# Patient Record
Sex: FEMALE | Race: BLACK OR AFRICAN AMERICAN | NOT HISPANIC OR LATINO | Employment: UNEMPLOYED | URBAN - NONMETROPOLITAN AREA
[De-identification: names, ages, dates, MRNs, and addresses within clinical notes are randomized per-mention and may not be internally consistent; named-entity substitution may affect disease eponyms.]

---

## 2023-09-14 PROBLEM — Z86.79 PERSONAL HISTORY OF OTHER DISEASES OF THE CIRCULATORY SYSTEM: Status: ACTIVE | Noted: 2023-09-14

## 2023-09-14 PROBLEM — Z87.39 PERSONAL HISTORY OF OTHER DISEASES OF THE MUSCULOSKELETAL SYSTEM AND CONNECTIVE TISSUE: Status: ACTIVE | Noted: 2023-09-14

## 2023-09-14 PROBLEM — I99.8 OTHER DISORDER OF CIRCULATORY SYSTEM: Status: ACTIVE | Noted: 2023-09-14

## 2023-09-14 PROBLEM — I25.2 OLD MYOCARDIAL INFARCTION: Status: ACTIVE | Noted: 2023-09-14

## 2023-09-14 PROBLEM — Z87.19 PERSONAL HISTORY OF OTHER DISEASES OF THE DIGESTIVE SYSTEM: Status: ACTIVE | Noted: 2023-09-14

## 2023-10-01 PROBLEM — N94.9 ADNEXAL FULLNESS: Status: ACTIVE | Noted: 2023-10-01

## 2023-10-01 PROBLEM — I25.10 CAD (CORONARY ARTERY DISEASE): Status: ACTIVE | Noted: 2023-10-01

## 2023-10-01 PROBLEM — I49.5 SICK SINUS SYNDROME DUE TO SINOATRIAL NODE DYSFUNCTION (MULTI): Status: ACTIVE | Noted: 2023-10-01

## 2023-10-01 PROBLEM — I44.7 LBBB (LEFT BUNDLE BRANCH BLOCK): Status: ACTIVE | Noted: 2023-10-01

## 2023-10-01 PROBLEM — I50.9 CHF (CONGESTIVE HEART FAILURE) (MULTI): Status: ACTIVE | Noted: 2023-10-01

## 2023-10-01 PROBLEM — N18.9 CKD (CHRONIC KIDNEY DISEASE): Status: ACTIVE | Noted: 2023-10-01

## 2023-10-01 PROBLEM — I10 HTN (HYPERTENSION): Status: ACTIVE | Noted: 2023-10-01

## 2023-10-01 RX ORDER — MULTIVITAMIN
TABLET ORAL
COMMUNITY

## 2023-10-01 RX ORDER — VALSARTAN 320 MG/1
TABLET ORAL
COMMUNITY

## 2023-10-01 RX ORDER — METOPROLOL TARTRATE 50 MG/1
50 TABLET ORAL 2 TIMES DAILY
COMMUNITY

## 2023-10-01 RX ORDER — FUROSEMIDE 20 MG/1
20 TABLET ORAL 2 TIMES DAILY
COMMUNITY

## 2023-10-01 RX ORDER — PREDNISONE 2.5 MG/1
2.5 TABLET ORAL DAILY
COMMUNITY

## 2023-10-01 RX ORDER — TORSEMIDE 20 MG/1
TABLET ORAL
COMMUNITY

## 2023-10-01 RX ORDER — HYDROXYCHLOROQUINE SULFATE 200 MG/1
TABLET, FILM COATED ORAL
COMMUNITY

## 2023-10-01 RX ORDER — OMEPRAZOLE 20 MG/1
CAPSULE, DELAYED RELEASE ORAL
COMMUNITY

## 2023-10-01 RX ORDER — HYDROCORTISONE 25 MG/G
OINTMENT TOPICAL
COMMUNITY

## 2023-10-01 RX ORDER — ATORVASTATIN CALCIUM 10 MG/1
TABLET, FILM COATED ORAL
COMMUNITY

## 2023-10-01 RX ORDER — METOLAZONE 2.5 MG/1
TABLET ORAL
COMMUNITY

## 2023-10-01 RX ORDER — TRAMADOL HYDROCHLORIDE 50 MG/1
50 TABLET ORAL EVERY 8 HOURS PRN
COMMUNITY

## 2023-10-01 RX ORDER — NITROGLYCERIN 0.4 MG/1
0.4 TABLET SUBLINGUAL EVERY 5 MIN PRN
COMMUNITY

## 2023-10-01 RX ORDER — AMLODIPINE AND BENAZEPRIL HYDROCHLORIDE 10; 20 MG/1; MG/1
CAPSULE ORAL
COMMUNITY

## 2023-10-01 RX ORDER — ERGOCALCIFEROL 1.25 MG/1
1.25 CAPSULE ORAL
COMMUNITY

## 2023-10-01 RX ORDER — AMLODIPINE BESYLATE 10 MG/1
10 TABLET ORAL DAILY
COMMUNITY

## 2023-10-01 RX ORDER — ASPIRIN 81 MG/1
81 TABLET ORAL DAILY
COMMUNITY

## 2023-10-02 ENCOUNTER — APPOINTMENT (OUTPATIENT)
Dept: CARDIOLOGY | Facility: HOSPITAL | Age: 88
End: 2023-10-02
Payer: COMMERCIAL

## 2023-10-03 ENCOUNTER — HOSPITAL ENCOUNTER (OUTPATIENT)
Dept: CARDIOLOGY | Facility: HOSPITAL | Age: 88
Discharge: HOME | End: 2023-10-03
Payer: COMMERCIAL

## 2023-10-03 DIAGNOSIS — I50.42 CHRONIC COMBINED SYSTOLIC (CONGESTIVE) AND DIASTOLIC (CONGESTIVE) HEART FAILURE (MULTI): ICD-10-CM

## 2023-10-03 DIAGNOSIS — I44.7 LBBB (LEFT BUNDLE BRANCH BLOCK): ICD-10-CM

## 2023-10-03 DIAGNOSIS — I49.5 SICK SINUS SYNDROME (MULTI): ICD-10-CM

## 2023-10-03 DIAGNOSIS — I50.9 CHF (CONGESTIVE HEART FAILURE) (MULTI): ICD-10-CM

## 2023-10-03 PROCEDURE — 93306 TTE W/DOPPLER COMPLETE: CPT

## 2023-10-03 PROCEDURE — 93306 TTE W/DOPPLER COMPLETE: CPT | Performed by: INTERNAL MEDICINE

## 2023-10-04 LAB — EJECTION FRACTION APICAL 4 CHAMBER: 51

## 2024-08-23 ENCOUNTER — CLINICAL SUPPORT (OUTPATIENT)
Dept: EMERGENCY MEDICINE | Facility: HOSPITAL | Age: 89
End: 2024-08-23
Payer: COMMERCIAL

## 2024-08-23 LAB
ALBUMIN SERPL BCP-MCNC: 3.5 G/DL (ref 3.4–5)
ALP SERPL-CCNC: 101 U/L (ref 33–136)
ALT SERPL W P-5'-P-CCNC: 12 U/L (ref 7–45)
ANION GAP BLDV CALCULATED.4IONS-SCNC: 13 MMOL/L (ref 10–25)
ANION GAP SERPL CALC-SCNC: 17 MMOL/L (ref 10–20)
AST SERPL W P-5'-P-CCNC: 18 U/L (ref 9–39)
ATRIAL RATE: 61 BPM
BASE EXCESS BLDV CALC-SCNC: -1.1 MMOL/L (ref -2–3)
BASOPHILS # BLD AUTO: 0.03 X10*3/UL (ref 0–0.1)
BASOPHILS NFR BLD AUTO: 0.7 %
BILIRUB SERPL-MCNC: 0.7 MG/DL (ref 0–1.2)
BNP SERPL-MCNC: 2626 PG/ML (ref 0–99)
BODY TEMPERATURE: 37 DEGREES CELSIUS
BUN SERPL-MCNC: 29 MG/DL (ref 6–23)
CA-I BLDV-SCNC: 1.13 MMOL/L (ref 1.1–1.33)
CALCIUM SERPL-MCNC: 9.5 MG/DL (ref 8.6–10.6)
CARDIAC TROPONIN I PNL SERPL HS: 235 NG/L (ref 0–34)
CHLORIDE BLDV-SCNC: 107 MMOL/L (ref 98–107)
CHLORIDE SERPL-SCNC: 108 MMOL/L (ref 98–107)
CO2 SERPL-SCNC: 22 MMOL/L (ref 21–32)
CREAT SERPL-MCNC: 1.61 MG/DL (ref 0.5–1.05)
EGFRCR SERPLBLD CKD-EPI 2021: 31 ML/MIN/1.73M*2
EOSINOPHIL # BLD AUTO: 0.07 X10*3/UL (ref 0–0.4)
EOSINOPHIL NFR BLD AUTO: 1.6 %
ERYTHROCYTE [DISTWIDTH] IN BLOOD BY AUTOMATED COUNT: 17.9 % (ref 11.5–14.5)
GLUCOSE BLDV-MCNC: 110 MG/DL (ref 74–99)
GLUCOSE SERPL-MCNC: 96 MG/DL (ref 74–99)
HCO3 BLDV-SCNC: 24.3 MMOL/L (ref 22–26)
HCT VFR BLD AUTO: 41.8 % (ref 36–46)
HCT VFR BLD EST: 44 % (ref 36–46)
HGB BLD-MCNC: 14.2 G/DL (ref 12–16)
HGB BLDV-MCNC: 14.8 G/DL (ref 12–16)
IMM GRANULOCYTES # BLD AUTO: 0.02 X10*3/UL (ref 0–0.5)
IMM GRANULOCYTES NFR BLD AUTO: 0.5 % (ref 0–0.9)
LACTATE BLDV-SCNC: 2.5 MMOL/L (ref 0.4–2)
LYMPHOCYTES # BLD AUTO: 0.76 X10*3/UL (ref 0.8–3)
LYMPHOCYTES NFR BLD AUTO: 17.6 %
MCH RBC QN AUTO: 27.1 PG (ref 26–34)
MCHC RBC AUTO-ENTMCNC: 34 G/DL (ref 32–36)
MCV RBC AUTO: 80 FL (ref 80–100)
MONOCYTES # BLD AUTO: 0.51 X10*3/UL (ref 0.05–0.8)
MONOCYTES NFR BLD AUTO: 11.8 %
NEUTROPHILS # BLD AUTO: 2.94 X10*3/UL (ref 1.6–5.5)
NEUTROPHILS NFR BLD AUTO: 67.8 %
NRBC BLD-RTO: 0 /100 WBCS (ref 0–0)
OXYHGB MFR BLDV: 21.5 % (ref 45–75)
PCO2 BLDV: 42 MM HG (ref 41–51)
PH BLDV: 7.37 PH (ref 7.33–7.43)
PLATELET # BLD AUTO: 214 X10*3/UL (ref 150–450)
PO2 BLDV: 23 MM HG (ref 35–45)
POTASSIUM BLDV-SCNC: 4.1 MMOL/L (ref 3.5–5.3)
POTASSIUM SERPL-SCNC: 4 MMOL/L (ref 3.5–5.3)
PROT SERPL-MCNC: 7.1 G/DL (ref 6.4–8.2)
Q ONSET: 196 MS
QRS COUNT: 10 BEATS
QRS DURATION: 164 MS
QT INTERVAL: 492 MS
QTC CALCULATION(BAZETT): 495 MS
QTC FREDERICIA: 494 MS
R AXIS: 147 DEGREES
RBC # BLD AUTO: 5.24 X10*6/UL (ref 4–5.2)
SAO2 % BLDV: 22 % (ref 45–75)
SODIUM BLDV-SCNC: 140 MMOL/L (ref 136–145)
SODIUM SERPL-SCNC: 143 MMOL/L (ref 136–145)
T AXIS: -33 DEGREES
T OFFSET: 442 MS
VENTRICULAR RATE: 61 BPM
WBC # BLD AUTO: 4.3 X10*3/UL (ref 4.4–11.3)

## 2024-08-23 PROCEDURE — 85025 COMPLETE CBC W/AUTO DIFF WBC: CPT | Performed by: STUDENT IN AN ORGANIZED HEALTH CARE EDUCATION/TRAINING PROGRAM

## 2024-08-23 PROCEDURE — 93005 ELECTROCARDIOGRAM TRACING: CPT

## 2024-08-23 PROCEDURE — 82330 ASSAY OF CALCIUM: CPT

## 2024-08-23 PROCEDURE — 84484 ASSAY OF TROPONIN QUANT: CPT | Performed by: STUDENT IN AN ORGANIZED HEALTH CARE EDUCATION/TRAINING PROGRAM

## 2024-08-23 PROCEDURE — 99285 EMERGENCY DEPT VISIT HI MDM: CPT

## 2024-08-23 PROCEDURE — 36415 COLL VENOUS BLD VENIPUNCTURE: CPT | Performed by: EMERGENCY MEDICINE

## 2024-08-23 PROCEDURE — 84132 ASSAY OF SERUM POTASSIUM: CPT

## 2024-08-23 PROCEDURE — 85025 COMPLETE CBC W/AUTO DIFF WBC: CPT | Performed by: EMERGENCY MEDICINE

## 2024-08-23 PROCEDURE — 82435 ASSAY OF BLOOD CHLORIDE: CPT

## 2024-08-23 PROCEDURE — 83880 ASSAY OF NATRIURETIC PEPTIDE: CPT | Performed by: STUDENT IN AN ORGANIZED HEALTH CARE EDUCATION/TRAINING PROGRAM

## 2024-08-23 PROCEDURE — 84484 ASSAY OF TROPONIN QUANT: CPT | Performed by: EMERGENCY MEDICINE

## 2024-08-23 PROCEDURE — 99285 EMERGENCY DEPT VISIT HI MDM: CPT | Performed by: STUDENT IN AN ORGANIZED HEALTH CARE EDUCATION/TRAINING PROGRAM

## 2024-08-23 PROCEDURE — 84132 ASSAY OF SERUM POTASSIUM: CPT | Performed by: EMERGENCY MEDICINE

## 2024-08-23 PROCEDURE — 93010 ELECTROCARDIOGRAM REPORT: CPT | Performed by: STUDENT IN AN ORGANIZED HEALTH CARE EDUCATION/TRAINING PROGRAM

## 2024-08-23 PROCEDURE — 83880 ASSAY OF NATRIURETIC PEPTIDE: CPT | Performed by: EMERGENCY MEDICINE

## 2024-08-23 PROCEDURE — 84132 ASSAY OF SERUM POTASSIUM: CPT | Performed by: STUDENT IN AN ORGANIZED HEALTH CARE EDUCATION/TRAINING PROGRAM

## 2024-08-23 PROCEDURE — 36415 COLL VENOUS BLD VENIPUNCTURE: CPT

## 2024-08-23 ASSESSMENT — COLUMBIA-SUICIDE SEVERITY RATING SCALE - C-SSRS
6. HAVE YOU EVER DONE ANYTHING, STARTED TO DO ANYTHING, OR PREPARED TO DO ANYTHING TO END YOUR LIFE?: NO
1. IN THE PAST MONTH, HAVE YOU WISHED YOU WERE DEAD OR WISHED YOU COULD GO TO SLEEP AND NOT WAKE UP?: NO
2. HAVE YOU ACTUALLY HAD ANY THOUGHTS OF KILLING YOURSELF?: NO

## 2024-08-23 ASSESSMENT — PAIN DESCRIPTION - ORIENTATION: ORIENTATION: LEFT;RIGHT

## 2024-08-23 ASSESSMENT — LIFESTYLE VARIABLES
TOTAL SCORE: 0
HAVE YOU EVER FELT YOU SHOULD CUT DOWN ON YOUR DRINKING: NO
HAVE PEOPLE ANNOYED YOU BY CRITICIZING YOUR DRINKING: NO
EVER HAD A DRINK FIRST THING IN THE MORNING TO STEADY YOUR NERVES TO GET RID OF A HANGOVER: NO
EVER FELT BAD OR GUILTY ABOUT YOUR DRINKING: NO

## 2024-08-23 ASSESSMENT — PAIN DESCRIPTION - DESCRIPTORS: DESCRIPTORS: ACHING

## 2024-08-23 ASSESSMENT — PAIN DESCRIPTION - LOCATION: LOCATION: LEG

## 2024-08-23 ASSESSMENT — PAIN SCALES - GENERAL: PAINLEVEL_OUTOF10: 7

## 2024-08-23 ASSESSMENT — PAIN DESCRIPTION - FREQUENCY: FREQUENCY: CONSTANT/CONTINUOUS

## 2024-08-23 ASSESSMENT — PAIN DESCRIPTION - PAIN TYPE: TYPE: ACUTE PAIN

## 2024-08-23 ASSESSMENT — PAIN - FUNCTIONAL ASSESSMENT: PAIN_FUNCTIONAL_ASSESSMENT: 0-10

## 2024-08-24 ENCOUNTER — CLINICAL SUPPORT (OUTPATIENT)
Dept: EMERGENCY MEDICINE | Facility: HOSPITAL | Age: 89
End: 2024-08-24
Payer: COMMERCIAL

## 2024-08-24 ENCOUNTER — APPOINTMENT (OUTPATIENT)
Dept: RADIOLOGY | Facility: HOSPITAL | Age: 89
End: 2024-08-24
Payer: COMMERCIAL

## 2024-08-24 ENCOUNTER — HOSPITAL ENCOUNTER (INPATIENT)
Facility: HOSPITAL | Age: 89
End: 2024-08-24
Attending: STUDENT IN AN ORGANIZED HEALTH CARE EDUCATION/TRAINING PROGRAM | Admitting: INTERNAL MEDICINE
Payer: COMMERCIAL

## 2024-08-24 ENCOUNTER — APPOINTMENT (OUTPATIENT)
Dept: CARDIOLOGY | Facility: HOSPITAL | Age: 89
End: 2024-08-24
Payer: COMMERCIAL

## 2024-08-24 DIAGNOSIS — R60.0 EDEMA OF BOTH UPPER EXTREMITIES: ICD-10-CM

## 2024-08-24 DIAGNOSIS — I42.9 CARDIOMYOPATHY, UNSPECIFIED TYPE (MULTI): Chronic | ICD-10-CM

## 2024-08-24 DIAGNOSIS — K59.00 CONSTIPATION, UNSPECIFIED CONSTIPATION TYPE: ICD-10-CM

## 2024-08-24 DIAGNOSIS — I21.4 NSTEMI (NON-ST ELEVATED MYOCARDIAL INFARCTION) (MULTI): Primary | ICD-10-CM

## 2024-08-24 DIAGNOSIS — Z95.0 PACEMAKER: ICD-10-CM

## 2024-08-24 DIAGNOSIS — E55.9 VITAMIN D DEFICIENCY: ICD-10-CM

## 2024-08-24 DIAGNOSIS — I50.43 ACUTE ON CHRONIC COMBINED SYSTOLIC AND DIASTOLIC CONGESTIVE HEART FAILURE (MULTI): ICD-10-CM

## 2024-08-24 DIAGNOSIS — Z00.00 EXAMINATION: ICD-10-CM

## 2024-08-24 DIAGNOSIS — M79.89 OTHER SPECIFIED SOFT TISSUE DISORDERS: ICD-10-CM

## 2024-08-24 DIAGNOSIS — I42.8 OTHER CARDIOMYOPATHY (MULTI): Chronic | ICD-10-CM

## 2024-08-24 DIAGNOSIS — K21.9 GASTROESOPHAGEAL REFLUX DISEASE, UNSPECIFIED WHETHER ESOPHAGITIS PRESENT: ICD-10-CM

## 2024-08-24 DIAGNOSIS — M19.90 ARTHRITIS: ICD-10-CM

## 2024-08-24 DIAGNOSIS — I50.21 ACUTE SYSTOLIC HEART FAILURE (MULTI): Chronic | ICD-10-CM

## 2024-08-24 DIAGNOSIS — E78.5 HYPERLIPIDEMIA, UNSPECIFIED HYPERLIPIDEMIA TYPE: ICD-10-CM

## 2024-08-24 DIAGNOSIS — I49.5 SICK SINUS SYNDROME DUE TO SINOATRIAL NODE DYSFUNCTION (MULTI): ICD-10-CM

## 2024-08-24 DIAGNOSIS — I50.22 CHRONIC SYSTOLIC (CONGESTIVE) HEART FAILURE (MULTI): ICD-10-CM

## 2024-08-24 PROBLEM — N17.9 AKI (ACUTE KIDNEY INJURY) (CMS-HCC): Status: ACTIVE | Noted: 2024-08-24

## 2024-08-24 PROBLEM — N17.9 AKI (ACUTE KIDNEY INJURY) (CMS-HCC): Chronic | Status: ACTIVE | Noted: 2024-08-24

## 2024-08-24 LAB
ALBUMIN SERPL BCP-MCNC: 3.1 G/DL (ref 3.4–5)
ANION GAP SERPL CALC-SCNC: 13 MMOL/L (ref 10–20)
AORTIC VALVE MEAN GRADIENT: 1.8 MMHG
AORTIC VALVE PEAK VELOCITY: 0.87 M/S
APTT PPP: 32 SECONDS (ref 27–38)
AV PEAK GRADIENT: 3 MMHG
AVA (PEAK VEL): 2.3 CM2
AVA (VTI): 2.17 CM2
BUN SERPL-MCNC: 29 MG/DL (ref 6–23)
CALCIUM SERPL-MCNC: 9 MG/DL (ref 8.6–10.6)
CARDIAC TROPONIN I PNL SERPL HS: 189 NG/L (ref 0–34)
CHLORIDE SERPL-SCNC: 107 MMOL/L (ref 98–107)
CO2 SERPL-SCNC: 25 MMOL/L (ref 21–32)
CREAT SERPL-MCNC: 1.56 MG/DL (ref 0.5–1.05)
EGFRCR SERPLBLD CKD-EPI 2021: 32 ML/MIN/1.73M*2
EJECTION FRACTION APICAL 4 CHAMBER: 31.7
EJECTION FRACTION: 33 %
ERYTHROCYTE [DISTWIDTH] IN BLOOD BY AUTOMATED COUNT: 18.2 % (ref 11.5–14.5)
GLUCOSE SERPL-MCNC: 88 MG/DL (ref 74–99)
HCT VFR BLD AUTO: 40.8 % (ref 36–46)
HGB BLD-MCNC: 13 G/DL (ref 12–16)
INR PPP: 1.1 (ref 0.9–1.1)
LEFT ATRIUM VOLUME AREA LENGTH INDEX BSA: 38.1 ML/M2
LEFT VENTRICLE INTERNAL DIMENSION DIASTOLE: 3.56 CM (ref 3.5–6)
LEFT VENTRICULAR OUTFLOW TRACT DIAMETER: 1.98 CM
MAGNESIUM SERPL-MCNC: 2.24 MG/DL (ref 1.6–2.4)
MCH RBC QN AUTO: 26.9 PG (ref 26–34)
MCHC RBC AUTO-ENTMCNC: 31.9 G/DL (ref 32–36)
MCV RBC AUTO: 84 FL (ref 80–100)
MITRAL VALVE E/A RATIO: 0.68
NRBC BLD-RTO: 0 /100 WBCS (ref 0–0)
PHOSPHATE SERPL-MCNC: 3.3 MG/DL (ref 2.5–4.9)
PLATELET # BLD AUTO: 196 X10*3/UL (ref 150–450)
POTASSIUM SERPL-SCNC: 4.4 MMOL/L (ref 3.5–5.3)
PROT SERPL-MCNC: 6.2 G/DL (ref 6.4–8.2)
PROT UR-ACNC: 9 MG/DL (ref 5–25)
PROTHROMBIN TIME: 12.6 SECONDS (ref 9.8–12.8)
RBC # BLD AUTO: 4.84 X10*6/UL (ref 4–5.2)
RIGHT VENTRICLE FREE WALL PEAK S': 12 CM/S
RIGHT VENTRICLE PEAK SYSTOLIC PRESSURE: 31.2 MMHG
SODIUM SERPL-SCNC: 141 MMOL/L (ref 136–145)
TRICUSPID ANNULAR PLANE SYSTOLIC EXCURSION: 2 CM
UFH PPP CHRO-ACNC: 0.6 IU/ML
UFH PPP CHRO-ACNC: 0.6 IU/ML
WBC # BLD AUTO: 4.4 X10*3/UL (ref 4.4–11.3)

## 2024-08-24 PROCEDURE — 2500000002 HC RX 250 W HCPCS SELF ADMINISTERED DRUGS (ALT 637 FOR MEDICARE OP, ALT 636 FOR OP/ED): Performed by: STUDENT IN AN ORGANIZED HEALTH CARE EDUCATION/TRAINING PROGRAM

## 2024-08-24 PROCEDURE — 2500000001 HC RX 250 WO HCPCS SELF ADMINISTERED DRUGS (ALT 637 FOR MEDICARE OP): Performed by: STUDENT IN AN ORGANIZED HEALTH CARE EDUCATION/TRAINING PROGRAM

## 2024-08-24 PROCEDURE — 84165 PROTEIN E-PHORESIS SERUM: CPT

## 2024-08-24 PROCEDURE — 84165 PROTEIN E-PHORESIS SERUM: CPT | Performed by: PATHOLOGY

## 2024-08-24 PROCEDURE — 2500000004 HC RX 250 GENERAL PHARMACY W/ HCPCS (ALT 636 FOR OP/ED)

## 2024-08-24 PROCEDURE — 1200000002 HC GENERAL ROOM WITH TELEMETRY DAILY

## 2024-08-24 PROCEDURE — 36415 COLL VENOUS BLD VENIPUNCTURE: CPT

## 2024-08-24 PROCEDURE — 86325 OTHER IMMUNOELECTROPHORESIS: CPT | Performed by: PATHOLOGY

## 2024-08-24 PROCEDURE — 93306 TTE W/DOPPLER COMPLETE: CPT | Performed by: INTERNAL MEDICINE

## 2024-08-24 PROCEDURE — 36415 COLL VENOUS BLD VENIPUNCTURE: CPT | Performed by: STUDENT IN AN ORGANIZED HEALTH CARE EDUCATION/TRAINING PROGRAM

## 2024-08-24 PROCEDURE — 83521 IG LIGHT CHAINS FREE EACH: CPT

## 2024-08-24 PROCEDURE — 85520 HEPARIN ASSAY: CPT

## 2024-08-24 PROCEDURE — 96375 TX/PRO/DX INJ NEW DRUG ADDON: CPT | Mod: 59

## 2024-08-24 PROCEDURE — 83735 ASSAY OF MAGNESIUM: CPT

## 2024-08-24 PROCEDURE — 93306 TTE W/DOPPLER COMPLETE: CPT

## 2024-08-24 PROCEDURE — 84155 ASSAY OF PROTEIN SERUM: CPT

## 2024-08-24 PROCEDURE — 71045 X-RAY EXAM CHEST 1 VIEW: CPT | Mod: FOREIGN READ | Performed by: RADIOLOGY

## 2024-08-24 PROCEDURE — 93308 TTE F-UP OR LMTD: CPT | Performed by: STUDENT IN AN ORGANIZED HEALTH CARE EDUCATION/TRAINING PROGRAM

## 2024-08-24 PROCEDURE — 96374 THER/PROPH/DIAG INJ IV PUSH: CPT | Mod: 59

## 2024-08-24 PROCEDURE — 84166 PROTEIN E-PHORESIS/URINE/CSF: CPT | Performed by: PATHOLOGY

## 2024-08-24 PROCEDURE — 2500000004 HC RX 250 GENERAL PHARMACY W/ HCPCS (ALT 636 FOR OP/ED): Performed by: STUDENT IN AN ORGANIZED HEALTH CARE EDUCATION/TRAINING PROGRAM

## 2024-08-24 PROCEDURE — 93005 ELECTROCARDIOGRAM TRACING: CPT

## 2024-08-24 PROCEDURE — 86320 SERUM IMMUNOELECTROPHORESIS: CPT | Performed by: PATHOLOGY

## 2024-08-24 PROCEDURE — 71045 X-RAY EXAM CHEST 1 VIEW: CPT

## 2024-08-24 PROCEDURE — 84156 ASSAY OF PROTEIN URINE: CPT

## 2024-08-24 PROCEDURE — 86335 IMMUNFIX E-PHORSIS/URINE/CSF: CPT

## 2024-08-24 PROCEDURE — 85610 PROTHROMBIN TIME: CPT | Performed by: STUDENT IN AN ORGANIZED HEALTH CARE EDUCATION/TRAINING PROGRAM

## 2024-08-24 PROCEDURE — 85027 COMPLETE CBC AUTOMATED: CPT

## 2024-08-24 PROCEDURE — 80069 RENAL FUNCTION PANEL: CPT

## 2024-08-24 PROCEDURE — 99223 1ST HOSP IP/OBS HIGH 75: CPT | Performed by: STUDENT IN AN ORGANIZED HEALTH CARE EDUCATION/TRAINING PROGRAM

## 2024-08-24 RX ORDER — FUROSEMIDE 10 MG/ML
40 INJECTION INTRAMUSCULAR; INTRAVENOUS ONCE
Status: COMPLETED | OUTPATIENT
Start: 2024-08-24 | End: 2024-08-24

## 2024-08-24 RX ORDER — ACETAMINOPHEN 325 MG/1
650 TABLET ORAL EVERY 6 HOURS PRN
Status: DISCONTINUED | OUTPATIENT
Start: 2024-08-24 | End: 2024-09-04 | Stop reason: HOSPADM

## 2024-08-24 RX ORDER — ACETAMINOPHEN 650 MG/1
650 SUPPOSITORY RECTAL EVERY 6 HOURS PRN
Status: DISCONTINUED | OUTPATIENT
Start: 2024-08-24 | End: 2024-08-30

## 2024-08-24 RX ORDER — ACETAMINOPHEN 160 MG/5ML
650 SOLUTION ORAL EVERY 6 HOURS PRN
Status: DISCONTINUED | OUTPATIENT
Start: 2024-08-24 | End: 2024-08-30

## 2024-08-24 RX ORDER — NAPROXEN SODIUM 220 MG/1
324 TABLET, FILM COATED ORAL ONCE
Status: COMPLETED | OUTPATIENT
Start: 2024-08-24 | End: 2024-08-24

## 2024-08-24 RX ORDER — ROSUVASTATIN CALCIUM 20 MG/1
20 TABLET, COATED ORAL NIGHTLY
Status: DISCONTINUED | OUTPATIENT
Start: 2024-08-24 | End: 2024-09-04 | Stop reason: HOSPADM

## 2024-08-24 RX ORDER — HEPARIN SODIUM 10000 [USP'U]/100ML
0-4000 INJECTION, SOLUTION INTRAVENOUS CONTINUOUS
Status: DISCONTINUED | OUTPATIENT
Start: 2024-08-24 | End: 2024-08-26

## 2024-08-24 RX ORDER — PANTOPRAZOLE SODIUM 40 MG/1
40 TABLET, DELAYED RELEASE ORAL
Status: DISCONTINUED | OUTPATIENT
Start: 2024-08-24 | End: 2024-09-04 | Stop reason: HOSPADM

## 2024-08-24 RX ORDER — PREDNISONE 2.5 MG/1
2.5 TABLET ORAL DAILY
Status: DISCONTINUED | OUTPATIENT
Start: 2024-08-24 | End: 2024-09-04 | Stop reason: HOSPADM

## 2024-08-24 RX ORDER — ERGOCALCIFEROL 1.25 MG/1
1250 CAPSULE ORAL
Status: DISCONTINUED | OUTPATIENT
Start: 2024-08-25 | End: 2024-09-04 | Stop reason: HOSPADM

## 2024-08-24 RX ORDER — AMLODIPINE BESYLATE 10 MG/1
10 TABLET ORAL DAILY
Status: DISCONTINUED | OUTPATIENT
Start: 2024-08-24 | End: 2024-08-25

## 2024-08-24 RX ORDER — FERROUS SULFATE, DRIED 160(50) MG
1 TABLET, EXTENDED RELEASE ORAL DAILY
Status: DISCONTINUED | OUTPATIENT
Start: 2024-08-24 | End: 2024-09-04 | Stop reason: HOSPADM

## 2024-08-24 RX ORDER — ASPIRIN 81 MG/1
81 TABLET ORAL DAILY
Status: DISCONTINUED | OUTPATIENT
Start: 2024-08-24 | End: 2024-09-04 | Stop reason: HOSPADM

## 2024-08-24 RX ORDER — POLYETHYLENE GLYCOL 3350 17 G/17G
17 POWDER, FOR SOLUTION ORAL DAILY
Status: DISCONTINUED | OUTPATIENT
Start: 2024-08-24 | End: 2024-08-28

## 2024-08-24 RX ADMIN — PANTOPRAZOLE SODIUM 40 MG: 40 TABLET, DELAYED RELEASE ORAL at 06:34

## 2024-08-24 RX ADMIN — FUROSEMIDE 40 MG: 10 INJECTION, SOLUTION INTRAMUSCULAR; INTRAVENOUS at 12:13

## 2024-08-24 RX ADMIN — FUROSEMIDE 40 MG: 10 INJECTION, SOLUTION INTRAMUSCULAR; INTRAVENOUS at 00:34

## 2024-08-24 RX ADMIN — ACETAMINOPHEN 650 MG: 325 TABLET ORAL at 09:34

## 2024-08-24 RX ADMIN — FUROSEMIDE 40 MG: 10 INJECTION, SOLUTION INTRAMUSCULAR; INTRAVENOUS at 18:07

## 2024-08-24 RX ADMIN — ASPIRIN 81 MG: 81 TABLET, COATED ORAL at 09:34

## 2024-08-24 RX ADMIN — AMLODIPINE BESYLATE 10 MG: 10 TABLET ORAL at 09:35

## 2024-08-24 RX ADMIN — Medication 1 TABLET: at 09:34

## 2024-08-24 RX ADMIN — HEPARIN SODIUM 800 UNITS/HR: 10000 INJECTION, SOLUTION INTRAVENOUS at 01:34

## 2024-08-24 RX ADMIN — ASPIRIN 81 MG 324 MG: 81 TABLET ORAL at 00:34

## 2024-08-24 RX ADMIN — PREDNISONE 2.5 MG: 2.5 TABLET ORAL at 09:34

## 2024-08-24 RX ADMIN — ROSUVASTATIN CALCIUM 20 MG: 20 TABLET, FILM COATED ORAL at 20:14

## 2024-08-24 SDOH — ECONOMIC STABILITY: HOUSING INSECURITY: IN THE PAST 12 MONTHS, HOW MANY TIMES HAVE YOU MOVED WHERE YOU WERE LIVING?: 1

## 2024-08-24 SDOH — ECONOMIC STABILITY: TRANSPORTATION INSECURITY
IN THE PAST 12 MONTHS, HAS THE LACK OF TRANSPORTATION KEPT YOU FROM MEDICAL APPOINTMENTS OR FROM GETTING MEDICATIONS?: NO

## 2024-08-24 SDOH — SOCIAL STABILITY: SOCIAL INSECURITY: HAS ANYONE EVER THREATENED TO HURT YOUR FAMILY OR YOUR PETS?: NO

## 2024-08-24 SDOH — SOCIAL STABILITY: SOCIAL INSECURITY: ABUSE: ADULT

## 2024-08-24 SDOH — SOCIAL STABILITY: SOCIAL INSECURITY: WERE YOU ABLE TO COMPLETE ALL THE BEHAVIORAL HEALTH SCREENINGS?: YES

## 2024-08-24 SDOH — SOCIAL STABILITY: SOCIAL INSECURITY: ARE THERE ANY APPARENT SIGNS OF INJURIES/BEHAVIORS THAT COULD BE RELATED TO ABUSE/NEGLECT?: NO

## 2024-08-24 SDOH — SOCIAL STABILITY: SOCIAL INSECURITY: DOES ANYONE TRY TO KEEP YOU FROM HAVING/CONTACTING OTHER FRIENDS OR DOING THINGS OUTSIDE YOUR HOME?: NO

## 2024-08-24 SDOH — ECONOMIC STABILITY: INCOME INSECURITY: IN THE LAST 12 MONTHS, WAS THERE A TIME WHEN YOU WERE NOT ABLE TO PAY THE MORTGAGE OR RENT ON TIME?: NO

## 2024-08-24 SDOH — SOCIAL STABILITY: SOCIAL INSECURITY: HAVE YOU HAD ANY THOUGHTS OF HARMING ANYONE ELSE?: NO

## 2024-08-24 SDOH — ECONOMIC STABILITY: TRANSPORTATION INSECURITY
IN THE PAST 12 MONTHS, HAS LACK OF TRANSPORTATION KEPT YOU FROM MEETINGS, WORK, OR FROM GETTING THINGS NEEDED FOR DAILY LIVING?: NO

## 2024-08-24 SDOH — ECONOMIC STABILITY: HOUSING INSECURITY: AT ANY TIME IN THE PAST 12 MONTHS, WERE YOU HOMELESS OR LIVING IN A SHELTER (INCLUDING NOW)?: NO

## 2024-08-24 SDOH — SOCIAL STABILITY: SOCIAL INSECURITY: ARE YOU OR HAVE YOU BEEN THREATENED OR ABUSED PHYSICALLY, EMOTIONALLY, OR SEXUALLY BY ANYONE?: NO

## 2024-08-24 SDOH — HEALTH STABILITY: PHYSICAL HEALTH: ON AVERAGE, HOW MANY DAYS PER WEEK DO YOU ENGAGE IN MODERATE TO STRENUOUS EXERCISE (LIKE A BRISK WALK)?: 0 DAYS

## 2024-08-24 SDOH — HEALTH STABILITY: PHYSICAL HEALTH: ON AVERAGE, HOW MANY MINUTES DO YOU ENGAGE IN EXERCISE AT THIS LEVEL?: 0 MIN

## 2024-08-24 SDOH — SOCIAL STABILITY: SOCIAL INSECURITY: DO YOU FEEL UNSAFE GOING BACK TO THE PLACE WHERE YOU ARE LIVING?: NO

## 2024-08-24 SDOH — ECONOMIC STABILITY: INCOME INSECURITY: HOW HARD IS IT FOR YOU TO PAY FOR THE VERY BASICS LIKE FOOD, HOUSING, MEDICAL CARE, AND HEATING?: NOT HARD AT ALL

## 2024-08-24 SDOH — SOCIAL STABILITY: SOCIAL INSECURITY: HAVE YOU HAD THOUGHTS OF HARMING ANYONE ELSE?: NO

## 2024-08-24 SDOH — SOCIAL STABILITY: SOCIAL INSECURITY: DO YOU FEEL ANYONE HAS EXPLOITED OR TAKEN ADVANTAGE OF YOU FINANCIALLY OR OF YOUR PERSONAL PROPERTY?: NO

## 2024-08-24 ASSESSMENT — COGNITIVE AND FUNCTIONAL STATUS - GENERAL
HELP NEEDED FOR BATHING: A LITTLE
WALKING IN HOSPITAL ROOM: A LOT
DRESSING REGULAR UPPER BODY CLOTHING: A LITTLE
DRESSING REGULAR LOWER BODY CLOTHING: A LITTLE
EATING MEALS: A LITTLE
EATING MEALS: A LITTLE
PERSONAL GROOMING: A LITTLE
CLIMB 3 TO 5 STEPS WITH RAILING: A LOT
MOVING TO AND FROM BED TO CHAIR: A LOT
DRESSING REGULAR LOWER BODY CLOTHING: A LITTLE
TOILETING: A LITTLE
TOILETING: A LITTLE
PERSONAL GROOMING: A LITTLE
STANDING UP FROM CHAIR USING ARMS: A LOT
DAILY ACTIVITIY SCORE: 18
MOVING FROM LYING ON BACK TO SITTING ON SIDE OF FLAT BED WITH BEDRAILS: A LOT
DAILY ACTIVITIY SCORE: 18
CLIMB 3 TO 5 STEPS WITH RAILING: A LOT
TURNING FROM BACK TO SIDE WHILE IN FLAT BAD: A LOT
MOBILITY SCORE: 12
TURNING FROM BACK TO SIDE WHILE IN FLAT BAD: A LOT
HELP NEEDED FOR BATHING: A LITTLE
WALKING IN HOSPITAL ROOM: A LOT
MOVING TO AND FROM BED TO CHAIR: A LOT
DAILY ACTIVITIY SCORE: 19
DRESSING REGULAR LOWER BODY CLOTHING: A LITTLE
MOVING TO AND FROM BED TO CHAIR: A LOT
DRESSING REGULAR UPPER BODY CLOTHING: A LITTLE
MOBILITY SCORE: 12
DRESSING REGULAR UPPER BODY CLOTHING: A LITTLE
PERSONAL GROOMING: A LITTLE
STANDING UP FROM CHAIR USING ARMS: A LOT
STANDING UP FROM CHAIR USING ARMS: A LOT
MOBILITY SCORE: 12
WALKING IN HOSPITAL ROOM: A LOT
TURNING FROM BACK TO SIDE WHILE IN FLAT BAD: A LOT
MOVING FROM LYING ON BACK TO SITTING ON SIDE OF FLAT BED WITH BEDRAILS: A LOT
CLIMB 3 TO 5 STEPS WITH RAILING: A LOT
MOVING FROM LYING ON BACK TO SITTING ON SIDE OF FLAT BED WITH BEDRAILS: A LOT
HELP NEEDED FOR BATHING: A LITTLE
TOILETING: A LITTLE
PATIENT BASELINE BEDBOUND: NO

## 2024-08-24 ASSESSMENT — ACTIVITIES OF DAILY LIVING (ADL)
HEARING - LEFT EAR: FUNCTIONAL
DRESSING YOURSELF: NEEDS ASSISTANCE
FEEDING YOURSELF: NEEDS ASSISTANCE
GROOMING: NEEDS ASSISTANCE
ADEQUATE_TO_COMPLETE_ADL: YES
TOILETING: NEEDS ASSISTANCE
ASSISTIVE_DEVICE: WALKER
JUDGMENT_ADEQUATE_SAFELY_COMPLETE_DAILY_ACTIVITIES: YES
WALKS IN HOME: NEEDS ASSISTANCE
BATHING: NEEDS ASSISTANCE
HEARING - RIGHT EAR: FUNCTIONAL
PATIENT'S MEMORY ADEQUATE TO SAFELY COMPLETE DAILY ACTIVITIES?: YES

## 2024-08-24 ASSESSMENT — PAIN SCALES - WONG BAKER: WONGBAKER_NUMERICALRESPONSE: NO HURT

## 2024-08-24 ASSESSMENT — LIFESTYLE VARIABLES
HOW OFTEN DO YOU HAVE 6 OR MORE DRINKS ON ONE OCCASION: NEVER
HOW OFTEN DO YOU HAVE A DRINK CONTAINING ALCOHOL: NEVER
SKIP TO QUESTIONS 9-10: 1
AUDIT-C TOTAL SCORE: 0
AUDIT-C TOTAL SCORE: 0
HOW MANY STANDARD DRINKS CONTAINING ALCOHOL DO YOU HAVE ON A TYPICAL DAY: PATIENT DOES NOT DRINK

## 2024-08-24 ASSESSMENT — PAIN SCALES - GENERAL
PAINLEVEL_OUTOF10: 0 - NO PAIN
PAINLEVEL_OUTOF10: 7

## 2024-08-24 ASSESSMENT — PATIENT HEALTH QUESTIONNAIRE - PHQ9
2. FEELING DOWN, DEPRESSED OR HOPELESS: NOT AT ALL
SUM OF ALL RESPONSES TO PHQ9 QUESTIONS 1 & 2: 0
1. LITTLE INTEREST OR PLEASURE IN DOING THINGS: NOT AT ALL

## 2024-08-24 NOTE — CARE PLAN
The patient's goals for the shift include  being free from pain     The clinical goals for the shift include pt will remain free from falls

## 2024-08-24 NOTE — CARE PLAN
The patient's goals for the shift include  free from pain    The clinical goals for the shift include pt will remain free from falls

## 2024-08-24 NOTE — ED TRIAGE NOTES
Pt reports bilateral upper and lower extremity edema that she has had for the last couple of weeks she also reports  SOB she has a hx/of CHF with a pacemaker

## 2024-08-24 NOTE — PROGRESS NOTES
Pharmacy Medication History Review    Erin Lopez is a 88 y.o. female admitted for NSTEMI (non-ST elevated myocardial infarction) ([facility]). Pharmacy reviewed the patient's amala-xj-gwdxrrfzj medications and allergies for accuracy.    Medications ADDED:  None  Medications CHANGED:  Calcium, vitamin D, Lopressor, torsemide  Medications REMOVED:   Hydrocortisone ointment, prednisone, omeprazole    The list below reflects the updated PTA list.   Prior to Admission Medications   Prescriptions Last Dose Informant   amLODIPine (Norvasc) 10 mg tablet Past Week Self   Sig: Take 1 tablet (10 mg) by mouth once daily.   aspirin 81 mg EC tablet Past Week Self   Sig: Take 1 tablet (81 mg) by mouth once daily.   atorvastatin (Lipitor) 10 mg tablet Past Week Self   Sig: Take 1 tablet (10 mg) by mouth once daily.  Patient reports taking, but no fill history    calcium carbonate-vitamin D3 (Calcium 600 + D,3,) 600 mg-10 mcg (400 unit) tablet Past Week Self   Sig: Take 1 tablet by mouth once daily.   ergocalciferol (Vitamin D-2) 50 MCG (2000 UT) capsule capsule Past Month Self   Sig: Take 1 capsule (50 mcg) by mouth 1 (one) time per week.   furosemide (Lasix) 20 mg tablet Past Week Self   Sig: Take 1 tablet (20 mg) by mouth 2 times a day.   metoprolol tartrate (Lopressor) 50 mg tablet Past Week Self   Sig: Take 1 tablet by mouth once daily.   nitroglycerin (Nitrostat) 0.4 mg SL tablet More than a month Self   Sig: Place 1 tablet (0.4 mg) under the tongue every 5 minutes if needed for chest pain (for up to 3 doses.Call 911 is pain persists).   torsemide (Demadex) 20 mg tablet Past Week Self   Sig: Take 1 tablet (20 mg) by mouth once daily.  Patient reports taking, but no fill history    traMADol (Ultram) 50 mg tablet Past Week Self   Sig: Take 1 tablet (50 mg) by mouth every 8 hours if needed.      Facility-Administered Medications: None      The list below reflects the updated allergy list. Please review each documented allergy for  "additional clarification and justification.  Allergies  Reviewed by Simone Nava on 8/24/2024   No Known Allergies  Patient states that a cream may have caused leg swelling, but does not have any more information     Patient declines M2B at discharge.     Sources:   Last fill history through pharmacy  Patient reported    Additional Comments:  Patient is a poor historian; not able to recall names or frequencies without prompting  States she takes some medications that have not been filled   Patient reports being on torsemide an furosemide together  Patietn reports only taking Lopressor once daily  Fill history states vitamin D dose of 2000 units, which is normally once daily, and patient reports taking once weekly    SIMONE NAVA  PGY-1 Pharmacy Resident  08/24/24     Secure Chat preferred   If no response call [phone] or Re-[employer]era \"Med Rec\"    "

## 2024-08-24 NOTE — CARE PLAN
The patient's goals for the shift include      The clinical goals for the shift include    PT WILL REMAIN FREE FROM INJURY THIS SHIFT    Problem: Fall/Injury  Goal: Not fall by end of shift  Outcome: Progressing  Goal: Be free from injury by end of the shift  Outcome: Progressing  Goal: Verbalize understanding of personal risk factors for fall in the hospital  Outcome: Progressing  Goal: Verbalize understanding of risk factor reduction measures to prevent injury from fall in the home  Outcome: Progressing  Goal: Use assistive devices by end of the shift  Outcome: Progressing  Goal: Pace activities to prevent fatigue by end of the shift  Outcome: Progressing     Problem: Pain - Adult  Goal: Verbalizes/displays adequate comfort level or baseline comfort level  Outcome: Progressing     Problem: Safety - Adult  Goal: Free from fall injury  Outcome: Progressing     Problem: Discharge Planning  Goal: Discharge to home or other facility with appropriate resources  Outcome: Progressing     Problem: Chronic Conditions and Co-morbidities  Goal: Patient's chronic conditions and co-morbidity symptoms are monitored and maintained or improved  Outcome: Progressing

## 2024-08-24 NOTE — H&P
History Of Present Illness:    Pt is an 87yo F w PMH of NSTEMI (nonobstructive CAD 2017), HFpEF, LBBB and SSS s/p PPM 2017 s/p device changeout 11/2022, HTN, CKD presenting with CHF exacerbation and new HFrEF.    Pt presented with progressively worsening bilateral upper and lower extremity edema and SOB for past 3 weeks. She takes diuretic only intermittently as she doesn't want to take it when she leaves the house. No CP. Occasionally gets palpitations but denies lightheadedness.    In the ED /95 SPO2 99% on RA. Labs notable for trop 235->189, BNP 2600s, Cr 1.61 WBC 4.3 Hgb 14.2. CXR showing likely bilateral pleural effusions and pulmonary edema. She was loaded w ASA and started on a heparin gtt given her troponin elevation and IV Lasix.    POCUS images performed by ED staff reviewed and appear to show a new EF reduction to 25-30% with regional wma (septal akinesis and otherwise global hypokinesis).     EKG showing 100% AV pacing    Home Meds (reconciled w pt):  amlo 10 od  Atorva 10 od  Asa 81 d  Lasix 20 od  Metop tart 50 bid  SL nitro  Omep 20 od  Prednisone 2.5 od ->takes for bad arthritis per pt    Cardiac Testing:  POCUS images appear to show EF 25-30% with septal akinesis and otherwise global hypokinesis.    TTE 9/2023:  1. Left ventricular systolic function is mildly decreased with a 50-55% estimated ejection fraction.   2. Moderately increased left ventricular septal thickness.   3. The left ventricular posterior wall thickness is moderately increased.   4. Moderately enlarged right ventricle.   5. Mild to moderate aortic valve regurgitation.   6. Mild to moderate mitral valve regurgitation.   7. Moderate tricuspid regurgitation visualized.   8. The tricuspid valve annulus appears dilated.   9. The left atrium is moderately dilated.  10. Moderately elevated right ventricular systolic pressure.  11. Compared with study from 2/17/2017, there is a significant increase in LV wall thickness. There is  "evidence of mild thickening of the RV free wall, associated with mild thickening of the AV, MV and tricuspid valve plus moderately elevated RVSP. This findings could be associated to an underlying infiltrative disease like amyloidosis.     Last Recorded Vitals:  Vitals:    08/23/24 2152 08/24/24 0015 08/24/24 0100 08/24/24 0200   BP: 124/70 125/74 (!) 122/95 136/69   BP Location: Left arm      Patient Position: Sitting      Pulse: 60 60 60 60   Resp: 18 18 13 14   Temp: 35.7 °C (96.3 °F)      TempSrc: Temporal      SpO2: (!) 93% 98% 99% 96%   Weight: 64.9 kg (143 lb)      Height: 1.6 m (5' 3\")          Last Labs:  CBC - 8/23/2024: 10:35 PM  4.3 14.2 214    41.8      CMP - 8/23/2024: 10:35 PM  9.5 7.1 18 --- 0.7   _ 3.5 12 101      PTT - 8/24/2024: 12:01 AM  1.1   12.6 32     Troponin I, High Sensitivity ([facility])   Date/Time Value Ref Range Status   08/23/2024 11:41  (HH) 0 - 34 ng/L Final     Comment:     Previous result verified on 8/23/2024 2336 on specimen/case [id number] called with component [facility] for procedure Troponin I, High Sensitivity, Initial with value 235 ng/L.   08/23/2024 10:35  (HH) 0 - 34 ng/L Final     BNP   Date/Time Value Ref Range Status   08/23/2024 10:35 PM 2,626 (H) 0 - 99 pg/mL Final      Last I/O:  No intake/output data recorded.    Past Cardiology Tests (Last 3 Years):  EKG:  ECG 12 lead 08/23/2024    Echo:  Transthoracic Echo (TTE) Complete 10/03/2023    Ejection Fractions:  No results found for: \"EF\"  Cath:  No results found for this or any previous visit from the past 1095 days.    Stress Test:  No results found for this or any previous visit from the past 1095 days.    Cardiac Imaging:  No results found for this or any previous visit from the past 1095 days.      Past Medical History:  She has a past medical history of Old myocardial infarction (11/24/2014), Other disorder of circulatory system (11/24/2014), Personal history of other diseases of the circulatory system " (11/24/2014), Personal history of other diseases of the digestive system (11/24/2014), Personal history of other diseases of the musculoskeletal system and connective tissue (11/24/2014), and Personal history of other endocrine, nutritional and metabolic disease (11/24/2014).    Past Surgical History:  She has a past surgical history that includes Hysterectomy (11/24/2014); Other surgical history (11/24/2014); and Colonoscopy (11/24/2014).      Social History:  She has no history on file for tobacco use, alcohol use, and drug use.    Family History:  Family History   Problem Relation Name Age of Onset    Prostate cancer Brother          Allergies:  Patient has no known allergies.    Inpatient Medications:  Scheduled medications   Medication Dose Route Frequency     PRN medications   Medication    heparin     Continuous Medications   Medication Dose Last Rate    heparin  0-4,000 Units/hr 800 Units/hr (08/24/24 0134)     Outpatient Medications:  Current Outpatient Medications   Medication Instructions    amLODIPine (NORVASC) 10 mg, oral, Daily    aspirin 81 mg, oral, Daily    atorvastatin (Lipitor) 10 mg tablet oral    calcium carbonate-vitamin D3 (Calcium 600 + D,3,) 600 mg-10 mcg (400 unit) tablet No dose, route, or frequency recorded.    ergocalciferol (VITAMIN D-2) 1.25 mg, oral, Once Weekly    furosemide (LASIX) 20 mg, oral, 2 times daily    hydrocortisone 2.5 % ointment Hydrocortisone 2.5 % External Ointment   Refills: 0       Active    metoprolol tartrate (LOPRESSOR) 50 mg, oral, 2 times daily    nitroglycerin (NITROSTAT) 0.4 mg, sublingual, Every 5 min PRN    omeprazole (PriLOSEC) 20 mg DR capsule oral    predniSONE (DELTASONE) 2.5 mg, oral, Daily    torsemide (Demadex) 20 mg tablet oral    traMADol (ULTRAM) 50 mg, oral, Every 8 hours PRN       Physical Exam:  GEN: NAD, pleasant, diaphoretic  NEURO: nonfocal, AAOx3  CV: RRR, S1/S2, 2/6 TAMEKA best heard at apex, JVD to jawline @ 45 deg, somewhat thready pulse but  difficult to assess d/t severe UE edema  PULM: bibasilar crackles, no increased wob on 2L NC  GI: soft nt/nd, +BS  EXT: 3-4+ upper and lower extremity edema       Assessment/Plan   Pt is an 89yo F w PMH of NSTEMI (nonobstructive CAD 2017), HFpEF, LBBB and SSS s/p PPM 2017 s/p device changeout 11/2022, HTN, CKD presenting with CHF exacerbation and new HFrEF.    Pt is NAD and HDS presenting with new HFrEF with decompensation. Historically  in 9/2023 her EF was 50-55%, some concern in past for underlying infiltrative disease, no cMRI as yet obtained EF appears significantly decreased (25-30%) on uploaded ED POCUS images with at least moderate, possibly severe MR by color Doppler. There is global hypokinesis however the septum appears largely akinetic which may suggest an ischemic component. Etiology may be ischemia vs infiltration vs pacemaker mediated (100% AV pacing on EKG today). Pt is diaphoretic on interview but does not appear to be in acute distress. Will diurese and plan on formal TTE in AM.     #Decompensated CHF   #New HFrEF  -Historically 9/2023, EF 50-55%, some concern in past for underlying infiltrative disease, no cMRI as yet obtained  -EF appears significantly decreased (~30%) on uploaded ED POCUS images with at least moderate, possibly severe MR by color Doppler. There is global hypokinesis however the septum appears largely akinetic  -Etiology may be ischemia vs infiltration vs pacemaker mediated (100% AV pacing on EKG today)  -Formal complete TTE in AM  -S/p 40 IV Lasix in ED  -Cont IV Lasix spot doses to goal net neg 2L/day  -May warrant cMRI given prior c/f amyloid  -Anticipate ischemic eval of some kind as part of HFrEF workup (perhaps stress cMRI)  -Hold metop iso new HFrEF and decompensation  -Device interrogation in AM to determine RV pacing burden    #Troponinemia  #CAD  -Trops already downtrending peak in 200s  -Suspect low and flat trops in absence of CP reflect demand/supply mismatch iso  decompensated CHF though septal wma may suggest there is an ischemic component  -Cont heparin gtt for now given apparently new EF reduction but relatively low c/f acute NSTEMI  -Cont ASA  -Intensify atorva 10 to rosuva 20 given hx of CAD    #Blistering LE  -Nonwarm, nonerythematous, likely chronic d/t LE edema, no apparent sign of infection at this time  -Wound care c/s in AM     #Arthritis  -Pt previously getting steroid injections q3 month then put on systemic steroids  -Cont prednisone 2.5 daily   -Was previously listed as on plaquenil for unclear reason but pt states this was stopped.  -cont home PPI while on steroids    #HTN  -Cont home amlo 10      F: none  E: replete prn  N: cardiac, NPO @ mn  A: PIV    DVT ppx heparin gtt    CODE STATUS: FULL CODE (confirmed on admission)  Cassidy Thapa (Child)  [phone] (Home Phone)    Peripheral IV 08/24/24 20 G Right;Anterior Forearm (Active)   Site Assessment Clean;Dry;Intact 08/24/24 0148   Dressing Type Transparent 08/24/24 0148   Line Status Blood return noted 08/24/24 0148   Number of days: 0       Code Status:  No Order    I spent 45 minutes in the professional and overall care of this patient.        Reese Cochran MD

## 2024-08-24 NOTE — PROGRESS NOTES
Subjective Data:  Patient reports feeling slightly better since admission following IV lasix.   She reports she has been slowly accumulating fluid over the last few months, noting it was her RLE weeping with fluid and weakness that drove her to seek medical attention.     Family at bedside, updated with plan. All questions were answered.     - device interrogation pending  - complete TTE: EF 30-35%, DD, elevated LVEDP, LV cavity decreased, abnormal septal motion consistent with RV pacemaker, LV posterior wall thickness mod increased, A mildly dilated, RA severely dilated, mild to mod MR, wide open severe TR (previously moderate), mild to mod AR, IVC dilated greater than 50% collapse  - severe anasarca to upper abdomen/UE and JVD up to mandible with RLE wound weeping  - will rebolus with IV Lasix 40mg this morning and assess UO throughout the day, may rebolus this evening if needed  - with previously noted concern for amyloidosis on TTE 10/2023, will obtain serum free light chains, SPEP and UPEP  - NM PYP cardiac amyloidosis with SPECT CT ordered for Monday - request approved by Dr. Patterson    Overnight Events:    Admitted to [facility] from ED overnight     Objective Data:  Last Recorded Vitals:  Vitals:    08/24/24 0315 08/24/24 0330 08/24/24 0806 08/24/24 1128   BP: 146/77  128/70 134/70   Pulse: 59  63 64   Resp: 18      Temp: 36.6 °C (97.9 °F)   36.4 °C (97.5 °F)   TempSrc:       SpO2: 98%  98% 95%   Weight:  73.3 kg (161 lb 9.6 oz)     Height:           Last Labs:  CBC - 8/23/2024: 10:35 PM  4.3 14.2 214    41.8      CMP - 8/23/2024: 10:35 PM  9.5 7.1 18 --- 0.7   _ 3.5 12 101      PTT - 8/24/2024: 12:01 AM  1.1   12.6 32     TROPHS   Date/Time Value Ref Range Status   08/23/2024 11:41  0 - 34 ng/L Final     Comment:     Previous result verified on 8/23/2024 2336 on specimen/case [id number] called with component [facility] for procedure Troponin I, High Sensitivity, Initial with value 235 ng/L.   08/23/2024  10:35  0 - 34 ng/L Final     BNP   Date/Time Value Ref Range Status   08/23/2024 10:35 PM 2,626 0 - 99 pg/mL Final      Last I/O:  I/O last 3 completed shifts:  In: 240 (3.3 mL/kg) [P.O.:240]  Out: - (0 mL/kg)   Weight: 73.3 kg     Past Cardiology Tests (Last 3 Years):  EKG:  ECG 12 lead 08/23/2024    Echo:  Transthoracic Echo (TTE) Complete 8/24/2024   1. Left ventricular ejection fraction is moderately decreased, by visual estimate at 30-35%.   2. There is global hypokinesis of the left ventricle with minor regional variations.   3. Spectral Doppler shows an impaired relaxation pattern of left ventricular diastolic filling.   4. There is an elevated left ventricular end diastolic pressure.   5. Left ventricular cavity size is decreased.   6. Abnormal septal motion consistent with RV pacemaker.   7. Moderately increased left ventricular septal thickness.   8. The left ventricular posterior wall thickness is moderately increased.   9. There is severe concentric left ventricular hypertrophy.  10. There is normal right ventricular global systolic function.  11. Moderately enlarged right ventricle.  12. The left atrium is mildly dilated.  13. The right atrium is severely dilated.  14. Mild to moderate mitral valve regurgitation.  15. Severe tricuspid regurgitation visualized.  16. Slightly elevated RVSP.  17. The RV systolic pressure is likely to be underestimated.  18. Mild to moderate aortic valve regurgitation.    Transthoracic Echo (TTE) Complete 10/03/2023   1. Left ventricular systolic function is mildly decreased with a 50-55% estimated ejection fraction.   2. Moderately increased left ventricular septal thickness.   3. The left ventricular posterior wall thickness is moderately increased.   4. Moderately enlarged right ventricle.   5. Mild to moderate aortic valve regurgitation.   6. Mild to moderate mitral valve regurgitation.   7. Moderate tricuspid regurgitation visualized.   8. The tricuspid valve  annulus appears dilated.   9. The left atrium is moderately dilated.  10. Moderately elevated right ventricular systolic pressure.  11. Compared with study from 2/17/2017, there is a significant increase in LV wall thickness. There is evidence of mild thickening of the RV free wall, associated with mild thickening of the AV, MV and tricuspid valve plus moderately elevated RVSP. This findings could be associated to an underlying infiltrative disease like amyloidosis.    Ejection Fractions:  EF   Date/Time Value Ref Range Status   08/24/2024 11:20 AM 33 %      Cath:  No results found for this or any previous visit from the past 1095 days.    Stress Test:  No results found for this or any previous visit from the past 1095 days.    Cardiac Imaging:  No results found for this or any previous visit from the past 1095 days.      Inpatient Medications:  Scheduled medications   Medication Dose Route Frequency    amLODIPine  10 mg oral Daily    aspirin  81 mg oral Daily    calcium carbonate-vitamin D3  1 tablet oral Daily    [START ON 8/25/2024] ergocalciferol  1,250 mcg oral Every Sunday    pantoprazole  40 mg oral Daily before breakfast    polyethylene glycol  17 g oral Daily    predniSONE  2.5 mg oral Daily    rosuvastatin  20 mg oral Nightly     PRN medications   Medication    acetaminophen    Or    acetaminophen    Or    acetaminophen    heparin     Continuous Medications   Medication Dose Last Rate    heparin  0-4,000 Units/hr 800 Units/hr (08/24/24 0134)       Physical Exam:  Physical Exam  Constitutional:       General: She is not in acute distress.     Appearance: She is obese. She is ill-appearing.   Cardiovascular:      Rate and Rhythm: Normal rate and regular rhythm.      Heart sounds: Murmur heard.      Comments: Engorged EJ  Pulmonary:      Effort: Pulmonary effort is normal.      Breath sounds: Examination of the right-lower field reveals decreased breath sounds. Examination of the left-lower field reveals  decreased breath sounds. Decreased breath sounds present.   Abdominal:      General: There is distension.      Comments: Edematous to upper abdomen and bilateral flanks   Musculoskeletal:      Right forearm: Edema present.      Left forearm: Edema present.      Right lower leg: 3+ Pitting Edema present.      Left lower leg: 3+ Pitting Edema present.   Skin:     General: Skin is warm and dry.   Neurological:      General: No focal deficit present.      Mental Status: She is alert and oriented to person, place, and time.   Psychiatric:         Mood and Affect: Mood normal.         Behavior: Behavior normal.          Assessment/Plan   Erin Lopez is a 87yo F w PMH of NSTEMI (nonobstructive CAD 2017), HFpEF, LBBB and SSS s/p PPM 2017 s/p device changeout 11/2022, HTN, CKD presenting with CHF exacerbation and new HFrEF.    Acute systolic and diastolic heart failure (HFrEF 30-35%, previous EF 50-55%)  Severe TR  -Etiology may be ischemia vs infiltration vs pacemaker mediated (100% AV pacing on EKG today)  - Historically 9/2023, EF 50-55%, some concern in past for underlying infiltrative disease, no cMRI as yet obtained  - EF appears significantly decreased (~30%) on uploaded ED POCUS images with at least moderate, possibly severe MR by color Doppler. There is global hypokinesis however the septum appears largely akinetic  - complete TTE 8/24: EF 30-35%, DD, elevated LVEDP, LV cavity decreased, abnormal septal motion consistent with RV pacemaker, LV posterior wall thickness mod increased, A mildly dilated, RA severely dilated, mild to mod MR, wide open severe TR (previously moderate), mild to mod AR, IVC dilated greater than 50% collapse  - BNP 2,626  - admit wt: 73.3 kg  - severe anasarca to upper abdomen/UE and JVD up to mandible with RLE wound weeping  - S/p 40 IV Lasix in ED  - will rebolus with IV Lasix 40mg this morning and assess UO throughout the day, may rebolus this evening if needed  - with previously noted  concern for amyloidosis on TTE 10/2023, will obtain serum free light chains, SPEP and UPEP  - NM PYP cardiac amyloidosis with SPECT CT ordered for Monday - request approved by Dr. Patterson  - Anticipate ischemic eval of some kind as part of HFrEF workup   - Hold metop iso new HFrEF and decompensation  - Daily standing weights, strict I&O's, 2g sodium diet, 2L fluid restriction      Troponinemia  CAD  - HS trop: 235 -> 189  - Suspect low and flat trops in absence of CP reflect demand/supply mismatch iso decompensated CHF though septal wma may suggest there is an ischemic component  - Cont heparin gtt for now given apparently new EF reduction but relatively low c/f acute NSTEMI  - Cont ASA  - Intensify atorva 10 to rosuva 20 given hx of CAD    HTN  - admit /70  - Cont home amlo 10 for now, will likely discontinue when transitioning to GDMT for HFrEF    Hx of SSS s/p PPM 2017   - s/p device changeout 11/2022  - ECG showing 100% AV pacing  - tele reveals AV pacing 60bpm  - concern for possible pacemaker mediated cardiomyopathy  - device interrogation pending     Blistering/weeping LE  - in setting of massively hypervolemic with +3 pitting LE edema  - Nonwarm, nonerythematous, no apparent sign of infection at this time  - consulted wound care     Arthritis  - Pt previously getting steroid injections q3 month then put on systemic steroids  - Cont prednisone 2.5 daily   - Was previously listed as on plaquenil for unclear reason but pt states this was stopped  - cont home PPI while on steroids     HTN  - admit /70  - Cont home amlo 10 for now  - will likely discontinue when transitioning to GDMT for HFrEF      DVT ppx: heparin gtt  Dispo: pending HFrEF work up, GDMT optimization, aggressive diuresis  Code Status:  Full Code    Seen and discussed with Dr. Adán Woo, LUIS F

## 2024-08-24 NOTE — ED PROCEDURE NOTE
Procedure    Performed by: Julio César Hernandez MD  Authorized by: Julio César Hernandez MD  Cardiac Indications: fluid overload                Procedure: Cardiac Ultrasound    Findings:   Views: parasternal long, parasternal short, apical four and subxiphoid  The pericardial space was visualized and was NEGATIVE for a significant pericardial effusion. (Pleural effusions present bilaterally, fluid posterior to descending aorta, so most consistent with pleural effusions rather than pericardial effusion)  Activity: Ventricular contractions were visualized.  LV: LV systolic function was DECREASED. and Just below normal, likely 45-50%  RV: RV size was NORMAL.    Impression:  Cardiac: The focused cardiac ultrasound exam had ABNORMAL findings as specified.    Comments: IVC plethoric, mildly low EF generally near baseline from 2023 Echo, but significant pleural effusions bilaterally, most consistent with significant fluid overload               Julio César Hernandez MD  08/24/24 0032

## 2024-08-24 NOTE — ED PROVIDER NOTES
History of Present Illness     History provided by: Patient  Limitations to History: None  External Records Reviewed: Care summary note in Care Everywhere    HPI:  Erin Lopez is a 88 y.o. female reported history of CVA, hypertension, congestive heart failure, pacemaker, on verapamil presents for shortness of breath and leg swelling.  Patient states that she is minimally compliant with her diuretics.  Had no preceding chest pain.  Her written out med list indicates she is supposed to be on lasix 20 mg BID. No infectious symptoms.  States she has been having worsening swelling over the past 3 weeks, 3-pillow orthopnea.  She states she saw her primary care physician and her primary care physician said take your water pill.  She is unsure whether she has been taking it but on deeper questioning she states she thinks she is probably not taking it.  She states decreased ability to perform her ADLs and IADLs due to shortness of breath.  She denies a personal history of blood clotting.  She denies pleuritic chest pain.  She denies abdominal pain/nausea/vomiting or diarrhea    Physical Exam   Triage vitals:  T 35.7 °C (96.3 °F)  HR 60  /70  RR 18  O2 (!) 93 % None (Room air)    Awake alert and oriented x 4, no acute distress, resting comfortably in stretcher.  Patient is ill-appearing but likely chronically so.   Bibasilar crackles, rales noted, with decreased breath sounds in bases bilaterally. no respiratory distress, on room air (was initially on 2 LPM O2, but weaned off without hypoxia)  Warm and well-perfused with 2+ pulses in all 4 extremities.  Diffuse edema/anasarca, without asymmetry in upper and lower extremities, abdominal wall, chest wall. Positive JVD, positive hepatojugular reflux  Abdomen is soft nontender nondistended    Medical Decision Making & ED Course   Medical Decision Makin y.o. female presents hemodynamically stable for the evaluation of shortness of breath.  Concern for CHF  exacerbation in this patient with poor compliance with diuretics, concern for ACS, NSTEMI without ischemic changes on EKG.  Patient's EKG does not show concern for ST elevation myocardial infarction at this time.  Troponin down trended.  Renal function slightly elevated but not concerningly so, okay for IV Lasix.  Patient given push of IV Lasix and initiated on heparin drip for NSTEMI.  Low concern for clotting pathology in this patient with a better explanation who is not tachycardic and not hypoxic, and specifically bradycardic (without sympathetic response, pushes strongly away from PE). Patient was admitted to the cardiology service for management of NSTEMI and CHF exacerbation.  ----         Social Determinants of Health which Significantly Impact Care: None identified       Chronic conditions affecting the patient's care: See HPI    The patient was discussed with the following consultants/services: Please see ED course for consult transcript        ED Course:  ED Course as of 08/24/24 0130   Sat Aug 24, 2024   0007 EKG shows a dual atrial and ventricularly paced rhythm, significant artifact obscures interpretation, within the limitations of artifact, right axis deviation, prolonged QTc, negative by Sgarbossa [GA]   0033 Heart Rate: 60  Not concerned for PE in this patient with signs of fluid overload, normal right heart on echo, and bradycardia [JH]   0034 Starting on heparin gtt for ACS []      ED Course User Index  [GA] Parish Centeno MD  [JH] Julio César Hernandez MD         Diagnoses as of 08/24/24 0130   NSTEMI (non-ST elevated myocardial infarction) (Multi)   Acute on chronic combined systolic and diastolic congestive heart failure (Multi)     Disposition   As a result of their workup, the patient will require admission to the hospital.  The patient was informed of her diagnosis.  The patient was given the opportunity to ask questions and I answered them. The patient agreed to be admitted to the  [facility].    Procedures   Procedures    Patient was seen and discussed with the attending of record.    Parish Centeno MD  Emergency Medicine     Parish Centeno MD  Resident  08/24/24 0142       Julio César Hernandez MD  08/25/24 [id number]

## 2024-08-25 VITALS
SYSTOLIC BLOOD PRESSURE: 135 MMHG | OXYGEN SATURATION: 98 % | RESPIRATION RATE: 18 BRPM | HEIGHT: 63 IN | TEMPERATURE: 97.3 F | BODY MASS INDEX: 28.63 KG/M2 | HEART RATE: 84 BPM | DIASTOLIC BLOOD PRESSURE: 82 MMHG | WEIGHT: 161.6 LBS

## 2024-08-25 LAB
ALBUMIN SERPL BCP-MCNC: 3 G/DL (ref 3.4–5)
ANION GAP BLDV CALCULATED.4IONS-SCNC: 12 MMOL/L (ref 10–25)
ANION GAP SERPL CALC-SCNC: 14 MMOL/L (ref 10–20)
BASE EXCESS BLDV CALC-SCNC: -0.2 MMOL/L (ref -2–3)
BODY TEMPERATURE: 37 DEGREES CELSIUS
BUN SERPL-MCNC: 29 MG/DL (ref 6–23)
CA-I BLDV-SCNC: 1.19 MMOL/L (ref 1.1–1.33)
CALCIUM SERPL-MCNC: 8.9 MG/DL (ref 8.6–10.6)
CHLORIDE BLDV-SCNC: 107 MMOL/L (ref 98–107)
CHLORIDE SERPL-SCNC: 105 MMOL/L (ref 98–107)
CO2 SERPL-SCNC: 27 MMOL/L (ref 21–32)
CREAT SERPL-MCNC: 1.55 MG/DL (ref 0.5–1.05)
EGFRCR SERPLBLD CKD-EPI 2021: 32 ML/MIN/1.73M*2
ERYTHROCYTE [DISTWIDTH] IN BLOOD BY AUTOMATED COUNT: 18.5 % (ref 11.5–14.5)
GLUCOSE BLDV-MCNC: 98 MG/DL (ref 74–99)
GLUCOSE SERPL-MCNC: 100 MG/DL (ref 74–99)
HCO3 BLDV-SCNC: 25.4 MMOL/L (ref 22–26)
HCT VFR BLD AUTO: 42.8 % (ref 36–46)
HCT VFR BLD EST: 41 % (ref 36–46)
HGB BLD-MCNC: 13.3 G/DL (ref 12–16)
HGB BLDV-MCNC: 13.8 G/DL (ref 12–16)
KAPPA LC SERPL-MCNC: 5.13 MG/DL (ref 0.33–1.94)
KAPPA LC/LAMBDA SER: 1.36 {RATIO} (ref 0.26–1.65)
LACTATE BLDV-SCNC: 2.1 MMOL/L (ref 0.4–2)
LAMBDA LC SERPL-MCNC: 3.78 MG/DL (ref 0.57–2.63)
MAGNESIUM SERPL-MCNC: 2.03 MG/DL (ref 1.6–2.4)
MCH RBC QN AUTO: 26.2 PG (ref 26–34)
MCHC RBC AUTO-ENTMCNC: 31.1 G/DL (ref 32–36)
MCV RBC AUTO: 84 FL (ref 80–100)
NRBC BLD-RTO: 0 /100 WBCS (ref 0–0)
OXYHGB MFR BLDV: 15.9 % (ref 45–75)
PCO2 BLDV: 44 MM HG (ref 41–51)
PH BLDV: 7.37 PH (ref 7.33–7.43)
PHOSPHATE SERPL-MCNC: 3.1 MG/DL (ref 2.5–4.9)
PLATELET # BLD AUTO: 216 X10*3/UL (ref 150–450)
PO2 BLDV: 20 MM HG (ref 35–45)
POTASSIUM BLDV-SCNC: 4.6 MMOL/L (ref 3.5–5.3)
POTASSIUM SERPL-SCNC: 4.1 MMOL/L (ref 3.5–5.3)
RBC # BLD AUTO: 5.07 X10*6/UL (ref 4–5.2)
SAO2 % BLDV: 16 % (ref 45–75)
SODIUM BLDV-SCNC: 140 MMOL/L (ref 136–145)
SODIUM SERPL-SCNC: 142 MMOL/L (ref 136–145)
UFH PPP CHRO-ACNC: 0.5 IU/ML
WBC # BLD AUTO: 5.4 X10*3/UL (ref 4.4–11.3)

## 2024-08-25 PROCEDURE — 2500000002 HC RX 250 W HCPCS SELF ADMINISTERED DRUGS (ALT 637 FOR MEDICARE OP, ALT 636 FOR OP/ED): Performed by: STUDENT IN AN ORGANIZED HEALTH CARE EDUCATION/TRAINING PROGRAM

## 2024-08-25 PROCEDURE — 85520 HEPARIN ASSAY: CPT

## 2024-08-25 PROCEDURE — 94640 AIRWAY INHALATION TREATMENT: CPT

## 2024-08-25 PROCEDURE — 2500000001 HC RX 250 WO HCPCS SELF ADMINISTERED DRUGS (ALT 637 FOR MEDICARE OP)

## 2024-08-25 PROCEDURE — 2500000004 HC RX 250 GENERAL PHARMACY W/ HCPCS (ALT 636 FOR OP/ED)

## 2024-08-25 PROCEDURE — 83735 ASSAY OF MAGNESIUM: CPT

## 2024-08-25 PROCEDURE — 99233 SBSQ HOSP IP/OBS HIGH 50: CPT | Performed by: INTERNAL MEDICINE

## 2024-08-25 PROCEDURE — 80069 RENAL FUNCTION PANEL: CPT

## 2024-08-25 PROCEDURE — 2500000002 HC RX 250 W HCPCS SELF ADMINISTERED DRUGS (ALT 637 FOR MEDICARE OP, ALT 636 FOR OP/ED)

## 2024-08-25 PROCEDURE — 1200000002 HC GENERAL ROOM WITH TELEMETRY DAILY

## 2024-08-25 PROCEDURE — 2500000001 HC RX 250 WO HCPCS SELF ADMINISTERED DRUGS (ALT 637 FOR MEDICARE OP): Performed by: STUDENT IN AN ORGANIZED HEALTH CARE EDUCATION/TRAINING PROGRAM

## 2024-08-25 PROCEDURE — 36415 COLL VENOUS BLD VENIPUNCTURE: CPT

## 2024-08-25 PROCEDURE — 2500000004 HC RX 250 GENERAL PHARMACY W/ HCPCS (ALT 636 FOR OP/ED): Performed by: STUDENT IN AN ORGANIZED HEALTH CARE EDUCATION/TRAINING PROGRAM

## 2024-08-25 PROCEDURE — 85027 COMPLETE CBC AUTOMATED: CPT

## 2024-08-25 RX ORDER — SPIRONOLACTONE 25 MG/1
25 TABLET ORAL DAILY
Status: DISCONTINUED | OUTPATIENT
Start: 2024-08-25 | End: 2024-08-29

## 2024-08-25 RX ORDER — IPRATROPIUM BROMIDE AND ALBUTEROL SULFATE 2.5; .5 MG/3ML; MG/3ML
3 SOLUTION RESPIRATORY (INHALATION) EVERY 6 HOURS PRN
Status: DISCONTINUED | OUTPATIENT
Start: 2024-08-25 | End: 2024-09-04 | Stop reason: HOSPADM

## 2024-08-25 RX ORDER — FUROSEMIDE 10 MG/ML
40 INJECTION INTRAMUSCULAR; INTRAVENOUS 2 TIMES DAILY
Status: DISCONTINUED | OUTPATIENT
Start: 2024-08-25 | End: 2024-08-26

## 2024-08-25 RX ORDER — BENZONATATE 100 MG/1
100 CAPSULE ORAL 3 TIMES DAILY PRN
Status: DISCONTINUED | OUTPATIENT
Start: 2024-08-25 | End: 2024-09-04 | Stop reason: HOSPADM

## 2024-08-25 RX ORDER — ACETAZOLAMIDE 500 MG/5ML
500 INJECTION, POWDER, LYOPHILIZED, FOR SOLUTION INTRAVENOUS ONCE
Status: COMPLETED | OUTPATIENT
Start: 2024-08-25 | End: 2024-08-25

## 2024-08-25 RX ADMIN — HEPARIN SODIUM 800 UNITS/HR: 10000 INJECTION, SOLUTION INTRAVENOUS at 03:31

## 2024-08-25 RX ADMIN — BENZONATATE 100 MG: 100 CAPSULE ORAL at 13:38

## 2024-08-25 RX ADMIN — FUROSEMIDE 40 MG: 10 INJECTION, SOLUTION INTRAMUSCULAR; INTRAVENOUS at 20:33

## 2024-08-25 RX ADMIN — SPIRONOLACTONE 25 MG: 25 TABLET, FILM COATED ORAL at 13:38

## 2024-08-25 RX ADMIN — Medication 1 TABLET: at 09:00

## 2024-08-25 RX ADMIN — POLYETHYLENE GLYCOL 3350 17 G: 17 POWDER, FOR SOLUTION ORAL at 09:00

## 2024-08-25 RX ADMIN — IPRATROPIUM BROMIDE AND ALBUTEROL SULFATE 3 ML: .5; 3 SOLUTION RESPIRATORY (INHALATION) at 10:59

## 2024-08-25 RX ADMIN — PANTOPRAZOLE SODIUM 40 MG: 40 TABLET, DELAYED RELEASE ORAL at 06:29

## 2024-08-25 RX ADMIN — FUROSEMIDE 40 MG: 10 INJECTION, SOLUTION INTRAMUSCULAR; INTRAVENOUS at 14:15

## 2024-08-25 RX ADMIN — ROSUVASTATIN CALCIUM 20 MG: 20 TABLET, FILM COATED ORAL at 20:33

## 2024-08-25 RX ADMIN — PREDNISONE 2.5 MG: 2.5 TABLET ORAL at 09:00

## 2024-08-25 RX ADMIN — ASPIRIN 81 MG: 81 TABLET, COATED ORAL at 09:00

## 2024-08-25 RX ADMIN — ACETAZOLAMIDE 500 MG: 500 INJECTION, POWDER, LYOPHILIZED, FOR SOLUTION INTRAVENOUS at 13:38

## 2024-08-25 RX ADMIN — ERGOCALCIFEROL 1250 MCG: 1.25 CAPSULE ORAL at 09:00

## 2024-08-25 ASSESSMENT — COGNITIVE AND FUNCTIONAL STATUS - GENERAL
TOILETING: A LOT
MOVING FROM LYING ON BACK TO SITTING ON SIDE OF FLAT BED WITH BEDRAILS: A LOT
DRESSING REGULAR LOWER BODY CLOTHING: A LOT
HELP NEEDED FOR BATHING: A LITTLE
EATING MEALS: A LITTLE
DAILY ACTIVITIY SCORE: 16
CLIMB 3 TO 5 STEPS WITH RAILING: A LOT
MOBILITY SCORE: 13
MOVING TO AND FROM BED TO CHAIR: A LOT
TOILETING: A LOT
DRESSING REGULAR UPPER BODY CLOTHING: A LITTLE
MOVING FROM LYING ON BACK TO SITTING ON SIDE OF FLAT BED WITH BEDRAILS: A LITTLE
WALKING IN HOSPITAL ROOM: A LOT
PERSONAL GROOMING: A LITTLE
STANDING UP FROM CHAIR USING ARMS: A LOT
DAILY ACTIVITIY SCORE: 16
MOBILITY SCORE: 12
TURNING FROM BACK TO SIDE WHILE IN FLAT BAD: A LOT
HELP NEEDED FOR BATHING: A LITTLE
DRESSING REGULAR LOWER BODY CLOTHING: A LOT
MOVING TO AND FROM BED TO CHAIR: A LOT
STANDING UP FROM CHAIR USING ARMS: A LOT
WALKING IN HOSPITAL ROOM: A LOT
CLIMB 3 TO 5 STEPS WITH RAILING: A LOT
DRESSING REGULAR UPPER BODY CLOTHING: A LITTLE
EATING MEALS: A LITTLE
PERSONAL GROOMING: A LITTLE
TURNING FROM BACK TO SIDE WHILE IN FLAT BAD: A LOT

## 2024-08-25 ASSESSMENT — ACTIVITIES OF DAILY LIVING (ADL): LACK_OF_TRANSPORTATION: NO

## 2024-08-25 ASSESSMENT — PAIN SCALES - GENERAL
PAINLEVEL_OUTOF10: 0 - NO PAIN
PAINLEVEL_OUTOF10: 0 - NO PAIN

## 2024-08-25 NOTE — HOSPITAL COURSE
Pt is an 89yo F w PMH of NSTEMI (nonobstructive CAD 2017), HFpEF, LBBB and SSS s/p PPM 2017 s/p device changeout 11/2022, HTN, CKD presenting with CHF exacerbation and new HFrEF.     Pt presented with progressively worsening bilateral upper and lower extremity edema and SOB for past 3 weeks. She takes diuretic only intermittently as she doesn't want to take it when she leaves the house. No CP. Occasionally gets palpitations but denies lightheadedness.     In the ED /95 SPO2 99% on RA. Labs notable for trop 235->189, BNP 2600s, Cr 1.61 WBC 4.3 Hgb 14.2. CXR showing likely bilateral pleural effusions and pulmonary edema. She was loaded w ASA and started on a heparin gtt given her troponin elevation and IV Lasix.     POCUS images performed by ED staff reviewed and appear to show a new EF reduction to 25-30% with regional wma (septal akinesis and otherwise global hypokinesis). EKG showing 100% AV pacing.    Floor course:  Complete TTE 8/24: EF 30-35%, DD, elevated LVEDP, LV cavity decreased, abnormal septal motion consistent with RV pacemaker, LV posterior wall thickness mod increased, A mildly dilated, RA severely dilated, mild to mod MR, wide open severe TR (previously moderate), mild to mod AR, IVC dilated greater than 50% collapse.   Patient was diuresed with IV Lasix until euvolemic, then transitioned to PO Lasix 40mg daily.   With previously noted concern for amyloidosis on TTE 10/2023 and significantly thickened LV posterior wall on TTE 8/24, serum free light chains, SPEP and UPEP negative. NM PYP cardiac amyloidosis with SPECT CT suggestive of TTR.  CTA coronary revealed minor CAD, calcium score 17.     Advanced heart failure recommended GDMT initiation inpatient as well as tafamadis as outpatient.  EP consulted and although she has a high pacing burden, an upgrade to CRT device would be too high risk.    GDMT was limited due to hypotension. GDMT at discharge: Entresto 12/13 BID, metop succ 12.5 mg daily,  dapagliflozin 10 mg daily,  and spironolactone 12.5 mg daily + Lasix 20mg daily for maintenance diuretic    PT recommended SNF; however patient declined SNF and wished to be discharged home with [facility]. DME was out for delivery to the home prior to discharge (hospital bed, shower chair, bedside commode and wheeled walker). Medications brought to bedside via meds to beds.      Discharge weight: 51.5 kg    After all labs and VS were reviewed the decision was made that the patient was medically stable for discharge.  The patient was discharged in satisfactory condition.    More than 60 minutes were spent in coordinating patient discharge.

## 2024-08-25 NOTE — CARE PLAN
The patient's goals for the shift include      The clinical goals for the shift include Patient will remain HDS overnight and no falls.

## 2024-08-25 NOTE — PROGRESS NOTES
Subjective Data:  Patient reports feeling better day by day with continued diuresis. She notes improvement in her SOB, however remains on O2 (not on home O2). UO -2.3L following IV lasix 40mg BID yesterday.  New complaint of dry cough, non productive. Tessalon pearles and PRN DuoNebs ordered.    - device interrogation pending, likely to be completed tomorrow  - pending wound care evaluation for weeping RLE wound  - will continue diuresis today with IV Lasix 40mg BID + IV Diamox 500mg to be given 30 min prior to first lasix bolus  - initiate spironolactone 25mg today  - serum free light chains, SPEP and UPEP collected and pending  - NM PYP cardiac amyloidosis with SPECT CT ordered for tomorrow - request approved by Dr. Patterson    Overnight Events:    No acute events overnight     Objective Data:  Last Recorded Vitals:  Vitals:    08/24/24 2358 08/25/24 0508 08/25/24 0801 08/25/24 1042   BP: 127/76 131/81 135/70 134/76   Pulse: 62 73 74 81   Resp: 16  16 20   Temp: 36.2 °C (97.2 °F) 36.2 °C (97.2 °F) 36.2 °C (97.2 °F) 36.3 °C (97.3 °F)   TempSrc: Temporal Temporal     SpO2: 98% 99% 100% 97%   Weight:       Height:           Last Labs:  CBC - 8/24/2024: 10:38 PM  4.4 13.0 196    40.8      CMP - 8/24/2024: 10:38 PM  9.0 6.2 18 --- 0.7   3.3 3.1 12 101      PTT - 8/24/2024: 12:01 AM  1.1   12.6 32     TROPHS   Date/Time Value Ref Range Status   08/23/2024 11:41  0 - 34 ng/L Final     Comment:     Previous result verified on 8/23/2024 2336 on specimen/case [id number] called with component [facility] for procedure Troponin I, High Sensitivity, Initial with value 235 ng/L.   08/23/2024 10:35  0 - 34 ng/L Final     BNP   Date/Time Value Ref Range Status   08/23/2024 10:35 PM 2,626 0 - 99 pg/mL Final      Last I/O:  I/O last 3 completed shifts:  In: 480 (6.5 mL/kg) [P.O.:480]  Out: 2300 (31.4 mL/kg) [Urine:2300 (0.9 mL/kg/hr)]  Weight: 73.3 kg     Past Cardiology Tests (Last 3 Years):  EKG:  ECG 12 lead  08/23/2024    Echo:  Transthoracic Echo (TTE) Complete 8/24/2024   1. Left ventricular ejection fraction is moderately decreased, by visual estimate at 30-35%.   2. There is global hypokinesis of the left ventricle with minor regional variations.   3. Spectral Doppler shows an impaired relaxation pattern of left ventricular diastolic filling.   4. There is an elevated left ventricular end diastolic pressure.   5. Left ventricular cavity size is decreased.   6. Abnormal septal motion consistent with RV pacemaker.   7. Moderately increased left ventricular septal thickness.   8. The left ventricular posterior wall thickness is moderately increased.   9. There is severe concentric left ventricular hypertrophy.  10. There is normal right ventricular global systolic function.  11. Moderately enlarged right ventricle.  12. The left atrium is mildly dilated.  13. The right atrium is severely dilated.  14. Mild to moderate mitral valve regurgitation.  15. Severe tricuspid regurgitation visualized.  16. Slightly elevated RVSP.  17. The RV systolic pressure is likely to be underestimated.  18. Mild to moderate aortic valve regurgitation.    Transthoracic Echo (TTE) Complete 10/03/2023   1. Left ventricular systolic function is mildly decreased with a 50-55% estimated ejection fraction.   2. Moderately increased left ventricular septal thickness.   3. The left ventricular posterior wall thickness is moderately increased.   4. Moderately enlarged right ventricle.   5. Mild to moderate aortic valve regurgitation.   6. Mild to moderate mitral valve regurgitation.   7. Moderate tricuspid regurgitation visualized.   8. The tricuspid valve annulus appears dilated.   9. The left atrium is moderately dilated.  10. Moderately elevated right ventricular systolic pressure.  11. Compared with study from 2/17/2017, there is a significant increase in LV wall thickness. There is evidence of mild thickening of the RV free wall, associated  with mild thickening of the AV, MV and tricuspid valve plus moderately elevated RVSP. This findings could be associated to an underlying infiltrative disease like amyloidosis.    Ejection Fractions:  EF   Date/Time Value Ref Range Status   08/24/2024 11:20 AM 33 %      Cath:  No results found for this or any previous visit from the past 1095 days.    Stress Test:  No results found for this or any previous visit from the past 1095 days.    Cardiac Imaging:  No results found for this or any previous visit from the past 1095 days.      Inpatient Medications:  Scheduled medications   Medication Dose Route Frequency    acetazolamide  500 mg intravenous Once    aspirin  81 mg oral Daily    calcium carbonate-vitamin D3  1 tablet oral Daily    ergocalciferol  1,250 mcg oral Every Sunday    furosemide  40 mg intravenous BID    pantoprazole  40 mg oral Daily before breakfast    polyethylene glycol  17 g oral Daily    predniSONE  2.5 mg oral Daily    rosuvastatin  20 mg oral Nightly    spironolactone  25 mg oral Daily     PRN medications   Medication    acetaminophen    Or    acetaminophen    Or    acetaminophen    benzonatate    heparin    ipratropium-albuteroL     Continuous Medications   Medication Dose Last Rate    heparin  0-4,000 Units/hr 800 Units/hr (08/25/24 0331)       Physical Exam:  Physical Exam  Constitutional:       General: She is not in acute distress.     Appearance: She is obese. She is ill-appearing.   Neck:      Vascular: JVD (in setting of severe TR) present.   Cardiovascular:      Rate and Rhythm: Normal rate and regular rhythm.      Heart sounds: Murmur heard.      Comments: Engorged EJ  Pulmonary:      Effort: Pulmonary effort is normal.      Breath sounds: Examination of the right-lower field reveals decreased breath sounds. Examination of the left-lower field reveals decreased breath sounds. Decreased breath sounds present.   Abdominal:      General: There is distension.      Comments: Edematous to  upper abdomen and bilateral flanks   Musculoskeletal:      Right forearm: Edema present.      Left forearm: Edema present.      Right lower le+ Pitting Edema present.      Left lower le+ Pitting Edema present.   Skin:     General: Skin is warm and dry.   Neurological:      General: No focal deficit present.      Mental Status: She is alert and oriented to person, place, and time.   Psychiatric:         Mood and Affect: Mood normal.         Behavior: Behavior normal.            Assessment/Plan   Erin Lopez is a 89yo F w PMH of NSTEMI (nonobstructive CAD ), HFpEF, LBBB and SSS s/p PPM 2017 s/p device changeout 2022, HTN, CKD presenting with CHF exacerbation and new HFrEF.    Acute systolic and diastolic heart failure (HFrEF 30-35%, previous EF 50-55%)  Severe TR  -Etiology may be ischemia vs infiltration vs pacemaker mediated (100% AV pacing on EKG today)  - Historically 2023, EF 50-55%, some concern in past for underlying infiltrative disease, no cMRI as yet obtained  - EF appears significantly decreased (~30%) on uploaded ED POCUS images with at least moderate, possibly severe MR by color Doppler. There is global hypokinesis however the septum appears largely akinetic  - complete TTE : EF 30-35%, DD, elevated LVEDP, LV cavity decreased, abnormal septal motion consistent with RV pacemaker, LV posterior wall thickness mod increased, A mildly dilated, RA severely dilated, mild to mod MR, wide open severe TR (previously moderate), mild to mod AR, IVC dilated greater than 50% collapse  - BNP 2,626  - admit wt: 73.3 kg  - today's wt: pending  - severe anasarca to upper abdomen/UE and JVD up to mandible with RLE wound weeping  - will continue diuresis today with IV Lasix 40mg BID + IV Diamox 500mg to be given 30 min prior to first lasix bolus  - initiate spironolactone 25mg today  - with previously noted concern for amyloidosis on TTE 10/2023 and significantly thickened LV posterior wall on TTE ,  serum free light chains, SPEP and UPEP collected and pending  - NM PYP cardiac amyloidosis with SPECT CT ordered for tomorrow - request approved by Dr. Patterson  - Anticipate ischemic eval of some kind as part of HFrEF workup   - Hold metop iso new HFrEF and decompensation  - Daily standing weights, strict I&O's, 2g sodium diet, 2L fluid restriction      Troponinemia  CAD  - HS trop: 235 -> 189  - Suspect low and flat trops in absence of CP reflect demand/supply mismatch iso decompensated CHF though septal wma may suggest there is an ischemic component  - Cont heparin gtt for now given apparently new EF reduction but relatively low c/f acute NSTEMI  - Cont ASA  - Intensify atorva 10 to rosuva 20 given hx of CAD    HTN  - admit /70  - -130s  - discontinued home amlodipine 10mg in anticipation for GDMT optimization    Hx of SSS s/p PPM 2017   - s/p device changeout 11/2022  - ECG showing 100% AV pacing  - tele reveals AV pacing 60bpm  - concern for possible pacemaker mediated cardiomyopathy  - device interrogation pending, likely to be completed tomorrow     Blistering/weeping LE  - in setting of massively hypervolemic with +3 pitting LE edema  - Nonwarm, nonerythematous, no apparent sign of infection at this time  - consulted wound care, pending evaluation and recommendations     Arthritis  - Pt previously getting steroid injections q3 month then put on systemic steroids  - Cont prednisone 2.5 daily   - Was previously listed as on plaquenil for unclear reason but pt states this was stopped  - cont home PPI while on steroids      DVT ppx: heparin gtt  Dispo: pending HFrEF work up, GDMT optimization, aggressive diuresis  Code Status:  Full Code    Seen and discussed with Dr. Adán Woo PA-C

## 2024-08-25 NOTE — CARE PLAN
The patient's goals for the shift include      The clinical goals for the shift include Patient will remain HDS overnight and no falls.      Problem: Fall/Injury  Goal: Not fall by end of shift  8/24/2024 2038 by Alexsander Chase RN  Outcome: Progressing  8/24/2024 2038 by Alexsander Chase RN  Outcome: Progressing  Goal: Be free from injury by end of the shift  8/24/2024 2038 by Alexsander Chase RN  Outcome: Progressing  8/24/2024 2038 by Alexsander Chase RN  Outcome: Progressing  Goal: Verbalize understanding of personal risk factors for fall in the hospital  8/24/2024 2038 by Alexsander Chase RN  Outcome: Progressing  8/24/2024 2038 by Alexsander Chase RN  Outcome: Progressing  Goal: Verbalize understanding of risk factor reduction measures to prevent injury from fall in the home  8/24/2024 2038 by Alexsander Chase RN  Outcome: Progressing  8/24/2024 2038 by Alexsander Chase RN  Outcome: Progressing  Goal: Use assistive devices by end of the shift  8/24/2024 2038 by Alexsander Chase RN  Outcome: Progressing  8/24/2024 2038 by Alexsander Chase RN  Outcome: Progressing  Goal: Pace activities to prevent fatigue by end of the shift  8/24/2024 2038 by Alexsander Chase RN  Outcome: Progressing  8/24/2024 2038 by Alexsander Chase RN  Outcome: Progressing     Problem: Pain - Adult  Goal: Verbalizes/displays adequate comfort level or baseline comfort level  8/24/2024 2038 by Alexsander Chase RN  Outcome: Progressing  8/24/2024 2038 by Alexsander Chase RN  Outcome: Progressing     Problem: Safety - Adult  Goal: Free from fall injury  8/24/2024 2038 by Alexsander Chase RN  Outcome: Progressing  8/24/2024 2038 by Alexsander Chase RN  Outcome: Progressing     Problem: Discharge Planning  Goal: Discharge to home or other facility with appropriate resources  8/24/2024 2038 by Alexsander Chase RN  Outcome: Progressing  8/24/2024 2038 by Alexsander Puruczky, RN  Outcome: Progressing     Problem: Chronic Conditions and Co-morbidities  Goal: Patient's  chronic conditions and co-morbidity symptoms are monitored and maintained or improved  8/24/2024 2038 by Alexsander Chase RN  Outcome: Progressing  8/24/2024 2038 by Alexsander Chase RN  Outcome: Progressing     Problem: Skin  Goal: Participates in plan/prevention/treatment measures  8/24/2024 2038 by Alexsander Chase RN  Outcome: Progressing  8/24/2024 2038 by Alexsander Chase RN  Outcome: Progressing  Goal: Prevent/manage excess moisture  8/24/2024 2038 by Alexsander Chase RN  Outcome: Progressing  8/24/2024 2038 by Alexsander Chase RN  Outcome: Progressing  Goal: Prevent/minimize sheer/friction injuries  8/24/2024 2038 by Alexsander Chase RN  Outcome: Progressing  8/24/2024 2038 by Alexasnder Chase RN  Outcome: Progressing  Goal: Promote/optimize nutrition  8/24/2024 2038 by Alexsander Chase RN  Outcome: Progressing  8/24/2024 2038 by Alexsander Chase RN  Outcome: Progressing

## 2024-08-25 NOTE — PROGRESS NOTES
Transitional Care Coordinator Progress Note:         08/25/24 1412   Discharge Planning   Living Arrangements Children  (Daughter Cassidy)   Support Systems Children;Family members   Assistance Needed shower chair/ bath bench   Type of Residence Private residence   Number of Stairs to Enter Residence 0  (Ramp)   Number of Stairs Within Residence 0   Do you have animals or pets at home? No   Who is requesting discharge planning? Patient   Expected Discharge Disposition Home   Does the patient need discharge transport arranged? No   Financial Resource Strain   How hard is it for you to pay for the very basics like food, housing, medical care, and heating? Not hard   Housing Stability   In the last 12 months, was there a time when you were not able to pay the mortgage or rent on time? N   In the past 12 months, how many times have you moved where you were living? 1   At any time in the past 12 months, were you homeless or living in a shelter (including now)? N   Transportation Needs   In the past 12 months, has lack of transportation kept you from medical appointments or from getting medications? no   In the past 12 months, has lack of transportation kept you from meetings, work, or from getting things needed for daily living? No     Assessment Note:  Met with pt and introduced myself as care coordinator and member of the Care Transitions team for discharge planning.   Pt feels safe at home.  Daughter Cassidy provides transport to drs appts.  Pt's address, phone number and contact information was verified.  Pt does not have any questions/concerns at this time.     Previous Home Care: None   DME:patient has cane, walker, ramp however needs shower chair/ bath bench   Falls: None   PCP: Shannen Shahid RN, BSN, T[facility]

## 2024-08-26 ENCOUNTER — APPOINTMENT (OUTPATIENT)
Dept: CARDIOLOGY | Facility: HOSPITAL | Age: 89
End: 2024-08-26
Payer: COMMERCIAL

## 2024-08-26 ENCOUNTER — APPOINTMENT (OUTPATIENT)
Dept: VASCULAR MEDICINE | Facility: HOSPITAL | Age: 89
End: 2024-08-26
Payer: COMMERCIAL

## 2024-08-26 ENCOUNTER — APPOINTMENT (OUTPATIENT)
Dept: RADIOLOGY | Facility: HOSPITAL | Age: 89
End: 2024-08-26
Payer: COMMERCIAL

## 2024-08-26 LAB
ALBUMIN SERPL BCP-MCNC: 3.2 G/DL (ref 3.4–5)
ANION GAP SERPL CALC-SCNC: 15 MMOL/L (ref 10–20)
BUN SERPL-MCNC: 28 MG/DL (ref 6–23)
CALCIUM SERPL-MCNC: 9.4 MG/DL (ref 8.6–10.6)
CHLORIDE SERPL-SCNC: 103 MMOL/L (ref 98–107)
CO2 SERPL-SCNC: 27 MMOL/L (ref 21–32)
CREAT SERPL-MCNC: 1.51 MG/DL (ref 0.5–1.05)
EGFRCR SERPLBLD CKD-EPI 2021: 33 ML/MIN/1.73M*2
ERYTHROCYTE [DISTWIDTH] IN BLOOD BY AUTOMATED COUNT: 18.1 % (ref 11.5–14.5)
ERYTHROCYTE [DISTWIDTH] IN BLOOD BY AUTOMATED COUNT: 18.8 % (ref 11.5–14.5)
GLUCOSE SERPL-MCNC: 81 MG/DL (ref 74–99)
HCT VFR BLD AUTO: 40 % (ref 36–46)
HCT VFR BLD AUTO: 43.5 % (ref 36–46)
HGB BLD-MCNC: 13 G/DL (ref 12–16)
HGB BLD-MCNC: 14.3 G/DL (ref 12–16)
MAGNESIUM SERPL-MCNC: 2.1 MG/DL (ref 1.6–2.4)
MCH RBC QN AUTO: 26.7 PG (ref 26–34)
MCH RBC QN AUTO: 26.9 PG (ref 26–34)
MCHC RBC AUTO-ENTMCNC: 32.5 G/DL (ref 32–36)
MCHC RBC AUTO-ENTMCNC: 32.9 G/DL (ref 32–36)
MCV RBC AUTO: 82 FL (ref 80–100)
MCV RBC AUTO: 82 FL (ref 80–100)
NRBC BLD-RTO: 0 /100 WBCS (ref 0–0)
NRBC BLD-RTO: 0 /100 WBCS (ref 0–0)
PHOSPHATE SERPL-MCNC: 2.7 MG/DL (ref 2.5–4.9)
PLATELET # BLD AUTO: 213 X10*3/UL (ref 150–450)
PLATELET # BLD AUTO: 214 X10*3/UL (ref 150–450)
POTASSIUM SERPL-SCNC: 3.6 MMOL/L (ref 3.5–5.3)
RBC # BLD AUTO: 4.86 X10*6/UL (ref 4–5.2)
RBC # BLD AUTO: 5.31 X10*6/UL (ref 4–5.2)
SODIUM SERPL-SCNC: 141 MMOL/L (ref 136–145)
UFH PPP CHRO-ACNC: 0.5 IU/ML
WBC # BLD AUTO: 5.4 X10*3/UL (ref 4.4–11.3)
WBC # BLD AUTO: 6.2 X10*3/UL (ref 4.4–11.3)

## 2024-08-26 PROCEDURE — 36415 COLL VENOUS BLD VENIPUNCTURE: CPT

## 2024-08-26 PROCEDURE — 78830 RP LOCLZJ TUM SPECT W/CT 1: CPT | Performed by: STUDENT IN AN ORGANIZED HEALTH CARE EDUCATION/TRAINING PROGRAM

## 2024-08-26 PROCEDURE — 80069 RENAL FUNCTION PANEL: CPT

## 2024-08-26 PROCEDURE — 2500000002 HC RX 250 W HCPCS SELF ADMINISTERED DRUGS (ALT 637 FOR MEDICARE OP, ALT 636 FOR OP/ED): Performed by: STUDENT IN AN ORGANIZED HEALTH CARE EDUCATION/TRAINING PROGRAM

## 2024-08-26 PROCEDURE — 99233 SBSQ HOSP IP/OBS HIGH 50: CPT | Performed by: INTERNAL MEDICINE

## 2024-08-26 PROCEDURE — 93970 EXTREMITY STUDY: CPT

## 2024-08-26 PROCEDURE — 4B02XSZ MEASUREMENT OF CARDIAC PACEMAKER, EXTERNAL APPROACH: ICD-10-PCS | Performed by: INTERNAL MEDICINE

## 2024-08-26 PROCEDURE — A9538 TC99M PYROPHOSPHATE: HCPCS | Performed by: INTERNAL MEDICINE

## 2024-08-26 PROCEDURE — 2500000004 HC RX 250 GENERAL PHARMACY W/ HCPCS (ALT 636 FOR OP/ED): Performed by: NURSE PRACTITIONER

## 2024-08-26 PROCEDURE — 93970 EXTREMITY STUDY: CPT | Performed by: SURGERY

## 2024-08-26 PROCEDURE — 2500000001 HC RX 250 WO HCPCS SELF ADMINISTERED DRUGS (ALT 637 FOR MEDICARE OP): Performed by: STUDENT IN AN ORGANIZED HEALTH CARE EDUCATION/TRAINING PROGRAM

## 2024-08-26 PROCEDURE — 1200000002 HC GENERAL ROOM WITH TELEMETRY DAILY

## 2024-08-26 PROCEDURE — 2500000004 HC RX 250 GENERAL PHARMACY W/ HCPCS (ALT 636 FOR OP/ED): Performed by: STUDENT IN AN ORGANIZED HEALTH CARE EDUCATION/TRAINING PROGRAM

## 2024-08-26 PROCEDURE — 83735 ASSAY OF MAGNESIUM: CPT

## 2024-08-26 PROCEDURE — 78830 RP LOCLZJ TUM SPECT W/CT 1: CPT

## 2024-08-26 PROCEDURE — 85027 COMPLETE CBC AUTOMATED: CPT

## 2024-08-26 PROCEDURE — 2500000002 HC RX 250 W HCPCS SELF ADMINISTERED DRUGS (ALT 637 FOR MEDICARE OP, ALT 636 FOR OP/ED)

## 2024-08-26 PROCEDURE — 3430000001 HC RX 343 DIAGNOSTIC RADIOPHARMACEUTICALS: Performed by: INTERNAL MEDICINE

## 2024-08-26 PROCEDURE — 85520 HEPARIN ASSAY: CPT

## 2024-08-26 RX ORDER — FUROSEMIDE 10 MG/ML
40 INJECTION INTRAMUSCULAR; INTRAVENOUS 2 TIMES DAILY
Status: DISCONTINUED | OUTPATIENT
Start: 2024-08-26 | End: 2024-08-29

## 2024-08-26 RX ORDER — HEPARIN SODIUM 10000 [USP'U]/100ML
0-4000 INJECTION, SOLUTION INTRAVENOUS CONTINUOUS
Status: DISCONTINUED | OUTPATIENT
Start: 2024-08-26 | End: 2024-08-26

## 2024-08-26 RX ADMIN — FUROSEMIDE 40 MG: 10 INJECTION, SOLUTION INTRAMUSCULAR; INTRAVENOUS at 20:29

## 2024-08-26 RX ADMIN — SPIRONOLACTONE 25 MG: 25 TABLET, FILM COATED ORAL at 09:23

## 2024-08-26 RX ADMIN — TECHNETIUM TC 99M PYROPHOSPHATE 21.6 MILLICURIE: 11.9; 3.2 INJECTION, POWDER, LYOPHILIZED, FOR SOLUTION INTRAVENOUS at 12:15

## 2024-08-26 RX ADMIN — ASPIRIN 81 MG: 81 TABLET, COATED ORAL at 09:23

## 2024-08-26 RX ADMIN — PREDNISONE 2.5 MG: 2.5 TABLET ORAL at 09:23

## 2024-08-26 RX ADMIN — PANTOPRAZOLE SODIUM 40 MG: 40 TABLET, DELAYED RELEASE ORAL at 06:35

## 2024-08-26 RX ADMIN — ROSUVASTATIN CALCIUM 20 MG: 20 TABLET, FILM COATED ORAL at 20:29

## 2024-08-26 RX ADMIN — FUROSEMIDE 40 MG: 10 INJECTION, SOLUTION INTRAMUSCULAR; INTRAVENOUS at 11:27

## 2024-08-26 RX ADMIN — HEPARIN SODIUM 800 UNITS/HR: 10000 INJECTION, SOLUTION INTRAVENOUS at 11:28

## 2024-08-26 RX ADMIN — POLYETHYLENE GLYCOL 3350 17 G: 17 POWDER, FOR SOLUTION ORAL at 09:46

## 2024-08-26 RX ADMIN — Medication 1 TABLET: at 09:23

## 2024-08-26 ASSESSMENT — COGNITIVE AND FUNCTIONAL STATUS - GENERAL
DAILY ACTIVITIY SCORE: 16
STANDING UP FROM CHAIR USING ARMS: A LOT
TURNING FROM BACK TO SIDE WHILE IN FLAT BAD: A LOT
CLIMB 3 TO 5 STEPS WITH RAILING: A LOT
EATING MEALS: A LITTLE
EATING MEALS: A LITTLE
MOVING TO AND FROM BED TO CHAIR: A LOT
WALKING IN HOSPITAL ROOM: A LOT
HELP NEEDED FOR BATHING: A LITTLE
MOVING FROM LYING ON BACK TO SITTING ON SIDE OF FLAT BED WITH BEDRAILS: A LITTLE
DRESSING REGULAR UPPER BODY CLOTHING: A LITTLE
MOBILITY SCORE: 13
HELP NEEDED FOR BATHING: A LITTLE
STANDING UP FROM CHAIR USING ARMS: A LOT
WALKING IN HOSPITAL ROOM: A LOT
DAILY ACTIVITIY SCORE: 16
MOVING FROM LYING ON BACK TO SITTING ON SIDE OF FLAT BED WITH BEDRAILS: A LITTLE
MOVING TO AND FROM BED TO CHAIR: A LOT
TURNING FROM BACK TO SIDE WHILE IN FLAT BAD: A LOT
TOILETING: A LOT
TOILETING: A LOT
MOBILITY SCORE: 13
CLIMB 3 TO 5 STEPS WITH RAILING: A LOT
PERSONAL GROOMING: A LITTLE
DRESSING REGULAR UPPER BODY CLOTHING: A LITTLE
PERSONAL GROOMING: A LITTLE
DRESSING REGULAR LOWER BODY CLOTHING: A LOT
DRESSING REGULAR LOWER BODY CLOTHING: A LOT

## 2024-08-26 ASSESSMENT — PAIN SCALES - GENERAL
PAINLEVEL_OUTOF10: 0 - NO PAIN

## 2024-08-26 ASSESSMENT — PAIN - FUNCTIONAL ASSESSMENT
PAIN_FUNCTIONAL_ASSESSMENT: 0-10
PAIN_FUNCTIONAL_ASSESSMENT: 0-10

## 2024-08-26 NOTE — CONSULTS
"Nutrition Initial Assessment:   Nutrition Assessment    Reason for Assessment: Admission nursing screening    Patient is a 88 y.o. female with PMH of NSTEMI (nonobstructive CAD 2017), HFpEF, LBBB and SSS s/p PPM 2017 s/p device changeout 11/2022, HTN, CKD presenting with CHF exacerbation and new HFrEF.       Nutrition History:  Energy Intake: Fair 50-75 %  Food and Nutrient History: Pt reports some days she eats 1 meal and some days won't eat anything. States appetite is fair. Per I/Os is consuming 50% of meals. Reports she used to drink Ensure supplements in the past. Agreeable to having oral nutrition supplements during admission.  Food Allergies/Intolerances:  None  GI Symptoms:  hard BM 8/25  Oral Problems: None       Anthropometrics:  Height: 160 cm (5' 3\")   Weight: 67.7 kg (149 lb 4 oz)   BMI (Calculated): 26.45  IBW/kg (Dietitian Calculated): 52.3 kg  Percent of IBW: 129 %       Weight History:   Wt Readings from Last 10 Encounters:   08/26/24 67.7 kg (149 lb 4 oz)     Per wt hx:  08/18/23 56.8 kg (125lb)    Weight Change %:  Weight History / % Weight Change: Reports usual body wt of 132lb. Reports wt is up d/t fluids.  Significant Weight Loss: No    Nutrition Focused Physical Exam Findings:    Subcutaneous Fat Loss:   Orbital Fat Pads: Mild-Moderate (slight dark circles and slight hollowing)  Buccal Fat Pads: Mild-Moderate (flat cheeks, minimal bounce)  Triceps: Well nourished (ample fat tissue)  Muscle Wasting:  Temporalis: Severe (hollowed scooping depression)  Pectoralis (Clavicular Region): Severe (protruding prominent clavicle)  Deltoid/Trapezius: Severe (squared shoulders, acromion process prominent)  Quadriceps: Mild-Moderate (mild depression on inner and outer thigh)  Gastrocnemius: Mild-Moderate (not well developed muscle)  Edema:  Edema: +4 severe  Edema Location: Generalized, RUE, LUE, RLE, LLE  Physical Findings:  Hair: Negative  Eyes: Negative  Mouth: Negative  Nails: Negative  Skin: Positive " (Pretibial R wound)    Nutrition Significant Labs:  CBC Trend:   Results from last 7 days   Lab Units 08/26/24  0940 08/25/24 1932 08/24/24 2238 08/23/24 2235   WBC AUTO x10*3/uL 5.4 5.4 4.4 4.3*   RBC AUTO x10*6/uL 4.86 5.07 4.84 5.24*   HEMOGLOBIN g/dL 13.0 13.3 13.0 14.2   HEMATOCRIT % 40.0 42.8 40.8 41.8   MCV fL 82 84 84 80   PLATELETS AUTO x10*3/uL 213 216 196 214    , BMP Trend:   Results from last 7 days   Lab Units 08/25/24 1932 08/24/24 2238 08/23/24 2235   GLUCOSE mg/dL 100* 88 96   CALCIUM mg/dL 8.9 9.0 9.5   SODIUM mmol/L 142 141 143   POTASSIUM mmol/L 4.1 4.4 4.0   CO2 mmol/L 27 25 22   CHLORIDE mmol/L 105 107 108*   BUN mg/dL 29* 29* 29*   CREATININE mg/dL 1.55* 1.56* 1.61*   Renal Lab Trend:   Results from last 7 days   Lab Units 08/25/24 1932 08/24/24 2238 08/23/24 2235 08/23/24 2235   POTASSIUM mmol/L 4.1 4.4  --  4.0   PHOSPHORUS mg/dL 3.1 3.3   < >  --    SODIUM mmol/L 142 141  --  143   MAGNESIUM mg/dL 2.03 2.24   < >  --    EGFR mL/min/1.73m*2 32* 32*  --  31*   BUN mg/dL 29* 29*  --  29*   CREATININE mg/dL 1.55* 1.56*  --  1.61*    < > = values in this interval not displayed.        Nutrition Specific Medications:  Scheduled medications  aspirin, 81 mg, oral, Daily  calcium carbonate-vitamin D3, 1 tablet, oral, Daily  ergocalciferol, 1,250 mcg, oral, Every Sunday  furosemide, 40 mg, intravenous, BID  heparin, 4,000 Units, intravenous, Once  pantoprazole, 40 mg, oral, Daily before breakfast  polyethylene glycol, 17 g, oral, Daily  predniSONE, 2.5 mg, oral, Daily  rosuvastatin, 20 mg, oral, Nightly  spironolactone, 25 mg, oral, Daily      Continuous medications  heparin, 0-4,000 Units/hr, Last Rate: 800 Units/hr (08/26/24 1128)        I/O:   Last BM Date: 08/25/24; Stool Appearance: Hard, Formed (08/25/24 1100)    Dietary Orders (From admission, onward)       Start     Ordered    08/24/24 0327  Adult diet Cardiac; 70 gm fat; 2 - 3 grams Sodium  Diet effective now        Question Answer  Comment   Diet type Cardiac    Fat restriction: 70 gm fat    Sodium restriction: 2 - 3 grams Sodium        08/24/24 0326                     Estimated Needs:   Total Energy Estimated Needs (kCal):  (0601-4405)  Method for Estimating Needs: 30kcal/kg IBW 52.3 kg  Total Protein Estimated Needs (g): 70 g  Method for Estimating Needs: 1.3g/kg IBW 52.3 kg  Total Fluid Estimated Needs (mL):  (1mL/kcal or per team)           Nutrition Diagnosis   Malnutrition Diagnosis  Patient has Malnutrition Diagnosis: Yes  Diagnosis Status: New  Malnutrition Diagnosis: Severe malnutrition related to chronic disease or condition  As Evidenced by: suspect patient meeting <75% of estimated energy needs for >1 month; moderate-severe muscle and subcutaneous fat loss; genealized +4 edema.            Nutrition Interventions/Recommendations         Nutrition Prescription:  Boost VHC BID (530kcal, 22g protein each).  Recommend liberalizing diet to regular to promote PO intake.  Diet texture and consistency per SLP.        Nutrition Interventions:   Interventions: Meals and snacks, Medical food supplement  Goal: consume >50% of meals  Medical Food Supplement: Commercial beverage  Goal: Boost [facility] BID       Nutrition Education:   Encouraged PO intake of oral nutrition supplements.       Nutrition Monitoring and Evaluation   Food/Nutrient Related History Monitoring  Monitoring and Evaluation Plan: Energy intake, Amount of food  Criteria: meet >75% of estimated energy needs  Amount of Food: Medical food intake  Criteria: consume 100% ONS    Body Composition/Growth/Weight History  Monitoring and Evaluation Plan: Weight  Criteria: stable weight    Biochemical Data, Medical Tests and Procedures  Monitoring and Evaluation Plan: Electrolyte/renal panel, Glucose/endocrine profile  Criteria: WNL              Time Spent (min): 60 minutes

## 2024-08-26 NOTE — CARE PLAN
The patient's goals for the shift include  work with PT/OT    The clinical goals for the shift include pt will remain HMDS throughout shift

## 2024-08-26 NOTE — PROGRESS NOTES
"[facility] Attending Shared Visit Note    This is a shared visit. Please see Advanced Practice Provider's encounter note for additional details.        Overnight events/Subjective: K/L ratio normal.    Exam:   Physical exam: Well appearing, no distress. Normal rate, regular rhythm, non labored breathing, clear to auscultation, abdomen non distended, + LE edema, \"woody\" appearing legs, bilateral upper extremity swelling. no focal neuro deficits.     A/P:   88 F with SSS s/p PPM, HTN, CKD and HFpEF who presented with decompensated HF and was found to have a newly reduced EF (30-35%).   #HFrEF: volume overloaded, rule out amyloid with NM SPECT PYP (K/L ratio normal, unlikely AL amyloid). 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 Continue diuresis  -may need RHC and coronary angiogram when more euvolemic  -GDMT pending the above  #Bilateral upper extremity swelling: ultrasound of both arms    Dispo: pending the above, likely late this week.     Oscar Marsh MD    ________________________________________________________________________________  Problems:   Principal Problem:    Acute systolic heart failure (Multi)  Active Problems:    Sick sinus syndrome (Multi)    NSTEMI (non-ST elevated myocardial infarction) (Multi)    Cardiomyopathy (Multi)    Pacemaker    TERI (acute kidney injury) (CMS-[facility])      Objective   Admit Date: 8/24/2024  Hospital Length of Stay: 2   Home: [facility] [id number]    Vitals:      8/26/2024    11:48 AM 8/26/2024     7:18 AM 8/26/2024     5:00 AM 8/26/2024     4:58 AM 8/26/2024    12:15 AM 8/25/2024     8:18 PM 8/25/2024     4:26 PM   Vitals   Systolic 125 122  120 128 135 139   Diastolic 64 68  66 77 82 84   Heart Rate 63 76  78 79 84 81   Temp 36.3 °C (97.3 °F) 36.1 °C (97 °F)  36.4 °C (97.5 °F) 36.2 °C (97.2 °F) 36.3 °C (97.3 °F) 36.2 °C (97.2 °F)   Resp 18 16  16 16 18 17   Weight (lb)   149.25       BMI   26.44 kg/m2       BSA (m2)   1.73 m2         Wt Readings " from Last 5 Encounters:   08/26/24 67.7 kg (149 lb 4 oz)       Intake/Output Summary (Last 24 hours) at 8/26/2024 1429  Last data filed at 8/26/2024 0927  Gross per 24 hour   Intake --   Output 5275 ml   Net -5275 ml         MEDICATIONS  Infusions:  heparin, Last Rate: 800 Units/hr (08/26/24 1128)      Scheduled:  aspirin, 81 mg, Daily  calcium carbonate-vitamin D3, 1 tablet, Daily  ergocalciferol, 1,250 mcg, Every Sunday  furosemide, 40 mg, BID  heparin, 4,000 Units, Once  pantoprazole, 40 mg, Daily before breakfast  polyethylene glycol, 17 g, Daily  predniSONE, 2.5 mg, Daily  rosuvastatin, 20 mg, Nightly  spironolactone, 25 mg, Daily      PRN:  acetaminophen, 650 mg, q6h PRN   Or  acetaminophen, 650 mg, q6h PRN   Or  acetaminophen, 650 mg, q6h PRN  benzonatate, 100 mg, TID PRN  heparin, 1,500-3,000 Units, q4h PRN  ipratropium-albuteroL, 3 mL, q6h PRN      Prior to Admission Meds:  Medications Prior to Admission   Medication Sig Dispense Refill Last Dose    amLODIPine (Norvasc) 10 mg tablet Take 1 tablet (10 mg) by mouth once daily.   Past Week    aspirin 81 mg EC tablet Take 1 tablet (81 mg) by mouth once daily.   Past Week    atorvastatin (Lipitor) 10 mg tablet Take 1 tablet (10 mg) by mouth once daily.   Past Week    calcium carbonate-vitamin D3 (Calcium 600 + D,3,) 600 mg-10 mcg (400 unit) tablet Take 1 tablet by mouth once daily.   Past Week    ergocalciferol (Vitamin D-2) 50 MCG (2000 UT) capsule capsule Take 1 capsule (50 mcg) by mouth 1 (one) time per week.   Past Month    furosemide (Lasix) 20 mg tablet Take 1 tablet (20 mg) by mouth 2 times a day.   Past Week    metoprolol tartrate (Lopressor) 50 mg tablet Take 1 tablet by mouth once daily.   Past Week    torsemide (Demadex) 20 mg tablet Take 1 tablet (20 mg) by mouth once daily.   Past Week    traMADol (Ultram) 50 mg tablet Take 1 tablet (50 mg) by mouth every 8 hours if needed.   Past Week    nitroglycerin (Nitrostat) 0.4 mg SL tablet Place 1 tablet  "(0.4 mg) under the tongue every 5 minutes if needed for chest pain (for up to 3 doses.Call 911 is pain persists).   More than a month       DATA:  CMP:  Recent Labs     08/25/24 1932 08/24/24 2238 08/23/24 2235    141 143   K 4.1 4.4 4.0    107 108*   CO2 27 25 22   ANIONGAP 14 13 17   BUN 29* 29* 29*   CREATININE 1.55* 1.56* 1.61*   EGFR 32* 32* 31*   MG 2.03 2.24  --      Recent Labs     08/25/24 1932 08/24/24 2238 08/23/24 2235   ALBUMIN 3.0* 3.1* 3.5   ALT  --   --  12   AST  --   --  18   BILITOT  --   --  0.7     CBC:  Recent Labs     08/26/24  0940 08/25/24 1932 08/24/24 2238 08/23/24 2235   WBC 5.4 5.4 4.4 4.3*   HGB 13.0 13.3 13.0 14.2   HCT 40.0 42.8 40.8 41.8    216 196 214   MCV 82 84 84 80     COAG:   Recent Labs     08/26/24  0940 08/25/24 1932 08/24/24  1305 08/24/24  0645 08/24/24  0001   INR  --   --   --   --  1.1   HAUF 0.5 0.5 0.6 0.6  --      ABO: No results for input(s): \"ABO\" in the last 49699 hours.  HEME/ENDO: No results for input(s): \"FERRITIN\", \"IRONSAT\", \"TSH\", \"FREET4\", \"HGBA1C\" in the last 08770 hours.  CARDIAC:   Recent Labs     08/23/24 2341 08/23/24 2235   TROPHS 189* 235*   BNP  --  2,626*     No results for input(s): \"CHOL\", \"LDLF\", \"LDLCALC\", \"HDL\", \"TRIG\" in the last 18649 hours.  TOX:No results for input(s): \"AMPHETAMINE\", \"BENZO\", \"CANNABINOID\", \"COCAI\", \"FENTANYL\", \"OPIATE\", \"OXYCODONE\", \"PCP\" in the last 01466 hours.    No lab exists for component: \"BARBSCRUR\"  MICRO: No results for input(s): \"ESR\", \"CRP\", \"PROCAL\" in the last 34273 hours.  No results found for the last 90 days.      FOLLOWUP:   Future Appointments   Date Time Provider Department Center   9/9/2024  8:00 AM Juan Akins DO [id number]           "

## 2024-08-26 NOTE — CARE PLAN
The patient's goals for the shift include      The clinical goals for the shift include Patient will remain HDS overnight and no falls.      Problem: Fall/Injury  Goal: Not fall by end of shift  Outcome: Progressing  Goal: Be free from injury by end of the shift  Outcome: Progressing  Goal: Verbalize understanding of personal risk factors for fall in the hospital  Outcome: Progressing  Goal: Verbalize understanding of risk factor reduction measures to prevent injury from fall in the home  Outcome: Progressing  Goal: Use assistive devices by end of the shift  Outcome: Progressing  Goal: Pace activities to prevent fatigue by end of the shift  Outcome: Progressing     Problem: Pain - Adult  Goal: Verbalizes/displays adequate comfort level or baseline comfort level  Outcome: Progressing     Problem: Safety - Adult  Goal: Free from fall injury  Outcome: Progressing     Problem: Discharge Planning  Goal: Discharge to home or other facility with appropriate resources  Outcome: Progressing     Problem: Chronic Conditions and Co-morbidities  Goal: Patient's chronic conditions and co-morbidity symptoms are monitored and maintained or improved  Outcome: Progressing     Problem: Skin  Goal: Participates in plan/prevention/treatment measures  Outcome: Progressing  Goal: Prevent/manage excess moisture  Outcome: Progressing  Goal: Prevent/minimize sheer/friction injuries  Outcome: Progressing  Goal: Promote/optimize nutrition  Outcome: Progressing

## 2024-08-26 NOTE — ED PROCEDURE NOTE
Procedure  Critical Care    Performed by: Julio César Hernandez MD  Authorized by: Julio César Hernandez MD    Critical care provider statement:     Critical care time (minutes):  14    Critical care time was exclusive of:  Separately billable procedures and treating other patients and teaching time    Critical care was necessary to treat or prevent imminent or life-threatening deterioration of the following conditions:  Cardiac failure (heparin gtt management in setting of NSTEMI)    Critical care was time spent personally by me on the following activities:  Development of treatment plan with patient or surrogate, ordering and performing treatments and interventions, ordering and review of laboratory studies, discussions with consultants, ordering and review of radiographic studies and evaluation of patient's response to treatment    Care discussed with: admitting provider                 Julio César Hernandez MD  08/25/24 8500

## 2024-08-26 NOTE — PROGRESS NOTES
Subjective Data:  - device interrogation pending today  - will continue diuresis today with IV Lasix 40mg BI  - NM PYP cardiac amyloidosis with SPECT CT today  - B/L UE edema, c/w heparin drip for now and obtain B/L UE duplex    Overnight Events:    No acute events overnight     Objective Data:  Last Recorded Vitals:  Vitals:    08/26/24 0458 08/26/24 0500 08/26/24 0718 08/26/24 1148   BP: 120/66  122/68 125/64   Pulse: 78  76 63   Resp: 16  16 18   Temp: 36.4 °C (97.5 °F)  36.1 °C (97 °F) 36.3 °C (97.3 °F)   TempSrc:       SpO2: 98%  97% 97%   Weight:  67.7 kg (149 lb 4 oz)     Height:           Last Labs:  CBC - 8/26/2024:  9:40 AM  5.4 13.0 213    40.0      CMP - 8/25/2024:  7:32 PM  8.9 6.2 18 --- 0.7   3.1 3.0 12 101      PTT - 8/24/2024: 12:01 AM  1.1   12.6 32     TROPHS   Date/Time Value Ref Range Status   08/23/2024 11:41  0 - 34 ng/L Final     Comment:     Previous result verified on 8/23/2024 2336 on specimen/case [id number] called with component [facility] for procedure Troponin I, High Sensitivity, Initial with value 235 ng/L.   08/23/2024 10:35  0 - 34 ng/L Final     BNP   Date/Time Value Ref Range Status   08/23/2024 10:35 PM 2,626 0 - 99 pg/mL Final      Last I/O:  I/O last 3 completed shifts:  In: - (0 mL/kg)   Out: 5925 (87.5 mL/kg) [Urine:5925 (2.4 mL/kg/hr)]  Weight: 67.7 kg     Past Cardiology Tests (Last 3 Years):  EKG:  ECG 12 lead 08/23/2024    Echo:  Transthoracic Echo (TTE) Complete 8/24/2024   1. Left ventricular ejection fraction is moderately decreased, by visual estimate at 30-35%.   2. There is global hypokinesis of the left ventricle with minor regional variations.   3. Spectral Doppler shows an impaired relaxation pattern of left ventricular diastolic filling.   4. There is an elevated left ventricular end diastolic pressure.   5. Left ventricular cavity size is decreased.   6. Abnormal septal motion consistent with RV pacemaker.   7. Moderately increased left ventricular  septal thickness.   8. The left ventricular posterior wall thickness is moderately increased.   9. There is severe concentric left ventricular hypertrophy.  10. There is normal right ventricular global systolic function.  11. Moderately enlarged right ventricle.  12. The left atrium is mildly dilated.  13. The right atrium is severely dilated.  14. Mild to moderate mitral valve regurgitation.  15. Severe tricuspid regurgitation visualized.  16. Slightly elevated RVSP.  17. The RV systolic pressure is likely to be underestimated.  18. Mild to moderate aortic valve regurgitation.    Transthoracic Echo (TTE) Complete 10/03/2023   1. Left ventricular systolic function is mildly decreased with a 50-55% estimated ejection fraction.   2. Moderately increased left ventricular septal thickness.   3. The left ventricular posterior wall thickness is moderately increased.   4. Moderately enlarged right ventricle.   5. Mild to moderate aortic valve regurgitation.   6. Mild to moderate mitral valve regurgitation.   7. Moderate tricuspid regurgitation visualized.   8. The tricuspid valve annulus appears dilated.   9. The left atrium is moderately dilated.  10. Moderately elevated right ventricular systolic pressure.  11. Compared with study from 2/17/2017, there is a significant increase in LV wall thickness. There is evidence of mild thickening of the RV free wall, associated with mild thickening of the AV, MV and tricuspid valve plus moderately elevated RVSP. This findings could be associated to an underlying infiltrative disease like amyloidosis.    Ejection Fractions:  EF   Date/Time Value Ref Range Status   08/24/2024 11:20 AM 33 %      Cath:  No results found for this or any previous visit from the past 1095 days.    Stress Test:  No results found for this or any previous visit from the past 1095 days.    Cardiac Imaging:  No results found for this or any previous visit from the past 1095 days.      Inpatient  Medications:  Scheduled medications   Medication Dose Route Frequency    aspirin  81 mg oral Daily    calcium carbonate-vitamin D3  1 tablet oral Daily    ergocalciferol  1,250 mcg oral Every     furosemide  40 mg intravenous BID    heparin  4,000 Units intravenous Once    pantoprazole  40 mg oral Daily before breakfast    polyethylene glycol  17 g oral Daily    predniSONE  2.5 mg oral Daily    rosuvastatin  20 mg oral Nightly    spironolactone  25 mg oral Daily     PRN medications   Medication    acetaminophen    Or    acetaminophen    Or    acetaminophen    benzonatate    heparin    ipratropium-albuteroL     Continuous Medications   Medication Dose Last Rate    heparin  0-4,000 Units/hr 800 Units/hr (24 1128)       Physical Exam:  Physical Exam  Constitutional:       General: She is not in acute distress.     Appearance: She is obese. She is ill-appearing.   Neck:      Vascular: JVD (in setting of severe TR) present.   Cardiovascular:      Rate and Rhythm: Normal rate and regular rhythm.      Heart sounds: Murmur heard.      Comments: Engorged EJ  Pulmonary:      Effort: Pulmonary effort is normal.      Breath sounds: Examination of the right-lower field reveals decreased breath sounds. Examination of the left-lower field reveals decreased breath sounds. Decreased breath sounds present.   Abdominal:      General: There is distension.      Comments: Edematous to upper abdomen and bilateral flanks   Musculoskeletal:      Right forearm: Edema present.      Left forearm: Edema present.      Right lower le+ Pitting Edema present.      Left lower le+ Pitting Edema present.   Skin:     General: Skin is warm and dry.   Neurological:      General: No focal deficit present.      Mental Status: She is alert and oriented to person, place, and time.   Psychiatric:         Mood and Affect: Mood normal.         Behavior: Behavior normal.            Assessment/Plan   Erin Lopez is a 89yo F w PMH of NSTEMI  (nonobstructive CAD 2017), HFpEF, LBBB and SSS s/p PPM 2017 s/p device changeout 11/2022, HTN, CKD presenting with CHF exacerbation and new HFrEF.    Acute systolic and diastolic heart failure (HFrEF 30-35%, previous EF 50-55%)  Severe TR  -Etiology may be ischemia vs infiltration vs pacemaker mediated (100% AV pacing on EKG today)  - Historically 9/2023, EF 50-55%, some concern in past for underlying infiltrative disease, no cMRI as yet obtained  - EF appears significantly decreased (~30%) on uploaded ED POCUS images with at least moderate, possibly severe MR by color Doppler. There is global hypokinesis however the septum appears largely akinetic  - complete TTE 8/24: EF 30-35%, DD, elevated LVEDP, LV cavity decreased, abnormal septal motion consistent with RV pacemaker, LV posterior wall thickness mod increased, A mildly dilated, RA severely dilated, mild to mod MR, wide open severe TR (previously moderate), mild to mod AR, IVC dilated greater than 50% collapse  - BNP 2,626  - admit wt: 73.3 kg  - today's wt: pending  - severe anasarca to upper abdomen/UE and JVD up to mandible with RLE wound weeping  -8/25 IV Lasix 40mg BID + IV Diamox 500mg to be given 30 min prior to first lasix bolus  - C/w lasix 40mg IV BID   - c/w spironolactone 25mg today  - with previously noted concern for amyloidosis on TTE 10/2023 and significantly thickened LV posterior wall on TTE 8/24, serum free light chains, SPEP and UPEP collected and pending  - K/L ration WNL   - SPEP, UPEP in process   - NM PYP cardiac amyloidosis with SPECT CT today  - Anticipate ischemic eval of some kind as part of HFrEF workup   - Hold metop iso new HFrEF and decompensation  - B/L UE edema, c/w heparin drip for now and obtain B/L UE duplex  - Daily standing weights, strict I&O's, 2g sodium diet, 2L fluid restriction      Troponinemia  CAD  - HS trop: 235 -> 189  - Suspect low and flat trops in absence of CP reflect demand/supply mismatch iso decompensated  CHF though septal wma may suggest there is an ischemic component  - Cont heparin gtt for now given apparently new EF reduction but relatively low c/f acute NSTEMI, had planned to stop today but c/f DVT in B/L UE will continue for now   - Cont ASA  - Intensified atorva 10 to rosuva 20 given hx of CAD    HTN  - admit /70  - -130s  - discontinued home amlodipine 10mg in anticipation for GDMT optimization    Hx of SSS s/p PPM 2017   - s/p device changeout 11/2022  - ECG showing 100% AV pacing  - tele reveals AV pacing 60bpm  - concern for possible pacemaker mediated cardiomyopathy  - device interrogation pending    Blistering/weeping LE  - in setting of massively hypervolemic with +3 pitting LE edema  - Nonwarm, nonerythematous, no apparent sign of infection at this time  - consulted wound care, pending evaluation and recommendations     Arthritis  - Pt previously getting steroid injections q3 month then put on systemic steroids  - Cont prednisone 2.5 daily   - Was previously listed as on plaquenil for unclear reason but pt states this was stopped  - cont home PPI while on steroids      DVT ppx: heparin gtt  Dispo: pending HFrEF work up, GDMT optimization, aggressive diuresis  Code Status:  Full Code    Seen and discussed with Dr. Maximo Anthony, APRN-CNP, DNP

## 2024-08-26 NOTE — CARE PLAN
The patient's goals for the shift include      The clinical goals for the shift include Patient will remain HDS overnight and no falls.      Problem: Fall/Injury  Goal: Not fall by end of shift  Outcome: Progressing  Goal: Be free from injury by end of the shift  Outcome: Progressing  Goal: Verbalize understanding of personal risk factors for fall in the hospital  Outcome: Progressing  Goal: Verbalize understanding of risk factor reduction measures to prevent injury from fall in the home  Outcome: Progressing  Goal: Use assistive devices by end of the shift  Outcome: Progressing  Goal: Pace activities to prevent fatigue by end of the shift  Outcome: Progressing     Problem: Pain - Adult  Goal: Verbalizes/displays adequate comfort level or baseline comfort level  Outcome: Progressing     Problem: Safety - Adult  Goal: Free from fall injury  Outcome: Progressing     Problem: Discharge Planning  Goal: Discharge to home or other facility with appropriate resources  Outcome: Progressing     Problem: Chronic Conditions and Co-morbidities  Goal: Patient's chronic conditions and co-morbidity symptoms are monitored and maintained or improved  Outcome: Progressing     Problem: Skin  Goal: Participates in plan/prevention/treatment measures  Outcome: Progressing  Goal: Prevent/manage excess moisture  Outcome: Progressing  Goal: Prevent/minimize sheer/friction injuries  8/25/2024 2302 by Alexsander Chase RN  Flowsheets (Taken 8/25/2024 2302)  Prevent/minimize sheer/friction injuries: Use pull sheet  8/25/2024 2302 by Alexsander Chase RN  Outcome: Progressing  Flowsheets (Taken 8/25/2024 2302)  Prevent/minimize sheer/friction injuries: Use pull sheet  Goal: Promote/optimize nutrition  Outcome: Progressing

## 2024-08-26 NOTE — CARE PLAN
The patient's goals for the shift include  free from injury    The clinical goals for the shift include pt will remain HMDS throughout shift

## 2024-08-26 NOTE — CARE PLAN
The patient's goals for the shift include  being free from falls    The clinical goals for the shift include pt will remain HMDS throughout shift

## 2024-08-26 NOTE — CONSULTS
Wound Care Consult     Visit Date: 8/26/2024      Patient Name: Erin Lopez         MRN: [id number]           YOB: 1935     Reason for Consult: right lower extremity wound        Wound Assessment:  Wound 08/25/24 Other (comment) Pretibial Right (Active)   Wound Image   08/26/24 1353   Site Assessment Bleeding;Edema;Fragile;Painful;Red 08/26/24 1353   Nelda-Wound Assessment Boggy;Dry;Edema;Hyperpigmented 08/26/24 1353   Non-staged Wound Description Partial thickness 08/26/24 1353   Wound Length (cm) 5 cm 08/26/24 1353   Wound Width (cm) 5 cm 08/26/24 1353   Wound Surface Area (cm^2) 25 cm^2 08/26/24 1353   State of Healing Early/partial granulation 08/25/24 1244   Margins Undefined edges 08/26/24 0900   Drainage Description Serosanguineous 08/26/24 1353   Drainage Amount Scant 08/26/24 1353   Dressing ABD;Xeroform;Kerlix/rolled gauze 08/26/24 1353   Dressing Changed New 08/26/24 1353   Dressing Status Clean;Dry;Occlusive 08/26/24 0900       Wound Team Summary Assessment:   Bilateral lower extremities cleansed with bath wipes, and allowed to dry. Criticaid clear barrier ointment then applied dry skin on both legs.  For open area to RLE:  Cleanse wound with NS, dry with gauze.  Apply xeroform gauze only to open shallow wounds.  Cover with 4x4 or ABD pad.  Wrap with kerlix.  Elevate heels on pillow or using EHOB boots.  Dressing should be change daily by bedside RN.        Elizabeth Torres RN  8/26/2024  1:55 PM

## 2024-08-27 ENCOUNTER — APPOINTMENT (OUTPATIENT)
Dept: CARDIOLOGY | Facility: HOSPITAL | Age: 89
End: 2024-08-27
Payer: COMMERCIAL

## 2024-08-27 LAB
ALBUMIN SERPL BCP-MCNC: 2.8 G/DL (ref 3.4–5)
ANION GAP SERPL CALC-SCNC: 21 MMOL/L (ref 10–20)
BUN SERPL-MCNC: 29 MG/DL (ref 6–23)
CALCIUM SERPL-MCNC: 9.1 MG/DL (ref 8.6–10.6)
CHLORIDE SERPL-SCNC: 101 MMOL/L (ref 98–107)
CO2 SERPL-SCNC: 21 MMOL/L (ref 21–32)
CREAT SERPL-MCNC: 1.4 MG/DL (ref 0.5–1.05)
EGFRCR SERPLBLD CKD-EPI 2021: 36 ML/MIN/1.73M*2
ERYTHROCYTE [DISTWIDTH] IN BLOOD BY AUTOMATED COUNT: 18.6 % (ref 11.5–14.5)
GLUCOSE SERPL-MCNC: 68 MG/DL (ref 74–99)
HCT VFR BLD AUTO: 44.8 % (ref 36–46)
HGB BLD-MCNC: 14.1 G/DL (ref 12–16)
MAGNESIUM SERPL-MCNC: 2.26 MG/DL (ref 1.6–2.4)
MCH RBC QN AUTO: 26.4 PG (ref 26–34)
MCHC RBC AUTO-ENTMCNC: 31.5 G/DL (ref 32–36)
MCV RBC AUTO: 84 FL (ref 80–100)
NRBC BLD-RTO: 0 /100 WBCS (ref 0–0)
PHOSPHATE SERPL-MCNC: 3 MG/DL (ref 2.5–4.9)
PLATELET # BLD AUTO: 213 X10*3/UL (ref 150–450)
POTASSIUM SERPL-SCNC: 5 MMOL/L (ref 3.5–5.3)
RBC # BLD AUTO: 5.35 X10*6/UL (ref 4–5.2)
SODIUM SERPL-SCNC: 138 MMOL/L (ref 136–145)
WBC # BLD AUTO: 5.7 X10*3/UL (ref 4.4–11.3)

## 2024-08-27 PROCEDURE — 2500000002 HC RX 250 W HCPCS SELF ADMINISTERED DRUGS (ALT 637 FOR MEDICARE OP, ALT 636 FOR OP/ED): Performed by: STUDENT IN AN ORGANIZED HEALTH CARE EDUCATION/TRAINING PROGRAM

## 2024-08-27 PROCEDURE — 2500000001 HC RX 250 WO HCPCS SELF ADMINISTERED DRUGS (ALT 637 FOR MEDICARE OP): Performed by: STUDENT IN AN ORGANIZED HEALTH CARE EDUCATION/TRAINING PROGRAM

## 2024-08-27 PROCEDURE — 97530 THERAPEUTIC ACTIVITIES: CPT | Mod: GP | Performed by: PHYSICAL THERAPIST

## 2024-08-27 PROCEDURE — 36415 COLL VENOUS BLD VENIPUNCTURE: CPT

## 2024-08-27 PROCEDURE — 97162 PT EVAL MOD COMPLEX 30 MIN: CPT | Mod: GP | Performed by: PHYSICAL THERAPIST

## 2024-08-27 PROCEDURE — 97166 OT EVAL MOD COMPLEX 45 MIN: CPT | Mod: GO

## 2024-08-27 PROCEDURE — 85027 COMPLETE CBC AUTOMATED: CPT

## 2024-08-27 PROCEDURE — 99221 1ST HOSP IP/OBS SF/LOW 40: CPT | Performed by: INTERNAL MEDICINE

## 2024-08-27 PROCEDURE — 2500000002 HC RX 250 W HCPCS SELF ADMINISTERED DRUGS (ALT 637 FOR MEDICARE OP, ALT 636 FOR OP/ED)

## 2024-08-27 PROCEDURE — 99223 1ST HOSP IP/OBS HIGH 75: CPT | Performed by: STUDENT IN AN ORGANIZED HEALTH CARE EDUCATION/TRAINING PROGRAM

## 2024-08-27 PROCEDURE — 2500000004 HC RX 250 GENERAL PHARMACY W/ HCPCS (ALT 636 FOR OP/ED): Performed by: NURSE PRACTITIONER

## 2024-08-27 PROCEDURE — 97530 THERAPEUTIC ACTIVITIES: CPT | Mod: GO

## 2024-08-27 PROCEDURE — 2500000004 HC RX 250 GENERAL PHARMACY W/ HCPCS (ALT 636 FOR OP/ED)

## 2024-08-27 PROCEDURE — 2500000002 HC RX 250 W HCPCS SELF ADMINISTERED DRUGS (ALT 637 FOR MEDICARE OP, ALT 636 FOR OP/ED): Performed by: NURSE PRACTITIONER

## 2024-08-27 PROCEDURE — 93010 ELECTROCARDIOGRAM REPORT: CPT | Performed by: INTERNAL MEDICINE

## 2024-08-27 PROCEDURE — 99233 SBSQ HOSP IP/OBS HIGH 50: CPT | Performed by: INTERNAL MEDICINE

## 2024-08-27 PROCEDURE — 1200000002 HC GENERAL ROOM WITH TELEMETRY DAILY

## 2024-08-27 PROCEDURE — 83735 ASSAY OF MAGNESIUM: CPT

## 2024-08-27 PROCEDURE — 80069 RENAL FUNCTION PANEL: CPT

## 2024-08-27 PROCEDURE — 93005 ELECTROCARDIOGRAM TRACING: CPT

## 2024-08-27 PROCEDURE — 2500000004 HC RX 250 GENERAL PHARMACY W/ HCPCS (ALT 636 FOR OP/ED): Performed by: STUDENT IN AN ORGANIZED HEALTH CARE EDUCATION/TRAINING PROGRAM

## 2024-08-27 RX ORDER — ONDANSETRON HYDROCHLORIDE 2 MG/ML
4 INJECTION, SOLUTION INTRAVENOUS ONCE
Status: COMPLETED | OUTPATIENT
Start: 2024-08-27 | End: 2024-08-27

## 2024-08-27 RX ORDER — ENOXAPARIN SODIUM 100 MG/ML
30 INJECTION SUBCUTANEOUS EVERY 24 HOURS
Status: DISCONTINUED | OUTPATIENT
Start: 2024-08-27 | End: 2024-09-04 | Stop reason: HOSPADM

## 2024-08-27 RX ORDER — POTASSIUM CHLORIDE 750 MG/1
40 TABLET, FILM COATED, EXTENDED RELEASE ORAL ONCE
Status: COMPLETED | OUTPATIENT
Start: 2024-08-27 | End: 2024-08-27

## 2024-08-27 RX ADMIN — PANTOPRAZOLE SODIUM 40 MG: 40 TABLET, DELAYED RELEASE ORAL at 06:22

## 2024-08-27 RX ADMIN — ROSUVASTATIN CALCIUM 20 MG: 20 TABLET, FILM COATED ORAL at 21:24

## 2024-08-27 RX ADMIN — Medication 1 TABLET: at 08:53

## 2024-08-27 RX ADMIN — ASPIRIN 81 MG: 81 TABLET, COATED ORAL at 08:53

## 2024-08-27 RX ADMIN — FUROSEMIDE 40 MG: 10 INJECTION, SOLUTION INTRAMUSCULAR; INTRAVENOUS at 21:24

## 2024-08-27 RX ADMIN — PREDNISONE 2.5 MG: 2.5 TABLET ORAL at 08:53

## 2024-08-27 RX ADMIN — ONDANSETRON 4 MG: 2 INJECTION INTRAMUSCULAR; INTRAVENOUS at 19:03

## 2024-08-27 RX ADMIN — FUROSEMIDE 40 MG: 10 INJECTION, SOLUTION INTRAMUSCULAR; INTRAVENOUS at 08:53

## 2024-08-27 RX ADMIN — POTASSIUM CHLORIDE 40 MEQ: 750 TABLET, FILM COATED, EXTENDED RELEASE ORAL at 08:53

## 2024-08-27 RX ADMIN — SPIRONOLACTONE 25 MG: 25 TABLET, FILM COATED ORAL at 08:53

## 2024-08-27 RX ADMIN — ENOXAPARIN SODIUM 30 MG: 100 INJECTION SUBCUTANEOUS at 08:53

## 2024-08-27 RX ADMIN — ONDANSETRON 4 MG: 2 INJECTION INTRAMUSCULAR; INTRAVENOUS at 12:43

## 2024-08-27 ASSESSMENT — COGNITIVE AND FUNCTIONAL STATUS - GENERAL
TOILETING: A LOT
WALKING IN HOSPITAL ROOM: A LOT
MOVING TO AND FROM BED TO CHAIR: A LOT
PERSONAL GROOMING: A LITTLE
MOVING FROM LYING ON BACK TO SITTING ON SIDE OF FLAT BED WITH BEDRAILS: A LITTLE
CLIMB 3 TO 5 STEPS WITH RAILING: A LOT
DRESSING REGULAR UPPER BODY CLOTHING: A LITTLE
EATING MEALS: A LITTLE
DRESSING REGULAR UPPER BODY CLOTHING: A LITTLE
DRESSING REGULAR UPPER BODY CLOTHING: A LITTLE
DRESSING REGULAR LOWER BODY CLOTHING: A LOT
CLIMB 3 TO 5 STEPS WITH RAILING: A LOT
STANDING UP FROM CHAIR USING ARMS: A LOT
HELP NEEDED FOR BATHING: A LITTLE
MOBILITY SCORE: 15
HELP NEEDED FOR BATHING: A LOT
DRESSING REGULAR LOWER BODY CLOTHING: A LOT
STANDING UP FROM CHAIR USING ARMS: A LOT
MOBILITY SCORE: 13
MOBILITY SCORE: 13
MOVING FROM LYING ON BACK TO SITTING ON SIDE OF FLAT BED WITH BEDRAILS: A LITTLE
STANDING UP FROM CHAIR USING ARMS: A LOT
DRESSING REGULAR LOWER BODY CLOTHING: A LOT
TURNING FROM BACK TO SIDE WHILE IN FLAT BAD: A LITTLE
MOVING TO AND FROM BED TO CHAIR: A LOT
DAILY ACTIVITIY SCORE: 16
CLIMB 3 TO 5 STEPS WITH RAILING: TOTAL
EATING MEALS: A LITTLE
WALKING IN HOSPITAL ROOM: A LOT
HELP NEEDED FOR BATHING: A LITTLE
PERSONAL GROOMING: A LITTLE
TURNING FROM BACK TO SIDE WHILE IN FLAT BAD: A LITTLE
EATING MEALS: A LITTLE
MOVING TO AND FROM BED TO CHAIR: A LOT
TURNING FROM BACK TO SIDE WHILE IN FLAT BAD: A LOT
TOILETING: A LOT
TOILETING: A LOT
DAILY ACTIVITIY SCORE: 16
MOVING FROM LYING ON BACK TO SITTING ON SIDE OF FLAT BED WITH BEDRAILS: A LITTLE
WALKING IN HOSPITAL ROOM: A LITTLE
DAILY ACTIVITIY SCORE: 15
PERSONAL GROOMING: A LITTLE

## 2024-08-27 ASSESSMENT — PAIN SCALES - GENERAL
PAINLEVEL_OUTOF10: 0 - NO PAIN

## 2024-08-27 ASSESSMENT — PAIN - FUNCTIONAL ASSESSMENT
PAIN_FUNCTIONAL_ASSESSMENT: 0-10

## 2024-08-27 ASSESSMENT — ACTIVITIES OF DAILY LIVING (ADL): BATHING_ASSISTANCE: MAXIMAL

## 2024-08-27 NOTE — NURSING NOTE
I contact Dr. Juan iDego Beal regarding patient having new onset JVD that appeared prominent at approximately 2335. Updated vitals obtained. Patient asymptomatic at this time. No new orders at this time.

## 2024-08-27 NOTE — CONSULTS
Heart Failure Nurse Navigator    The role of the HF nurse navigator is to (1) characterize risk profiles of patients with heart failure transitioning from [id number]-[id number] after hospitalization, (2) recommend interventions to improve care and reduce risks of worse post-hospitalization outcomes. Potential recommendations may touch base on patient/family education, additional consults that may bring value, and appointment scheduling.    Assessment    I met with Erin Jessica at the bedside, and my impressions include:   89yo female with PMHx of SSS s/p PPM, HTN, CKD and HFpEF. She is alert and oriented and engaged in assessment. Newly diagnosed HFrEF + TTR amyloid. Advanced heart failure consulted who suggested initiation of Tafamidis as an outpatient.    Patients Cardiologist(s): New appointment scheduled with heart failure cardiologist Dr. Lit Goss 9/17/24 3pm [facility]    1. Medical Domain  What is the patient's most recent LVEF? 30-35%  HFrEF (LVEF <= 40%) Quadruple therapy recommended  HFmrEF (LVEF 41-49%) Quadruple therapy recommended  HFpEF (LVEF >= 50%) Minimum recommendations: SGLT2i and MRA  Is the patient on GDMT for their condition?   ARNI/ACEI/ARB: Yes  BB: Yes  MRA: Yes  SGLT2i: Yes  Could this patient have advanced heart failure (Stage D heart failure)?: No   If yes, the potential markers of advanced heart failure include: NA    REFERENCE: Potential markers of advanced HF   Inotrope (dobutamine or milrinone) used during this admission?   LVEF<=25%?   >=2 hospitalizations for ADHF in the last year?   Severe symptoms of HF (fatigue, dyspnea, confusion, edema) despite medical therapy?   Downtitration of GDMT as compared to home medications?   Discontinuation of GDMT because of hypotension or renal intolerance?   Recurrent arrhythmias (AF, VT with ICD shocks)?   Cardiac cachexia (i.e., unintentional weight loss due to HF)?   High-risk biomarker profile (e.g., hyponatremia [Na<135],  "very elevated BNP, worsening kidney function)   Escalating doses of diuretics or persistent edema despite escalation     2. Mind and Emotion  Does this patient have possible cognitive impairment?: No (The Mini-Cog score 3/5)  Ask the patient to memorize these 3 words: banana, sunrise, chair  Ask the patient to draw a clock with hands pointing at \"20 minutes after 8\"  Ask the patient to recall the 3 words  Score: Add number of words recalled + clock drawing (0 points for any errors, 2 points if correct)  Interpretation: A score of 0-2 suggests cognitive impairment is present, a score of 3-5 suggests cognitive impairment is absent  Does this patient have major depression?: No (PH-Q2 score 1/6)  Over the last 2 weeks: Little interest or pleasure in doing things? (Not at all +0, Several days +1, More than half the days +2, Nearly every day +3)  Over the last 2 weeks: Feeling down, depressed or hopeless? (Not at all +0, Several days +1, More than half the days +2, Nearly every day +3)  Score: Add points  Interpretation: A score of 3 or more suggests that major depression is likely.     3. Physical Function  Could this patient be frail?: Yes   Defined by presence of all of these: slowness, weakness, shrinking, inactivity, exhaustion  Is this patient at risk for falling?: No   Defined by having experienced a fall in the last 12 months.    4. Social Determinants of Health  Transportation deficits?: No   Lack of insurance?: No   Poor financial resources?: No   Living conditions (homelessness, unstable home)?: No   Poor family/social support?: No   Poor personal care?: No   Food insecurity?: No   Lack of HCPOA?: N/A    Summary of Assessment  The following linked problems were found:   Patient is currently on quadruple GDMT being managed by HHVI service.   Tafamidis to be prescribed outpatient. YURIDIA Brand, RN [place] cardiac [place] [facility] [place]  Patient has been scheduled with Dr. Goss for " "initiation of Tafamadis as recommended by cardiology team.  Patient assessed with Mini-Cog and PHQ-2. There are no concerns for cognitive dysfunction or depression.  Patient appears to be frail. She reports weakness. Nutrition consulted for malnutrition who recommended Boost VHC BID (530kcal, 22g protein each), recommend liberalizing diet to regular to promote PO intake. Patient still reports decreased appetite due to \"stomach not feeling well\". Encouraged patient to drink Boost if she is unable to meals.   PT following and recommended SNF. Patient will discuss with family regarding options.  Patient denies any falls. Fall precautions reviewed with patient which includes requesting assistance ambulating when needed, use of assistive devices and changing of positions slowly to reduced dizziness and lightheadedness.    Current Heart Failure Medications:  Beta blocker: Metoprolol succinate 12.5 mg daily  ACE inhibitor/ARB/ARNI: Sacubitril-valsartan 24-26 mg 0.5 tablet BID  MRA: spironolactone 12.5 mg daily  SGLT2i:   dapagliflozin 5mg  Diuretic: Furosemide 40 mg daily    Heart Failure Education   - Living with Heart Failure book provided.  - CHF signs and symptoms, heart failure zones and when to call cardiologist. HF Zones magnet provided.  - Controlling heart failure at home: medication adherence, following up with cardiologist at least once yearly, staying healthy and active, limiting sodium and fluid intake as directed by cardiologist.  - Daily weight education    Recommendations  Recommend patient continues to be followed by registered dietician on outpatient bases.    Mika LAMAN, RN  Heart Failure Clinical Nurse Navigator  [phone]   "

## 2024-08-27 NOTE — PROGRESS NOTES
Ozempic 1mg weekly  Qty 9/ 3 was escribed to [facility] as requested. Patient advised to let us know if medication coverage is an issue.        Subjective Data:  - device interrogation done high pacing burden, consult EP for recs  - will continue diuresis today with IV Lasix 40mg BI  - consult HF for new HFREF, TTR amyloid recs      Overnight Events:    No acute events overnight     Objective Data:  Last Recorded Vitals:  Vitals:    08/27/24 0421 08/27/24 0425 08/27/24 0726 08/27/24 1117   BP: 129/71  131/70 132/73   BP Location:       Patient Position:       Pulse: 73  76 80   Resp: 18  16 18   Temp: 36.1 °C (97 °F)  36.1 °C (97 °F) 36.4 °C (97.5 °F)   TempSrc:       SpO2: 99%  98% 97%   Weight:  66.9 kg (147 lb 8 oz)     Height:           Last Labs:  CBC - 8/26/2024:  6:27 PM  6.2 14.3 214    43.5      CMP - 8/26/2024:  6:27 PM  9.4 6.2 18 --- 0.7   2.7 3.2 12 101      PTT - 8/24/2024: 12:01 AM  1.1   12.6 32     TROPHS   Date/Time Value Ref Range Status   08/23/2024 11:41  0 - 34 ng/L Final     Comment:     Previous result verified on 8/23/2024 2336 on specimen/case [id number] called with component [facility] for procedure Troponin I, High Sensitivity, Initial with value 235 ng/L.   08/23/2024 10:35  0 - 34 ng/L Final     BNP   Date/Time Value Ref Range Status   08/23/2024 10:35 PM 2,626 0 - 99 pg/mL Final      Last I/O:  I/O last 3 completed shifts:  In: - (0 mL/kg)   Out: 6825 (102 mL/kg) [Urine:6825 (2.8 mL/kg/hr)]  Weight: 66.9 kg     Past Cardiology Tests (Last 3 Years):  EKG:  ECG 12 lead 08/23/2024    Echo:  Transthoracic Echo (TTE) Complete 8/24/2024   1. Left ventricular ejection fraction is moderately decreased, by visual estimate at 30-35%.   2. There is global hypokinesis of the left ventricle with minor regional variations.   3. Spectral Doppler shows an impaired relaxation pattern of left ventricular diastolic filling.   4. There is an elevated left ventricular end diastolic pressure.   5. Left ventricular cavity size is decreased.   6. Abnormal septal motion consistent with RV pacemaker.   7. Moderately increased left  ventricular septal thickness.   8. The left ventricular posterior wall thickness is moderately increased.   9. There is severe concentric left ventricular hypertrophy.  10. There is normal right ventricular global systolic function.  11. Moderately enlarged right ventricle.  12. The left atrium is mildly dilated.  13. The right atrium is severely dilated.  14. Mild to moderate mitral valve regurgitation.  15. Severe tricuspid regurgitation visualized.  16. Slightly elevated RVSP.  17. The RV systolic pressure is likely to be underestimated.  18. Mild to moderate aortic valve regurgitation.    Transthoracic Echo (TTE) Complete 10/03/2023   1. Left ventricular systolic function is mildly decreased with a 50-55% estimated ejection fraction.   2. Moderately increased left ventricular septal thickness.   3. The left ventricular posterior wall thickness is moderately increased.   4. Moderately enlarged right ventricle.   5. Mild to moderate aortic valve regurgitation.   6. Mild to moderate mitral valve regurgitation.   7. Moderate tricuspid regurgitation visualized.   8. The tricuspid valve annulus appears dilated.   9. The left atrium is moderately dilated.  10. Moderately elevated right ventricular systolic pressure.  11. Compared with study from 2/17/2017, there is a significant increase in LV wall thickness. There is evidence of mild thickening of the RV free wall, associated with mild thickening of the AV, MV and tricuspid valve plus moderately elevated RVSP. This findings could be associated to an underlying infiltrative disease like amyloidosis.    Ejection Fractions:  EF   Date/Time Value Ref Range Status   08/24/2024 11:20 AM 33 %      Cath:  No results found for this or any previous visit from the past 1095 days.    Stress Test:  No results found for this or any previous visit from the past 1095 days.    Cardiac Imaging:  No results found for this or any previous visit from the past 1095 days.      Inpatient  Medications:  Scheduled medications   Medication Dose Route Frequency    aspirin  81 mg oral Daily    calcium carbonate-vitamin D3  1 tablet oral Daily    enoxaparin  30 mg subcutaneous q24h    ergocalciferol  1,250 mcg oral Every     furosemide  40 mg intravenous BID    ondansetron  4 mg intravenous Once    pantoprazole  40 mg oral Daily before breakfast    polyethylene glycol  17 g oral Daily    predniSONE  2.5 mg oral Daily    rosuvastatin  20 mg oral Nightly    spironolactone  25 mg oral Daily     PRN medications   Medication    acetaminophen    Or    acetaminophen    Or    acetaminophen    benzonatate    ipratropium-albuteroL     Continuous Medications   Medication Dose Last Rate       Physical Exam:  Physical Exam  Constitutional:       General: She is not in acute distress.     Appearance: She is obese. She is ill-appearing.   Neck:      Vascular: JVD (in setting of severe TR) present.   Cardiovascular:      Rate and Rhythm: Normal rate and regular rhythm.      Heart sounds: Murmur heard.      Comments: Engorged EJ  Pulmonary:      Effort: Pulmonary effort is normal.      Breath sounds: Examination of the right-lower field reveals decreased breath sounds. Examination of the left-lower field reveals decreased breath sounds. Decreased breath sounds present.   Abdominal:      General: There is distension.      Comments: Edematous to upper abdomen and bilateral flanks   Musculoskeletal:      Right forearm: Edema present.      Left forearm: Edema present.      Right lower le+ Pitting Edema present.      Left lower le+ Pitting Edema present.   Skin:     General: Skin is warm and dry.   Neurological:      General: No focal deficit present.      Mental Status: She is alert and oriented to person, place, and time.   Psychiatric:         Mood and Affect: Mood normal.         Behavior: Behavior normal.            Assessment/Plan   Erin Lopez is a 89yo F w PMH of NSTEMI (nonobstructive CAD ), HFpEF,  LBBB and SSS s/p PPM 2017 s/p device changeout 11/2022, HTN, CKD presenting with CHF exacerbation and new HFrEF.    Acute systolic and diastolic heart failure (HFrEF 30-35%, previous EF 50-55%)  Severe TR  -Etiology may be ischemia vs infiltration vs pacemaker mediated (100% AV pacing on EKG today)  - Historically 9/2023, EF 50-55%, some concern in past for underlying infiltrative disease, no cMRI as yet obtained  - EF appears significantly decreased (~30%) on uploaded ED POCUS images with at least moderate, possibly severe MR by color Doppler. There is global hypokinesis however the septum appears largely akinetic  - complete TTE 8/24: EF 30-35%, DD, elevated LVEDP, LV cavity decreased, abnormal septal motion consistent with RV pacemaker, LV posterior wall thickness mod increased, A mildly dilated, RA severely dilated, mild to mod MR, wide open severe TR (previously moderate), mild to mod AR, IVC dilated greater than 50% collapse  - BNP 2,626  - admit wt: 73.3 kg  - today's wt: pending  - severe anasarca to upper abdomen/UE and JVD up to mandible with RLE wound weeping  -8/25 IV Lasix 40mg BID + IV Diamox 500mg to be given 30 min prior to first lasix bolus  - neg 4.3L (no inputs) remains volume up , JVD to earlobe  - C/w lasix 40mg IV BID   - c/w spironolactone 25mg   - with previously noted concern for amyloidosis on TTE 10/2023 and significantly thickened LV posterior wall on TTE 8/24, serum free light chains, SPEP and UPEP collected and pending  - K/L ration WNL   - SPEP, UPEP in process   - 8/26 NM PYP cardiac amyloidosis with SPECT CT Amyloid SPECT heart study is suggestive of TTR amyloidosis.   - consult HF team for recs  - Anticipate ischemic eval of some kind as part of HFrEF workup   - Hold metop iso new HFrEF and decompensation  - B/L UE edema, c/w heparin drip for now and obtain B/L UE duplex  - Daily standing weights, strict I&O's, 2g sodium diet, 2L fluid restriction      Troponinemia  CAD  - HS trop:  235 -> 189  - Suspect low and flat trops in absence of CP reflect demand/supply mismatch iso decompensated CHF though septal wma may suggest there is an ischemic component  - Cont heparin gtt for now given apparently new EF reduction but relatively low c/f acute NSTEMI, had planned to stop today but c/f DVT in B/L UE will continue for now   - Cont ASA  - Intensified atorva 10 to rosuva 20 given hx of CAD    HTN  - admit /70  - -130s  - discontinued home amlodipine 10mg in anticipation for GDMT optimization    Hx of SSS s/p PPM 2017   - s/p device changeout 11/2022  - ECG showing 100% AV pacing  - tele reveals AV pacing 60bpm  - concern for possible pacemaker mediated cardiomyopathy  - device interrogation done, high pacer burden consult EP for recs, I/e CRT upgrade or adjustment of settings     Blistering/weeping LE  - in setting of massively hypervolemic with +3 pitting LE edema  - Nonwarm, nonerythematous, no apparent sign of infection at this time  - consulted wound care, appreciate recommendations     Arthritis  - Pt previously getting steroid injections q3 month then put on systemic steroids  - Cont prednisone 2.5 daily   - Was previously listed as on plaquenil for unclear reason but pt states this was stopped  - cont home PPI while on steroids      DVT ppx: heparin gtt  Dispo: pending HFrEF work up, GDMT optimization, aggressive diuresis  Code Status:  Full Code    Seen and discussed with Dr. Maximo Anthony, APRN-CNP, DNP

## 2024-08-27 NOTE — CONSULTS
Inpatient consult to Electrophysiology  Consult performed by: Eliud Shine MD  Consult ordered by: Reese Anthony, APRN-CNP, DNP      History Of Present Illness:    Erin Lopez is a 88 y.o. female with PMH of NSTEMI (nonobstructive CAD 2017), HFpEF, LBBB and SSS s/p PPM 2017 s/p device changeout 11/2022, HTN, CKD presenting with CHF exacerbation and new HFrEF.     Over the past 3 weeks, she had progressive upper and lower extremity swelling with associated dyspnea on exertion. She also endorses orthopnea and PND, and occasional palpitations. She did describe 2 brief episodes of lightheadedness, one which occurred while she was standing in the kitchen, but she was able to sit down and felt better. No syncope. Denies chest pain.     In the ED /95 SPO2 99% on RA. Labs notable for trop 235->189, BNP 2600s, Cr 1.61 WBC 4.3 Hgb 14.2. CXR showing likely bilateral pleural effusions and pulmonary edema. She was loaded w ASA and started on a heparin gtt given her troponin elevation and IV Lasix.     During this admission, she has been diuresed with improvement in her symptoms. A repeat surface echocardiogram has shown newly reduced EF 30-35%, severe concentric LVH and thickened LV posterior wall, mild-mod MR, severe TR, mild-mod AI. She is currently undergoing a work up for cardiac amyloidosis. So far, k/l ratio normal, SPEP/UPEP pending, but NM PYP scan is suggestive of TTR amyloidosis. Device interrogation revealed AP 66%,  >99%, and underlying rhythm was complete heart block.     ROS: 10/14 systems reviewed and negative except per HPI.     Last Recorded Vitals:  Vitals:    08/27/24 0421 08/27/24 0425 08/27/24 0726 08/27/24 1117   BP: 129/71  131/70 132/73   BP Location:       Patient Position:       Pulse: 73  76 80   Resp: 18  16 18   Temp: 36.1 °C (97 °F)  36.1 °C (97 °F) 36.4 °C (97.5 °F)   TempSrc:       SpO2: 99%  98% 97%   Weight:  66.9 kg (147 lb 8 oz)     Height:         Physical Exam:  GEN: Elderly  woman lying in bed, comfortable appearing  HEENT: MMM, anicteric sclera  CV: RRR, nl S1/2, systolic murmur best heard at LLSB. JVD to midneck   RESP: mild crackles at the bases, no increased WOB  ABD: Soft, nontender, nondistended  EXT: 2+ pitting edema of the upper extremities, 1+ edema of b/l LE to the shins  NEURO: alert and oriented to conversation, moves all extremities  PSYCH: appropriate affect    Last Labs:  CBC - 8/26/2024:  6:27 PM  6.2 14.3 214    43.5      CMP - 8/26/2024:  6:27 PM  9.4 6.2 18 --- 0.7   2.7 3.2 12 101      PTT - 8/24/2024: 12:01 AM  1.1   12.6 32     Troponin I, High Sensitivity ([facility])   Date/Time Value Ref Range Status   08/23/2024 11:41  (HH) 0 - 34 ng/L Final     Comment:     Previous result verified on 8/23/2024 2336 on specimen/case [id number] called with component [facility] for procedure Troponin I, High Sensitivity, Initial with value 235 ng/L.   08/23/2024 10:35  (HH) 0 - 34 ng/L Final     BNP   Date/Time Value Ref Range Status   08/23/2024 10:35 PM 2,626 (H) 0 - 99 pg/mL Final      Last I/O:  I/O last 3 completed shifts:  In: - (0 mL/kg)   Out: 6825 (102 mL/kg) [Urine:6825 (2.8 mL/kg/hr)]  Weight: 66.9 kg     Past Cardiology Tests (Last 3 Years):  EKG:  ECG 12 lead 08/23/2024: AV paced rhythm      Echo:  Transthoracic Echo (TTE) Complete 08/24/2024  CONCLUSIONS:   1. Left ventricular ejection fraction is moderately decreased, by visual estimate at 30-35%.   2. There is global hypokinesis of the left ventricle with minor regional variations.   3. Spectral Doppler shows an impaired relaxation pattern of left ventricular diastolic filling.   4. There is an elevated left ventricular end diastolic pressure.   5. Left ventricular cavity size is decreased.   6. Abnormal septal motion consistent with RV pacemaker.   7. Moderately increased left ventricular septal thickness.   8. The left ventricular posterior wall thickness is moderately increased.   9. There is severe  concentric left ventricular hypertrophy.  10. There is normal right ventricular global systolic function.  11. Moderately enlarged right ventricle.  12. The left atrium is mildly dilated.  13. The right atrium is severely dilated.  14. Mild to moderate mitral valve regurgitation.  15. Severe tricuspid regurgitation visualized.  16. Slightly elevated RVSP.  17. The RV systolic pressure is likely to be underestimated.  18. Mild to moderate aortic valve regurgitation.  19. Sent a Haiku to the NP service.    Cardiac Imaging:  NM PYP 8/26:  IMPRESSION:  1. Amyloid SPECT heart study is suggestive of TTR amyloidosis.  2. Representative images were saved into the PACS system.    Device interrogation 8/26:      Past Medical History:  She has a past medical history of Old myocardial infarction (11/24/2014), Other disorder of circulatory system (11/24/2014), Personal history of other diseases of the circulatory system (11/24/2014), Personal history of other diseases of the digestive system (11/24/2014), Personal history of other diseases of the musculoskeletal system and connective tissue (11/24/2014), and Personal history of other endocrine, nutritional and metabolic disease (11/24/2014).    Past Surgical History:  She has a past surgical history that includes Hysterectomy (11/24/2014); Other surgical history (11/24/2014); and Colonoscopy (11/24/2014).      Social History:  She reports that she has never smoked. She has never used smokeless tobacco. No history on file for alcohol use and drug use.    Family History:  Family History   Problem Relation Name Age of Onset    Prostate cancer Brother          Allergies:  Patient has no known allergies.    Inpatient Medications:  Scheduled medications   Medication Dose Route Frequency    aspirin  81 mg oral Daily    calcium carbonate-vitamin D3  1 tablet oral Daily    enoxaparin  30 mg subcutaneous q24h    ergocalciferol  1,250 mcg oral Every Sunday    furosemide  40 mg intravenous  BID    pantoprazole  40 mg oral Daily before breakfast    polyethylene glycol  17 g oral Daily    predniSONE  2.5 mg oral Daily    rosuvastatin  20 mg oral Nightly    spironolactone  25 mg oral Daily     PRN medications   Medication    acetaminophen    Or    acetaminophen    Or    acetaminophen    benzonatate    ipratropium-albuteroL     Continuous Medications   Medication Dose Last Rate     Outpatient Medications:  Current Outpatient Medications   Medication Instructions    amLODIPine (NORVASC) 10 mg, oral, Daily    aspirin 81 mg, oral, Daily    atorvastatin (LIPITOR) 10 mg, oral, Daily    calcium carbonate-vitamin D3 (Calcium 600 + D,3,) 600 mg-10 mcg (400 unit) tablet 1 tablet, oral, Daily    ergocalciferol (Vitamin D-2) 50 MCG (2000 UT) capsule capsule 1 capsule, oral, Once Weekly    furosemide (LASIX) 20 mg, oral, 2 times daily    metoprolol tartrate (LOPRESSOR) 50 mg, oral, Daily    nitroglycerin (NITROSTAT) 0.4 mg, sublingual, Every 5 min PRN    torsemide (DEMADEX) 20 mg, oral, Daily    traMADol (ULTRAM) 50 mg, oral, Every 8 hours PRN        Assessment/Plan   Erin Lopez is a 88 y.o. female with PMH of NSTEMI (nonobstructive CAD 2017), HFpEF, LBBB and SSS s/p PPM 2017 s/p device changeout 11/2022, HTN, CKD presenting with CHF exacerbation and new HFrEF/newly diagnosed TTR cardiac amyloidosis. EP is consulted regarding high burden of RV pacing.    # SSS s/p PPM  # Complete heart block  # High RV pacing burden  Overall, the patient has reduced EF in the setting of cardiac amyloid and has been shown to have >99% RV pacing burden. We interrogated her device again today and extended the AV delay to 350ms, however there was no intrinsic conduction across the AV node, thus Ventricular Intrinsic Preference mode is not an option for her to minimize RV pacing. In this setting we would recommend optimizing GDMT/heart failure treatment prior to consideration of CRT upgrade (would be high-risk procedure in this  patient).    Recommendations:  - optimize guideline directed medical therapy for HFrEF  - please refer to EP for outpatient follow up    Thank you for involving us in the care of this patient. Above recommendations discussed with Dr. Bell. If further questions arise, please page the EP consult pager at [phone] on weekdays 7AM - 6PM and weekends 7AM - 2PM, or at [phone] at all other times. The EP device nurse can be reached at pager [phone] during regular business hours GRACIA Shine MD  Cardiology Fellow, PGY-5

## 2024-08-27 NOTE — CARE PLAN
The patient's goals for the shift include  to have improved work of breathing    The clinical goals for the shift include patient will have adequate urine output throughout the shift      Problem: Fall/Injury  Goal: Not fall by end of shift  Outcome: Progressing  Goal: Be free from injury by end of the shift  Outcome: Progressing  Goal: Verbalize understanding of personal risk factors for fall in the hospital  Outcome: Progressing  Goal: Pace activities to prevent fatigue by end of the shift  Outcome: Progressing     Problem: Safety - Adult  Goal: Free from fall injury  Outcome: Progressing     Problem: Skin  Goal: Participates in plan/prevention/treatment measures  Outcome: Progressing  Goal: Prevent/manage excess moisture  Outcome: Progressing  Goal: Prevent/minimize sheer/friction injuries  Outcome: Progressing  Goal: Promote/optimize nutrition  Outcome: Progressing

## 2024-08-27 NOTE — PROGRESS NOTES
"[facility] Attending Shared Visit Note    This is a shared visit. Please see Advanced Practice Provider's encounter note for additional details.        Overnight events/Subjective: PYP scan positive, 99% RV pacing burden    Exam:   Physical exam: Well appearing, no distress. Normal rate, regular rhythm, non labored breathing, clear to auscultation, abdomen non distended, + LE edema, \"woody\" appearing legs, bilateral upper extremity swelling. no focal neuro deficits.     A/P:   88 F with SSS s/p PPM, HTN, CKD and HFpEF who presented with decompensated HF and was found to have a newly reduced EF (30-35%) and PYP suggestion of TTR amyloid.  #New HFrEF: HF review for treatment of TTR amyloid with new HFrEF. Ask EP if any changes need to be made to device settings or if they would add a CRT lead given high pacing burden. Continue diuresis she is overloaded and warm on exam.   -may need RHC and coronary angiogram when more euvolemic pending goals of care discussion  #Bilateral upper extremity swelling: no DVT    Dispo: pending the above, likely late this week.     Oscar Marsh MD    ________________________________________________________________________________  Problems:   Principal Problem:    Acute systolic heart failure (Multi)  Active Problems:    Sick sinus syndrome (Multi)    NSTEMI (non-ST elevated myocardial infarction) (Multi)    Cardiomyopathy (Multi)    Pacemaker    TERI (acute kidney injury) (CMS-[facility])      Objective   Admit Date: 8/24/2024  Hospital Length of Stay: 3   Home: [facility] [id number]    Vitals:      8/27/2024    11:51 AM 8/27/2024    11:50 AM 8/27/2024    11:17 AM 8/27/2024     7:26 AM 8/27/2024     4:25 AM 8/27/2024     4:21 AM 8/26/2024    11:43 PM   Vitals   Systolic 117 117 132 131  129 128   Diastolic 80 80 73 70  71 71   Heart Rate -- -- 80 76  73 78   Temp   36.4 °C (97.5 °F) 36.1 °C (97 °F)  36.1 °C (97 °F) 36.8 °C (98.2 °F)   Resp   18 16  18 18   Weight (lb)     147.5     BMI     26.13 kg/m2   "   BSA (m2)     1.72 m2       Wt Readings from Last 5 Encounters:   08/27/24 66.9 kg (147 lb 8 oz)       Intake/Output Summary (Last 24 hours) at 8/27/2024 1455  Last data filed at 8/27/2024 1426  Gross per 24 hour   Intake --   Output 4300 ml   Net -4300 ml         MEDICATIONS  Infusions:       Scheduled:  aspirin, 81 mg, Daily  calcium carbonate-vitamin D3, 1 tablet, Daily  enoxaparin, 30 mg, q24h  ergocalciferol, 1,250 mcg, Every Sunday  furosemide, 40 mg, BID  pantoprazole, 40 mg, Daily before breakfast  polyethylene glycol, 17 g, Daily  predniSONE, 2.5 mg, Daily  rosuvastatin, 20 mg, Nightly  spironolactone, 25 mg, Daily      PRN:  acetaminophen, 650 mg, q6h PRN   Or  acetaminophen, 650 mg, q6h PRN   Or  acetaminophen, 650 mg, q6h PRN  benzonatate, 100 mg, TID PRN  ipratropium-albuteroL, 3 mL, q6h PRN      Prior to Admission Meds:  Medications Prior to Admission   Medication Sig Dispense Refill Last Dose    amLODIPine (Norvasc) 10 mg tablet Take 1 tablet (10 mg) by mouth once daily.   Past Week    aspirin 81 mg EC tablet Take 1 tablet (81 mg) by mouth once daily.   Past Week    atorvastatin (Lipitor) 10 mg tablet Take 1 tablet (10 mg) by mouth once daily.   Past Week    calcium carbonate-vitamin D3 (Calcium 600 + D,3,) 600 mg-10 mcg (400 unit) tablet Take 1 tablet by mouth once daily.   Past Week    ergocalciferol (Vitamin D-2) 50 MCG (2000 UT) capsule capsule Take 1 capsule (50 mcg) by mouth 1 (one) time per week.   Past Month    furosemide (Lasix) 20 mg tablet Take 1 tablet (20 mg) by mouth 2 times a day.   Past Week    metoprolol tartrate (Lopressor) 50 mg tablet Take 1 tablet by mouth once daily.   Past Week    torsemide (Demadex) 20 mg tablet Take 1 tablet (20 mg) by mouth once daily.   Past Week    traMADol (Ultram) 50 mg tablet Take 1 tablet (50 mg) by mouth every 8 hours if needed.   Past Week    nitroglycerin (Nitrostat) 0.4 mg SL tablet Place 1 tablet (0.4 mg) under the tongue every 5 minutes if needed  "for chest pain (for up to 3 doses.Call 911 is pain persists).   More than a month       DATA:  CMP:  Recent Labs     08/26/24 1827 08/25/24 1932 08/24/24 2238 08/23/24 2235    142 141 143   K 3.6 4.1 4.4 4.0    105 107 108*   CO2 27 27 25 22   ANIONGAP 15 14 13 17   BUN 28* 29* 29* 29*   CREATININE 1.51* 1.55* 1.56* 1.61*   EGFR 33* 32* 32* 31*   MG 2.10 2.03 2.24  --      Recent Labs     08/26/24 1827 08/25/24 1932 08/24/24 2238 08/23/24 2235   ALBUMIN 3.2* 3.0* 3.1* 3.5   ALT  --   --   --  12   AST  --   --   --  18   BILITOT  --   --   --  0.7     CBC:  Recent Labs     08/26/24 1827 08/26/24 0940 08/25/24 1932 08/24/24 2238 08/23/24 2235   WBC 6.2 5.4 5.4 4.4 4.3*   HGB 14.3 13.0 13.3 13.0 14.2   HCT 43.5 40.0 42.8 40.8 41.8    213 216 196 214   MCV 82 82 84 84 80     COAG:   Recent Labs     08/26/24 0940 08/25/24 1932 08/24/24  1305 08/24/24  0645 08/24/24  0001   INR  --   --   --   --  1.1   HAUF 0.5 0.5 0.6 0.6  --      ABO: No results for input(s): \"ABO\" in the last 66912 hours.  HEME/ENDO: No results for input(s): \"FERRITIN\", \"IRONSAT\", \"TSH\", \"FREET4\", \"HGBA1C\" in the last 24020 hours.  CARDIAC:   Recent Labs     08/23/24 2341 08/23/24 2235   TROPHS 189* 235*   BNP  --  2,626*     No results for input(s): \"CHOL\", \"LDLF\", \"LDLCALC\", \"HDL\", \"TRIG\" in the last 68390 hours.  TOX:No results for input(s): \"AMPHETAMINE\", \"BENZO\", \"CANNABINOID\", \"COCAI\", \"FENTANYL\", \"OPIATE\", \"OXYCODONE\", \"PCP\" in the last 58403 hours.    No lab exists for component: \"BARBSCRUR\"  MICRO: No results for input(s): \"ESR\", \"CRP\", \"PROCAL\" in the last 58014 hours.  No results found for the last 90 days.      FOLLOWUP:   Future Appointments   Date Time Provider Department Center   9/9/2024  8:00 AM Juan Akins DO [id number]           "

## 2024-08-27 NOTE — CARE PLAN
The patient's goals for the shift include   Problem: Fall/Injury  Goal: Not fall by end of shift  8/27/2024 1431 by Penny Pinzon RN  Outcome: Progressing  8/27/2024 1217 by Penny Pinzon RN  Outcome: Progressing  Goal: Be free from injury by end of the shift  8/27/2024 1431 by Penny Pinzon RN  Outcome: Progressing  8/27/2024 1217 by Penny Pinzon RN  Outcome: Progressing  Goal: Verbalize understanding of personal risk factors for fall in the hospital  8/27/2024 1431 by Penny Pinzon RN  Outcome: Progressing  8/27/2024 1217 by Penny Pinzon RN  Outcome: Progressing  Goal: Verbalize understanding of risk factor reduction measures to prevent injury from fall in the home  8/27/2024 1431 by Penny Pinzon RN  Outcome: Progressing  8/27/2024 1217 by Penny Pinzon RN  Outcome: Progressing  Goal: Use assistive devices by end of the shift  8/27/2024 1431 by Penny Pinzon RN  Outcome: Progressing  8/27/2024 1217 by Penny Pinzon RN  Outcome: Progressing  Goal: Pace activities to prevent fatigue by end of the shift  8/27/2024 1431 by Penny Pinzon RN  Outcome: Progressing  8/27/2024 1217 by Penny Pinzon RN  Outcome: Progressing     Problem: Pain - Adult  Goal: Verbalizes/displays adequate comfort level or baseline comfort level  8/27/2024 1431 by Penny Pinzon RN  Outcome: Progressing  8/27/2024 1217 by Penny Pinzon RN  Outcome: Progressing     Problem: Safety - Adult  Goal: Free from fall injury  8/27/2024 1431 by Penny Pinzon RN  Outcome: Progressing  8/27/2024 1217 by Penny Pinzon RN  Outcome: Progressing     Problem: Discharge Planning  Goal: Discharge to home or other facility with appropriate resources  8/27/2024 1431 by Penny Pinzon RN  Outcome: Progressing  8/27/2024 1217 by Penny Pinzon RN  Outcome: Progressing     Problem: Chronic Conditions and Co-morbidities  Goal: Patient's chronic conditions and co-morbidity symptoms are monitored and maintained or improved  8/27/2024 1431 by Penny  MELONY Pinzon  Outcome: Progressing  8/27/2024 1217 by Penny Pinzon RN  Outcome: Progressing     Problem: Skin  Goal: Participates in plan/prevention/treatment measures  8/27/2024 1431 by Penny Pinzon RN  Outcome: Progressing  8/27/2024 1217 by Penny Pinzon RN  Outcome: Progressing  Goal: Prevent/manage excess moisture  8/27/2024 1431 by Penny Pinzon RN  Outcome: Progressing  8/27/2024 1217 by Penny Pinzon RN  Outcome: Progressing  Goal: Prevent/minimize sheer/friction injuries  8/27/2024 1431 by Penny Pinzon RN  Outcome: Progressing  8/27/2024 1217 by Penny Pinzon RN  Outcome: Progressing  Goal: Promote/optimize nutrition  8/27/2024 1431 by Penny Pinzon RN  Outcome: Progressing  8/27/2024 1217 by Penny Pinzon RN  Outcome: Progressing        The clinical goals for the shift include pt will remain HMDS throughout shift

## 2024-08-27 NOTE — CARE PLAN
The patient's goals for the shift include  being free from injury    The clinical goals for the shift include pt will remain HMDS throughout shift

## 2024-08-27 NOTE — CONSULTS
Advanced Heart Failure Initial Consultation Note   Consulting Team:  Primary Cardiologist:  Reason for Consult:    Keaton Lopez is a 88 y.o. female with a PMHx of HFpEF, SSS s/p PPM, nonobstructive CAD/NSTEMI who was admitted on 8/24/2024 for ADHF and f/t/h new drop in EF to 30-35% with concentric LVH and global hypokineses, PYP scan showing concern for TTR amyloid . Heart failure was consulted regarding TTR amyloid management.    Patient is seen at bedside. She reports that she presented with severe b/l upper and lower extremities that was making if difficult for her to walk at home. She also had RANGEL prior to admission iso volume overload. After she has received multiple days of effective IV diuresis with fluid removal, she now feels its easier to breath. Also notices improvement in LE swelling. She denies CP, cough or SOB at rest. Her goal is to return to her children and be able to walk around home better. She is not aware of prior TTR amyloid diagnosis. Denies malignancy history.     The following portions of the chart were reviewed this encounter and updated as appropriate:    Review of Systems  Otherwise, limited cardiovascular review of systems is negative.    Past Medical History:   Diagnosis Date    Old myocardial infarction 11/24/2014    History of myocardial infarction    Other disorder of circulatory system 11/24/2014    Transient ischemia    Personal history of other diseases of the circulatory system 11/24/2014    History of hypertension    Personal history of other diseases of the digestive system 11/24/2014    History of gastroesophageal reflux (GERD)    Personal history of other diseases of the musculoskeletal system and connective tissue 11/24/2014    History of osteoarthritis    Personal history of other endocrine, nutritional and metabolic disease 11/24/2014    History of hyperlipidemia     Past Surgical History:   Procedure Laterality Date    COLONOSCOPY  11/24/2014    Complete  Colonoscopy    HYSTERECTOMY  11/24/2014    Hysterectomy    OTHER SURGICAL HISTORY  11/24/2014    Duodenoscopy With Biopsy     Social History     Socioeconomic History    Marital status:      Spouse name: Not on file    Number of children: Not on file    Years of education: Not on file    Highest education level: Not on file   Occupational History    Not on file   Tobacco Use    Smoking status: Never    Smokeless tobacco: Never   Substance and Sexual Activity    Alcohol use: Not on file    Drug use: Not on file    Sexual activity: Not on file   Other Topics Concern    Not on file   Social History Narrative    Not on file     Social Determinants of Health     Financial Resource Strain: Low Risk  (8/25/2024)    Overall Financial Resource Strain (CARDIA)     Difficulty of Paying Living Expenses: Not hard at all   Food Insecurity: Not on file   Transportation Needs: No Transportation Needs (8/25/2024)    PRAPARE - Transportation     Lack of Transportation (Medical): No     Lack of Transportation (Non-Medical): No   Physical Activity: Inactive (8/24/2024)    Exercise Vital Sign     Days of Exercise per Week: 0 days     Minutes of Exercise per Session: 0 min   Stress: Not on file   Social Connections: Not on file   Intimate Partner Violence: Not on file   Housing Stability: Low Risk  (8/25/2024)    Housing Stability Vital Sign     Unable to Pay for Housing in the Last Year: No     Number of Times Moved in the Last Year: 1     Homeless in the Last Year: No     Family History   Problem Relation Name Age of Onset    Prostate cancer Brother         Current Outpatient Medications   Medication Instructions    amLODIPine (NORVASC) 10 mg, oral, Daily    aspirin 81 mg, oral, Daily    atorvastatin (LIPITOR) 10 mg, oral, Daily    calcium carbonate-vitamin D3 (Calcium 600 + D,3,) 600 mg-10 mcg (400 unit) tablet 1 tablet, oral, Daily    ergocalciferol (Vitamin D-2) 50 MCG (2000 UT) capsule capsule 1 capsule, oral, Once Weekly     furosemide (LASIX) 20 mg, oral, 2 times daily    metoprolol tartrate (LOPRESSOR) 50 mg, oral, Daily    nitroglycerin (NITROSTAT) 0.4 mg, sublingual, Every 5 min PRN    torsemide (DEMADEX) 20 mg, oral, Daily    traMADol (ULTRAM) 50 mg, oral, Every 8 hours PRN         Objective   Physical Exam  Vitals:    08/27/24 1551   BP: 124/74   Pulse: 82   Resp: 18   Temp: 36.1 °C (97 °F)   SpO2: 96%       GEN: frail, elderly female, ill-appearing, NAD.  CV: RRR, L parasternal murmur c/w severe TR. JVP elevated   LUNGS: CTAB, no w/r/c.  ABD: Soft, NT/ND, NBS, no masses or organomegaly.  SKIN: Warm, well perfused. No skin rashes or abnormal lesions. LE edema 2+ non-pitting bilaterally   MSK: No deformities.  EXT: No clubbing, cyanosis.   NEURO: AO x 3. No focal deficits.    I have personally reviewed the following images and laboratory findings:    ECG:  AV dual paced rhythm.  .    Results for orders placed during the hospital encounter of 08/24/24    Transthoracic Echo (TTE) Complete    Narrative  [facility], [address] [place] [address]  Tel [phone] and Fax [phone]    TRANSTHORACIC ECHOCARDIOGRAM REPORT      Patient Name:      MICHELE JACOB          Pierre Physician:    [id number] Mehrdad Yee MD  Study Date:        8/24/2024            Ordering Provider:    [id number] ALEN SIMON  MRN/PID:           [id number]             Fellow:  Accession#:        [id number]         Nurse:                Abigail Moore RN  Date of Birth/Age: 1935 / 88 years Sonographer:          Concha Pena RDCS  Gender:            F                    Additional Staff:  Height:            160.02 cm            Admit Date:  Weight:            73.03 kg             Admission Status:     Inpatient -  Routine  BSA / BMI:         1.76 m2 / 28.52      Encounter#:           [id number]  kg/m2  Blood Pressure:    128/70 mmHg          Department Location:  [facility] Non  Invasive    Study Type:    TRANSTHORACIC ECHO (TTE)  COMPLETE  Diagnosis/ICD: Chronic systolic (congestive) heart failure (CHF)-I50.22  Indication:    new HFrEF with decompensation  CPT Code:      Echo Complete w Full Doppler-[id number]    Patient History:  Pertinent History: NSTEMI, HFpEF, SSS s/p PPM 2017, HTN, CKD, CAD.    Study Detail: The following Echo studies were performed: 2D, M-Mode, Doppler and  color flow. Agitated saline used as a contrast agent for  intraseptal flow evaluation.      Critical Event  Critical Event: Test was completed as per department protocol.  Critical Finding: Severe TR.  Time Test was Completed: 11:15:39 AM  Notified: Dr. Yee.  Attending notification time: 11:25:49 AM    PHYSICIAN INTERPRETATION:  Left Ventricle: Left ventricular ejection fraction is moderately decreased, by visual estimate at 30-35%. There is global hypokinesis of the left ventricle with minor regional variations. The left ventricular cavity size is decreased. The left ventricular septal wall thickness is moderately increased. There is moderately increased left ventricular posterior wall thickness. There is severe concentric left ventricular hypertrophy. Abnormal (paradoxical) septal motion, consistent with RV pacemaker. Spectral Doppler shows an impaired relaxation pattern of left ventricular diastolic filling. There is an elevated left ventricular end diastolic pressure.  Left Atrium: The left atrium is mildly dilated. A bubble study using agitated saline was performed. A small PFO (= 10 bubbles) was demonstrated.  Right Ventricle: The right ventricle is moderately enlarged. There is normal right ventricular global systolic function. A device is visualized in the right ventricle.  Right Atrium: The right atrium is severely dilated.  Aortic Valve: The aortic valve is trileaflet. There is minimal aortic valve cusp calcification. The aortic valve dimensionless index is 0.70. There is mild to moderate aortic valve regurgitation. The peak instantaneous gradient of the  aortic valve is 3.0 mmHg. The mean gradient of the aortic valve is 1.8 mmHg.  Mitral Valve: The mitral valve is mildly thickened. There is mild to moderate mitral valve regurgitation.  Tricuspid Valve: The tricuspid valve is structurally normal. There is severe tricuspid regurgitation. The Doppler estimated RVSP is slightly elevated at 31.2 mmHg. The RV systolic pressure is likely to be underestimated.  Pulmonic Valve: The pulmonic valve is structurally normal. There is physiologic pulmonic valve regurgitation.  Pericardium: There is a trivial pericardial effusion.  Pleural: There is bilateral pleural effusion.  Aorta: The aortic root is normal.  Systemic Veins: The inferior vena cava appears dilated. There is IVC inspiratory collapse greater than 50%. The hepatic vein shows a pattern of systolic flow reversal, suggestive of severe tricuspid regurgitation.  In comparison to the previous echocardiogram(s): Compared with study dated 10/3/2023, the LV EF is decreaaed (was 50-55%) and the TR is wide-open (was moderate).      CONCLUSIONS:  1. Left ventricular ejection fraction is moderately decreased, by visual estimate at 30-35%.  2. There is global hypokinesis of the left ventricle with minor regional variations.  3. Spectral Doppler shows an impaired relaxation pattern of left ventricular diastolic filling.  4. There is an elevated left ventricular end diastolic pressure.  5. Left ventricular cavity size is decreased.  6. Abnormal septal motion consistent with RV pacemaker.  7. Moderately increased left ventricular septal thickness.  8. The left ventricular posterior wall thickness is moderately increased.  9. There is severe concentric left ventricular hypertrophy.  10. There is normal right ventricular global systolic function.  11. Moderately enlarged right ventricle.  12. The left atrium is mildly dilated.  13. The right atrium is severely dilated.  14. Mild to moderate mitral valve regurgitation.  15. Severe  tricuspid regurgitation visualized.  16. Slightly elevated RVSP.  17. The RV systolic pressure is likely to be underestimated.  18. Mild to moderate aortic valve regurgitation.  19. Sent a Haiku to the NP service.    QUANTITATIVE DATA SUMMARY:  2D MEASUREMENTS:  Normal Ranges:  Ao Root d:     3.20 cm  (2.0-3.7cm)  LAs:           4.25 cm  (2.7-4.0cm)  IVSd:          1.78 cm  (0.6-1.1cm)  LVPWd:         1.76 cm  (0.6-1.1cm)  LVIDd:         3.56 cm  (3.9-5.9cm)  LVIDs:         3.26 cm  LV Mass Index: 148 g/m2  LVEDV Index:   56 ml/m2  LV % FS        8.3 %    LA VOLUME:  Normal Ranges:  LA Vol A4C:        65.4 ml    (22+/-6mL/m2)  LA Vol A2C:        62.6 ml  LA Vol BP:         67.2 ml  LA Vol Index A4C:  37.1ml/m2  LA Vol Index A2C:  35.5 ml/m2  LA Vol Index BP:   38.1 ml/m2  LA Area A4C:       20.2 cm2  LA Area A2C:       18.8 cm2  LA Major Axis A4C: 5.3 cm  LA Major Axis A2C: 4.8 cm    RA VOLUME BY A/L METHOD:  Normal Ranges:  RA Area A4C: 31.0 cm2    AORTA MEASUREMENTS:  Normal Ranges:  Asc Ao, d: 3.30 cm (2.1-3.4cm)    LV SYSTOLIC FUNCTION BY 2D PLANIMETRY (MOD):  Normal Ranges:  EF-A4C View:    32 % (>=55%)  EF-A2C View:    39 %  EF-Biplane:     35 %  EF-Visual:      33 %  LV EF Reported: 33 %    LV DIASTOLIC FUNCTION:  Normal Ranges:  MV Peak E: 0.44 m/s    (0.7-1.2 m/s)  MV Peak A: 0.64 m/s    (0.42-0.7 m/s)  E/A Ratio: 0.68        (1.0-2.2)  MV A Dur:  124.57 msec  MV DT:     126 msec    (150-240 msec)    MITRAL VALVE:  Normal Ranges:  MV DT: 126 msec (150-240msec)    MITRAL INSUFFICIENCY:  Normal Ranges:  MR VTI:  176.42 cm  MR Vmax: 459.02 cm/s    AORTIC VALVE:  Normal Ranges:  AoV Vmax:                0.87 m/s (<=1.7m/s)  AoV Peak PG:             3.0 mmHg (<20mmHg)  AoV Mean P.8 mmHg (1.7-11.5mmHg)  LVOT Max Alfonso:            0.65 m/s (<=1.1m/s)  AoV VTI:                 16.35 cm (18-25cm)  LVOT VTI:                11.51 cm  LVOT Diameter:           1.98 cm  (1.8-2.4cm)  AoV Area, VTI:            "2.17 cm2 (2.5-5.5cm2)  AoV Area,Vmax:           2.30 cm2 (2.5-4.5cm2)  AoV Dimensionless Index: 0.70    AORTIC INSUFFICIENCY:  AI Vmax:       3.51 m/s  AI Half-time:  433 msec  AI Decel Time: 1492 msec  AI Decel Rate: 235.88 cm/s2      RIGHT VENTRICLE:  RV Basal 4.80 cm  RV Mid   3.60 cm  RV Major 8.2 cm  TAPSE:   20.0 mm  RV s'    0.12 m/s    TRICUSPID VALVE/RVSP:  Normal Ranges:  Peak TR Velocity: 2.41 m/s  RV Syst Pressure: 31.2 mmHg (< 30mmHg)  IVC Diam:         2.89 cm    PULMONIC VALVE:  Normal Ranges:  PV Accel Time: 69 msec  (>120ms)  PV Max Alfonso:    0.7 m/s  (0.6-0.9m/s)  PV Max P.0 mmHg    AORTA:  Asc Ao Diam 3.26 cm      [id number] Mehrdad Yee MD  Electronically signed on 2024 at 12:07:44 PM        ** Final **       Imaging  Chest x-ray:  Cardiomegaly with small to moderate bilateral pleural effusions and      Lab Review   Lab Results   Component Value Date     2024     2024     2024    K 3.6 2024    K 4.1 2024    K 4.4 2024    CO2 27 2024    CO2 27 2024    CO2 25 2024    BUN 28 (H) 2024    BUN 29 (H) 2024    BUN 29 (H) 2024    CREATININE 1.51 (H) 2024    CREATININE 1.55 (H) 2024    CREATININE 1.56 (H) 2024    GLUCOSE 81 2024    GLUCOSE 100 (H) 2024    GLUCOSE 88 2024    CALCIUM 9.4 2024    CALCIUM 8.9 2024    CALCIUM 9.0 2024     No results found for: \"CKTOTAL\", \"CKMB\", \"CKMBINDEX\", \"TROPONINI\"  Lab Results   Component Value Date    WBC 6.2 2024    WBC 5.4 2024    WBC 5.4 2024    HGB 14.3 2024    HGB 13.0 2024    HGB 13.3 2024    HCT 43.5 2024    HCT 40.0 2024    HCT 42.8 2024    MCV 82 2024    MCV 82 2024    MCV 84 2024     2024     2024     2024       Troponin I, High Sensitivity (CMC)   Date/Time Value Ref Range Status   2024 11:41  " (HH) 0 - 34 ng/L Final     Comment:     Previous result verified on 8/23/2024 2336 on specimen/case [id number] called with component TRPHS for procedure Troponin I, High Sensitivity, Initial with value 235 ng/L.   08/23/2024 10:35  (HH) 0 - 34 ng/L Final     BNP   Date/Time Value Ref Range Status   08/23/2024 10:35 PM 2,626 (H) 0 - 99 pg/mL Final        Assessment and Plan     Erin Lopez is a 88 y.o. female with a PMHx of HFpEF, SSS s/p PPM, nonobstructive CAD/NSTEMI who was admitted on 8/24/2024 for ADHF and f/t/h new drop in EF to 30-35% with concentric LVH and global hypokineses, PYP scan showing concern for TTR amyloid . Heart failure was consulted regarding TTR amyloid management.    Acute HFrEF (NYHA Class IV/AHA Stage C) on presentation  hx of HFpEF  LVH  Severe TR   TTR amyloidosis:  Patient presented with ADHF. Admit BNP 2626, trop 235>189, EKG AV-dual paced rhythm. Last TTE on 08/24 demonstrating EF 30-35%. Etiology of heart failure is secondary to TTR amyloid as seen on PYP scan.  - Diuresis: defer to primary  - GDMT   - SGLT-2i: consider Fraxiga 10 mg    - Beta-blocker: pt takes metop tart at home, can switch to XL for GDMT when appropriate    - ACE-I/ARB/ARNI: consider entresto for GDMT if BP allows    - MRA: cont Aldactone 25 mg   - Vasodilators: none  - Recommend initiating Tafamidis, amyloid coordinator informed. Discussed w pt and family and in agreement.   - AICD/CRT-D/Lifevest: agree with EP eval  - Agree with ischemic work-up for eval of newly reduced EF.     For any questions or concerns, feel free to reach out via [employer] chat or page at [phone].     Recs discussed w MD Angeline Mcintosh MD, MS  Heart Failure Fellow,  [facility]

## 2024-08-27 NOTE — PROGRESS NOTES
Occupational Therapy    Evaluation and Treatment    Patient Name: Erin Lopez  MRN: [id number]  Today's Date: 8/27/2024  Room: 70[facility]  Time Calculation  Start Time: 1150  Stop Time: 1232  Time Calculation (min): 42 min    Assessment  IP OT Assessment  OT Assessment: Pt presents with weakness, mildly decreased coordination, slowed movements, and low endurance impacting ADL and functional mobility independence. Pt would benefit from MOD intensity OT.  Prognosis: Good  Evaluation/Treatment Tolerance: Patient limited by fatigue  End of Session Communication: Bedside nurse  End of Session Patient Position: Bed, 3 rail up, Alarm off, not on at start of session    Plan:  Inpatient Plan  Treatment Interventions: ADL retraining, Functional transfer training, UE strengthening/ROM, Endurance training, Patient/family training, Equipment evaluation/education, Fine motor coordination activities, Compensatory technique education  OT Frequency: 3 times per week  OT Discharge Recommendations: Moderate intensity level of continued care  Equipment Recommended upon Discharge:  (sock aid, reacher, BSC)  OT Recommended Transfer Status: Assist of 2  OT - OK to Discharge: Yes  OT Assessment  OT Assessment Results: Decreased ADL status, Decreased upper extremity range of motion, Decreased upper extremity strength, Decreased endurance, Decreased fine motor control, Decreased functional mobility, Decreased gross motor control, Decreased IADLs  Prognosis: Good  Evaluation/Treatment Tolerance: Patient limited by fatigue  Strengths: Attitude of self, Support of Caregivers    Subjective   Current Problem:  1. NSTEMI (non-ST elevated myocardial infarction) (Multi)  CANCELED: Cardiac Device Check - Inpatient    CANCELED: Cardiac Device Check - Inpatient      2. Acute on chronic combined systolic and diastolic congestive heart failure (Multi)  Transthoracic Echo (TTE) Complete    Transthoracic Echo (TTE) Complete    CANCELED: Cardiac Device Check  - Inpatient    CANCELED: Cardiac Device Check - Inpatient      3. Sick sinus syndrome due to sinoatrial node dysfunction (Multi)  CANCELED: Cardiac Device Check - Inpatient    CANCELED: Cardiac Device Check - Inpatient      4. Chronic systolic (congestive) heart failure (Multi)  Transthoracic Echo (TTE) Complete      5. Pacemaker  Cardiac Device Check - Inpatient    Cardiac Device Check - Inpatient      6. Edema of both upper extremities  Vascular US Upper Extremity Venous Duplex Bilateral    Vascular US Upper Extremity Venous Duplex Bilateral      7. Other specified soft tissue disorders  Vascular US Upper Extremity Venous Duplex Bilateral      8. Other specified soft tissue disorders  Vascular US Upper Extremity Venous Duplex Bilateral        General:  Reason for Referral: Per chart, CHF excaerbation and new HFrEF  Past Medical History Relevant to Rehab: Per chart: PMH of NSTEMI (nonobstructive CAD 2017), HFpEF, LBBB and SSS s/p PPM 2017 s/p device changeout 11/2022, HTN, CKD  Co-Treatment: PT  Co-Treatment Reason: To maximize pt safety and mobility  Prior to Session Communication: Bedside nurse  Patient Position Received: Bed, 3 rail up, Alarm off, not on at start of session  Family/Caregiver Present: Yes  Caregiver Feedback: granddaughter present and supportive  General Comment: Pt agreeable to therapy this date. Pt performedUB and LB dressing ADLs as well as functional mobility min household distances. Pt would benefit from MOD intensity OT.     Precautions:  Medical Precautions: Fall precautions    Vital Signs:  Heart Rate:  (80-85 after mobility)  SpO2: 97 %  BP: 117/80  BP Location: Left arm  BP Method: Automatic  Patient Position: Sitting    Pain:  Pain Assessment  Pain Assessment: 0-10  0-10 (Numeric) Pain Score: 0 - No pain  Lines/Tubes/Drains:  External Urinary Catheter (Active)   Number of days: 3     Objective   Cognition:  Orientation Level: Oriented X4  Memory:  (Pt did not recall she was not  independent at home - pt's granddaughter corrected her)  Safety/Judgement: Within Functional Limits  Insight: Within function limits  Impulsive: Within functional limits  Processing Speed:  (mildly delayed)    Home Living:  Type of Home: House  Lives With: Adult children (can help as needed)  Home Adaptive Equipment: Cane (+rollator, uses cane mostly)  Home Layout: One level  Home Access: Ramped entrance  Bathroom Shower/Tub: Tub/shower unit  Bathroom Equipment: Grab bars in shower     Prior Function:  Level of [place]:  (Pt endorsed independence but granddaughter states she needs assistance for many ADLs)  Receives Help From: Family  Hand Dominance: Right  Prior Function Comments: endorses 0 falls in past 6 months, gradnchildren will drive into comunity as needed, daughter will help cook/clean    ADL:  Eating Assistance:  (set up, anticipated)  Grooming Assistance:  (modA, anticipated)  Bathing Assistance: Maximal (anticipated)  UE Dressing Assistance: Minimal (Pt doffed and donned gown with maxA and Angy respectively - pt able to thread UEs through gown when held up but therapist unsnapped gown when doffing)  LE Dressing Assistance:  (Pt donned and doffed pants with maxA)  Toileting Assistance with Device: Maximal (anticipate)  Functional Assistance: Maximal (anticipate)    Activity Tolerance:  Endurance:  (pt with limited endurance, demonstrated shortness of breath after taking few steps at EOB and after taking few steps forward)    Balance:  Dynamic Sitting Balance  Dynamic Sitting-Level of Assistance:  (CGA-Angy during LB dressing and dynamic reaching)  Dynamic Standing Balance  Dynamic Standing-Level of Assistance: Minimum assistance (+2)  Static Sitting Balance  Static Sitting-Level of Assistance:  (close supervision-Angy, pt demonstrated posterior lean requiring frequent VCs for correction and intermittent assistance)  Static Standing Balance  Static Standing-Level of Assistance: Minimum assistance  (+2)    Bed Mobility/Transfers: Bed Mobility/Transfers: Bed Mobility  Bed Mobility: Yes  Bed Mobility 1  Bed Mobility 1: Supine to sitting  Level of Assistance 1: Minimum assistance, Minimal verbal cues  Bed Mobility 2  Bed Mobility  2: Sitting to supine  Level of Assistance 2: Moderate assistance, Minimal verbal cues  Functional Mobility  Functional Mobility Performed: Yes  Functional Mobility 1  Device 1: Rolling walker  Assistance 1: Minimum assistance, Minimal verbal cues (+2)  Comments 1: Pt took few side steps toward HOB  Functional Mobility 2  Device 2: Rolling walker  Assistance 2: Minimum assistance, Minimal verbal cues (+2)  Comments 2: Pt took few steps forward and backward   and Transfers  Transfer: Yes  Transfer 1  Transfer From 1: Bed to, Stand to  Transfer to 1: Stand, Bed  Transfer Device 1: Walker  Transfer Level of Assistance 1: Minimum assistance, +2, Minimal verbal cues, Minimal tactile cues  Trials/Comments 1: x2 trials    Sensation:  Sensation Comment: n/t endorsed in arms    Coordination:  Movements are Fluid and Coordinated: No  Upper Body Coordination: slowed movements throughout UEs     Hand Function:  Hand Function  Gross Grasp: Functional  Coordination:  (decreased)    Extremities:   RUE   RUE : Exceptions to WFL  RUE AROM (degrees)  RUE AROM Comment: WFL besides shoulder flexion  R Shoulder Flexion  0-170:  (~90 degrees)  RUE Strength  RUE Overall Strength: Greater than or equal to 3/5 as evidenced by functional mobility (+ADLs besides shoulder flexion (decreased AROM)), LUE   LUE: Exceptions to WFL  LUE AROM (degrees)  LUE AROM Comment: WFL besides shoulder flexion  L Shoulder Flexion  0-170:  (~90 degrees)  LUE Strength  LUE Overall Strength: Greater than or equal to 3/5 as evidenced by functional mobility (+ADLs besides shoulder flexion (decreased AROM)),  , and      Outcome Measures: [facility] Daily Activity  Putting on and taking off regular lower body clothing: A lot  Bathing  (including washing, rinsing, drying): A lot  Putting on and taking off regular upper body clothing: A little  Toileting, which includes using toilet, bedpan or urinal: A lot  Taking care of personal grooming such as brushing teeth: A little  Eating Meals: A little  Daily Activity - Total Score: 15  Brief Confusion Assessment Method (bCAM)  CAM Result: CAM -     Education Documentation  Body Mechanics, taught by Elizabeth Jewell OT at 8/27/2024  2:00 PM.  Learner: Patient  Readiness: Eager  Method: Explanation, Demonstration  Response: Verbalizes Understanding, Demonstrated Understanding    Precautions, taught by Elizabeth Jewell OT at 8/27/2024  2:00 PM.  Learner: Patient  Readiness: Eager  Method: Explanation, Demonstration  Response: Verbalizes Understanding, Demonstrated Understanding    ADL Training, taught by Elizabeth Jewell OT at 8/27/2024  2:00 PM.  Learner: Patient  Readiness: Eager  Method: Explanation, Demonstration  Response: Verbalizes Understanding, Demonstrated Understanding    Education Comments  No comments found.      Goals:   Encounter Problems       Encounter Problems (Active)       ADLs       Patient will perform UB and LB bathing with modified independent level of assistance and shower chair and long-handled sponge. (Progressing)       Start:  08/27/24    Expected End:  09/10/24            Patient will complete upper body dressing with stand by assist level of assistance donning and doffing all UE clothes with PRN adaptive equipment while edge of bed  (Progressing)       Start:  08/27/24    Expected End:  09/10/24            Patient will complete lower body dressing with minimal assist  level of assistance donning and doffing all LE clothes  with PRN adaptive equipment while edge of bed  (Progressing)       Start:  08/27/24    Expected End:  09/10/24            Patient will complete toileting including hygiene clothing management/hygiene with minimal assist  level of assistance and raised toilet seat  and grab bars. (Progressing)       Start:  08/27/24    Expected End:  09/10/24               BALANCE       Pt will maintain dynamic sitting balance during ADL task with stand by assist level of assistance in order to demonstrate decreased risk of falling and improved postural control. (Progressing)       Start:  08/27/24    Expected End:  09/10/24               MOBILITY       Patient will perform Functional mobility mod  Household distances/Community Distances with contact guard assist level of assistance and least restrictive device in order to improve safety and functional mobility. (Progressing)       Start:  08/27/24    Expected End:  09/10/24               TRANSFERS       Patient will perform bed mobility stand by assist level of assistance and bed rails in order to improve safety and independence with mobility (Progressing)       Start:  08/27/24    Expected End:  09/10/24            Patient will complete sit to stand transfer with contact guard assist level of assistance and least restrictive device in order to improve safety and prepare for out of bed mobility. (Progressing)       Start:  08/27/24    Expected End:  09/10/24                   Treatment Completed on Evaluation  Therapy/Activity:     Therapeutic Activity  Therapeutic Activity Performed: Yes  Therapeutic Activity 1: VCs for hand placement and walker management  Therapeutic Activity 2: VCs for dressing techniques       08/27/24 at 2:03 PM   AGNES Laurent/RAFAEL  Rehab Office: [phone]

## 2024-08-27 NOTE — PROGRESS NOTES
Physical Therapy    Physical Therapy Evaluation & Treatment    Patient Name: Erin Lopez  MRN: [id number]  Today's Date: 8/27/2024   Time Calculation  Start Time: 1150  Stop Time: 1232  Time Calculation (min): 42 min    Assessment/Plan   PT Assessment  PT Assessment Results: Decreased strength, Decreased endurance, Impaired balance, Decreased mobility  Rehab Prognosis: Good  End of Session Communication: Bedside nurse  End of Session Patient Position: Bed, 3 rail up, Alarm off, not on at start of session   IP OR SWING BED PT PLAN  Inpatient or Swing Bed: Inpatient  PT Plan  Treatment/Interventions: Bed mobility, Transfer training, Gait training, Balance training, Endurance training, Strengthening, Therapeutic exercise, Therapeutic activity  PT Plan: Ongoing PT  PT Frequency: 3 times per week  PT Discharge Recommendations: Moderate intensity level of continued care  Equipment Recommended upon Discharge: Wheeled walker  PT Recommended Transfer Status: Assist x2, Assistive device  PT - OK to Discharge: Yes      Subjective     General Visit Information:  General  Reason for Referral: presented with decompensated HF and was found to have a newly reduced EF (30-35%).  Past Medical History Relevant to Rehab: PMH of NSTEMI (nonobstructive CAD 2017), HFpEF, LBBB and SSS s/p PPM 2017 s/p device changeout 11/2022, HTN, CKD  Family/Caregiver Present: Yes  Caregiver Feedback: granddaughter present and supportive  Co-Treatment: OT  Co-Treatment Reason: To maximize pt safety with mobility  Prior to Session Communication: Bedside nurse  Patient Position Received: Bed, 3 rail up  Preferred Learning Style: verbal  General Comment: Pt supine in bed upon arrival and willing to participate in Therapy session. BUE and LE edema, dressing on RLE lower leg. Reports of nausea, dizziness with mobility which resolved after  Home Living:  Home Living  Type of Home: House  Lives With: Adult children (Dtr, can assist as needed, Per grand dtr  "24/7)  Home Adaptive Equipment: Cane  Home Layout: One level, Laundry main level  Home Access: Ramped entrance  Bathroom Shower/Tub: Tub/shower unit  Prior Level of Function:  Prior Function Per Pt/Caregiver Report  Level of [place]:  (Pt reports indepedent, Grand dtr reports needs assist with ADL\"s)  Receives Help From: Family  Ambulatory Assistance:  (Ranjit with cane)  Leisure: likes to watch TV  Hand Dominance: Right  Prior Function Comments: denies falls in past 6 months, familiy drives pt  Precautions:  Precautions  Medical Precautions: Fall precautions, Cardiac precautions  Vital Signs:  Vital Signs  Heart Rate:  (80 bpm to 85 bpm with mobility)  SpO2: 97 %  BP: 117/80  Patient Position: Sitting    Objective   Pain:  Pain Assessment  Pain Assessment: 0-10  0-10 (Numeric) Pain Score: 0 - No pain  Cognition:  Cognition  Orientation Level: Oriented X4    General Assessments:         Sensation  Light Touch:  (reports n/t arms)    Strength  Strength Comments: BLE's >/=3/5 based on mobility               Static Sitting Balance  Static Sitting-Level of Assistance:  (SBA-CGA)  Dynamic Sitting Balance  Dynamic Sitting-Level of Assistance:  (CGA-Chago due to retro lean)    Static Standing Balance  Static Standing-Level of Assistance:  (MinAx2 with wlaker)  Dynamic Standing Balance  Dynamic Standing-Level of Assistance:  (MinAx2 with walker)  Functional Assessments:  Bed Mobility  Bed Mobility: Yes  Bed Mobility 1  Bed Mobility 1: Supine to sitting  Level of Assistance 1: Minimum assistance, Minimal verbal cues  Bed Mobility Comments 1: HOB elevated, bedrails (increased time to complete)  Bed Mobility 2  Bed Mobility  2: Sitting to supine  Level of Assistance 2: Moderate assistance  Bed Mobility 3  Bed Mobility 3: Scooting (to HOB)  Level of Assistance 3: Maximum assistance, +2    Transfers  Transfer: Yes  Transfer 1  Transfer From 1: Sit to, Stand to  Transfer to 1: Sit, Stand  Technique 1: Sit to stand, Stand to " sit  Transfer Device 1: Walker  Transfer Level of Assistance 1: Maximum assistance, +2, Minimal verbal cues  Trials/Comments 1: x2 from EOB (cues for safe hand placement and sequencing)    Ambulation/Gait Training  Ambulation/Gait Training Performed: Yes  Ambulation/Gait Training 1  Surface 1: Level tile  Device 1: Rolling walker  Assistance 1: Minimal verbal cues (MinAx2)  Quality of Gait 1: Decreased step length, Narrow base of support  Comments/Distance (ft) 1: 3 sidesteps and 5 steps fwd and backwards (cues for sequencing and walker management)    Stairs  Stairs: No  Extremity/Trunk Assessments:        RLE   RLE : Within Functional Limits  LLE   LLE : Within Functional Limits  Treatments:    Ambulation/Gait Training  Ambulation/Gait Training Performed: Yes  Ambulation/Gait Training 1  Surface 1: Level tile  Device 1: Rolling walker  Assistance 1: Minimal verbal cues (MinAx2)  Quality of Gait 1: Decreased step length, Narrow base of support  Comments/Distance (ft) 1: 3 sidesteps and 5 steps fwd and backwards (cues for sequencing and walker management)  Transfers  Transfer: Yes  Transfer 1  Transfer From 1: Sit to, Stand to  Transfer to 1: Sit, Stand  Technique 1: Sit to stand, Stand to sit  Transfer Device 1: Walker  Transfer Level of Assistance 1: Maximum assistance, +2, Minimal verbal cues  Trials/Comments 1: x2 from EOB (cues for safe hand placement and sequencing)    Outcome Measures:  [facility] Basic Mobility  Turning from your back to your side while in a flat bed without using bedrails: A little  Moving from lying on your back to sitting on the side of a flat bed without using bedrails: A little  Moving to and from bed to chair (including a wheelchair): A lot  Standing up from a chair using your arms (e.g. wheelchair or bedside chair): A lot  To walk in hospital room: A lot  Climbing 3-5 steps with railing: Total  Basic Mobility - Total Score: 13    Encounter Problems       Encounter Problems (Active)        Balance       Pt will tolerate standing for >/=2 mins with SBA with walker (Progressing)       Start:  08/27/24    Expected End:  09/10/24                   Mobility       Pt will be SBA ambulation 80 ft with LRAD (Progressing)       Start:  08/27/24    Expected End:  09/10/24               PT Transfers       Pt will be SBA for sit to stand and bed to chair transfers with LRAD (Progressing)       Start:  08/27/24    Expected End:  09/10/24            Pt will be Ranjit with be dmobility (Progressing)       Start:  08/27/24    Expected End:  09/10/24                     Education Documentation  Precautions, taught by Ramya Mendes PT at 8/27/2024  2:22 PM.  Learner: Patient  Readiness: Acceptance  Method: Explanation, Demonstration  Response: Verbalizes Understanding, Needs Reinforcement    Mobility Training, taught by Ramya Mendes PT at 8/27/2024  2:22 PM.  Learner: Patient  Readiness: Acceptance  Method: Explanation, Demonstration  Response: Verbalizes Understanding, Needs Reinforcement    Education Comments  No comments found.

## 2024-08-28 ENCOUNTER — APPOINTMENT (OUTPATIENT)
Dept: RADIOLOGY | Facility: HOSPITAL | Age: 89
End: 2024-08-28
Payer: COMMERCIAL

## 2024-08-28 PROBLEM — E85.82 WILD-TYPE TRANSTHYRETIN-RELATED (ATTR) AMYLOIDOSIS (MULTI): Status: ACTIVE | Noted: 2024-08-28

## 2024-08-28 LAB
ALBUMIN MFR UR ELPH: 56.5 %
ALBUMIN SERPL BCP-MCNC: 2.9 G/DL (ref 3.4–5)
ALBUMIN: 3.2 G/DL (ref 3.4–5)
ALPHA 1 GLOBULIN: 0.3 G/DL (ref 0.2–0.6)
ALPHA 2 GLOBULIN: 0.6 G/DL (ref 0.4–1.1)
ALPHA1 GLOB MFR UR ELPH: 3.9 %
ALPHA2 GLOB MFR UR ELPH: 7.8 %
ANION GAP SERPL CALC-SCNC: 16 MMOL/L (ref 10–20)
ATRIAL RATE: 80 BPM
B-GLOBULIN MFR UR ELPH: 12.7 %
BETA GLOBULIN: 0.9 G/DL (ref 0.5–1.2)
BUN SERPL-MCNC: 31 MG/DL (ref 6–23)
CALCIUM SERPL-MCNC: 9.2 MG/DL (ref 8.6–10.6)
CHLORIDE SERPL-SCNC: 96 MMOL/L (ref 98–107)
CO2 SERPL-SCNC: 28 MMOL/L (ref 21–32)
CREAT SERPL-MCNC: 1.65 MG/DL (ref 0.5–1.05)
EGFRCR SERPLBLD CKD-EPI 2021: 30 ML/MIN/1.73M*2
ERYTHROCYTE [DISTWIDTH] IN BLOOD BY AUTOMATED COUNT: 18.1 % (ref 11.5–14.5)
GAMMA GLOB MFR UR ELPH: 19.1 %
GAMMA GLOBULIN: 1.3 G/DL (ref 0.5–1.4)
GLUCOSE SERPL-MCNC: 77 MG/DL (ref 74–99)
HCT VFR BLD AUTO: 45.2 % (ref 36–46)
HGB BLD-MCNC: 14.1 G/DL (ref 12–16)
IMMUNOFIXATION COMMENT: ABNORMAL
IMMUNOFIXATION COMMENT: NORMAL
MAGNESIUM SERPL-MCNC: 2.19 MG/DL (ref 1.6–2.4)
MCH RBC QN AUTO: 26.6 PG (ref 26–34)
MCHC RBC AUTO-ENTMCNC: 31.2 G/DL (ref 32–36)
MCV RBC AUTO: 85 FL (ref 80–100)
NRBC BLD-RTO: 0 /100 WBCS (ref 0–0)
P AXIS: 32 DEGREES
P OFFSET: 158 MS
P ONSET: 104 MS
PATH REVIEW - SERUM IMMUNOFIXATION: ABNORMAL
PATH REVIEW - URINE IMMUNOFIXATION: NORMAL
PATH REVIEW-SERUM PROTEIN ELECTROPHORESIS: ABNORMAL
PATH REVIEW-URINE PROTEIN ELECTROPHORESIS: NORMAL
PHOSPHATE SERPL-MCNC: 2.7 MG/DL (ref 2.5–4.9)
PLATELET # BLD AUTO: 211 X10*3/UL (ref 150–450)
POTASSIUM SERPL-SCNC: 4.3 MMOL/L (ref 3.5–5.3)
PR INTERVAL: 166 MS
PROTEIN ELECTROPHORESIS COMMENT: ABNORMAL
Q ONSET: 187 MS
QRS COUNT: 13 BEATS
QRS DURATION: 164 MS
QT INTERVAL: 450 MS
QTC CALCULATION(BAZETT): 519 MS
QTC FREDERICIA: 495 MS
R AXIS: 204 DEGREES
RBC # BLD AUTO: 5.31 X10*6/UL (ref 4–5.2)
SODIUM SERPL-SCNC: 136 MMOL/L (ref 136–145)
T AXIS: 56 DEGREES
T OFFSET: 412 MS
URINE ELECTROPHORESIS COMMENT: NORMAL
VENTRICULAR RATE: 80 BPM
WBC # BLD AUTO: 5.6 X10*3/UL (ref 4.4–11.3)

## 2024-08-28 PROCEDURE — 2500000004 HC RX 250 GENERAL PHARMACY W/ HCPCS (ALT 636 FOR OP/ED): Performed by: STUDENT IN AN ORGANIZED HEALTH CARE EDUCATION/TRAINING PROGRAM

## 2024-08-28 PROCEDURE — 36415 COLL VENOUS BLD VENIPUNCTURE: CPT

## 2024-08-28 PROCEDURE — 2500000002 HC RX 250 W HCPCS SELF ADMINISTERED DRUGS (ALT 637 FOR MEDICARE OP, ALT 636 FOR OP/ED)

## 2024-08-28 PROCEDURE — 83735 ASSAY OF MAGNESIUM: CPT

## 2024-08-28 PROCEDURE — 2500000002 HC RX 250 W HCPCS SELF ADMINISTERED DRUGS (ALT 637 FOR MEDICARE OP, ALT 636 FOR OP/ED): Performed by: STUDENT IN AN ORGANIZED HEALTH CARE EDUCATION/TRAINING PROGRAM

## 2024-08-28 PROCEDURE — 71045 X-RAY EXAM CHEST 1 VIEW: CPT | Performed by: RADIOLOGY

## 2024-08-28 PROCEDURE — 2500000004 HC RX 250 GENERAL PHARMACY W/ HCPCS (ALT 636 FOR OP/ED): Performed by: NURSE PRACTITIONER

## 2024-08-28 PROCEDURE — 2500000001 HC RX 250 WO HCPCS SELF ADMINISTERED DRUGS (ALT 637 FOR MEDICARE OP): Performed by: PHYSICIAN ASSISTANT

## 2024-08-28 PROCEDURE — 80069 RENAL FUNCTION PANEL: CPT

## 2024-08-28 PROCEDURE — 99232 SBSQ HOSP IP/OBS MODERATE 35: CPT | Performed by: STUDENT IN AN ORGANIZED HEALTH CARE EDUCATION/TRAINING PROGRAM

## 2024-08-28 PROCEDURE — 2500000004 HC RX 250 GENERAL PHARMACY W/ HCPCS (ALT 636 FOR OP/ED): Performed by: INTERNAL MEDICINE

## 2024-08-28 PROCEDURE — 74018 RADEX ABDOMEN 1 VIEW: CPT

## 2024-08-28 PROCEDURE — 1200000002 HC GENERAL ROOM WITH TELEMETRY DAILY

## 2024-08-28 PROCEDURE — 99233 SBSQ HOSP IP/OBS HIGH 50: CPT | Performed by: INTERNAL MEDICINE

## 2024-08-28 PROCEDURE — 74018 RADEX ABDOMEN 1 VIEW: CPT | Performed by: RADIOLOGY

## 2024-08-28 PROCEDURE — 85027 COMPLETE CBC AUTOMATED: CPT

## 2024-08-28 PROCEDURE — 2500000001 HC RX 250 WO HCPCS SELF ADMINISTERED DRUGS (ALT 637 FOR MEDICARE OP): Performed by: STUDENT IN AN ORGANIZED HEALTH CARE EDUCATION/TRAINING PROGRAM

## 2024-08-28 PROCEDURE — 71045 X-RAY EXAM CHEST 1 VIEW: CPT

## 2024-08-28 RX ORDER — DAPAGLIFLOZIN 10 MG/1
5 TABLET, FILM COATED ORAL EVERY 24 HOURS
Qty: 15 TABLET | Refills: 0 | Status: SHIPPED | OUTPATIENT
Start: 2024-08-28 | End: 2024-09-03 | Stop reason: HOSPADM

## 2024-08-28 RX ORDER — METOPROLOL TARTRATE 1 MG/ML
5 INJECTION, SOLUTION INTRAVENOUS ONCE AS NEEDED
Status: DISCONTINUED | OUTPATIENT
Start: 2024-08-28 | End: 2024-08-30

## 2024-08-28 RX ORDER — METOPROLOL TARTRATE 1 MG/ML
5 INJECTION, SOLUTION INTRAVENOUS ONCE
Status: DISCONTINUED | OUTPATIENT
Start: 2024-08-28 | End: 2024-08-30

## 2024-08-28 RX ORDER — METOPROLOL TARTRATE 25 MG/1
100 TABLET, FILM COATED ORAL ONCE AS NEEDED
Status: COMPLETED | OUTPATIENT
Start: 2024-08-28 | End: 2024-08-28

## 2024-08-28 RX ORDER — METOPROLOL TARTRATE 25 MG/1
100 TABLET, FILM COATED ORAL ONCE
Status: COMPLETED | OUTPATIENT
Start: 2024-08-28 | End: 2024-08-28

## 2024-08-28 RX ORDER — NITROGLYCERIN 0.4 MG/1
0.8 TABLET SUBLINGUAL ONCE
Status: COMPLETED | OUTPATIENT
Start: 2024-08-28 | End: 2024-08-29

## 2024-08-28 RX ORDER — ONDANSETRON 4 MG/1
4 TABLET, FILM COATED ORAL EVERY 8 HOURS PRN
Status: DISCONTINUED | OUTPATIENT
Start: 2024-08-28 | End: 2024-09-04 | Stop reason: HOSPADM

## 2024-08-28 RX ORDER — ONDANSETRON HYDROCHLORIDE 2 MG/ML
4 INJECTION, SOLUTION INTRAVENOUS EVERY 8 HOURS PRN
Status: DISCONTINUED | OUTPATIENT
Start: 2024-08-28 | End: 2024-09-04 | Stop reason: HOSPADM

## 2024-08-28 RX ORDER — METOPROLOL SUCCINATE 25 MG/1
25 TABLET, EXTENDED RELEASE ORAL DAILY
Status: DISCONTINUED | OUTPATIENT
Start: 2024-08-28 | End: 2024-08-29

## 2024-08-28 RX ORDER — DAPAGLIFLOZIN 5 MG/1
5 TABLET, FILM COATED ORAL EVERY 24 HOURS
Status: DISCONTINUED | OUTPATIENT
Start: 2024-08-29 | End: 2024-08-29

## 2024-08-28 RX ORDER — LORAZEPAM 2 MG/ML
0.5 INJECTION INTRAMUSCULAR EVERY 5 MIN PRN
Status: DISCONTINUED | OUTPATIENT
Start: 2024-08-28 | End: 2024-09-04 | Stop reason: HOSPADM

## 2024-08-28 RX ADMIN — ENOXAPARIN SODIUM 30 MG: 100 INJECTION SUBCUTANEOUS at 08:23

## 2024-08-28 RX ADMIN — METOPROLOL TARTRATE 100 MG: 25 TABLET, FILM COATED ORAL at 13:29

## 2024-08-28 RX ADMIN — Medication 1 TABLET: at 08:23

## 2024-08-28 RX ADMIN — POLYETHYLENE GLYCOL 3350 17 G: 17 POWDER, FOR SOLUTION ORAL at 08:23

## 2024-08-28 RX ADMIN — FUROSEMIDE 40 MG: 10 INJECTION, SOLUTION INTRAMUSCULAR; INTRAVENOUS at 08:24

## 2024-08-28 RX ADMIN — ONDANSETRON 4 MG: 2 INJECTION INTRAMUSCULAR; INTRAVENOUS at 21:49

## 2024-08-28 RX ADMIN — METOPROLOL SUCCINATE 25 MG: 25 TABLET, EXTENDED RELEASE ORAL at 13:11

## 2024-08-28 RX ADMIN — PREDNISONE 2.5 MG: 2.5 TABLET ORAL at 08:24

## 2024-08-28 RX ADMIN — ASPIRIN 81 MG: 81 TABLET, COATED ORAL at 08:23

## 2024-08-28 RX ADMIN — METOPROLOL TARTRATE 100 MG: 25 TABLET, FILM COATED ORAL at 14:44

## 2024-08-28 RX ADMIN — PANTOPRAZOLE SODIUM 40 MG: 40 TABLET, DELAYED RELEASE ORAL at 06:45

## 2024-08-28 RX ADMIN — ROSUVASTATIN CALCIUM 20 MG: 20 TABLET, FILM COATED ORAL at 20:33

## 2024-08-28 RX ADMIN — SPIRONOLACTONE 25 MG: 25 TABLET, FILM COATED ORAL at 08:23

## 2024-08-28 ASSESSMENT — COGNITIVE AND FUNCTIONAL STATUS - GENERAL
DRESSING REGULAR UPPER BODY CLOTHING: A LITTLE
STANDING UP FROM CHAIR USING ARMS: A LOT
CLIMB 3 TO 5 STEPS WITH RAILING: A LOT
WALKING IN HOSPITAL ROOM: A LITTLE
EATING MEALS: A LITTLE
MOBILITY SCORE: 15
PERSONAL GROOMING: A LITTLE
DRESSING REGULAR LOWER BODY CLOTHING: A LOT
TURNING FROM BACK TO SIDE WHILE IN FLAT BAD: A LITTLE
DAILY ACTIVITIY SCORE: 16
MOVING FROM LYING ON BACK TO SITTING ON SIDE OF FLAT BED WITH BEDRAILS: A LITTLE
MOVING TO AND FROM BED TO CHAIR: A LOT
TOILETING: A LOT
HELP NEEDED FOR BATHING: A LITTLE

## 2024-08-28 ASSESSMENT — PAIN - FUNCTIONAL ASSESSMENT
PAIN_FUNCTIONAL_ASSESSMENT: 0-10

## 2024-08-28 ASSESSMENT — PAIN SCALES - GENERAL
PAINLEVEL_OUTOF10: 10 - WORST POSSIBLE PAIN
PAINLEVEL_OUTOF10: 0 - NO PAIN
PAINLEVEL_OUTOF10: 0 - NO PAIN
PAINLEVEL_OUTOF10: 8

## 2024-08-28 ASSESSMENT — PAIN DESCRIPTION - DESCRIPTORS
DESCRIPTORS: ACHING;BURNING;DISCOMFORT;CRAMPING
DESCRIPTORS: ACHING;BURNING;CRAMPING;DISCOMFORT

## 2024-08-28 NOTE — PROGRESS NOTES
Advanced Heart Failure Progress Note   Consulting Team: [facility]  Primary Cardiologist: N/A  Reason for Consult: TTR amyloidosis     Subjective   rEin Lopez is a 88 y.o. female with a PMHx of HFpEF, SSS s/p PPM, nonobstructive CAD/NSTEMI who was admitted on 8/24/2024 for ADHF and f/t/h new drop in EF to 30-35% with concentric LVH and global hypokineses, PYP scan showing concern for TTR amyloid . Heart failure was consulted regarding TTR amyloid management.     She was seen at bedside this morning. She reports vomiting and diarrhea this morning that has now settled. Otherwise denies CP, SOB. Continues to diurese well and per charts appears to be >10L net negative since admission.      Objective   Physical Exam  Vitals:    08/28/24 1431   BP: 133/73   Pulse: 81   Resp:    Temp:    SpO2: 97%       GEN: Healthy appearing, well-developed, NAD.  CV: RRR, no m/r/g. JVP mildly elevated  LUNGS: CTAB, no w/r/c.  ABD: Soft, NT/ND, NBS, no masses or organomegaly.  SKIN: Warm, well perfused. No skin rashes or abnormal lesions. LE edema +1 bilaterally  MSK: Normal gait. No deformities.  EXT: No clubbing, cyanosis, +1 edema.  NEURO: Ambulating with no limitations. No focal deficits.      ECG:  AV dual paced rhythm.  .     Results for orders placed during the hospital encounter of 08/24/24    Transthoracic Echo (TTE) Complete    PHYSICIAN INTERPRETATION:  Left Ventricle: Left ventricular ejection fraction is moderately decreased, by visual estimate at 30-35%. There is global hypokinesis of the left ventricle with minor regional variations. The left ventricular cavity size is decreased. The left ventricular septal wall thickness is moderately increased. There is moderately increased left ventricular posterior wall thickness. There is severe concentric left ventricular hypertrophy. Abnormal (paradoxical) septal motion, consistent with RV pacemaker. Spectral Doppler shows an impaired relaxation pattern of left ventricular diastolic  filling. There is an elevated left ventricular end diastolic pressure.  Left Atrium: The left atrium is mildly dilated. A bubble study using agitated saline was performed. A small PFO (= 10 bubbles) was demonstrated.  Right Ventricle: The right ventricle is moderately enlarged. There is normal right ventricular global systolic function. A device is visualized in the right ventricle.  Right Atrium: The right atrium is severely dilated.  Aortic Valve: The aortic valve is trileaflet. There is minimal aortic valve cusp calcification. The aortic valve dimensionless index is 0.70. There is mild to moderate aortic valve regurgitation. The peak instantaneous gradient of the aortic valve is 3.0 mmHg. The mean gradient of the aortic valve is 1.8 mmHg.  Mitral Valve: The mitral valve is mildly thickened. There is mild to moderate mitral valve regurgitation.  Tricuspid Valve: The tricuspid valve is structurally normal. There is severe tricuspid regurgitation. The Doppler estimated RVSP is slightly elevated at 31.2 mmHg. The RV systolic pressure is likely to be underestimated.  Pulmonic Valve: The pulmonic valve is structurally normal. There is physiologic pulmonic valve regurgitation.  Pericardium: There is a trivial pericardial effusion.  Pleural: There is bilateral pleural effusion.  Aorta: The aortic root is normal.  Systemic Veins: The inferior vena cava appears dilated. There is IVC inspiratory collapse greater than 50%. The hepatic vein shows a pattern of systolic flow reversal, suggestive of severe tricuspid regurgitation.  In comparison to the previous echocardiogram(s): Compared with study dated 10/3/2023, the LV EF is decreaaed (was 50-55%) and the TR is wide-open (was moderate).      CONCLUSIONS:  1. Left ventricular ejection fraction is moderately decreased, by visual estimate at 30-35%.  2. There is global hypokinesis of the left ventricle with minor regional variations.  3. Spectral Doppler shows an impaired  relaxation pattern of left ventricular diastolic filling.  4. There is an elevated left ventricular end diastolic pressure.  5. Left ventricular cavity size is decreased.  6. Abnormal septal motion consistent with RV pacemaker.  7. Moderately increased left ventricular septal thickness.  8. The left ventricular posterior wall thickness is moderately increased.  9. There is severe concentric left ventricular hypertrophy.  10. There is normal right ventricular global systolic function.  11. Moderately enlarged right ventricle.  12. The left atrium is mildly dilated.  13. The right atrium is severely dilated.  14. Mild to moderate mitral valve regurgitation.  15. Severe tricuspid regurgitation visualized.  16. Slightly elevated RVSP.  17. The RV systolic pressure is likely to be underestimated.  18. Mild to moderate aortic valve regurgitation.  19. Sent a Haiku to the NP service.    QUANTITATIVE DATA SUMMARY:  2D MEASUREMENTS:  Normal Ranges:  Ao Root d:     3.20 cm  (2.0-3.7cm)  LAs:           4.25 cm  (2.7-4.0cm)  IVSd:          1.78 cm  (0.6-1.1cm)  LVPWd:         1.76 cm  (0.6-1.1cm)  LVIDd:         3.56 cm  (3.9-5.9cm)  LVIDs:         3.26 cm  LV Mass Index: 148 g/m2  LVEDV Index:   56 ml/m2  LV % FS        8.3 %    LA VOLUME:  Normal Ranges:  LA Vol A4C:        65.4 ml    (22+/-6mL/m2)  LA Vol A2C:        62.6 ml  LA Vol BP:         67.2 ml  LA Vol Index A4C:  37.1ml/m2  LA Vol Index A2C:  35.5 ml/m2  LA Vol Index BP:   38.1 ml/m2  LA Area A4C:       20.2 cm2  LA Area A2C:       18.8 cm2  LA Major Axis A4C: 5.3 cm  LA Major Axis A2C: 4.8 cm    RA VOLUME BY A/L METHOD:  Normal Ranges:  RA Area A4C: 31.0 cm2    AORTA MEASUREMENTS:  Normal Ranges:  Asc Ao, d: 3.30 cm (2.1-3.4cm)    LV SYSTOLIC FUNCTION BY 2D PLANIMETRY (MOD):  Normal Ranges:  EF-A4C View:    32 % (>=55%)  EF-A2C View:    39 %  EF-Biplane:     35 %  EF-Visual:      33 %  LV EF Reported: 33 %    LV DIASTOLIC FUNCTION:  Normal Ranges:  MV Peak E: 0.44  m/s    (0.7-1.2 m/s)  MV Peak A: 0.64 m/s    (0.42-0.7 m/s)  E/A Ratio: 0.68        (1.0-2.2)  MV A Dur:  124.57 msec  MV DT:     126 msec    (150-240 msec)    MITRAL VALVE:  Normal Ranges:  MV DT: 126 msec (150-240msec)    MITRAL INSUFFICIENCY:  Normal Ranges:  MR VTI:  176.42 cm  MR Vmax: 459.02 cm/s    AORTIC VALVE:  Normal Ranges:  AoV Vmax:                0.87 m/s (<=1.7m/s)  AoV Peak PG:             3.0 mmHg (<20mmHg)  AoV Mean P.8 mmHg (1.7-11.5mmHg)  LVOT Max Alfonso:            0.65 m/s (<=1.1m/s)  AoV VTI:                 16.35 cm (18-25cm)  LVOT VTI:                11.51 cm  LVOT Diameter:           1.98 cm  (1.8-2.4cm)  AoV Area, VTI:           2.17 cm2 (2.5-5.5cm2)  AoV Area,Vmax:           2.30 cm2 (2.5-4.5cm2)  AoV Dimensionless Index: 0.70    AORTIC INSUFFICIENCY:  AI Vmax:       3.51 m/s  AI Half-time:  433 msec  AI Decel Time: 1492 msec  AI Decel Rate: 235.88 cm/s2      RIGHT VENTRICLE:  RV Basal 4.80 cm  RV Mid   3.60 cm  RV Major 8.2 cm  TAPSE:   20.0 mm  RV s'    0.12 m/s    TRICUSPID VALVE/RVSP:  Normal Ranges:  Peak TR Velocity: 2.41 m/s  RV Syst Pressure: 31.2 mmHg (< 30mmHg)  IVC Diam:         2.89 cm    PULMONIC VALVE:  Normal Ranges:  PV Accel Time: 69 msec  (>120ms)  PV Max Alfonso:    0.7 m/s  (0.6-0.9m/s)  PV Max P.0 mmHg    AORTA:  Asc Ao Diam 3.26 cm      [id number] Mehrdad Yee MD  Electronically signed on 2024 at 12:07:44 PM        ** Final **       Imaging  Chest x-ray:  Cardiomegaly with small to moderate bilateral pleural effusions and         Lab Review   Lab Results   Component Value Date     2024     2024     2024    K 5.0 2024    K 3.6 2024    K 4.1 2024    CO2 21 2024    CO2 27 2024    CO2 27 2024    BUN 29 (H) 2024    BUN 28 (H) 2024    BUN 29 (H) 2024    CREATININE 1.40 (H) 2024    CREATININE 1.51 (H) 2024    CREATININE 1.55 (H) 2024    GLUCOSE 68 (L)  "08/27/2024    GLUCOSE 81 08/26/2024    GLUCOSE 100 (H) 08/25/2024    CALCIUM 9.1 08/27/2024    CALCIUM 9.4 08/26/2024    CALCIUM 8.9 08/25/2024     No results found for: \"CKTOTAL\", \"CKMB\", \"CKMBINDEX\", \"TROPONINI\"  Lab Results   Component Value Date    WBC 5.7 08/27/2024    WBC 6.2 08/26/2024    WBC 5.4 08/26/2024    HGB 14.1 08/27/2024    HGB 14.3 08/26/2024    HGB 13.0 08/26/2024    HCT 44.8 08/27/2024    HCT 43.5 08/26/2024    HCT 40.0 08/26/2024    MCV 84 08/27/2024    MCV 82 08/26/2024    MCV 82 08/26/2024     08/27/2024     08/26/2024     08/26/2024       Troponin I, High Sensitivity (CMC)   Date/Time Value Ref Range Status   08/23/2024 11:41  (HH) 0 - 34 ng/L Final     Comment:     Previous result verified on 8/23/2024 2336 on specimen/case [id number] called with component [facility] for procedure Troponin I, High Sensitivity, Initial with value 235 ng/L.   08/23/2024 10:35  (HH) 0 - 34 ng/L Final     BNP   Date/Time Value Ref Range Status   08/23/2024 10:35 PM 2,626 (H) 0 - 99 pg/mL Final        Assessment and Plan   Erin Lopez is a 88 y.o. female with a PMHx of HFpEF, SSS s/p PPM, nonobstructive CAD/NSTEMI who was admitted on 8/24/2024 for ADHF and f/t/h new drop in EF to 30-35% with concentric LVH and global hypokineses, PYP scan showing concern for TTR amyloid . Heart failure was consulted regarding TTR amyloid management.     #Acute HFrEF (EF 35%, NYHA Class IV/AHA Stage C) on presentation  # Previous hx of HFpEF  # Severe LVH  # Severe TR   # TTR amyloidosis:  Patient presented with ADHF. Admit BNP 2626, trop 235>189, EKG AV-dual paced rhythm. Last TTE on 08/24 demonstrating EF 30-35%. Etiology of heart failure is secondary to TTR amyloid as seen on PYP scan.  - Diuresis: defer to primary  - GDMT              - SGLT-2i: consider starting Fraxiga 10 mg               - Beta-blocker: pt takes metop tart at home, can switch to XL for GDMT when appropriate               - " ACE-I/ARB/ARNI: consider entresto for GDMT if BP allows               - MRA: cont Aldactone 25 mg  - Recommend initiating Tafamidis, amyloid coordinator informed. Discussed w pt and family and in agreement. Will be started in ambulatory settings. Pt to follow with  amyloid center post-discharge.   - AICD/CRT-D/Lifevest: agree with EP eval  - Agree with ischemic work-up for eval of newly reduced EF.      For any questions or concerns, feel free to reach out via [employer] chat or page at [phone].    Recs discussed w Dr. Omar MD. HF will sign-off this time.     Angeline Rodriguez MD   Heart Failure Fellow  PGY-4

## 2024-08-28 NOTE — PROGRESS NOTES
"Physical Therapy                 Therapy Communication Note    Patient Name: Erin Lopez  MRN: [id number]  Today's Date: 8/28/2024     Discipline: Physical Therapy    Missed Visit Reason: Missed Visit Reason:  (Patient declining therapy stating \"I am still eating my lunch and if I try to get up I will throw up\".)    Missed Time: Attempt    Comment:  "

## 2024-08-28 NOTE — PROGRESS NOTES
"HVI Attending Shared Visit Note    This is a shared visit. Please see Advanced Practice Provider's encounter note for additional details.        Overnight events/Subjective: Feeling ok    Exam:   Physical exam: Well appearing, no distress. Normal rate, regular rhythm, non labored breathing, clear to auscultation, abdomen non distended, 1+  LE edema, \"woody\" appearing legs, mild bilateral upper extremity swelling. no focal neuro deficits.     A/P:   88 F with SSS s/p PPM, HTN, CKD and HFpEF who presented with decompensated HF and was found to have a newly reduced EF (30-35%) and PYP suggestion of TTR amyloid.  #New HFrEF + TTR amyloidosis: Despite high pacing burden, she does not have intrinsic conduction to allow change in pacing settings to minimize pacing. Continue metoprolol and spironolactone. Start ARNI and SGLT2i. Tafamadis as an outpatient  -coronary CT to evaluate for any ischemia as a potential contributor to decompensated HF.     Dispo: pending the above, likely late this week.     Oscar Marsh MD    ________________________________________________________________________________  Problems:   Principal Problem:    Acute systolic heart failure (Multi)  Active Problems:    Sick sinus syndrome (Multi)    NSTEMI (non-ST elevated myocardial infarction) (Multi)    Cardiomyopathy (Multi)    Pacemaker    TERI (acute kidney injury) (CMS-[facility])    Wild-type transthyretin-related (ATTR) amyloidosis (Multi)      Objective   Admit Date: 8/24/2024  Hospital Length of Stay: 4   Home: [facility] [id number]    Vitals:      8/28/2024     3:36 PM 8/28/2024     2:31 PM 8/28/2024     1:28 PM 8/28/2024     1:04 PM 8/28/2024    11:48 AM 8/28/2024     7:21 AM 8/28/2024     5:06 AM   Vitals   Systolic 123 133 127 133 121 127 128   Diastolic 66 73 64 72 65 72 68   Heart Rate  81 78 78  76 74   Temp 36.1 °C (97 °F)  36.2 °C (97.2 °F) 36.3 °C (97.3 °F) 36.2 °C (97.2 °F) 36.1 °C (97 °F) 36.1 °C (97 °F)   Resp   16 16 18 16    Weight (lb) "       131.7   BMI       23.33 kg/m2   BSA (m2)       1.63 m2     Wt Readings from Last 5 Encounters:   08/28/24 59.7 kg (131 lb 11.2 oz)       Intake/Output Summary (Last 24 hours) at 8/28/2024 1634  Last data filed at 8/28/2024 1444  Gross per 24 hour   Intake 660 ml   Output 5300 ml   Net -4640 ml         MEDICATIONS  Infusions:       Scheduled:  aspirin, 81 mg, Daily  calcium carbonate-vitamin D3, 1 tablet, Daily  [START ON 8/29/2024] dapagliflozin propanediol, 5 mg, q24h  enoxaparin, 30 mg, q24h  ergocalciferol, 1,250 mcg, Every Sunday  furosemide, 40 mg, BID  metoprolol succinate XL, 25 mg, Daily  metoprolol, 5 mg, Once  nitroglycerin, 0.8 mg, Once  pantoprazole, 40 mg, Daily before breakfast  polyethylene glycol, 17 g, Daily  predniSONE, 2.5 mg, Daily  rosuvastatin, 20 mg, Nightly  sacubitriL-valsartan, 1 tablet, BID  spironolactone, 25 mg, Daily      PRN:  acetaminophen, 650 mg, q6h PRN   Or  acetaminophen, 650 mg, q6h PRN   Or  acetaminophen, 650 mg, q6h PRN  benzonatate, 100 mg, TID PRN  ipratropium-albuteroL, 3 mL, q6h PRN  LORazepam, 0.5 mg, q5 min PRN  metoprolol, 5 mg, Once PRN  metoprolol, 5 mg, Once PRN  metoprolol, 5 mg, Once PRN  metoprolol, 5 mg, Once PRN      Prior to Admission Meds:  Medications Prior to Admission   Medication Sig Dispense Refill Last Dose    amLODIPine (Norvasc) 10 mg tablet Take 1 tablet (10 mg) by mouth once daily.   Past Week    aspirin 81 mg EC tablet Take 1 tablet (81 mg) by mouth once daily.   Past Week    atorvastatin (Lipitor) 10 mg tablet Take 1 tablet (10 mg) by mouth once daily.   Past Week    calcium carbonate-vitamin D3 (Calcium 600 + D,3,) 600 mg-10 mcg (400 unit) tablet Take 1 tablet by mouth once daily.   Past Week    ergocalciferol (Vitamin D-2) 50 MCG (2000 UT) capsule capsule Take 1 capsule (50 mcg) by mouth 1 (one) time per week.   Past Month    furosemide (Lasix) 20 mg tablet Take 1 tablet (20 mg) by mouth 2 times a day.   Past Week    metoprolol tartrate  "(Lopressor) 50 mg tablet Take 1 tablet by mouth once daily.   Past Week    torsemide (Demadex) 20 mg tablet Take 1 tablet (20 mg) by mouth once daily.   Past Week    traMADol (Ultram) 50 mg tablet Take 1 tablet (50 mg) by mouth every 8 hours if needed.   Past Week    nitroglycerin (Nitrostat) 0.4 mg SL tablet Place 1 tablet (0.4 mg) under the tongue every 5 minutes if needed for chest pain (for up to 3 doses.Call 911 is pain persists).   More than a month       DATA:  CMP:  Recent Labs     08/27/24 1821 08/26/24 1827 08/25/24 1932 08/24/24 2238 08/23/24 2235    141 142 141 143   K 5.0 3.6 4.1 4.4 4.0    103 105 107 108*   CO2 21 27 27 25 22   ANIONGAP 21* 15 14 13 17   BUN 29* 28* 29* 29* 29*   CREATININE 1.40* 1.51* 1.55* 1.56* 1.61*   EGFR 36* 33* 32* 32* 31*   MG 2.26 2.10 2.03 2.24  --      Recent Labs     08/27/24 1821 08/26/24 1827 08/25/24 1932 08/24/24 2238 08/23/24 2235   ALBUMIN 2.8* 3.2* 3.0* 3.1* 3.5   ALT  --   --   --   --  12   AST  --   --   --   --  18   BILITOT  --   --   --   --  0.7     CBC:  Recent Labs     08/27/24 1821 08/26/24 1827 08/26/24 0940 08/25/24 1932 08/24/24 2238 08/23/24 2235   WBC 5.7 6.2 5.4 5.4 4.4 4.3*   HGB 14.1 14.3 13.0 13.3 13.0 14.2   HCT 44.8 43.5 40.0 42.8 40.8 41.8    214 213 216 196 214   MCV 84 82 82 84 84 80     COAG:   Recent Labs     08/26/24 0940 08/25/24 1932 08/24/24  1305 08/24/24  0645 08/24/24  0001   INR  --   --   --   --  1.1   HAUF 0.5 0.5 0.6 0.6  --      ABO: No results for input(s): \"ABO\" in the last 65311 hours.  HEME/ENDO: No results for input(s): \"FERRITIN\", \"IRONSAT\", \"TSH\", \"FREET4\", \"HGBA1C\" in the last 78206 hours.  CARDIAC:   Recent Labs     08/23/24  2341 08/23/24  2235   TROPHS 189* 235*   BNP  --  2,626*     No results for input(s): \"CHOL\", \"LDLF\", \"LDLCALC\", \"HDL\", \"TRIG\" in the last 52838 hours.  TOX:No results for input(s): \"AMPHETAMINE\", \"BENZO\", \"CANNABINOID\", \"COCAI\", \"FENTANYL\", \"OPIATE\", " "\"OXYCODONE\", \"PCP\" in the last 51136 hours.    No lab exists for component: \"BARBSCRUR\"  MICRO: No results for input(s): \"ESR\", \"CRP\", \"PROCAL\" in the last 21070 hours.  No results found for the last 90 days.      FOLLOWUP:   No future appointments.          "

## 2024-08-28 NOTE — PROGRESS NOTES
Interval events:    No acute events overnight    Subjective:  No chest pain or SOB.    Today in brief:  - Add GDMT slowly: Toprol 25mg daily.  Start low dose entresto tonight. Plan to add low dose SGLT2i tomorrow.   - Coronary CTA for ischemic evaluation  - Tafamidis to be intiated per HF team    Objective:  Vitals:    08/28/24 0721   BP: 127/72   Pulse: 76   Resp: 16   Temp: 36.1 °C (97 °F)   SpO2: 97%     Weight         8/23/2024  2152 8/24/2024  0330 8/26/2024  0500 8/27/2024  0425 8/28/2024  0506    Weight: 64.9 kg (143 lb) 73.3 kg (161 lb 9.6 oz) 67.7 kg (149 lb 4 oz) 66.9 kg (147 lb 8 oz) 59.7 kg (131 lb 11.2 oz)            Intake/Output Summary (Last 24 hours) at 8/28/2024 0809  Last data filed at 8/28/2024 0506  Gross per 24 hour   Intake 360 ml   Output 4900 ml   Net -4540 ml     Recent Results (from the past 24 hour(s))   CBC    Collection Time: 08/27/24  6:21 PM   Result Value Ref Range    WBC 5.7 4.4 - 11.3 x10*3/uL    nRBC 0.0 0.0 - 0.0 /100 WBCs    RBC 5.35 (H) 4.00 - 5.20 x10*6/uL    Hemoglobin 14.1 12.0 - 16.0 g/dL    Hematocrit 44.8 36.0 - 46.0 %    MCV 84 80 - 100 fL    MCH 26.4 26.0 - 34.0 pg    MCHC 31.5 (L) 32.0 - 36.0 g/dL    RDW 18.6 (H) 11.5 - 14.5 %    Platelets 213 150 - 450 x10*3/uL   Renal Function Panel    Collection Time: 08/27/24  6:21 PM   Result Value Ref Range    Glucose 68 (L) 74 - 99 mg/dL    Sodium 138 136 - 145 mmol/L    Potassium 5.0 3.5 - 5.3 mmol/L    Chloride 101 98 - 107 mmol/L    Bicarbonate 21 21 - 32 mmol/L    Anion Gap 21 (H) 10 - 20 mmol/L    Urea Nitrogen 29 (H) 6 - 23 mg/dL    Creatinine 1.40 (H) 0.50 - 1.05 mg/dL    eGFR 36 (L) >60 mL/min/1.73m*2    Calcium 9.1 8.6 - 10.6 mg/dL    Phosphorus 3.0 2.5 - 4.9 mg/dL    Albumin 2.8 (L) 3.4 - 5.0 g/dL   Magnesium    Collection Time: 08/27/24  6:21 PM   Result Value Ref Range    Magnesium 2.26 1.60 - 2.40 mg/dL     Inpatient Medications:  Scheduled medications   Medication Dose Route Frequency    aspirin  81 mg oral Daily     calcium carbonate-vitamin D3  1 tablet oral Daily    enoxaparin  30 mg subcutaneous q24h    ergocalciferol  1,250 mcg oral Every Sunday    furosemide  40 mg intravenous BID    pantoprazole  40 mg oral Daily before breakfast    polyethylene glycol  17 g oral Daily    predniSONE  2.5 mg oral Daily    rosuvastatin  20 mg oral Nightly    spironolactone  25 mg oral Daily     PRN medications   Medication    acetaminophen    Or    acetaminophen    Or    acetaminophen    benzonatate    ipratropium-albuteroL     Continuous Medications   Medication Dose Last Rate       Telemetry 8/28/2024 (personally reviewed): AsVp 60-70s    Physical exam:  General: NAD  Head/ neck: no JVD  Cardiac: RRR, regular S1 S2 , no murmur, no rub, no gallop  Pulm: No WOB. No wheezes  Vascular: Radial 2+ bilaterally  GI: Non distended  Extremities: no LE edema   Neuro: no focal neuro deficits   Psych: appropriate mood and behavior   Skin: warm and dry     Assessment/Plan   Erin Lopez is a 88 y.o. female with PMH of NSTEMI (nonobstructive CAD 2017), HFpEF, LBBB and SSS s/p PPM 2017 s/p device changeout 11/2022, HTN, CKD presenting with CHF exacerbation and new HFrEF/newly diagnosed TTR cardiac amyloidosis      Acute systolic and diastolic heart failure (HFrEF 30-35%> improved to 50%) likely 2/2 amyloidosis  - 2/2 ischemia vs infiltration vs pacemaker mediated   - TTE 10/2023: EF 50-55%, moderately increased LV septal and posterior wall thickness, Moderately enlarged V, mild to mod AR and MR, mod TR, moderate LAE, Compared to 2017 TTE there is possible underlying infiltrative disease  - Admit TTE 8/24: EF 30-35%, gHK of LV,  elevated LVEDP, abnormal septal motion c/w V pacemaker, moderately increased LV septal thickness. Lv posterior wall moderately thickened. Severe cLVH. Moderately enlarged RV, mild LAE, RA severely dilated, mild to mod MR, severe TR, Slightly elevated RVSP. Mild to mod AR.  - Hypervolemic   - BNP 2,626  - Admit wt: 73.3 kg.   -  Home metop held initially iso new HFrEF and decompensation  - severe anasarca to upper abdomen/UE and JVD up to mandible with RLE wound weeping  - IV Lasix 40mg BID + IV Diamox 500mg to be given 30 min prior to first lasix bolus  - Volume improving  - Today's wt: 59.7  - C/w lasix 40mg IV BID. Renal function stable.  - Daily standing weights, strict I&O's, 2g sodium diet, 2L fluid restriction   - New TTR amyloidosis seen on  NM PYP scan   - K/L ration WNL   - SPEP, UPEP in process   - Advanced HF team consulted:  - Recommend initiating Tafamidis (amyloid coordinator informed) and titrating GDMT as tolerated  - Agree with ischemic work-up for eval of newly reduced EF.  - Coronary CTA ordered for ischemic evaluation  - GDMT : will add cautiously in light of amyloidosis   - start Toprol 25mg daily today since she is warm and closer to euvolemia   - c/w spironolactone 25mg    - start entresto 24-26 BID tonight   - Add SGLT2i  tomorrow    - Appreciate wound care recommendations for bilateral lower extremity weeping    Troponinemia  - HS trop: 235 -> 189. Suspected 2/2 demand from CHF, although cannot rule out true ischemic component  - Treated with ~48hs of heparin gtt.   - Cont ASA  - Intensified atorva 10 to rosuva 20 given hx of CAD  - coronary CTA ordered      Hx of SSS s/p PPM  , s/p device changeout 2022  - EC% AV pacing  - SR with  on telemetry  - Concern for possible pacemaker mediated cardiomyopathy given high pacing burden on device interrogation  - EP consulted: AV delayed extended but no intrinsic conduction noted. Recommended optimizing GDMT prior to considering upgrade to CRT since she would be high risk.     HTN  - Admit /70  - Last 24hrs: -130s.  - discontinued home amlodipine 10mg in anticipation for GDMT optimization     Arthritis  - Pt previously getting steroid injections q3 month then put on systemic steroids  - Cont prednisone 2.5 daily   - Was previously listed  as on plaquenil for unclear reason but pt states this was stopped  - cont home PPI while on steroids     DVT ppx: Lovenox  DISPO: pending further HFrEF evaluation/management  Code status: Full Code    Patient seen and discussed with Dr. Oscar Marsh.  Evelina Zuñiga PA-C

## 2024-08-28 NOTE — CARE PLAN
The patient's goals for the shift include  to obtain adequate rest throughout the evening.    The clinical goals for the shift include patient will have adequate urine output throughout the shift

## 2024-08-29 ENCOUNTER — APPOINTMENT (OUTPATIENT)
Dept: CARDIOLOGY | Facility: HOSPITAL | Age: 89
End: 2024-08-29
Payer: COMMERCIAL

## 2024-08-29 ENCOUNTER — APPOINTMENT (OUTPATIENT)
Dept: RADIOLOGY | Facility: HOSPITAL | Age: 89
End: 2024-08-29
Payer: COMMERCIAL

## 2024-08-29 LAB
ALBUMIN SERPL BCP-MCNC: 3 G/DL (ref 3.4–5)
AMYLASE SERPL-CCNC: 63 U/L (ref 29–103)
ANION GAP SERPL CALC-SCNC: 17 MMOL/L (ref 10–20)
BUN SERPL-MCNC: 35 MG/DL (ref 6–23)
CALCIUM SERPL-MCNC: 9.4 MG/DL (ref 8.6–10.6)
CHLORIDE SERPL-SCNC: 96 MMOL/L (ref 98–107)
CO2 SERPL-SCNC: 29 MMOL/L (ref 21–32)
CREAT SERPL-MCNC: 1.49 MG/DL (ref 0.5–1.05)
EGFRCR SERPLBLD CKD-EPI 2021: 34 ML/MIN/1.73M*2
ERYTHROCYTE [DISTWIDTH] IN BLOOD BY AUTOMATED COUNT: 18.5 % (ref 11.5–14.5)
GLUCOSE SERPL-MCNC: 76 MG/DL (ref 74–99)
HCT VFR BLD AUTO: 47 % (ref 36–46)
HGB BLD-MCNC: 14.8 G/DL (ref 12–16)
LIPASE SERPL-CCNC: 9 U/L (ref 9–82)
MAGNESIUM SERPL-MCNC: 2.16 MG/DL (ref 1.6–2.4)
MCH RBC QN AUTO: 26.7 PG (ref 26–34)
MCHC RBC AUTO-ENTMCNC: 31.5 G/DL (ref 32–36)
MCV RBC AUTO: 85 FL (ref 80–100)
NRBC BLD-RTO: 0 /100 WBCS (ref 0–0)
PHOSPHATE SERPL-MCNC: 3.2 MG/DL (ref 2.5–4.9)
PLATELET # BLD AUTO: 199 X10*3/UL (ref 150–450)
POTASSIUM SERPL-SCNC: 4.8 MMOL/L (ref 3.5–5.3)
RBC # BLD AUTO: 5.54 X10*6/UL (ref 4–5.2)
SODIUM SERPL-SCNC: 137 MMOL/L (ref 136–145)
WBC # BLD AUTO: 5.9 X10*3/UL (ref 4.4–11.3)

## 2024-08-29 PROCEDURE — 2500000004 HC RX 250 GENERAL PHARMACY W/ HCPCS (ALT 636 FOR OP/ED): Performed by: NURSE PRACTITIONER

## 2024-08-29 PROCEDURE — 36415 COLL VENOUS BLD VENIPUNCTURE: CPT

## 2024-08-29 PROCEDURE — 36415 COLL VENOUS BLD VENIPUNCTURE: CPT | Performed by: INTERNAL MEDICINE

## 2024-08-29 PROCEDURE — 2550000001 HC RX 255 CONTRASTS: Performed by: PHYSICIAN ASSISTANT

## 2024-08-29 PROCEDURE — 2500000002 HC RX 250 W HCPCS SELF ADMINISTERED DRUGS (ALT 637 FOR MEDICARE OP, ALT 636 FOR OP/ED)

## 2024-08-29 PROCEDURE — 83735 ASSAY OF MAGNESIUM: CPT

## 2024-08-29 PROCEDURE — 85027 COMPLETE CBC AUTOMATED: CPT

## 2024-08-29 PROCEDURE — 2500000001 HC RX 250 WO HCPCS SELF ADMINISTERED DRUGS (ALT 637 FOR MEDICARE OP): Performed by: PHYSICIAN ASSISTANT

## 2024-08-29 PROCEDURE — 75574 CT ANGIO HRT W/3D IMAGE: CPT

## 2024-08-29 PROCEDURE — 99233 SBSQ HOSP IP/OBS HIGH 50: CPT | Performed by: INTERNAL MEDICINE

## 2024-08-29 PROCEDURE — 2500000001 HC RX 250 WO HCPCS SELF ADMINISTERED DRUGS (ALT 637 FOR MEDICARE OP)

## 2024-08-29 PROCEDURE — 75574 CT ANGIO HRT W/3D IMAGE: CPT | Performed by: INTERNAL MEDICINE

## 2024-08-29 PROCEDURE — 83690 ASSAY OF LIPASE: CPT | Performed by: INTERNAL MEDICINE

## 2024-08-29 PROCEDURE — 82150 ASSAY OF AMYLASE: CPT | Performed by: INTERNAL MEDICINE

## 2024-08-29 PROCEDURE — 1200000002 HC GENERAL ROOM WITH TELEMETRY DAILY

## 2024-08-29 PROCEDURE — 2500000001 HC RX 250 WO HCPCS SELF ADMINISTERED DRUGS (ALT 637 FOR MEDICARE OP): Performed by: STUDENT IN AN ORGANIZED HEALTH CARE EDUCATION/TRAINING PROGRAM

## 2024-08-29 PROCEDURE — 80069 RENAL FUNCTION PANEL: CPT

## 2024-08-29 PROCEDURE — 2500000004 HC RX 250 GENERAL PHARMACY W/ HCPCS (ALT 636 FOR OP/ED): Performed by: STUDENT IN AN ORGANIZED HEALTH CARE EDUCATION/TRAINING PROGRAM

## 2024-08-29 PROCEDURE — 2500000004 HC RX 250 GENERAL PHARMACY W/ HCPCS (ALT 636 FOR OP/ED): Performed by: INTERNAL MEDICINE

## 2024-08-29 RX ORDER — SPIRONOLACTONE 25 MG/1
12.5 TABLET ORAL DAILY
Status: DISCONTINUED | OUTPATIENT
Start: 2024-08-29 | End: 2024-09-04 | Stop reason: HOSPADM

## 2024-08-29 RX ORDER — METOPROLOL SUCCINATE 25 MG/1
12.5 TABLET, EXTENDED RELEASE ORAL DAILY
Status: DISCONTINUED | OUTPATIENT
Start: 2024-08-30 | End: 2024-08-29

## 2024-08-29 RX ORDER — HYDROMORPHONE HYDROCHLORIDE 1 MG/ML
0.2 INJECTION, SOLUTION INTRAMUSCULAR; INTRAVENOUS; SUBCUTANEOUS
Status: DISCONTINUED | OUTPATIENT
Start: 2024-08-29 | End: 2024-08-29

## 2024-08-29 RX ORDER — AMOXICILLIN 250 MG
1 CAPSULE ORAL NIGHTLY
Status: DISCONTINUED | OUTPATIENT
Start: 2024-08-29 | End: 2024-09-04 | Stop reason: HOSPADM

## 2024-08-29 RX ORDER — BISACODYL 10 MG/1
10 SUPPOSITORY RECTAL DAILY PRN
Status: DISCONTINUED | OUTPATIENT
Start: 2024-08-29 | End: 2024-08-30

## 2024-08-29 RX ORDER — SPIRONOLACTONE 25 MG/1
12.5 TABLET ORAL DAILY
Status: DISCONTINUED | OUTPATIENT
Start: 2024-08-30 | End: 2024-08-29

## 2024-08-29 RX ORDER — FUROSEMIDE 20 MG/1
40 TABLET ORAL DAILY
Status: DISCONTINUED | OUTPATIENT
Start: 2024-08-29 | End: 2024-09-01

## 2024-08-29 RX ORDER — METOPROLOL SUCCINATE 25 MG/1
12.5 TABLET, EXTENDED RELEASE ORAL DAILY
Status: DISCONTINUED | OUTPATIENT
Start: 2024-08-29 | End: 2024-09-04 | Stop reason: HOSPADM

## 2024-08-29 RX ORDER — FUROSEMIDE 20 MG/1
40 TABLET ORAL DAILY
Status: DISCONTINUED | OUTPATIENT
Start: 2024-08-29 | End: 2024-08-29

## 2024-08-29 RX ORDER — BISACODYL 10 MG/1
10 SUPPOSITORY RECTAL ONCE
Status: COMPLETED | OUTPATIENT
Start: 2024-08-29 | End: 2024-08-29

## 2024-08-29 RX ORDER — DAPAGLIFLOZIN 5 MG/1
5 TABLET, FILM COATED ORAL EVERY 24 HOURS
Status: DISCONTINUED | OUTPATIENT
Start: 2024-08-29 | End: 2024-08-31

## 2024-08-29 RX ADMIN — ENOXAPARIN SODIUM 30 MG: 100 INJECTION SUBCUTANEOUS at 12:21

## 2024-08-29 RX ADMIN — DAPAGLIFLOZIN 5 MG: 5 TABLET, FILM COATED ORAL at 12:20

## 2024-08-29 RX ADMIN — SACUBITRIL AND VALSARTAN 0.5 TABLET: 24; 26 TABLET, FILM COATED ORAL at 12:21

## 2024-08-29 RX ADMIN — SACUBITRIL AND VALSARTAN 0.5 TABLET: 24; 26 TABLET, FILM COATED ORAL at 21:11

## 2024-08-29 RX ADMIN — PANTOPRAZOLE SODIUM 40 MG: 40 TABLET, DELAYED RELEASE ORAL at 06:26

## 2024-08-29 RX ADMIN — IOHEXOL 60 ML: 350 INJECTION, SOLUTION INTRAVENOUS at 09:07

## 2024-08-29 RX ADMIN — Medication 1 TABLET: at 12:20

## 2024-08-29 RX ADMIN — METOPROLOL SUCCINATE 12.5 MG: 25 TABLET, EXTENDED RELEASE ORAL at 12:21

## 2024-08-29 RX ADMIN — BISACODYL 10 MG: 10 SUPPOSITORY RECTAL at 12:20

## 2024-08-29 RX ADMIN — SPIRONOLACTONE 12.5 MG: 25 TABLET, FILM COATED ORAL at 12:21

## 2024-08-29 RX ADMIN — HYDROMORPHONE HYDROCHLORIDE 0.2 MG: 1 INJECTION, SOLUTION INTRAMUSCULAR; INTRAVENOUS; SUBCUTANEOUS at 04:37

## 2024-08-29 RX ADMIN — FUROSEMIDE 40 MG: 20 TABLET ORAL at 12:20

## 2024-08-29 RX ADMIN — NITROGLYCERIN 0.8 MG: 0.4 TABLET SUBLINGUAL at 08:48

## 2024-08-29 RX ADMIN — SENNOSIDES AND DOCUSATE SODIUM 1 TABLET: 8.6; 5 TABLET ORAL at 21:11

## 2024-08-29 RX ADMIN — PREDNISONE 2.5 MG: 2.5 TABLET ORAL at 12:20

## 2024-08-29 RX ADMIN — ASPIRIN 81 MG: 81 TABLET, COATED ORAL at 12:20

## 2024-08-29 ASSESSMENT — COGNITIVE AND FUNCTIONAL STATUS - GENERAL
DRESSING REGULAR UPPER BODY CLOTHING: A LITTLE
MOVING FROM LYING ON BACK TO SITTING ON SIDE OF FLAT BED WITH BEDRAILS: A LITTLE
DAILY ACTIVITIY SCORE: 16
TOILETING: A LOT
CLIMB 3 TO 5 STEPS WITH RAILING: A LOT
DRESSING REGULAR UPPER BODY CLOTHING: A LITTLE
MOVING FROM LYING ON BACK TO SITTING ON SIDE OF FLAT BED WITH BEDRAILS: A LITTLE
PERSONAL GROOMING: A LITTLE
DRESSING REGULAR LOWER BODY CLOTHING: A LOT
STANDING UP FROM CHAIR USING ARMS: A LOT
CLIMB 3 TO 5 STEPS WITH RAILING: A LOT
TURNING FROM BACK TO SIDE WHILE IN FLAT BAD: A LITTLE
EATING MEALS: A LITTLE
MOBILITY SCORE: 15
TOILETING: A LOT
STANDING UP FROM CHAIR USING ARMS: A LOT
DRESSING REGULAR LOWER BODY CLOTHING: A LOT
WALKING IN HOSPITAL ROOM: A LITTLE
HELP NEEDED FOR BATHING: A LITTLE
MOBILITY SCORE: 15
HELP NEEDED FOR BATHING: A LITTLE
TURNING FROM BACK TO SIDE WHILE IN FLAT BAD: A LITTLE
WALKING IN HOSPITAL ROOM: A LITTLE
EATING MEALS: A LITTLE
DAILY ACTIVITIY SCORE: 16
MOVING TO AND FROM BED TO CHAIR: A LOT
PERSONAL GROOMING: A LITTLE
MOVING TO AND FROM BED TO CHAIR: A LOT

## 2024-08-29 ASSESSMENT — PAIN SCALES - GENERAL
PAINLEVEL_OUTOF10: 8
PAINLEVEL_OUTOF10: 6
PAINLEVEL_OUTOF10: 0 - NO PAIN
PAINLEVEL_OUTOF10: 8
PAINLEVEL_OUTOF10: 8
PAINLEVEL_OUTOF10: 0 - NO PAIN

## 2024-08-29 ASSESSMENT — PAIN - FUNCTIONAL ASSESSMENT
PAIN_FUNCTIONAL_ASSESSMENT: 0-10

## 2024-08-29 ASSESSMENT — PAIN DESCRIPTION - DESCRIPTORS
DESCRIPTORS: ACHING;BURNING;DISCOMFORT
DESCRIPTORS: ACHING;BURNING;DISCOMFORT;SHOOTING

## 2024-08-29 NOTE — PROGRESS NOTES
Interval events:    Overnight, patient reported abdominal pain with 3 liquid BM (last being at midnight), and 2 episodes of emesis. PRN IV Zofran given with little to no relief. CXR/KUB and C.diff PCR ordered. Also mildly hypotensive at 98/61, held evening Entresto and IV lasix.    Subjective:  This morning, patient reports improvement in abdominal pain, reporting soreness with palpitation. No long requiring O2, successfully weaned to RA.    Today in brief:  - CTA: minor CAD, prox LAD has focal calcified plaque with minimal stenosis (<25%), LM, LCx and RCA have no significant atherosclerotic change or stenotic disease. Calcium score 17  - CXR: small L and moderate R pleural effusion  - mildly hypotensive overnight, held evening Entresto  - will discontinue IV lasix and transition to PO Lasix 40mg daily  - halved GDMT doses: Entresto 12/13 BID, metop succ 12.5 mg and spironolactone 12.5 mg   - KUB revealing moderate colonic burden  - low suspicion for C.diff, diarrhea likely in the setting of constipation -> will escalate bowel regimen with suppository and discontinue C.diff precautions   - PT rec SNF, list provided to patient, pending selection. Will require precert    Objective:  Vitals:    08/29/24 1218   BP: 117/61   Pulse: 62   Resp: 17   Temp: 36.2 °C (97.2 °F)   SpO2: 93%     Weight         8/24/2024  0330 8/26/2024  0500 8/27/2024  0425 8/28/2024  0506 8/29/2024  0412    Weight: 73.3 kg (161 lb 9.6 oz) 67.7 kg (149 lb 4 oz) 66.9 kg (147 lb 8 oz) 59.7 kg (131 lb 11.2 oz) 54.9 kg (121 lb 0.5 oz)            Intake/Output Summary (Last 24 hours) at 8/29/2024 1507  Last data filed at 8/29/2024 0600  Gross per 24 hour   Intake --   Output 1175 ml   Net -1175 ml     Recent Results (from the past 24 hour(s))   CBC    Collection Time: 08/28/24  7:02 PM   Result Value Ref Range    WBC 5.6 4.4 - 11.3 x10*3/uL    nRBC 0.0 0.0 - 0.0 /100 WBCs    RBC 5.31 (H) 4.00 - 5.20 x10*6/uL    Hemoglobin 14.1 12.0 - 16.0 g/dL     Hematocrit 45.2 36.0 - 46.0 %    MCV 85 80 - 100 fL    MCH 26.6 26.0 - 34.0 pg    MCHC 31.2 (L) 32.0 - 36.0 g/dL    RDW 18.1 (H) 11.5 - 14.5 %    Platelets 211 150 - 450 x10*3/uL   Renal Function Panel    Collection Time: 08/28/24  7:02 PM   Result Value Ref Range    Glucose 77 74 - 99 mg/dL    Sodium 136 136 - 145 mmol/L    Potassium 4.3 3.5 - 5.3 mmol/L    Chloride 96 (L) 98 - 107 mmol/L    Bicarbonate 28 21 - 32 mmol/L    Anion Gap 16 10 - 20 mmol/L    Urea Nitrogen 31 (H) 6 - 23 mg/dL    Creatinine 1.65 (H) 0.50 - 1.05 mg/dL    eGFR 30 (L) >60 mL/min/1.73m*2    Calcium 9.2 8.6 - 10.6 mg/dL    Phosphorus 2.7 2.5 - 4.9 mg/dL    Albumin 2.9 (L) 3.4 - 5.0 g/dL   Magnesium    Collection Time: 08/28/24  7:02 PM   Result Value Ref Range    Magnesium 2.19 1.60 - 2.40 mg/dL   Lipase    Collection Time: 08/29/24  9:54 AM   Result Value Ref Range    Lipase 9 9 - 82 U/L   Amylase    Collection Time: 08/29/24  9:54 AM   Result Value Ref Range    Amylase 63 29 - 103 U/L     Inpatient Medications:  Scheduled medications   Medication Dose Route Frequency    aspirin  81 mg oral Daily    calcium carbonate-vitamin D3  1 tablet oral Daily    dapagliflozin propanediol  5 mg oral q24h    enoxaparin  30 mg subcutaneous q24h    ergocalciferol  1,250 mcg oral Every Sunday    furosemide  40 mg oral Daily    metoprolol succinate XL  12.5 mg oral Daily    metoprolol  5 mg intravenous Once    pantoprazole  40 mg oral Daily before breakfast    predniSONE  2.5 mg oral Daily    rosuvastatin  20 mg oral Nightly    sacubitriL-valsartan  0.5 tablet oral BID    spironolactone  12.5 mg oral Daily     PRN medications   Medication    acetaminophen    Or    acetaminophen    Or    acetaminophen    benzonatate    ipratropium-albuteroL    LORazepam    metoprolol    metoprolol    metoprolol    metoprolol    ondansetron    Or    ondansetron     Continuous Medications   Medication Dose Last Rate       Telemetry 8/29/2024 (personally reviewed): Kamala  60-70s    Physical exam:  General: NAD  Head/ neck: JVD above level of clavicle at 90 degrees  Cardiac: RRR, regular S1 S2 , no murmur, no rub, no gallop  Pulm: diminished RLL on RA  Vascular: Radial 2+ bilaterally  GI: Non distended, slightly firm, mild discomfort with palpation  Extremities: trace LE edema, wrinkling of skin  Neuro: no focal neuro deficits   Psych: appropriate mood and behavior   Skin: warm and dry     Assessment/Plan   Erin Lopez is a 88 y.o. female with PMH of NSTEMI (nonobstructive CAD 2017), HFpEF, LBBB and SSS s/p PPM 2017 s/p device changeout 11/2022, HTN, CKD presenting with CHF exacerbation and new HFrEF/newly diagnosed TTR cardiac amyloidosis     Acute systolic and diastolic heart failure (HFrEF 30-35%)  TTR Amyloidosis  - 2/2 ischemia vs infiltration vs pacemaker mediated   - TTE 10/2023: EF 50-55%, moderately increased LV septal and posterior wall thickness, Moderately enlarged V, mild to mod AR and MR, mod TR, moderate LAE, Compared to 2017 TTE there is possible underlying infiltrative disease  - Admit TTE 8/24: EF 30-35%, gHK of LV,  elevated LVEDP, abnormal septal motion c/w V pacemaker, moderately increased LV septal thickness. Lv posterior wall moderately thickened. Severe cLVH. Moderately enlarged RV, mild LAE, RA severely dilated, mild to mod MR, severe TR, Slightly elevated RVSP. Mild to mod AR.  - New TTR amyloidosis seen on 8/25 NM PYP scan-- K/L ration WNL, SPEP, UPEP negative  - CTA coronary 8/29: minor CAD, prox LAD has focal calcified plaque with minimal stenosis (<25%), LM, LCx and RCA have no significant atherosclerotic change or stenotic disease. Calcium score 17  - CXR 8/28: small L and moderate R pleural effusion  - BNP 2,626  - Admit wt: 73.3 kg  - Today's wt: 54.9 kg  - on admission severe anasarca to upper abdomen/UE and JVD up to mandible with RLE wound weeping, s/p IV Lasix boluses with intermittent IV Diamox  - now near euvolemic, net negative -19.5L since  admission  - will discontinue IV lasix and transition to PO Lasix 40mg daily  - Advanced HF team consulted: Recommend initiating Tafamidis (amyloid coordinator informed) and titrating GDMT as tolerated  - GDMT : will add cautiously in light of amyloidosis  -- hypotension overnight  -> halved GDMT doses: Entresto 12/13 BID, metop succ 12.5 mg and spironolactone 12.5 mg   - cont dapagliflozin 5mg  - Daily standing weights, strict I&O's, 2g sodium diet, 2L fluid restriction    Troponinemia  - HS trop: 235 -> 189. Suspected 2/2 demand from CHF, although cannot rule out true ischemic component  - Treated with ~48hs of heparin gtt.   - Cont ASA  - Intensified atorva 10 to rosuva 20 given hx of CAD  - coronary CTA above     Hx of SSS s/p PPM  , s/p device changeout 2022  - EC% AV pacing  - SR with  on telemetry  - Concern for possible pacemaker mediated cardiomyopathy given high pacing burden on device interrogation  - EP consulted: AV delayed extended but no intrinsic conduction noted. Recommended optimizing GDMT prior to considering upgrade to CRT since she would be high risk.     HTN  - Admit /70  - Last 24hrs: SBP 95-110s  - discontinued home amlodipine 10mg in anticipation for GDMT optimization     Arthritis  - Pt previously getting steroid injections q3 month then put on systemic steroids  - Cont prednisone 2.5 daily   - Was previously listed as on plaquenil for unclear reason but pt states this was stopped  - cont home PPI while on steroids    Constipation  - Overnight , patient reported abdominal pain with 3 liquid BM (last being at midnight), and 2 episodes of emesis. PRN IV Zofran given with little to no relief. CXR/KUB and C.diff PCR ordered by fellow  - KUB revealing moderate colonic burden  - low suspicion for C.diff, diarrhea likely in the setting of constipation -> will escalate bowel regimen with suppository and discontinue C.diff precautions  - following suppository, patient  had medium soft stool  - cont suppositories PRN and nightly Nelda-Colace      RLE wound  - weeping RLE wound iso hypervolemia  - wound care consulted, recommendations ordered       DVT ppx: Lovenox  DISPO: pending further HFrEF evaluation/management  - PT rec SNF, list provided to patient, pending selection. Will require precert  Code status: Full Code    Patient seen and discussed with Dr. Oscar Woo PA-C

## 2024-08-29 NOTE — NURSING NOTE
Patient with abdominal pain, 3 liquid BM since 1900 and 2 episodes of emesis. PRN IV zofran administered with little to no relief. MD Irene Torres notified and ordered KUB, labs and cdiff to collect. Patient placed in contact+ isolation for r/o cdiff

## 2024-08-29 NOTE — CARE PLAN
The patient's goals for the shift include  be free from pain   Problem: Fall/Injury  Goal: Not fall by end of shift  Outcome: Progressing  Goal: Be free from injury by end of the shift  Outcome: Progressing  Goal: Verbalize understanding of personal risk factors for fall in the hospital  Outcome: Progressing  Goal: Verbalize understanding of risk factor reduction measures to prevent injury from fall in the home  Outcome: Progressing  Goal: Use assistive devices by end of the shift  Outcome: Progressing  Goal: Pace activities to prevent fatigue by end of the shift  Outcome: Progressing     Problem: Pain - Adult  Goal: Verbalizes/displays adequate comfort level or baseline comfort level  Outcome: Progressing     Problem: Safety - Adult  Goal: Free from fall injury  Outcome: Progressing     Problem: Discharge Planning  Goal: Discharge to home or other facility with appropriate resources  Outcome: Progressing     Problem: Chronic Conditions and Co-morbidities  Goal: Patient's chronic conditions and co-morbidity symptoms are monitored and maintained or improved  Outcome: Progressing     Problem: Skin  Goal: Participates in plan/prevention/treatment measures  Outcome: Progressing  Goal: Prevent/manage excess moisture  Outcome: Progressing  Goal: Prevent/minimize sheer/friction injuries  Outcome: Progressing  Goal: Promote/optimize nutrition  Outcome: Progressing       The clinical goals for the shift include pt will remain HMDS throughout shift

## 2024-08-29 NOTE — CARE PLAN
The patient's goals for the shift include      The clinical goals for the shift include Patient will remain HDS throughout the shift      Problem: Fall/Injury  Goal: Not fall by end of shift  Outcome: Progressing  Goal: Be free from injury by end of the shift  Outcome: Progressing  Goal: Verbalize understanding of personal risk factors for fall in the hospital  Outcome: Progressing  Goal: Verbalize understanding of risk factor reduction measures to prevent injury from fall in the home  Outcome: Progressing  Goal: Use assistive devices by end of the shift  Outcome: Progressing  Goal: Pace activities to prevent fatigue by end of the shift  Outcome: Progressing     Problem: Pain - Adult  Goal: Verbalizes/displays adequate comfort level or baseline comfort level  Outcome: Progressing     Problem: Safety - Adult  Goal: Free from fall injury  Outcome: Progressing     Problem: Discharge Planning  Goal: Discharge to home or other facility with appropriate resources  Outcome: Progressing     Problem: Chronic Conditions and Co-morbidities  Goal: Patient's chronic conditions and co-morbidity symptoms are monitored and maintained or improved  Outcome: Progressing     Problem: Skin  Goal: Participates in plan/prevention/treatment measures  Outcome: Progressing  Goal: Prevent/manage excess moisture  Outcome: Progressing  Goal: Prevent/minimize sheer/friction injuries  Outcome: Progressing  Goal: Promote/optimize nutrition  Outcome: Progressing

## 2024-08-29 NOTE — PROGRESS NOTES
8/29/2024  Erin Lopez is a 88 y.o. female on day 5 of admission presenting with Acute systolic heart failure (Multi).  PT/OT recs Mod.    Discussed with pt.  Somewhat agreeable to SNF.  Stated  her daughter lives with, and will discuss and review the list with her today.    8/30/2024 Care coordination  Follow up with pt regarding SNF list.  Daughter present at bedside .  Discussed dispo  and difference between HHC and SNF.  Still wanting to review list with family members.  Team aware .

## 2024-08-29 NOTE — PROGRESS NOTES
"[facility] Attending Shared Visit Note    This is a shared visit. Please see Advanced Practice Provider's encounter note for additional details.        Overnight events/Subjective: CCTA with only minimal non obstructive CAD    Exam:   Physical exam: Well appearing, no distress. Normal rate, regular rhythm, non labored breathing, clear to auscultation, abdomen non distended, trace LE edema, \"woody\" appearing legs, trace bilateral upper extremity swelling. no focal neuro deficits.     A/P:   88 F with SSS s/p PPM, HTN, CKD and HFpEF who presented with decompensated HF and was found to have a newly reduced EF (30-35%) and PYP suggestion of TTR amyloid.  #New HFrEF + TTR amyloidosis: Despite high pacing burden, she does not have intrinsic conduction to allow change in pacing settings to minimize pacing. Low dose ARNI, SGLT2, spironolactone and metoprolol as her BP is around 100/60. Low threshold to cut back. Tafamidis as an outpatient. Start lasix 40 mg PO for mild volume overload    Dispo: as early as 8/30 if SNF bed available.     Oscar Marsh MD    ________________________________________________________________________________  Problems:   Principal Problem:    Acute systolic heart failure (Multi)  Active Problems:    Sick sinus syndrome (Multi)    NSTEMI (non-ST elevated myocardial infarction) (Multi)    Cardiomyopathy (Multi)    Pacemaker    TERI (acute kidney injury) (CMS-[facility])    Wild-type transthyretin-related (ATTR) amyloidosis (Multi)      Objective   Admit Date: 8/24/2024  Hospital Length of Stay: 5   Home: [facility] [id number]    Vitals:      8/29/2024    12:18 PM 8/29/2024     9:02 AM 8/29/2024     8:57 AM 8/29/2024     8:52 AM 8/29/2024     8:47 AM 8/29/2024     4:12 AM 8/29/2024    12:49 AM   Vitals   Systolic 117 102 95 99 109 111 118   Diastolic 61 56 56 62 70 69 61   Heart Rate 62 60 60 60 60 62 64   Temp 36.2 °C (97.2 °F)     36.1 °C (97 °F) 36.1 °C (97 °F)   Resp 17     18 18   Weight (lb)      121.03  "   BMI      21.44 kg/m2    BSA (m2)      1.56 m2      Wt Readings from Last 5 Encounters:   08/29/24 54.9 kg (121 lb 0.5 oz)       Intake/Output Summary (Last 24 hours) at 8/29/2024 1251  Last data filed at 8/29/2024 0600  Gross per 24 hour   Intake 300 ml   Output 2375 ml   Net -2075 ml         MEDICATIONS  Infusions:       Scheduled:  aspirin, 81 mg, Daily  calcium carbonate-vitamin D3, 1 tablet, Daily  dapagliflozin propanediol, 5 mg, q24h  enoxaparin, 30 mg, q24h  ergocalciferol, 1,250 mcg, Every Sunday  furosemide, 40 mg, Daily  metoprolol succinate XL, 12.5 mg, Daily  metoprolol, 5 mg, Once  pantoprazole, 40 mg, Daily before breakfast  predniSONE, 2.5 mg, Daily  rosuvastatin, 20 mg, Nightly  sacubitriL-valsartan, 0.5 tablet, BID  spironolactone, 12.5 mg, Daily      PRN:  acetaminophen, 650 mg, q6h PRN   Or  acetaminophen, 650 mg, q6h PRN   Or  acetaminophen, 650 mg, q6h PRN  benzonatate, 100 mg, TID PRN  ipratropium-albuteroL, 3 mL, q6h PRN  LORazepam, 0.5 mg, q5 min PRN  metoprolol, 5 mg, Once PRN  metoprolol, 5 mg, Once PRN  metoprolol, 5 mg, Once PRN  metoprolol, 5 mg, Once PRN  ondansetron, 4 mg, q8h PRN   Or  ondansetron, 4 mg, q8h PRN      Prior to Admission Meds:  Medications Prior to Admission   Medication Sig Dispense Refill Last Dose    amLODIPine (Norvasc) 10 mg tablet Take 1 tablet (10 mg) by mouth once daily.   Past Week    aspirin 81 mg EC tablet Take 1 tablet (81 mg) by mouth once daily.   Past Week    atorvastatin (Lipitor) 10 mg tablet Take 1 tablet (10 mg) by mouth once daily.   Past Week    calcium carbonate-vitamin D3 (Calcium 600 + D,3,) 600 mg-10 mcg (400 unit) tablet Take 1 tablet by mouth once daily.   Past Week    ergocalciferol (Vitamin D-2) 50 MCG (2000 UT) capsule capsule Take 1 capsule (50 mcg) by mouth 1 (one) time per week.   Past Month    furosemide (Lasix) 20 mg tablet Take 1 tablet (20 mg) by mouth 2 times a day.   Past Week    metoprolol tartrate (Lopressor) 50 mg tablet Take  "1 tablet by mouth once daily.   Past Week    torsemide (Demadex) 20 mg tablet Take 1 tablet (20 mg) by mouth once daily.   Past Week    traMADol (Ultram) 50 mg tablet Take 1 tablet (50 mg) by mouth every 8 hours if needed.   Past Week    nitroglycerin (Nitrostat) 0.4 mg SL tablet Place 1 tablet (0.4 mg) under the tongue every 5 minutes if needed for chest pain (for up to 3 doses.Call 911 is pain persists).   More than a month       DATA:  CMP:  Recent Labs     08/28/24 1902 08/27/24 1821 08/26/24 1827 08/25/24 1932 08/24/24 2238 08/23/24 2235    138 141 142 141 143   K 4.3 5.0 3.6 4.1 4.4 4.0   CL 96* 101 103 105 107 108*   CO2 28 21 27 27 25 22   ANIONGAP 16 21* 15 14 13 17   BUN 31* 29* 28* 29* 29* 29*   CREATININE 1.65* 1.40* 1.51* 1.55* 1.56* 1.61*   EGFR 30* 36* 33* 32* 32* 31*   MG 2.19 2.26 2.10 2.03 2.24  --      Recent Labs     08/29/24  0954 08/28/24 1902 08/27/24 1821 08/26/24 1827 08/25/24 1932 08/24/24 2238 08/23/24 2235   ALBUMIN  --  2.9* 2.8* 3.2* 3.0* 3.1* 3.5   ALT  --   --   --   --   --   --  12   AST  --   --   --   --   --   --  18   BILITOT  --   --   --   --   --   --  0.7   LIPASE 9  --   --   --   --   --   --      CBC:  Recent Labs     08/28/24 1902 08/27/24 1821 08/26/24 1827 08/26/24 0940 08/25/24 1932 08/24/24 2238 08/23/24 2235   WBC 5.6 5.7 6.2 5.4 5.4 4.4 4.3*   HGB 14.1 14.1 14.3 13.0 13.3 13.0 14.2   HCT 45.2 44.8 43.5 40.0 42.8 40.8 41.8    213 214 213 216 196 214   MCV 85 84 82 82 84 84 80     COAG:   Recent Labs     08/26/24  0940 08/25/24  1932 08/24/24  1305 08/24/24  0645 08/24/24  0001   INR  --   --   --   --  1.1   HAUF 0.5 0.5 0.6 0.6  --      ABO: No results for input(s): \"ABO\" in the last 35319 hours.  HEME/ENDO: No results for input(s): \"FERRITIN\", \"IRONSAT\", \"TSH\", \"FREET4\", \"HGBA1C\" in the last 15073 hours.  CARDIAC:   Recent Labs     08/23/24  2341 08/23/24  2235   TROPHS 189* 235*   BNP  --  2,626*     No results for input(s): " "\"CHOL\", \"LDLF\", \"LDLCALC\", \"HDL\", \"TRIG\" in the last 71819 hours.  TOX:No results for input(s): \"AMPHETAMINE\", \"BENZO\", \"CANNABINOID\", \"COCAI\", \"FENTANYL\", \"OPIATE\", \"OXYCODONE\", \"PCP\" in the last 15824 hours.    No lab exists for component: \"BARBSCRUR\"  MICRO: No results for input(s): \"ESR\", \"CRP\", \"PROCAL\" in the last 55426 hours.  No results found for the last 90 days.      FOLLOWUP:   Future Appointments   Date Time Provider Department Center   9/17/2024  3:00 PM Lit Goss MD [id number]             "

## 2024-08-30 LAB
ALBUMIN SERPL BCP-MCNC: 2.9 G/DL (ref 3.4–5)
ANION GAP SERPL CALC-SCNC: 24 MMOL/L (ref 10–20)
BUN SERPL-MCNC: 36 MG/DL (ref 6–23)
CALCIUM SERPL-MCNC: 9.2 MG/DL (ref 8.6–10.6)
CHLORIDE SERPL-SCNC: 98 MMOL/L (ref 98–107)
CO2 SERPL-SCNC: 18 MMOL/L (ref 21–32)
CREAT SERPL-MCNC: 1.6 MG/DL (ref 0.5–1.05)
EGFRCR SERPLBLD CKD-EPI 2021: 31 ML/MIN/1.73M*2
ERYTHROCYTE [DISTWIDTH] IN BLOOD BY AUTOMATED COUNT: 18.7 % (ref 11.5–14.5)
GLUCOSE SERPL-MCNC: 79 MG/DL (ref 74–99)
HCT VFR BLD AUTO: 51.2 % (ref 36–46)
HGB BLD-MCNC: 16 G/DL (ref 12–16)
MAGNESIUM SERPL-MCNC: 2.37 MG/DL (ref 1.6–2.4)
MCH RBC QN AUTO: 27 PG (ref 26–34)
MCHC RBC AUTO-ENTMCNC: 31.3 G/DL (ref 32–36)
MCV RBC AUTO: 86 FL (ref 80–100)
NRBC BLD-RTO: 0 /100 WBCS (ref 0–0)
PHOSPHATE SERPL-MCNC: 3.6 MG/DL (ref 2.5–4.9)
PLATELET # BLD AUTO: 184 X10*3/UL (ref 150–450)
POTASSIUM SERPL-SCNC: 4.6 MMOL/L (ref 3.5–5.3)
RBC # BLD AUTO: 5.93 X10*6/UL (ref 4–5.2)
SODIUM SERPL-SCNC: 135 MMOL/L (ref 136–145)
WBC # BLD AUTO: 7.1 X10*3/UL (ref 4.4–11.3)

## 2024-08-30 PROCEDURE — 83735 ASSAY OF MAGNESIUM: CPT

## 2024-08-30 PROCEDURE — 85027 COMPLETE CBC AUTOMATED: CPT

## 2024-08-30 PROCEDURE — 2500000002 HC RX 250 W HCPCS SELF ADMINISTERED DRUGS (ALT 637 FOR MEDICARE OP, ALT 636 FOR OP/ED)

## 2024-08-30 PROCEDURE — 2500000004 HC RX 250 GENERAL PHARMACY W/ HCPCS (ALT 636 FOR OP/ED): Performed by: STUDENT IN AN ORGANIZED HEALTH CARE EDUCATION/TRAINING PROGRAM

## 2024-08-30 PROCEDURE — 1200000002 HC GENERAL ROOM WITH TELEMETRY DAILY

## 2024-08-30 PROCEDURE — 2500000002 HC RX 250 W HCPCS SELF ADMINISTERED DRUGS (ALT 637 FOR MEDICARE OP, ALT 636 FOR OP/ED): Performed by: STUDENT IN AN ORGANIZED HEALTH CARE EDUCATION/TRAINING PROGRAM

## 2024-08-30 PROCEDURE — 80069 RENAL FUNCTION PANEL: CPT

## 2024-08-30 PROCEDURE — 36415 COLL VENOUS BLD VENIPUNCTURE: CPT

## 2024-08-30 PROCEDURE — 2500000004 HC RX 250 GENERAL PHARMACY W/ HCPCS (ALT 636 FOR OP/ED): Performed by: NURSE PRACTITIONER

## 2024-08-30 PROCEDURE — 97530 THERAPEUTIC ACTIVITIES: CPT | Mod: GP | Performed by: PHYSICAL THERAPIST

## 2024-08-30 PROCEDURE — 2500000001 HC RX 250 WO HCPCS SELF ADMINISTERED DRUGS (ALT 637 FOR MEDICARE OP): Performed by: STUDENT IN AN ORGANIZED HEALTH CARE EDUCATION/TRAINING PROGRAM

## 2024-08-30 PROCEDURE — 99233 SBSQ HOSP IP/OBS HIGH 50: CPT | Performed by: INTERNAL MEDICINE

## 2024-08-30 PROCEDURE — 2500000001 HC RX 250 WO HCPCS SELF ADMINISTERED DRUGS (ALT 637 FOR MEDICARE OP)

## 2024-08-30 PROCEDURE — 97530 THERAPEUTIC ACTIVITIES: CPT | Mod: GO

## 2024-08-30 PROCEDURE — 2500000004 HC RX 250 GENERAL PHARMACY W/ HCPCS (ALT 636 FOR OP/ED)

## 2024-08-30 PROCEDURE — 97110 THERAPEUTIC EXERCISES: CPT | Mod: GP | Performed by: PHYSICAL THERAPIST

## 2024-08-30 RX ORDER — BISACODYL 5 MG
10 TABLET, DELAYED RELEASE (ENTERIC COATED) ORAL ONCE
Status: COMPLETED | OUTPATIENT
Start: 2024-08-30 | End: 2024-08-30

## 2024-08-30 RX ORDER — POLYETHYLENE GLYCOL 3350 17 G/17G
17 POWDER, FOR SOLUTION ORAL DAILY
Status: DISCONTINUED | OUTPATIENT
Start: 2024-08-30 | End: 2024-09-04 | Stop reason: HOSPADM

## 2024-08-30 RX ORDER — ADHESIVE BANDAGE
30 BANDAGE TOPICAL DAILY PRN
Status: DISCONTINUED | OUTPATIENT
Start: 2024-08-30 | End: 2024-09-04 | Stop reason: HOSPADM

## 2024-08-30 RX ADMIN — SACUBITRIL AND VALSARTAN 0.5 TABLET: 24; 26 TABLET, FILM COATED ORAL at 10:13

## 2024-08-30 RX ADMIN — FUROSEMIDE 40 MG: 20 TABLET ORAL at 10:12

## 2024-08-30 RX ADMIN — SENNOSIDES AND DOCUSATE SODIUM 1 TABLET: 8.6; 5 TABLET ORAL at 20:31

## 2024-08-30 RX ADMIN — ENOXAPARIN SODIUM 30 MG: 100 INJECTION SUBCUTANEOUS at 10:24

## 2024-08-30 RX ADMIN — POLYETHYLENE GLYCOL 3350 17 G: 17 POWDER, FOR SOLUTION ORAL at 13:15

## 2024-08-30 RX ADMIN — ROSUVASTATIN CALCIUM 20 MG: 20 TABLET, FILM COATED ORAL at 20:31

## 2024-08-30 RX ADMIN — SPIRONOLACTONE 12.5 MG: 25 TABLET, FILM COATED ORAL at 10:13

## 2024-08-30 RX ADMIN — PANTOPRAZOLE SODIUM 40 MG: 40 TABLET, DELAYED RELEASE ORAL at 06:10

## 2024-08-30 RX ADMIN — DAPAGLIFLOZIN 5 MG: 5 TABLET, FILM COATED ORAL at 11:56

## 2024-08-30 RX ADMIN — BISACODYL 10 MG: 5 TABLET, COATED ORAL at 11:56

## 2024-08-30 RX ADMIN — SACUBITRIL AND VALSARTAN 0.5 TABLET: 24; 26 TABLET, FILM COATED ORAL at 20:31

## 2024-08-30 RX ADMIN — PREDNISONE 2.5 MG: 2.5 TABLET ORAL at 10:12

## 2024-08-30 RX ADMIN — ASPIRIN 81 MG: 81 TABLET, COATED ORAL at 10:25

## 2024-08-30 RX ADMIN — ACETAMINOPHEN 650 MG: 325 TABLET ORAL at 20:31

## 2024-08-30 RX ADMIN — METOPROLOL SUCCINATE 12.5 MG: 25 TABLET, EXTENDED RELEASE ORAL at 10:22

## 2024-08-30 ASSESSMENT — COGNITIVE AND FUNCTIONAL STATUS - GENERAL
TURNING FROM BACK TO SIDE WHILE IN FLAT BAD: A LOT
PERSONAL GROOMING: A LITTLE
CLIMB 3 TO 5 STEPS WITH RAILING: TOTAL
MOBILITY SCORE: 9
DRESSING REGULAR UPPER BODY CLOTHING: A LOT
EATING MEALS: A LITTLE
PERSONAL GROOMING: A LITTLE
TOILETING: TOTAL
DRESSING REGULAR LOWER BODY CLOTHING: A LOT
TURNING FROM BACK TO SIDE WHILE IN FLAT BAD: A LOT
TOILETING: A LOT
HELP NEEDED FOR BATHING: A LOT
MOVING TO AND FROM BED TO CHAIR: TOTAL
CLIMB 3 TO 5 STEPS WITH RAILING: TOTAL
HELP NEEDED FOR BATHING: A LOT
WALKING IN HOSPITAL ROOM: TOTAL
EATING MEALS: A LITTLE
WALKING IN HOSPITAL ROOM: TOTAL
MOVING FROM LYING ON BACK TO SITTING ON SIDE OF FLAT BED WITH BEDRAILS: A LITTLE
DRESSING REGULAR UPPER BODY CLOTHING: A LITTLE
DAILY ACTIVITIY SCORE: 12
EATING MEALS: A LITTLE
DRESSING REGULAR LOWER BODY CLOTHING: A LOT
MOVING TO AND FROM BED TO CHAIR: A LOT
MOVING TO AND FROM BED TO CHAIR: TOTAL
STANDING UP FROM CHAIR USING ARMS: TOTAL
CLIMB 3 TO 5 STEPS WITH RAILING: TOTAL
DRESSING REGULAR UPPER BODY CLOTHING: A LOT
MOVING FROM LYING ON BACK TO SITTING ON SIDE OF FLAT BED WITH BEDRAILS: A LITTLE
DAILY ACTIVITIY SCORE: 14
MOBILITY SCORE: 9
MOBILITY SCORE: 10
DAILY ACTIVITIY SCORE: 14
PERSONAL GROOMING: A LOT
DRESSING REGULAR LOWER BODY CLOTHING: A LOT
MOVING FROM LYING ON BACK TO SITTING ON SIDE OF FLAT BED WITH BEDRAILS: A LOT
TOILETING: TOTAL
STANDING UP FROM CHAIR USING ARMS: TOTAL
STANDING UP FROM CHAIR USING ARMS: TOTAL
WALKING IN HOSPITAL ROOM: A LOT
TURNING FROM BACK TO SIDE WHILE IN FLAT BAD: A LOT
HELP NEEDED FOR BATHING: A LOT

## 2024-08-30 ASSESSMENT — PAIN - FUNCTIONAL ASSESSMENT
PAIN_FUNCTIONAL_ASSESSMENT: 0-10

## 2024-08-30 ASSESSMENT — PAIN SCALES - GENERAL
PAINLEVEL_OUTOF10: 0 - NO PAIN
PAINLEVEL_OUTOF10: 0 - NO PAIN
PAINLEVEL_OUTOF10: 7
PAINLEVEL_OUTOF10: 0 - NO PAIN

## 2024-08-30 ASSESSMENT — PAIN DESCRIPTION - DESCRIPTORS: DESCRIPTORS: ACHING

## 2024-08-30 NOTE — PROGRESS NOTES
Occupational Therapy        Occupational Therapy Treatment    Name: Erin Lopez  MRN: [id number]  : 1935  Date: 24  Room: [address] [place], [place] [address]      Time Calculation  Start Time: 1125  Stop Time: 1148  Time Calculation (min): 23 min    Assessment:  End of Session Communication: Bedside nurse  End of Session Patient Position: Bed, 3 rail up, Alarm on  Plan:  Treatment Interventions: ADL retraining, Functional transfer training, UE strengthening/ROM, Endurance training, Patient/family training, Equipment evaluation/education, Fine motor coordination activities, Compensatory technique education  OT Frequency: 3 times per week  OT Discharge Recommendations: Moderate intensity level of continued care  Equipment Recommended upon Discharge: Wheeled walker  OT Recommended Transfer Status: Assist of 2  OT - OK to Discharge: Yes    Subjective   General:  OT Last Visit  OT Received On: 24  Reason for Referral: presented with decompensated HF and was found to have a newly reduced EF (30-35%).  Prior to Session Communication: Bedside nurse  Patient Position Received: Bed, 3 rail up  Family/Caregiver Present: Yes  Caregiver Feedback: daughter present  General Comment: pt in supine on arrival, willing to participate in OT, declined ADLs, session focuesed on sitting balance/tolerance and functional transfers   Precautions:  Medical Precautions: Fall precautions, Cardiac precautions  Vitals:  Vital Signs (Past 2hrs)        Date/Time Vitals Session Patient Position Pulse Resp SpO2 BP MAP (mmHg)    24 1125 --  --  70  --  --  --  --     24 11:25:23 --  --  62  19  97 %  119/68  85                   Lines/Tubes/Drains:  External Urinary Catheter (Active)   Number of days: 6       Cognition:  Orientation Level: Disoriented to time (reports month as September)    Pain Assessment:  Pain Assessment  Pain Assessment: 0-10  0-10 (Numeric) Pain Score: 0 - No pain     Objective   Activities of Daily Living:         Bed  Mobility/Transfers:   Bed Mobility  Bed Mobility: Yes  Bed Mobility 1  Bed Mobility 1: Supine to sitting  Level of Assistance 1: Moderate assistance  Bed Mobility Comments 1: HOB up, use of bed rails  Bed Mobility 2  Bed Mobility  2: Sitting to supine  Level of Assistance 2: Moderate assistance  Bed Mobility Comments 2: required assistance to mobilize LEs back into bed    Transfers  Transfer: Yes  Transfer 1  Transfer From 1: Sit to, Stand to  Transfer to 1: Stand, Sit  Technique 1: Sit to stand, Stand to sit  Transfer Device 1: Walker  Transfer Level of Assistance 1: Maximum assistance, Maximum verbal cues         Balance:  Static Sitting Balance  Static Sitting-Balance Support: Feet supported, Bilateral upper extremity supported  Static Sitting-Level of Assistance: Contact guard  Static Sitting-Comment/Number of Minutes: Pt initially required Min A, leaning posteriorly, progressed to CGA  Static Standing Balance  Static Standing-Balance Support: Bilateral upper extremity supported  Static Standing-Level of Assistance: Maximum assistance  Static Standing-Comment/Number of Minutes: using WW, leaniing posteriorly  Communication:     Splinting:     Therapy/Activity:   Therapeutic Exercise  Therapeutic Exercise Performed: Yes  Therapeutic Exercise Activity 1: pt completed 5 reps of B shld elevation/reaching outside of [place], required one rest break        Outcome Measures:  [facility] Daily Activity  Putting on and taking off regular lower body clothing: A lot  Bathing (including washing, rinsing, drying): A lot  Putting on and taking off regular upper body clothing: A lot  Toileting, which includes using toilet, bedpan or urinal: A lot  Taking care of personal grooming such as brushing teeth: A little  Eating Meals: A little  Daily Activity - Total Score: 14     Education Documentation  No documentation found.  Education Comments  No comments found.        Goals:  Encounter Problems       Encounter Problems (Active)        ADLs       Patient will perform UB and LB bathing with modified independent level of assistance and shower chair and long-handled sponge. (Progressing)       Start:  08/27/24    Expected End:  09/10/24            Patient will complete upper body dressing with stand by assist level of assistance donning and doffing all UE clothes with PRN adaptive equipment while edge of bed  (Progressing)       Start:  08/27/24    Expected End:  09/10/24            Patient will complete lower body dressing with minimal assist  level of assistance donning and doffing all LE clothes  with PRN adaptive equipment while edge of bed  (Progressing)       Start:  08/27/24    Expected End:  09/10/24            Patient will complete toileting including hygiene clothing management/hygiene with minimal assist  level of assistance and raised toilet seat and grab bars. (Progressing)       Start:  08/27/24    Expected End:  09/10/24               BALANCE       Pt will maintain dynamic sitting balance during ADL task with stand by assist level of assistance in order to demonstrate decreased risk of falling and improved postural control. (Progressing)       Start:  08/27/24    Expected End:  09/10/24               MOBILITY       Patient will perform Functional mobility mod  Household distances/Community Distances with contact guard assist level of assistance and least restrictive device in order to improve safety and functional mobility. (Progressing)       Start:  08/27/24    Expected End:  09/10/24               TRANSFERS       Patient will perform bed mobility stand by assist level of assistance and bed rails in order to improve safety and independence with mobility (Progressing)       Start:  08/27/24    Expected End:  09/10/24            Patient will complete sit to stand transfer with contact guard assist level of assistance and least restrictive device in order to improve safety and prepare for out of bed mobility. (Progressing)       Start:   08/27/24    Expected End:  09/10/24                     08/30/24 at 1:19 PM   Orquidea Mcgarry, OT   [phone]

## 2024-08-30 NOTE — PROGRESS NOTES
"[facility] Attending Shared Visit Note    This is a shared visit. Please see Advanced Practice Provider's encounter note for additional details.        Overnight events/Subjective: small watery BM    Exam:   Physical exam: Well appearing, no distress. Normal rate, regular rhythm, non labored breathing, clear to auscultation, abdomen non distended, trace LE edema, \"woody\" appearing legs, trace bilateral upper extremity swelling. no focal neuro deficits.     A/P:   88 F with SSS s/p PPM, HTN, CKD and HFpEF who presented with decompensated HF and was found to have a newly reduced EF (30-35%) and PYP suggestion of TTR amyloid.  #New HFrEF + TTR amyloidosis: Despite high pacing burden, she does not have intrinsic conduction to allow change in pacing settings to minimize pacing. Low dose ARNI, SGLT2, spironolactone and metoprolol as her BP is around 100/60. Low threshold to cut back. Tafamidis as an outpatient. Continue lasix 40 mg PO for mild volume overload    Dispo: medically ready for discharge, pending pre cert for SNF    Oscar Marsh MD    ________________________________________________________________________________  Problems:   Principal Problem:    Acute systolic heart failure (Multi)  Active Problems:    Sick sinus syndrome (Multi)    NSTEMI (non-ST elevated myocardial infarction) (Multi)    Cardiomyopathy (Multi)    Pacemaker    TERI (acute kidney injury) (CMS-[facility])    Wild-type transthyretin-related (ATTR) amyloidosis (Multi)      Objective   Admit Date: 8/24/2024  Hospital Length of Stay: 6   Home: [facility] [id number]    Vitals:      8/30/2024    11:25 AM 8/30/2024     8:37 AM 8/30/2024     5:00 AM 8/30/2024     4:32 AM 8/29/2024    11:46 PM 8/29/2024     8:18 PM 8/29/2024     3:40 PM   Vitals   Systolic 119 117  124 123 126 120   Diastolic 68 57  62 60 62 60   Heart Rate 62    70 63  60 60 61 61   Temp 36 °C (96.8 °F) 36.2 °C (97.2 °F)  35.8 °C (96.4 °F) 36.5 °C (97.7 °F) 36.1 °C (97 °F) 36 °C (96.8 °F)   Resp 19 " 18  16 17 17 18   Weight (lb)   121.4       BMI   21.51 kg/m2       BSA (m2)   1.56 m2         Wt Readings from Last 5 Encounters:   08/30/24 55.1 kg (121 lb 6.4 oz)       Intake/Output Summary (Last 24 hours) at 8/30/2024 1343  Last data filed at 8/30/2024 0900  Gross per 24 hour   Intake 120 ml   Output 1101 ml   Net -981 ml         MEDICATIONS  Infusions:       Scheduled:  aspirin, 81 mg, Daily  calcium carbonate-vitamin D3, 1 tablet, Daily  dapagliflozin propanediol, 5 mg, q24h  enoxaparin, 30 mg, q24h  ergocalciferol, 1,250 mcg, Every Sunday  furosemide, 40 mg, Daily  metoprolol succinate XL, 12.5 mg, Daily  pantoprazole, 40 mg, Daily before breakfast  polyethylene glycol, 17 g, Daily  predniSONE, 2.5 mg, Daily  rosuvastatin, 20 mg, Nightly  sacubitriL-valsartan, 0.5 tablet, BID  sennosides-docusate sodium, 1 tablet, Nightly  spironolactone, 12.5 mg, Daily      PRN:  acetaminophen, 650 mg, q6h PRN  benzonatate, 100 mg, TID PRN  ipratropium-albuteroL, 3 mL, q6h PRN  LORazepam, 0.5 mg, q5 min PRN  magnesium hydroxide, 30 mL, Daily PRN  ondansetron, 4 mg, q8h PRN   Or  ondansetron, 4 mg, q8h PRN      Prior to Admission Meds:  Medications Prior to Admission   Medication Sig Dispense Refill Last Dose    amLODIPine (Norvasc) 10 mg tablet Take 1 tablet (10 mg) by mouth once daily.   Past Week    aspirin 81 mg EC tablet Take 1 tablet (81 mg) by mouth once daily.   Past Week    atorvastatin (Lipitor) 10 mg tablet Take 1 tablet (10 mg) by mouth once daily.   Past Week    calcium carbonate-vitamin D3 (Calcium 600 + D,3,) 600 mg-10 mcg (400 unit) tablet Take 1 tablet by mouth once daily.   Past Week    ergocalciferol (Vitamin D-2) 50 MCG (2000 UT) capsule capsule Take 1 capsule (50 mcg) by mouth 1 (one) time per week.   Past Month    furosemide (Lasix) 20 mg tablet Take 1 tablet (20 mg) by mouth 2 times a day.   Past Week    metoprolol tartrate (Lopressor) 50 mg tablet Take 1 tablet by mouth once daily.   Past Week     "torsemide (Demadex) 20 mg tablet Take 1 tablet (20 mg) by mouth once daily.   Past Week    traMADol (Ultram) 50 mg tablet Take 1 tablet (50 mg) by mouth every 8 hours if needed.   Past Week    nitroglycerin (Nitrostat) 0.4 mg SL tablet Place 1 tablet (0.4 mg) under the tongue every 5 minutes if needed for chest pain (for up to 3 doses.Call 911 is pain persists).   More than a month       DATA:  CMP:  Recent Labs     08/29/24 1844 08/28/24 1902 08/27/24 1821 08/26/24 1827 08/25/24 1932 08/24/24 2238 08/23/24 2235    136 138 141 142 141 143   K 4.8 4.3 5.0 3.6 4.1 4.4 4.0   CL 96* 96* 101 103 105 107 108*   CO2 29 28 21 27 27 25 22   ANIONGAP 17 16 21* 15 14 13 17   BUN 35* 31* 29* 28* 29* 29* 29*   CREATININE 1.49* 1.65* 1.40* 1.51* 1.55* 1.56* 1.61*   EGFR 34* 30* 36* 33* 32* 32* 31*   MG 2.16 2.19 2.26 2.10 2.03 2.24  --      Recent Labs     08/29/24 1844 08/29/24 0954 08/28/24 1902 08/27/24 1821 08/26/24 1827 08/25/24 1932 08/24/24 2238 08/23/24 2235   ALBUMIN 3.0*  --  2.9* 2.8* 3.2* 3.0* 3.1* 3.5   ALT  --   --   --   --   --   --   --  12   AST  --   --   --   --   --   --   --  18   BILITOT  --   --   --   --   --   --   --  0.7   LIPASE  --  9  --   --   --   --   --   --      CBC:  Recent Labs     08/29/24 1844 08/28/24 1902 08/27/24 1821 08/26/24 1827 08/26/24  0940 08/25/24 1932 08/24/24 2238 08/23/24  2235   WBC 5.9 5.6 5.7 6.2 5.4 5.4 4.4 4.3*   HGB 14.8 14.1 14.1 14.3 13.0 13.3 13.0 14.2   HCT 47.0* 45.2 44.8 43.5 40.0 42.8 40.8 41.8    211 213 214 213 216 196 214   MCV 85 85 84 82 82 84 84 80     COAG:   Recent Labs     08/26/24  0940 08/25/24  1932 08/24/24  1305 08/24/24  0645 08/24/24  0001   INR  --   --   --   --  1.1   HAUF 0.5 0.5 0.6 0.6  --      ABO: No results for input(s): \"ABO\" in the last 41233 hours.  HEME/ENDO: No results for input(s): \"FERRITIN\", \"IRONSAT\", \"TSH\", \"FREET4\", \"HGBA1C\" in the last 95754 hours.  CARDIAC:   Recent Labs     08/23/24  2341 " "08/23/24  2235   TROPHS 189* 235*   BNP  --  2,626*     No results for input(s): \"CHOL\", \"LDLF\", \"LDLCALC\", \"HDL\", \"TRIG\" in the last 05448 hours.  TOX:No results for input(s): \"AMPHETAMINE\", \"BENZO\", \"CANNABINOID\", \"COCAI\", \"FENTANYL\", \"OPIATE\", \"OXYCODONE\", \"PCP\" in the last 81562 hours.    No lab exists for component: \"BARBSCRUR\"  MICRO: No results for input(s): \"ESR\", \"CRP\", \"PROCAL\" in the last 27693 hours.  No results found for the last 90 days.      FOLLOWUP:   Future Appointments   Date Time Provider Department Center   9/17/2024  3:00 PM Lit Goss MD [id number]             "

## 2024-08-30 NOTE — PROGRESS NOTES
Physical Therapy    Physical Therapy Treatment    Patient Name: Erin Lopez  MRN: [id number]  Today's Date: 8/30/2024  Time Calculation  Start Time: 1525  Stop Time: 1555  Time Calculation (min): 30 min         Assessment/Plan   PT Assessment  PT Assessment Results: Decreased strength, Decreased endurance, Impaired balance, Decreased mobility  Rehab Prognosis: Good  End of Session Communication: Bedside nurse  End of Session Patient Position: Bed, 3 rail up, Alarm on  PT Plan  Inpatient/Swing Bed or Outpatient: Inpatient  PT Plan  Treatment/Interventions: Bed mobility, Gait training, Transfer training, Balance training, Endurance training, Therapeutic exercise, Therapeutic activity  PT Plan: Ongoing PT  PT Frequency: 3 times per week  PT Discharge Recommendations: Moderate intensity level of continued care  Equipment Recommended upon Discharge: Wheeled walker  PT Recommended Transfer Status: Assist x2  PT - OK to Discharge: Yes      General Visit Information:   PT  Visit  PT Received On: 08/30/24  General  Reason for Referral: presented with decompensated HF and was found to have a newly reduced EF (30-35%).  Past Medical History Relevant to Rehab: PMH of NSTEMI (nonobstructive CAD 2017), HFpEF, LBBB and SSS s/p PPM 2017 s/p device changeout 11/2022, HTN, CKD  Prior to Session Communication: Bedside nurse  Patient Position Received: Bed, 3 rail up, Alarm off, not on at start of session  Preferred Learning Style: verbal  General Comment: Patient supine on arrival and willing to partcipate in PT session. Declined bed>chair transfers    Subjective   Precautions:  Precautions  Medical Precautions: Fall precautions, Cardiac precautions    Vital Signs (Past 2hrs)        Date/Time Vitals Session Patient Position Pulse Resp SpO2 BP MAP (mmHg)    08/30/24 1525 During PT  Sitting  66  --  94 %  136/75  --     08/30/24 1526 Post OT  Lying  68  --  99 %  121/64  --                         Objective   Pain:  Pain Assessment  Pain  Assessment: 0-10  0-10 (Numeric) Pain Score: 0 - No pain  Cognition:  Cognition  Orientation Level: Oriented X4  Coordination:     Postural Control:  Static Sitting Balance  Static Sitting-Level of Assistance:  (SBA)  Dynamic Sitting Balance  Dynamic Sitting-Level of Assistance: Contact guard  Static Standing Balance  Static Standing-Level of Assistance:  (MinAx1 with walker)  Dynamic Standing Balance  Dynamic Standing-Level of Assistance:  (MinAx2 with walker)     Treatments:  Therapeutic Exercise  Therapeutic Exercise Performed: Yes  Therapeutic Exercise Activity 1: Seated BLE AP, LAQ and supine AP 1x10 reps. Pt educated on supine exs QS and GS         Bed Mobility  Bed Mobility: Yes  Bed Mobility 1  Bed Mobility 1: Supine to sitting  Level of Assistance 1: Moderate assistance  Bed Mobility Comments 1: HOB elevated, bedrail  Bed Mobility 2  Bed Mobility  2: Sitting to supine  Level of Assistance 2: Moderate assistance  Bed Mobility Comments 2: assist for BLE's    Ambulation/Gait Training  Ambulation/Gait Training Performed: Yes  Ambulation/Gait Training 1  Surface 1: Level tile  Device 1: Rolling walker  Assistance 1: Minimal verbal cues (MinAx2)  Quality of Gait 1: Decreased step length, Narrow base of support  Comments/Distance (ft) 1: 4 sidesteps at EOB (cues for sequencing and walker management, increased time to complete)  Transfers  Transfer: Yes  Transfer 1  Transfer From 1: Sit to, Stand to  Transfer to 1: Stand, Sit  Technique 1: Sit to stand, Stand to sit  Transfer Device 1: Walker  Transfer Level of Assistance 1: Minimal verbal cues (ModAx2)  Trials/Comments 1: cues for safe hand placement and sequencing         Outcome Measures:  [facility] Basic Mobility  Turning from your back to your side while in a flat bed without using bedrails: A lot  Moving from lying on your back to sitting on the side of a flat bed without using bedrails: A lot  Moving to and from bed to chair (including a wheelchair): A  lot  Standing up from a chair using your arms (e.g. wheelchair or bedside chair): Total  To walk in hospital room: A lot  Climbing 3-5 steps with railing: Total  Basic Mobility - Total Score: 10    Education Documentation  Precautions, taught by Ramya Mendes PT at 8/30/2024  4:12 PM.  Learner: Patient  Readiness: Acceptance  Method: Explanation, Demonstration  Response: Verbalizes Understanding, Needs Reinforcement    Mobility Training, taught by Ramya Mendes PT at 8/30/2024  4:12 PM.  Learner: Patient  Readiness: Acceptance  Method: Explanation, Demonstration  Response: Verbalizes Understanding, Needs Reinforcement    Education Comments  No comments found.        OP EDUCATION:       Encounter Problems       Encounter Problems (Active)       Balance       Pt will tolerate standing for >/=2 mins with SBA with walker (Progressing)       Start:  08/27/24    Expected End:  09/10/24       I            Mobility       Pt will be SBA ambulation 80 ft with LRAD (Progressing)       Start:  08/27/24    Expected End:  09/10/24               PT Transfers       Pt will be SBA for sit to stand and bed to chair transfers with LRAD (Progressing)       Start:  08/27/24    Expected End:  09/10/24            Pt will be Ranjit with be dmobility (Progressing)       Start:  08/27/24    Expected End:  09/10/24

## 2024-08-30 NOTE — CARE PLAN
The patient's goals for the shift include  have a formed BM today    The clinical goals for the shift include safety management throughout shift    Over the shift, the patient was given laxatives with small amount of liquid stool.  No formed stool. Tolerated breakfast with no emesis or nausea.  PT worked with patient to get to edge of bed.  No c/o pain  Family at bedside

## 2024-08-30 NOTE — PROGRESS NOTES
Interval events:    No acute events overnight    Subjective:  Patient reports not having much of an appetite yesterday following N/V and diarrhea the night prior. She attempted to eat breakfast this morning that was followed by nausea, no vomiting. Continues to have small or watery bowel movements.    Today in brief:  - SBP remains 110-120s since halving GDMT yesterday, will continue current regimen  - patient refusing suppositories, will escalate oral bowel regimen as patient has yet to pass mod/large bowel movement following KUB revealing moderate colonic burden  - pending SNF selection from family, will require precert    Objective:  Vitals:    08/30/24 1125   BP: 119/68   Pulse: 62   Resp: 19   Temp: 36 °C (96.8 °F)   SpO2: 97%     Weight         8/26/2024  0500 8/27/2024  0425 8/28/2024  0506 8/29/2024  0412 8/30/2024  0500    Weight: 67.7 kg (149 lb 4 oz) 66.9 kg (147 lb 8 oz) 59.7 kg (131 lb 11.2 oz) 54.9 kg (121 lb 0.5 oz) 55.1 kg (121 lb 6.4 oz)            Intake/Output Summary (Last 24 hours) at 8/30/2024 1240  Last data filed at 8/30/2024 0900  Gross per 24 hour   Intake 120 ml   Output 1101 ml   Net -981 ml     Recent Results (from the past 24 hour(s))   Renal Function Panel    Collection Time: 08/29/24  6:44 PM   Result Value Ref Range    Glucose 76 74 - 99 mg/dL    Sodium 137 136 - 145 mmol/L    Potassium 4.8 3.5 - 5.3 mmol/L    Chloride 96 (L) 98 - 107 mmol/L    Bicarbonate 29 21 - 32 mmol/L    Anion Gap 17 10 - 20 mmol/L    Urea Nitrogen 35 (H) 6 - 23 mg/dL    Creatinine 1.49 (H) 0.50 - 1.05 mg/dL    eGFR 34 (L) >60 mL/min/1.73m*2    Calcium 9.4 8.6 - 10.6 mg/dL    Phosphorus 3.2 2.5 - 4.9 mg/dL    Albumin 3.0 (L) 3.4 - 5.0 g/dL   Magnesium    Collection Time: 08/29/24  6:44 PM   Result Value Ref Range    Magnesium 2.16 1.60 - 2.40 mg/dL   CBC    Collection Time: 08/29/24  6:44 PM   Result Value Ref Range    WBC 5.9 4.4 - 11.3 x10*3/uL    nRBC 0.0 0.0 - 0.0 /100 WBCs    RBC 5.54 (H) 4.00 - 5.20  x10*6/uL    Hemoglobin 14.8 12.0 - 16.0 g/dL    Hematocrit 47.0 (H) 36.0 - 46.0 %    MCV 85 80 - 100 fL    MCH 26.7 26.0 - 34.0 pg    MCHC 31.5 (L) 32.0 - 36.0 g/dL    RDW 18.5 (H) 11.5 - 14.5 %    Platelets 199 150 - 450 x10*3/uL     Inpatient Medications:  Scheduled medications   Medication Dose Route Frequency    aspirin  81 mg oral Daily    calcium carbonate-vitamin D3  1 tablet oral Daily    dapagliflozin propanediol  5 mg oral q24h    enoxaparin  30 mg subcutaneous q24h    ergocalciferol  1,250 mcg oral Every Sunday    furosemide  40 mg oral Daily    metoprolol succinate XL  12.5 mg oral Daily    pantoprazole  40 mg oral Daily before breakfast    predniSONE  2.5 mg oral Daily    rosuvastatin  20 mg oral Nightly    sacubitriL-valsartan  0.5 tablet oral BID    sennosides-docusate sodium  1 tablet oral Nightly    spironolactone  12.5 mg oral Daily     PRN medications   Medication    acetaminophen    benzonatate    ipratropium-albuteroL    LORazepam    magnesium hydroxide    ondansetron    Or    ondansetron     Continuous Medications   Medication Dose Last Rate       Telemetry 8/30/2024 (personally reviewed): AsVp 60-70s    Physical exam:  General: NAD  Head/ neck: JVD above level of clavicle at 90 degrees  Cardiac: RRR, regular S1 S2 , no murmur, no rub, no gallop  Pulm: diminished RLL on RA  Vascular: Radial 2+ bilaterally  GI: Non distended, slightly firm, mild discomfort with palpation  Extremities: trace LE edema, wrinkling of skin  Neuro: no focal neuro deficits   Psych: appropriate mood and behavior   Skin: warm and dry     Assessment/Plan   Erin Lopez is a 88 y.o. female with PMH of NSTEMI (nonobstructive CAD 2017), HFpEF, LBBB and SSS s/p PPM 2017 s/p device changeout 11/2022, HTN, CKD presenting with CHF exacerbation and new HFrEF/newly diagnosed TTR cardiac amyloidosis     Acute systolic and diastolic heart failure (HFrEF 30-35%)  TTR Amyloidosis  - 2/2 ischemia vs infiltration vs pacemaker mediated    - TTE 10/2023: EF 50-55%, moderately increased LV septal and posterior wall thickness, Moderately enlarged V, mild to mod AR and MR, mod TR, moderate LAE, Compared to 2017 TTE there is possible underlying infiltrative disease  - Admit TTE : EF 30-35%, gHK of LV,  elevated LVEDP, abnormal septal motion c/w V pacemaker, moderately increased LV septal thickness. Lv posterior wall moderately thickened. Severe cLVH. Moderately enlarged RV, mild LAE, RA severely dilated, mild to mod MR, severe TR, Slightly elevated RVSP. Mild to mod AR.  - New TTR amyloidosis seen on  NM PYP scan-- K/L ration WNL, SPEP, UPEP negative  - CTA coronary : minor CAD, prox LAD has focal calcified plaque with minimal stenosis (<25%), LM, LCx and RCA have no significant atherosclerotic change or stenotic disease. Calcium score 17  - CXR : small L and moderate R pleural effusion  - BNP 2,626  - Admit wt: 73.3 kg  - Today's wt: 55.1 kg  - on admission severe anasarca to upper abdomen/UE and JVD up to mandible with RLE wound weeping, s/p IV Lasix boluses with intermittent IV Diamox  - near euvolemic, net negative -20.6L since admission  - cont PO Lasix 40mg daily  - Advanced HF team consulted: Recommend initiating Tafamidis (amyloid coordinator informed) and titrating GDMT as tolerated  - GDMT : will add cautiously in light of amyloidosis  - GDMT doses halved  following episode of hypotension  - cont Entresto 12/13 BID, metop succ 12.5 mg and spironolactone 12.5 mg   - cont dapagliflozin 5mg  - Daily standing weights, strict I&O's, 2g sodium diet, 2L fluid restriction    Troponinemia  - HS trop: 235 -> 189. Suspected 2/2 demand from CHF, although cannot rule out true ischemic component  - Treated with ~48hs of heparin gtt.   - Cont ASA  - Intensified atorva 10 to rosuva 20 given hx of CAD  - coronary CTA above     Hx of SSS s/p PPM  , s/p device changeout 2022  - EC% AV pacing  - SR with  on telemetry  - Concern  for possible pacemaker mediated cardiomyopathy given high pacing burden on device interrogation  - EP consulted: AV delayed extended but no intrinsic conduction noted. Recommended optimizing GDMT prior to considering upgrade to CRT since she would be high risk.     HTN  - Admit /70  - Last 24hrs: -120s  - discontinued home amlodipine 10mg in anticipation for GDMT optimization     Arthritis  - Pt previously getting steroid injections q3 month then put on systemic steroids  - Cont prednisone 2.5 daily   - Was previously listed as on plaquenil for unclear reason but pt states this was stopped  - cont home PPI while on steroids    Constipation  - Overnight 8/28, patient reported abdominal pain with 3 liquid BM (last being at midnight), and 2 episodes of emesis. PRN IV Zofran given with little to no relief. CXR/KUB and C.diff PCR ordered by fellow  - KUB revealing moderate colonic burden  - low suspicion for C.diff, diarrhea likely in the setting of constipation -> will escalate bowel regimen with suppository and discontinue C.diff precautions  - following suppository 8/29, patient had medium soft stool  - 8/30 patient reporting low appetite due to abdominal discomfort, likely in setting of continued constipation. Refusing suppositories, will escalate oral bowel regimen as patient has yet to pass mod/large bowel movement following KUB revealing moderate colonic burden  - cont nightly Nelda-Colace and daily Miralax  - PRN milk of mag ordered    RLE wound  - weeping RLE wound iso hypervolemia  - wound care consulted, recommendations ordered       DVT ppx: Lovenox  DISPO: pending further HFrEF evaluation/management  - PT rec SNF, list provided to patient, pending selection. Will require precert  Code status: Full Code    Patient seen and discussed with Dr. Oscar Woo PA-C

## 2024-08-31 LAB
ALBUMIN SERPL BCP-MCNC: 2.6 G/DL (ref 3.4–5)
ANION GAP SERPL CALC-SCNC: 16 MMOL/L (ref 10–20)
BUN SERPL-MCNC: 34 MG/DL (ref 6–23)
CALCIUM SERPL-MCNC: 8.5 MG/DL (ref 8.6–10.6)
CHLORIDE SERPL-SCNC: 98 MMOL/L (ref 98–107)
CO2 SERPL-SCNC: 24 MMOL/L (ref 21–32)
CREAT SERPL-MCNC: 1.47 MG/DL (ref 0.5–1.05)
EGFRCR SERPLBLD CKD-EPI 2021: 34 ML/MIN/1.73M*2
ERYTHROCYTE [DISTWIDTH] IN BLOOD BY AUTOMATED COUNT: 17.2 % (ref 11.5–14.5)
GLUCOSE SERPL-MCNC: 111 MG/DL (ref 74–99)
HCT VFR BLD AUTO: 42.9 % (ref 36–46)
HGB BLD-MCNC: 14.8 G/DL (ref 12–16)
MAGNESIUM SERPL-MCNC: 2.33 MG/DL (ref 1.6–2.4)
MCH RBC QN AUTO: 26.3 PG (ref 26–34)
MCHC RBC AUTO-ENTMCNC: 34.5 G/DL (ref 32–36)
MCV RBC AUTO: 76 FL (ref 80–100)
NRBC BLD-RTO: 0 /100 WBCS (ref 0–0)
PHOSPHATE SERPL-MCNC: 2.9 MG/DL (ref 2.5–4.9)
PLATELET # BLD AUTO: 193 X10*3/UL (ref 150–450)
POTASSIUM SERPL-SCNC: 3.8 MMOL/L (ref 3.5–5.3)
RBC # BLD AUTO: 5.63 X10*6/UL (ref 4–5.2)
SODIUM SERPL-SCNC: 134 MMOL/L (ref 136–145)
WBC # BLD AUTO: 5.3 X10*3/UL (ref 4.4–11.3)

## 2024-08-31 PROCEDURE — 2500000004 HC RX 250 GENERAL PHARMACY W/ HCPCS (ALT 636 FOR OP/ED): Performed by: STUDENT IN AN ORGANIZED HEALTH CARE EDUCATION/TRAINING PROGRAM

## 2024-08-31 PROCEDURE — 83735 ASSAY OF MAGNESIUM: CPT

## 2024-08-31 PROCEDURE — 2500000001 HC RX 250 WO HCPCS SELF ADMINISTERED DRUGS (ALT 637 FOR MEDICARE OP): Performed by: NURSE PRACTITIONER

## 2024-08-31 PROCEDURE — 2500000001 HC RX 250 WO HCPCS SELF ADMINISTERED DRUGS (ALT 637 FOR MEDICARE OP)

## 2024-08-31 PROCEDURE — 80069 RENAL FUNCTION PANEL: CPT

## 2024-08-31 PROCEDURE — 1200000002 HC GENERAL ROOM WITH TELEMETRY DAILY

## 2024-08-31 PROCEDURE — 85027 COMPLETE CBC AUTOMATED: CPT

## 2024-08-31 PROCEDURE — 2500000002 HC RX 250 W HCPCS SELF ADMINISTERED DRUGS (ALT 637 FOR MEDICARE OP, ALT 636 FOR OP/ED): Performed by: STUDENT IN AN ORGANIZED HEALTH CARE EDUCATION/TRAINING PROGRAM

## 2024-08-31 PROCEDURE — 36415 COLL VENOUS BLD VENIPUNCTURE: CPT

## 2024-08-31 PROCEDURE — 2500000004 HC RX 250 GENERAL PHARMACY W/ HCPCS (ALT 636 FOR OP/ED): Performed by: NURSE PRACTITIONER

## 2024-08-31 PROCEDURE — 2500000001 HC RX 250 WO HCPCS SELF ADMINISTERED DRUGS (ALT 637 FOR MEDICARE OP): Performed by: STUDENT IN AN ORGANIZED HEALTH CARE EDUCATION/TRAINING PROGRAM

## 2024-08-31 PROCEDURE — 2500000004 HC RX 250 GENERAL PHARMACY W/ HCPCS (ALT 636 FOR OP/ED)

## 2024-08-31 PROCEDURE — 99233 SBSQ HOSP IP/OBS HIGH 50: CPT | Performed by: INTERNAL MEDICINE

## 2024-08-31 PROCEDURE — 2500000002 HC RX 250 W HCPCS SELF ADMINISTERED DRUGS (ALT 637 FOR MEDICARE OP, ALT 636 FOR OP/ED)

## 2024-08-31 RX ORDER — DAPAGLIFLOZIN 10 MG/1
10 TABLET, FILM COATED ORAL EVERY 24 HOURS
Status: DISCONTINUED | OUTPATIENT
Start: 2024-08-31 | End: 2024-09-04 | Stop reason: HOSPADM

## 2024-08-31 RX ADMIN — ACETAMINOPHEN 650 MG: 325 TABLET ORAL at 09:18

## 2024-08-31 RX ADMIN — PREDNISONE 2.5 MG: 2.5 TABLET ORAL at 09:22

## 2024-08-31 RX ADMIN — PANTOPRAZOLE SODIUM 40 MG: 40 TABLET, DELAYED RELEASE ORAL at 06:35

## 2024-08-31 RX ADMIN — SPIRONOLACTONE 12.5 MG: 25 TABLET, FILM COATED ORAL at 09:16

## 2024-08-31 RX ADMIN — Medication 1 TABLET: at 09:17

## 2024-08-31 RX ADMIN — ENOXAPARIN SODIUM 30 MG: 100 INJECTION SUBCUTANEOUS at 09:16

## 2024-08-31 RX ADMIN — DAPAGLIFLOZIN 10 MG: 10 TABLET, FILM COATED ORAL at 12:46

## 2024-08-31 RX ADMIN — METOPROLOL SUCCINATE 12.5 MG: 25 TABLET, EXTENDED RELEASE ORAL at 09:16

## 2024-08-31 RX ADMIN — SACUBITRIL AND VALSARTAN 0.5 TABLET: 24; 26 TABLET, FILM COATED ORAL at 21:14

## 2024-08-31 RX ADMIN — SACUBITRIL AND VALSARTAN 0.5 TABLET: 24; 26 TABLET, FILM COATED ORAL at 09:17

## 2024-08-31 RX ADMIN — ASPIRIN 81 MG: 81 TABLET, COATED ORAL at 09:17

## 2024-08-31 RX ADMIN — FUROSEMIDE 40 MG: 20 TABLET ORAL at 09:16

## 2024-08-31 RX ADMIN — ROSUVASTATIN CALCIUM 20 MG: 20 TABLET, FILM COATED ORAL at 21:14

## 2024-08-31 RX ADMIN — POLYETHYLENE GLYCOL 3350 17 G: 17 POWDER, FOR SOLUTION ORAL at 09:16

## 2024-08-31 RX ADMIN — SENNOSIDES AND DOCUSATE SODIUM 1 TABLET: 8.6; 5 TABLET ORAL at 21:14

## 2024-08-31 ASSESSMENT — COGNITIVE AND FUNCTIONAL STATUS - GENERAL
STANDING UP FROM CHAIR USING ARMS: TOTAL
DRESSING REGULAR LOWER BODY CLOTHING: A LOT
DRESSING REGULAR UPPER BODY CLOTHING: A LITTLE
HELP NEEDED FOR BATHING: A LOT
PERSONAL GROOMING: A LITTLE
STANDING UP FROM CHAIR USING ARMS: TOTAL
MOVING FROM LYING ON BACK TO SITTING ON SIDE OF FLAT BED WITH BEDRAILS: A LITTLE
CLIMB 3 TO 5 STEPS WITH RAILING: TOTAL
DAILY ACTIVITIY SCORE: 14
HELP NEEDED FOR BATHING: A LOT
MOVING FROM LYING ON BACK TO SITTING ON SIDE OF FLAT BED WITH BEDRAILS: A LITTLE
WALKING IN HOSPITAL ROOM: TOTAL
CLIMB 3 TO 5 STEPS WITH RAILING: TOTAL
DRESSING REGULAR LOWER BODY CLOTHING: A LOT
MOVING TO AND FROM BED TO CHAIR: TOTAL
EATING MEALS: A LITTLE
EATING MEALS: A LITTLE
TURNING FROM BACK TO SIDE WHILE IN FLAT BAD: A LOT
PERSONAL GROOMING: A LITTLE
TURNING FROM BACK TO SIDE WHILE IN FLAT BAD: A LOT
DRESSING REGULAR UPPER BODY CLOTHING: A LITTLE
TOILETING: TOTAL
MOBILITY SCORE: 9
MOVING TO AND FROM BED TO CHAIR: TOTAL
MOBILITY SCORE: 9
WALKING IN HOSPITAL ROOM: TOTAL
DAILY ACTIVITIY SCORE: 14
TOILETING: TOTAL

## 2024-08-31 ASSESSMENT — PAIN SCALES - GENERAL
PAINLEVEL_OUTOF10: 3
PAINLEVEL_OUTOF10: 0 - NO PAIN

## 2024-08-31 NOTE — PROGRESS NOTES
Interval events:    No acute events overnight    Subjective:  + BM overnight     Today in brief:  - SBP remains 104 -120s since halving GDMT yesterday, will continue current regimen  - Daughter at bedside discussed need for facility of choice   - pending SNF selection from family, will require precert    Objective:  Vitals:    08/31/24 1057   BP: 104/57   Pulse: 67   Resp: 17   Temp: 36.2 °C (97.2 °F)   SpO2: 94%     Weight         8/27/2024  0425 8/28/2024  0506 8/29/2024  0412 8/30/2024  0500 8/31/2024  0505    Weight: 66.9 kg (147 lb 8 oz) 59.7 kg (131 lb 11.2 oz) 54.9 kg (121 lb 0.5 oz) 55.1 kg (121 lb 6.4 oz) 52.7 kg (116 lb 2.9 oz)            Intake/Output Summary (Last 24 hours) at 8/31/2024 1527  Last data filed at 8/31/2024 0505  Gross per 24 hour   Intake --   Output 801 ml   Net -801 ml     Recent Results (from the past 24 hour(s))   Renal Function Panel    Collection Time: 08/30/24  7:07 PM   Result Value Ref Range    Glucose 79 74 - 99 mg/dL    Sodium 135 (L) 136 - 145 mmol/L    Potassium 4.6 3.5 - 5.3 mmol/L    Chloride 98 98 - 107 mmol/L    Bicarbonate 18 (L) 21 - 32 mmol/L    Anion Gap 24 (H) 10 - 20 mmol/L    Urea Nitrogen 36 (H) 6 - 23 mg/dL    Creatinine 1.60 (H) 0.50 - 1.05 mg/dL    eGFR 31 (L) >60 mL/min/1.73m*2    Calcium 9.2 8.6 - 10.6 mg/dL    Phosphorus 3.6 2.5 - 4.9 mg/dL    Albumin 2.9 (L) 3.4 - 5.0 g/dL   Magnesium    Collection Time: 08/30/24  7:07 PM   Result Value Ref Range    Magnesium 2.37 1.60 - 2.40 mg/dL   CBC    Collection Time: 08/30/24  7:07 PM   Result Value Ref Range    WBC 7.1 4.4 - 11.3 x10*3/uL    nRBC 0.0 0.0 - 0.0 /100 WBCs    RBC 5.93 (H) 4.00 - 5.20 x10*6/uL    Hemoglobin 16.0 12.0 - 16.0 g/dL    Hematocrit 51.2 (H) 36.0 - 46.0 %    MCV 86 80 - 100 fL    MCH 27.0 26.0 - 34.0 pg    MCHC 31.3 (L) 32.0 - 36.0 g/dL    RDW 18.7 (H) 11.5 - 14.5 %    Platelets 184 150 - 450 x10*3/uL     Inpatient Medications:  Scheduled medications   Medication Dose Route Frequency    aspirin   81 mg oral Daily    calcium carbonate-vitamin D3  1 tablet oral Daily    dapagliflozin propanediol  10 mg oral q24h    enoxaparin  30 mg subcutaneous q24h    ergocalciferol  1,250 mcg oral Every Sunday    furosemide  40 mg oral Daily    metoprolol succinate XL  12.5 mg oral Daily    pantoprazole  40 mg oral Daily before breakfast    polyethylene glycol  17 g oral Daily    predniSONE  2.5 mg oral Daily    rosuvastatin  20 mg oral Nightly    sacubitriL-valsartan  0.5 tablet oral BID    sennosides-docusate sodium  1 tablet oral Nightly    spironolactone  12.5 mg oral Daily     PRN medications   Medication    acetaminophen    benzonatate    ipratropium-albuteroL    LORazepam    magnesium hydroxide    ondansetron    Or    ondansetron     Continuous Medications   Medication Dose Last Rate         Physical exam:  General: NAD  Head/ neck: JVD above level of clavicle at 90 degrees  Cardiac: RRR, regular S1 S2 , no murmur, no rub, no gallop  Pulm: clear on RA  Vascular: Radial 2+ bilaterally  GI: Non distended, slightly firm, mild discomfort with palpation BS +   Extremities: trace LE edema, wrinkling of skin, warm   Neuro: no focal neuro deficits   Psych: appropriate mood and behavior   Skin: warm and dry     Assessment/Plan   Erin Lopez is a 88 y.o. female with PMH of NSTEMI (nonobstructive CAD 2017), HFpEF, LBBB and SSS s/p PPM 2017 s/p device changeout 11/2022, HTN, CKD presenting with CHF exacerbation and new HFrEF/newly diagnosed TTR cardiac amyloidosis     Acute systolic and diastolic heart failure (HFrEF 30-35%)  TTR Amyloidosis  - 2/2 ischemia vs infiltration vs pacemaker mediated   - TTE 10/2023: EF 50-55%, moderately increased LV septal and posterior wall thickness, Moderately enlarged V, mild to mod AR and MR, mod TR, moderate LAE, Compared to 2017 TTE there is possible underlying infiltrative disease  - Admit TTE 8/24: EF 30-35%, gHK of LV,  elevated LVEDP, abnormal septal motion c/w V pacemaker, moderately  increased LV septal thickness. Lv posterior wall moderately thickened. Severe cLVH. Moderately enlarged RV, mild LAE, RA severely dilated, mild to mod MR, severe TR, Slightly elevated RVSP. Mild to mod AR.  - New TTR amyloidosis seen on  NM PYP scan-- K/L ration WNL, SPEP, UPEP negative  - CTA coronary : minor CAD, prox LAD has focal calcified plaque with minimal stenosis (<25%), LM, LCx and RCA have no significant atherosclerotic change or stenotic disease. Calcium score 17  - CXR : small L and moderate R pleural effusion  - BNP 2,626  - Admit wt: 73.3 kg  - Today's wt: 55.1 kg  - on admission severe anasarca to upper abdomen/UE and JVD up to mandible with RLE wound weeping, s/p IV Lasix boluses with intermittent IV Diamox  - near euvolemic, net negative -20.6L since admission  - cont PO Lasix 40mg daily  - Advanced HF team consulted: Recommend initiating Tafamidis (amyloid coordinator informed) and titrating GDMT as tolerated  - GDMT : will add cautiously in light of amyloidosis  - GDMT doses halved  following episode of hypotension  - cont Entresto 12/13 BID, metop succ 12.5 mg and spironolactone 12.5 mg   - cont dapagliflozin 5mg >  to 10mg   - Daily standing weights, strict I&O's, 2g sodium diet, 2L fluid restriction    Troponinemia  - HS trop: 235 -> 189. Suspected 2/2 demand from CHF, although cannot rule out true ischemic component  - Treated with ~48hs of heparin gtt.   - Cont ASA  - Intensified atorva 10 to rosuva 20 given hx of CAD  - coronary CTA above     Hx of SSS s/p PPM  , s/p device changeout 2022  - EC% AV pacing  - SR with  on telemetry  - Concern for possible pacemaker mediated cardiomyopathy given high pacing burden on device interrogation  - EP consulted: AV delayed extended but no intrinsic conduction noted. Recommended optimizing GDMT prior to considering upgrade to CRT since she would be high risk.     HTN  - Admit /70  - Last 24hrs:  -120s  -  discontinued home amlodipine 10mg in anticipation for GDMT optimization     Arthritis  - Pt previously getting steroid injections q3 month then put on systemic steroids  - Cont prednisone 2.5 daily   - Was previously listed as on plaquenil for unclear reason but pt states this was stopped  - cont home PPI while on steroids    Constipation  - Overnight 8/28, patient reported abdominal pain with 3 liquid BM (last being at midnight), and 2 episodes of emesis. PRN IV Zofran given with little to no relief. CXR/KUB and C.diff PCR ordered by fellow  - KUB revealing moderate colonic burden  - low suspicion for C.diff, diarrhea likely in the setting of constipation -> will escalate bowel regimen with suppository and discontinue C.diff precautions  - following suppository 8/29, patient had medium soft stool  - 8/30 patient reporting low appetite due to abdominal discomfort, likely in setting of continued constipation. Refusing suppositories, will escalate oral bowel regimen as patient has yet to pass mod/large bowel movement following KUB revealing moderate colonic burden  - cont nightly Nelda-Colace and daily Miralax  - PRN milk of mag ordered  - 8/31 ++ BM overnight     RLE wound  - weeping RLE wound iso hypervolemia  - wound care consulted, recommendations ordered       DVT ppx: Lovenox  DISPO: pending further HFrEF evaluation/management  - PT rec SNF, list provided to patient, pending selection. Will require precert  8/31 Daughter at bedside discussed need for facility of choice   Code status: Full Code    Patient seen and discussed with Dr. Oscar Guardado, APRN-CNP

## 2024-09-01 VITALS
HEIGHT: 63 IN | OXYGEN SATURATION: 96 % | WEIGHT: 116.18 LBS | DIASTOLIC BLOOD PRESSURE: 64 MMHG | SYSTOLIC BLOOD PRESSURE: 109 MMHG | RESPIRATION RATE: 16 BRPM | BODY MASS INDEX: 20.59 KG/M2 | HEART RATE: 70 BPM | TEMPERATURE: 97.2 F

## 2024-09-01 LAB
ALBUMIN SERPL BCP-MCNC: 2.6 G/DL (ref 3.4–5)
ANION GAP SERPL CALC-SCNC: 12 MMOL/L (ref 10–20)
BUN SERPL-MCNC: 28 MG/DL (ref 6–23)
CALCIUM SERPL-MCNC: 8.4 MG/DL (ref 8.6–10.6)
CHLORIDE SERPL-SCNC: 103 MMOL/L (ref 98–107)
CO2 SERPL-SCNC: 27 MMOL/L (ref 21–32)
CREAT SERPL-MCNC: 1.44 MG/DL (ref 0.5–1.05)
EGFRCR SERPLBLD CKD-EPI 2021: 35 ML/MIN/1.73M*2
ERYTHROCYTE [DISTWIDTH] IN BLOOD BY AUTOMATED COUNT: 16.7 % (ref 11.5–14.5)
GLUCOSE SERPL-MCNC: 85 MG/DL (ref 74–99)
HCT VFR BLD AUTO: 43.3 % (ref 36–46)
HGB BLD-MCNC: 14.5 G/DL (ref 12–16)
MAGNESIUM SERPL-MCNC: 2.13 MG/DL (ref 1.6–2.4)
MCH RBC QN AUTO: 26.3 PG (ref 26–34)
MCHC RBC AUTO-ENTMCNC: 33.5 G/DL (ref 32–36)
MCV RBC AUTO: 78 FL (ref 80–100)
NRBC BLD-RTO: 0 /100 WBCS (ref 0–0)
PHOSPHATE SERPL-MCNC: 2.5 MG/DL (ref 2.5–4.9)
PLATELET # BLD AUTO: 208 X10*3/UL (ref 150–450)
POTASSIUM SERPL-SCNC: 4.1 MMOL/L (ref 3.5–5.3)
RBC # BLD AUTO: 5.52 X10*6/UL (ref 4–5.2)
SODIUM SERPL-SCNC: 138 MMOL/L (ref 136–145)
WBC # BLD AUTO: 5.2 X10*3/UL (ref 4.4–11.3)

## 2024-09-01 PROCEDURE — 36415 COLL VENOUS BLD VENIPUNCTURE: CPT

## 2024-09-01 PROCEDURE — 85027 COMPLETE CBC AUTOMATED: CPT

## 2024-09-01 PROCEDURE — 1200000002 HC GENERAL ROOM WITH TELEMETRY DAILY

## 2024-09-01 PROCEDURE — 80069 RENAL FUNCTION PANEL: CPT

## 2024-09-01 PROCEDURE — 2500000002 HC RX 250 W HCPCS SELF ADMINISTERED DRUGS (ALT 637 FOR MEDICARE OP, ALT 636 FOR OP/ED)

## 2024-09-01 PROCEDURE — 83735 ASSAY OF MAGNESIUM: CPT

## 2024-09-01 PROCEDURE — 2500000001 HC RX 250 WO HCPCS SELF ADMINISTERED DRUGS (ALT 637 FOR MEDICARE OP)

## 2024-09-01 PROCEDURE — 2500000002 HC RX 250 W HCPCS SELF ADMINISTERED DRUGS (ALT 637 FOR MEDICARE OP, ALT 636 FOR OP/ED): Performed by: STUDENT IN AN ORGANIZED HEALTH CARE EDUCATION/TRAINING PROGRAM

## 2024-09-01 PROCEDURE — 2500000001 HC RX 250 WO HCPCS SELF ADMINISTERED DRUGS (ALT 637 FOR MEDICARE OP): Performed by: NURSE PRACTITIONER

## 2024-09-01 PROCEDURE — 99233 SBSQ HOSP IP/OBS HIGH 50: CPT | Performed by: INTERNAL MEDICINE

## 2024-09-01 PROCEDURE — 2500000004 HC RX 250 GENERAL PHARMACY W/ HCPCS (ALT 636 FOR OP/ED): Performed by: STUDENT IN AN ORGANIZED HEALTH CARE EDUCATION/TRAINING PROGRAM

## 2024-09-01 PROCEDURE — 2500000004 HC RX 250 GENERAL PHARMACY W/ HCPCS (ALT 636 FOR OP/ED): Performed by: NURSE PRACTITIONER

## 2024-09-01 PROCEDURE — 2500000001 HC RX 250 WO HCPCS SELF ADMINISTERED DRUGS (ALT 637 FOR MEDICARE OP): Performed by: STUDENT IN AN ORGANIZED HEALTH CARE EDUCATION/TRAINING PROGRAM

## 2024-09-01 RX ORDER — FUROSEMIDE 20 MG/1
20 TABLET ORAL DAILY
Status: DISCONTINUED | OUTPATIENT
Start: 2024-09-02 | End: 2024-09-04 | Stop reason: HOSPADM

## 2024-09-01 RX ORDER — POTASSIUM CHLORIDE 750 MG/1
20 TABLET, FILM COATED, EXTENDED RELEASE ORAL ONCE
Status: COMPLETED | OUTPATIENT
Start: 2024-09-01 | End: 2024-09-01

## 2024-09-01 RX ADMIN — SACUBITRIL AND VALSARTAN 0.5 TABLET: 24; 26 TABLET, FILM COATED ORAL at 08:50

## 2024-09-01 RX ADMIN — PREDNISONE 2.5 MG: 2.5 TABLET ORAL at 08:51

## 2024-09-01 RX ADMIN — SACUBITRIL AND VALSARTAN 0.5 TABLET: 24; 26 TABLET, FILM COATED ORAL at 21:21

## 2024-09-01 RX ADMIN — SPIRONOLACTONE 12.5 MG: 25 TABLET, FILM COATED ORAL at 08:50

## 2024-09-01 RX ADMIN — ENOXAPARIN SODIUM 30 MG: 100 INJECTION SUBCUTANEOUS at 08:50

## 2024-09-01 RX ADMIN — SENNOSIDES AND DOCUSATE SODIUM 1 TABLET: 8.6; 5 TABLET ORAL at 21:22

## 2024-09-01 RX ADMIN — PANTOPRAZOLE SODIUM 40 MG: 40 TABLET, DELAYED RELEASE ORAL at 06:09

## 2024-09-01 RX ADMIN — DAPAGLIFLOZIN 10 MG: 10 TABLET, FILM COATED ORAL at 13:03

## 2024-09-01 RX ADMIN — ROSUVASTATIN CALCIUM 20 MG: 20 TABLET, FILM COATED ORAL at 21:21

## 2024-09-01 RX ADMIN — ERGOCALCIFEROL 1250 MCG: 1.25 CAPSULE ORAL at 08:51

## 2024-09-01 RX ADMIN — FUROSEMIDE 40 MG: 20 TABLET ORAL at 08:54

## 2024-09-01 RX ADMIN — POTASSIUM CHLORIDE 20 MEQ: 750 TABLET, FILM COATED, EXTENDED RELEASE ORAL at 08:59

## 2024-09-01 RX ADMIN — METOPROLOL SUCCINATE 12.5 MG: 25 TABLET, EXTENDED RELEASE ORAL at 08:51

## 2024-09-01 RX ADMIN — Medication 1 TABLET: at 08:50

## 2024-09-01 RX ADMIN — ASPIRIN 81 MG: 81 TABLET, COATED ORAL at 08:50

## 2024-09-01 ASSESSMENT — PAIN SCALES - GENERAL
PAINLEVEL_OUTOF10: 0 - NO PAIN
PAINLEVEL_OUTOF10: 0 - NO PAIN

## 2024-09-01 ASSESSMENT — COGNITIVE AND FUNCTIONAL STATUS - GENERAL
MOVING FROM LYING ON BACK TO SITTING ON SIDE OF FLAT BED WITH BEDRAILS: A LITTLE
MOVING TO AND FROM BED TO CHAIR: TOTAL
HELP NEEDED FOR BATHING: A LOT
MOVING TO AND FROM BED TO CHAIR: TOTAL
MOVING FROM LYING ON BACK TO SITTING ON SIDE OF FLAT BED WITH BEDRAILS: A LITTLE
DRESSING REGULAR LOWER BODY CLOTHING: A LOT
DRESSING REGULAR UPPER BODY CLOTHING: A LITTLE
MOBILITY SCORE: 9
STANDING UP FROM CHAIR USING ARMS: TOTAL
EATING MEALS: A LITTLE
WALKING IN HOSPITAL ROOM: TOTAL
DRESSING REGULAR UPPER BODY CLOTHING: A LITTLE
TURNING FROM BACK TO SIDE WHILE IN FLAT BAD: A LOT
CLIMB 3 TO 5 STEPS WITH RAILING: TOTAL
CLIMB 3 TO 5 STEPS WITH RAILING: TOTAL
TOILETING: A LOT
MOBILITY SCORE: 9
DAILY ACTIVITIY SCORE: 14
WALKING IN HOSPITAL ROOM: TOTAL
DRESSING REGULAR LOWER BODY CLOTHING: A LOT
TOILETING: TOTAL
DAILY ACTIVITIY SCORE: 15
PERSONAL GROOMING: A LITTLE
STANDING UP FROM CHAIR USING ARMS: TOTAL
TURNING FROM BACK TO SIDE WHILE IN FLAT BAD: A LOT
HELP NEEDED FOR BATHING: A LOT
EATING MEALS: A LITTLE
PERSONAL GROOMING: A LITTLE

## 2024-09-01 ASSESSMENT — PAIN - FUNCTIONAL ASSESSMENT
PAIN_FUNCTIONAL_ASSESSMENT: 0-10
PAIN_FUNCTIONAL_ASSESSMENT: 0-10

## 2024-09-01 NOTE — CARE PLAN
The patient's goals for the shift include      The clinical goals for the shift include Pt will remain free from injury this shift    Problem: Fall/Injury  Goal: Not fall by end of shift  Outcome: Progressing  Goal: Be free from injury by end of the shift  Outcome: Progressing  Goal: Verbalize understanding of personal risk factors for fall in the hospital  Outcome: Progressing  Goal: Verbalize understanding of risk factor reduction measures to prevent injury from fall in the home  Outcome: Progressing  Goal: Use assistive devices by end of the shift  Outcome: Progressing  Goal: Pace activities to prevent fatigue by end of the shift  Outcome: Progressing     Problem: Pain - Adult  Goal: Verbalizes/displays adequate comfort level or baseline comfort level  Outcome: Progressing     Problem: Skin  Goal: Participates in plan/prevention/treatment measures  Outcome: Progressing  Flowsheets (Taken 8/31/2024 2012)  Participates in plan/prevention/treatment measures: Elevate heels  Goal: Prevent/manage excess moisture  Outcome: Progressing  Flowsheets (Taken 8/31/2024 2012)  Prevent/manage excess moisture: Moisturize dry skin  Goal: Prevent/minimize sheer/friction injuries  Outcome: Progressing  Flowsheets (Taken 8/31/2024 2012)  Prevent/minimize sheer/friction injuries:   Use pull sheet   HOB 30 degrees or less   Turn/reposition every 2 hours/use positioning/transfer devices  Goal: Promote/optimize nutrition  Outcome: Progressing  Flowsheets (Taken 8/31/2024 2012)  Promote/optimize nutrition:   Consume > 50% meals/supplements   Monitor/record intake including meals

## 2024-09-01 NOTE — PROGRESS NOTES
"Interval events:    No acute events overnight    Subjective:  Patient seen this AM lying in bed, reports feeling \"a little better today.\" She states she got some rest overnight. Became tearful during interview, states it is hard for her to still be in the hospital. Pending determination of SNF choice by patient/ family.      Today in brief:  - pending SNF selection from family, will require precert  - TCC spoke to family today who are still reviewing SNF choices  - lasix dose reduced to 20 mg PO; appears euvolemic     Objective:  Vitals:    09/01/24 0758   BP: 112/52   Pulse: 65   Resp: 14   Temp: 36.3 °C (97.3 °F)   SpO2: 99%     Weight         8/27/2024  0425 8/28/2024  0506 8/29/2024  0412 8/30/2024  0500 8/31/2024  0505    Weight: 66.9 kg (147 lb 8 oz) 59.7 kg (131 lb 11.2 oz) 54.9 kg (121 lb 0.5 oz) 55.1 kg (121 lb 6.4 oz) 52.7 kg (116 lb 2.9 oz)            Intake/Output Summary (Last 24 hours) at 9/1/2024 0849  Last data filed at 9/1/2024 0441  Gross per 24 hour   Intake --   Output 1050 ml   Net -1050 ml     Recent Results (from the past 24 hour(s))   Renal Function Panel    Collection Time: 08/31/24  8:41 PM   Result Value Ref Range    Glucose 111 (H) 74 - 99 mg/dL    Sodium 134 (L) 136 - 145 mmol/L    Potassium 3.8 3.5 - 5.3 mmol/L    Chloride 98 98 - 107 mmol/L    Bicarbonate 24 21 - 32 mmol/L    Anion Gap 16 10 - 20 mmol/L    Urea Nitrogen 34 (H) 6 - 23 mg/dL    Creatinine 1.47 (H) 0.50 - 1.05 mg/dL    eGFR 34 (L) >60 mL/min/1.73m*2    Calcium 8.5 (L) 8.6 - 10.6 mg/dL    Phosphorus 2.9 2.5 - 4.9 mg/dL    Albumin 2.6 (L) 3.4 - 5.0 g/dL   Magnesium    Collection Time: 08/31/24  8:41 PM   Result Value Ref Range    Magnesium 2.33 1.60 - 2.40 mg/dL   CBC    Collection Time: 08/31/24  8:41 PM   Result Value Ref Range    WBC 5.3 4.4 - 11.3 x10*3/uL    nRBC 0.0 0.0 - 0.0 /100 WBCs    RBC 5.63 (H) 4.00 - 5.20 x10*6/uL    Hemoglobin 14.8 12.0 - 16.0 g/dL    Hematocrit 42.9 36.0 - 46.0 %    MCV 76 (L) 80 - 100 fL    " MCH 26.3 26.0 - 34.0 pg    MCHC 34.5 32.0 - 36.0 g/dL    RDW 17.2 (H) 11.5 - 14.5 %    Platelets 193 150 - 450 x10*3/uL     Inpatient Medications:  Scheduled medications   Medication Dose Route Frequency    aspirin  81 mg oral Daily    calcium carbonate-vitamin D3  1 tablet oral Daily    dapagliflozin propanediol  10 mg oral q24h    enoxaparin  30 mg subcutaneous q24h    ergocalciferol  1,250 mcg oral Every Sunday    furosemide  40 mg oral Daily    metoprolol succinate XL  12.5 mg oral Daily    pantoprazole  40 mg oral Daily before breakfast    polyethylene glycol  17 g oral Daily    potassium chloride CR  20 mEq oral Once    predniSONE  2.5 mg oral Daily    rosuvastatin  20 mg oral Nightly    sacubitriL-valsartan  0.5 tablet oral BID    sennosides-docusate sodium  1 tablet oral Nightly    spironolactone  12.5 mg oral Daily     PRN medications   Medication    acetaminophen    benzonatate    ipratropium-albuteroL    LORazepam    magnesium hydroxide    ondansetron    Or    ondansetron     Continuous Medications   Medication Dose Last Rate     Physical Exam  Constitutional:       General: She is not in acute distress.     Appearance: Normal appearance.   Eyes:      Extraocular Movements: Extraocular movements intact.   Neck:      Vascular: No JVD.   Cardiovascular:      Rate and Rhythm: Normal rate and regular rhythm.      Comments: V paced  Pulmonary:      Effort: Pulmonary effort is normal.      Breath sounds: Normal breath sounds.   Abdominal:      General: Abdomen is flat.   Musculoskeletal:      Right lower leg: No edema.      Left lower leg: No edema.   Skin:     General: Skin is warm and dry.   Neurological:      General: No focal deficit present.      Mental Status: She is alert and oriented to person, place, and time.   Psychiatric:         Mood and Affect: Affect is tearful.      Comments: Tearful today about being in the hospital       Assessment/Plan   Erin Lopez is an 88 y.o. female with PMH of NSTEMI  (nonobstructive CAD 2017), HFpEF, LBBB and SSS s/p PPM 2017 s/p device changeout 11/2022, HTN, CKD presenting with CHF exacerbation and new HFrEF/newly diagnosed TTR cardiac amyloidosis     Acute systolic and diastolic heart failure (HFrEF 30-35%)  TTR Amyloidosis  - TTE 10/2023: EF 50-55%, moderately increased LV septal and posterior wall thickness, Moderately enlarged V, mild to mod AR and MR, mod TR, moderate LAE, Compared to 2017 TTE there is possible underlying infiltrative disease  - Admit TTE 8/24: EF 30-35%, gHK of LV,  elevated LVEDP, abnormal septal motion c/w V pacemaker, moderately increased LV septal thickness. Lv posterior wall moderately thickened. Severe cLVH. Moderately enlarged RV, mild LAE, RA severely dilated, mild to mod MR, severe TR, Slightly elevated RVSP. Mild to mod AR.  - New TTR amyloidosis seen on 8/25 NM PYP scan-- K/L ration WNL, SPEP, UPEP negative  - CXR 8/28: small L and moderate R pleural effusion  - BNP 2,626  - Admit wt: 73.3 kg  - Today's wt: 52.7 kg (55.1 kg)  - on admission severe anasarca to upper abdomen/UE and JVD up to mandible with RLE wound weeping, s/p IV Lasix boluses with intermittent IV Diamox  - lasix dose reduced to 20 mg PO; appears euvolemic   - Advanced HF team consulted: Recommend initiating Tafamidis (amyloid coordinator informed) and titrating GDMT as tolerated  - GDMT : will add cautiously in light of amyloidosis, GDMT doses reduced 8/29 following episode of hypotension  - cont Entresto 12/13 BID, metop succ 12.5 mg daily, dapagliflozin 10 mg daily,  and spironolactone 12.5 mg daily  - Daily standing weights, strict I&O's, 2g sodium diet, 2L fluid restriction    Troponinemia  - HS trop: 235 -> 189. Suspected 2/2 demand from CHF  - Treated with ~48hs of heparin gtt.   - CTA coronary 8/29: minor CAD, prox LAD has focal calcified plaque with minimal stenosis (<25%), LM, LCx and RCA have no significant atherosclerotic change or stenotic disease. Calcium score  17  - Cont ASA 81 mg daily, rosuvastatin 20 mg nightly      Hx of SSS s/p PPM  , s/p device changeout 2022  - EC% AV pacing  - SR with  on telemetry  - Concern for possible pacemaker mediated cardiomyopathy given high pacing burden on device interrogation  - EP consulted: AV delayed extended but no intrinsic conduction noted. Recommended optimizing GDMT prior to considering upgrade to CRT since she would be high risk.     HTN  - Admit /70  - Last 24hrs: - 125  - continue Entresto as above      Arthritis  - Pt previously getting steroid injections q3 month then put on systemic steroids  - Cont prednisone 2.5 daily   - Was previously listed as on plaquenil for unclear reason but pt states this was stopped  - cont home PPI while on steroids    Constipation, improving   - Overnight , patient reported abdominal pain with 3 liquid BM (last being at midnight), and 2 episodes of emesis. PRN IV Zofran given with little to no relief. CXR/KUB and C.diff PCR ordered by fellow  - KUB revealing moderate colonic burden  - low suspicion for C.diff, diarrhea likely in the setting of constipation -> will escalate bowel regimen with suppository and discontinue C.diff precautions  - following suppository , patient had medium soft stool  -  patient reporting low appetite due to abdominal discomfort, likely in setting of continued constipation. Refusing suppositories, will escalate oral bowel regimen as patient has yet to pass mod/large bowel movement following KUB revealing moderate colonic burden  - cont nightly Nelda-Colace and daily Miralax  - PRN milk of mag ordered  -  ++ BM overnight     RLE wound  - weeping RLE wound iso hypervolemia  - wound care consulted, recommendations ordered    DVT ppx: Lovenox  DISPO: pending further HFrEF evaluation/management  - PT rec SNF, list provided to patient, pending selection. Will require precert    Code status: Full Code    Patient seen and discussed  with Dr. Oscar Skinner, APRN-CNP

## 2024-09-01 NOTE — PROGRESS NOTES
"HVI Attending Shared Visit Note    This is a shared visit. Please see Advanced Practice Provider's encounter note for additional details.        Overnight events/Subjective: doing ok    Exam:   Physical exam: Well appearing, no distress. Normal rate, regular rhythm, non labored breathing, clear to auscultation, abdomen non distended, no LE edema, \"woody\" appearing legs, minimal bilateral upper extremity swelling. no focal neuro deficits.     A/P:   88 F with SSS s/p PPM, HTN, CKD and HFpEF who presented with decompensated HF and was found to have a newly reduced EF (30-35%) and PYP suggestion of TTR amyloid.  #New HFrEF + TTR amyloidosis: Despite high pacing burden, she does not have intrinsic conduction to allow change in pacing settings to minimize pacing. Low dose ARNI, SGLT2, spironolactone and metoprolol as her BP is around 100/60. Low threshold to cut back. Tafamidis as an outpatient. Reduce lasix to 20 mg PO 9/1/24.     Dispo: medically ready for discharge, pending pre cert for SNF    Oscar Marsh MD    ________________________________________________________________________________  Problems:   Principal Problem:    Acute systolic heart failure (Multi)  Active Problems:    Sick sinus syndrome (Multi)    NSTEMI (non-ST elevated myocardial infarction) (Multi)    Cardiomyopathy (Multi)    Pacemaker    TERI (acute kidney injury) (CMS-[facility])    Wild-type transthyretin-related (ATTR) amyloidosis (Multi)      Objective   Admit Date: 8/24/2024  Hospital Length of Stay: 8   Home: [facility] [id number]    Vitals:      9/1/2024     7:58 AM 9/1/2024     4:41 AM 8/31/2024    11:53 PM 8/31/2024     8:54 PM 8/31/2024     4:12 PM 8/31/2024     3:39 PM 8/31/2024    10:57 AM   Vitals   Systolic 112 116 114 112  125 104   Diastolic 52 56 51 54  61 57   Heart Rate 65 67 66 65 70  67   Temp 36.3 °C (97.3 °F) 36.3 °C (97.3 °F) 36.6 °C (97.9 °F) 36.2 °C (97.2 °F) 36.1 °C (97 °F) 36.2 °C (97.2 °F) 36.2 °C (97.2 °F)   Resp 14 16 16 16  " 16 17     Wt Readings from Last 5 Encounters:   08/31/24 52.7 kg (116 lb 2.9 oz)       Intake/Output Summary (Last 24 hours) at 9/1/2024 1115  Last data filed at 9/1/2024 0441  Gross per 24 hour   Intake --   Output 1050 ml   Net -1050 ml         MEDICATIONS  Infusions:       Scheduled:  aspirin, 81 mg, Daily  calcium carbonate-vitamin D3, 1 tablet, Daily  dapagliflozin propanediol, 10 mg, q24h  enoxaparin, 30 mg, q24h  ergocalciferol, 1,250 mcg, Every Sunday  [START ON 9/2/2024] furosemide, 20 mg, Daily  metoprolol succinate XL, 12.5 mg, Daily  pantoprazole, 40 mg, Daily before breakfast  polyethylene glycol, 17 g, Daily  predniSONE, 2.5 mg, Daily  rosuvastatin, 20 mg, Nightly  sacubitriL-valsartan, 0.5 tablet, BID  sennosides-docusate sodium, 1 tablet, Nightly  spironolactone, 12.5 mg, Daily      PRN:  acetaminophen, 650 mg, q6h PRN  benzonatate, 100 mg, TID PRN  ipratropium-albuteroL, 3 mL, q6h PRN  LORazepam, 0.5 mg, q5 min PRN  magnesium hydroxide, 30 mL, Daily PRN  ondansetron, 4 mg, q8h PRN   Or  ondansetron, 4 mg, q8h PRN      Prior to Admission Meds:  Medications Prior to Admission   Medication Sig Dispense Refill Last Dose    amLODIPine (Norvasc) 10 mg tablet Take 1 tablet (10 mg) by mouth once daily.   Past Week    aspirin 81 mg EC tablet Take 1 tablet (81 mg) by mouth once daily.   Past Week    atorvastatin (Lipitor) 10 mg tablet Take 1 tablet (10 mg) by mouth once daily.   Past Week    calcium carbonate-vitamin D3 (Calcium 600 + D,3,) 600 mg-10 mcg (400 unit) tablet Take 1 tablet by mouth once daily.   Past Week    ergocalciferol (Vitamin D-2) 50 MCG (2000 UT) capsule capsule Take 1 capsule (50 mcg) by mouth 1 (one) time per week.   Past Month    furosemide (Lasix) 20 mg tablet Take 1 tablet (20 mg) by mouth 2 times a day.   Past Week    metoprolol tartrate (Lopressor) 50 mg tablet Take 1 tablet by mouth once daily.   Past Week    torsemide (Demadex) 20 mg tablet Take 1 tablet (20 mg) by mouth once  "daily.   Past Week    traMADol (Ultram) 50 mg tablet Take 1 tablet (50 mg) by mouth every 8 hours if needed.   Past Week    nitroglycerin (Nitrostat) 0.4 mg SL tablet Place 1 tablet (0.4 mg) under the tongue every 5 minutes if needed for chest pain (for up to 3 doses.Call 911 is pain persists).   More than a month       DATA:  CMP:  Recent Labs     08/31/24 2041 08/30/24 1907 08/29/24 1844 08/28/24 1902 08/27/24 1821 08/26/24 1827 08/25/24 1932 08/24/24 2238   * 135* 137 136 138 141 142 141   K 3.8 4.6 4.8 4.3 5.0 3.6 4.1 4.4   CL 98 98 96* 96* 101 103 105 107   CO2 24 18* 29 28 21 27 27 25   ANIONGAP 16 24* 17 16 21* 15 14 13   BUN 34* 36* 35* 31* 29* 28* 29* 29*   CREATININE 1.47* 1.60* 1.49* 1.65* 1.40* 1.51* 1.55* 1.56*   EGFR 34* 31* 34* 30* 36* 33* 32* 32*   MG 2.33 2.37 2.16 2.19 2.26 2.10 2.03 2.24     Recent Labs     08/31/24 2041 08/30/24 1907 08/29/24 1844 08/29/24 0954 08/28/24 1902 08/27/24 1821 08/26/24 1827 08/25/24 1932 08/24/24 2238 08/23/24 2235   ALBUMIN 2.6* 2.9* 3.0*  --  2.9* 2.8* 3.2* 3.0* 3.1* 3.5   ALT  --   --   --   --   --   --   --   --   --  12   AST  --   --   --   --   --   --   --   --   --  18   BILITOT  --   --   --   --   --   --   --   --   --  0.7   LIPASE  --   --   --  9  --   --   --   --   --   --      CBC:  Recent Labs     08/31/24  2041 08/30/24  1907 08/29/24  1844 08/28/24  1902 08/27/24  1821 08/26/24  1827 08/26/24  0940 08/25/24  1932   WBC 5.3 7.1 5.9 5.6 5.7 6.2 5.4 5.4   HGB 14.8 16.0 14.8 14.1 14.1 14.3 13.0 13.3   HCT 42.9 51.2* 47.0* 45.2 44.8 43.5 40.0 42.8    184 199 211 213 214 213 216   MCV 76* 86 85 85 84 82 82 84     COAG:   Recent Labs     08/26/24  0940 08/25/24  1932 08/24/24  1305 08/24/24  0645 08/24/24  0001   INR  --   --   --   --  1.1   HAUF 0.5 0.5 0.6 0.6  --      ABO: No results for input(s): \"ABO\" in the last 33602 hours.  HEME/ENDO: No results for input(s): \"FERRITIN\", \"IRONSAT\", \"TSH\", \"FREET4\", \"HGBA1C\" in the " "last 03543 hours.  CARDIAC:   Recent Labs     08/23/24  2341 08/23/24  2235   TROPHS 189* 235*   BNP  --  2,626*     No results for input(s): \"CHOL\", \"LDLF\", \"LDLCALC\", \"HDL\", \"TRIG\" in the last 06507 hours.  TOX:No results for input(s): \"AMPHETAMINE\", \"BENZO\", \"CANNABINOID\", \"COCAI\", \"FENTANYL\", \"OPIATE\", \"OXYCODONE\", \"PCP\" in the last 94243 hours.    No lab exists for component: \"BARBSCRUR\"  MICRO: No results for input(s): \"ESR\", \"CRP\", \"PROCAL\" in the last 84804 hours.  No results found for the last 90 days.      FOLLOWUP:   Future Appointments   Date Time Provider Department Center   9/17/2024  3:00 PM Lit Goss MD [id number]             "

## 2024-09-01 NOTE — PROGRESS NOTES
09/01/24 1026   Current Planned Discharge Disposition   Current Planned Discharge Disposition SNF     TCC contacted patient's daughter Cassidy by phone today. She states pt's grandson is still reviewing list of SNFs at this time and family is still discussing. Advised to pick 4-5 choices so that referral can be sent.  TCC to reach out to family at later time.    Claire Snowden RN  (Weekend Transitional Care Coordinator-TCC, send Epic message)

## 2024-09-02 LAB
ALBUMIN SERPL BCP-MCNC: 3 G/DL (ref 3.4–5)
ANION GAP SERPL CALC-SCNC: 11 MMOL/L (ref 10–20)
BUN SERPL-MCNC: 25 MG/DL (ref 6–23)
CALCIUM SERPL-MCNC: 9 MG/DL (ref 8.6–10.6)
CHLORIDE SERPL-SCNC: 101 MMOL/L (ref 98–107)
CO2 SERPL-SCNC: 30 MMOL/L (ref 21–32)
CREAT SERPL-MCNC: 1.47 MG/DL (ref 0.5–1.05)
EGFRCR SERPLBLD CKD-EPI 2021: 34 ML/MIN/1.73M*2
ERYTHROCYTE [DISTWIDTH] IN BLOOD BY AUTOMATED COUNT: 18.6 % (ref 11.5–14.5)
GLUCOSE SERPL-MCNC: 85 MG/DL (ref 74–99)
HCT VFR BLD AUTO: 50 % (ref 36–46)
HGB BLD-MCNC: 16 G/DL (ref 12–16)
MAGNESIUM SERPL-MCNC: 2.37 MG/DL (ref 1.6–2.4)
MCH RBC QN AUTO: 26.8 PG (ref 26–34)
MCHC RBC AUTO-ENTMCNC: 32 G/DL (ref 32–36)
MCV RBC AUTO: 84 FL (ref 80–100)
NRBC BLD-RTO: 0 /100 WBCS (ref 0–0)
PHOSPHATE SERPL-MCNC: 2 MG/DL (ref 2.5–4.9)
PLATELET # BLD AUTO: 222 X10*3/UL (ref 150–450)
POTASSIUM SERPL-SCNC: 4.7 MMOL/L (ref 3.5–5.3)
RBC # BLD AUTO: 5.98 X10*6/UL (ref 4–5.2)
SODIUM SERPL-SCNC: 137 MMOL/L (ref 136–145)
WBC # BLD AUTO: 5 X10*3/UL (ref 4.4–11.3)

## 2024-09-02 PROCEDURE — 83735 ASSAY OF MAGNESIUM: CPT

## 2024-09-02 PROCEDURE — 1200000002 HC GENERAL ROOM WITH TELEMETRY DAILY

## 2024-09-02 PROCEDURE — 2500000001 HC RX 250 WO HCPCS SELF ADMINISTERED DRUGS (ALT 637 FOR MEDICARE OP)

## 2024-09-02 PROCEDURE — 80069 RENAL FUNCTION PANEL: CPT

## 2024-09-02 PROCEDURE — 2500000004 HC RX 250 GENERAL PHARMACY W/ HCPCS (ALT 636 FOR OP/ED): Performed by: STUDENT IN AN ORGANIZED HEALTH CARE EDUCATION/TRAINING PROGRAM

## 2024-09-02 PROCEDURE — 85027 COMPLETE CBC AUTOMATED: CPT

## 2024-09-02 PROCEDURE — 2500000002 HC RX 250 W HCPCS SELF ADMINISTERED DRUGS (ALT 637 FOR MEDICARE OP, ALT 636 FOR OP/ED): Performed by: STUDENT IN AN ORGANIZED HEALTH CARE EDUCATION/TRAINING PROGRAM

## 2024-09-02 PROCEDURE — 2500000002 HC RX 250 W HCPCS SELF ADMINISTERED DRUGS (ALT 637 FOR MEDICARE OP, ALT 636 FOR OP/ED)

## 2024-09-02 PROCEDURE — 99233 SBSQ HOSP IP/OBS HIGH 50: CPT | Performed by: INTERNAL MEDICINE

## 2024-09-02 PROCEDURE — 2500000004 HC RX 250 GENERAL PHARMACY W/ HCPCS (ALT 636 FOR OP/ED): Performed by: NURSE PRACTITIONER

## 2024-09-02 PROCEDURE — 36415 COLL VENOUS BLD VENIPUNCTURE: CPT

## 2024-09-02 PROCEDURE — 2500000001 HC RX 250 WO HCPCS SELF ADMINISTERED DRUGS (ALT 637 FOR MEDICARE OP): Performed by: STUDENT IN AN ORGANIZED HEALTH CARE EDUCATION/TRAINING PROGRAM

## 2024-09-02 PROCEDURE — 2500000001 HC RX 250 WO HCPCS SELF ADMINISTERED DRUGS (ALT 637 FOR MEDICARE OP): Performed by: NURSE PRACTITIONER

## 2024-09-02 RX ADMIN — SACUBITRIL AND VALSARTAN 0.5 TABLET: 24; 26 TABLET, FILM COATED ORAL at 08:14

## 2024-09-02 RX ADMIN — PREDNISONE 2.5 MG: 2.5 TABLET ORAL at 08:15

## 2024-09-02 RX ADMIN — Medication 1 TABLET: at 08:14

## 2024-09-02 RX ADMIN — FUROSEMIDE 20 MG: 20 TABLET ORAL at 08:14

## 2024-09-02 RX ADMIN — SACUBITRIL AND VALSARTAN 0.5 TABLET: 24; 26 TABLET, FILM COATED ORAL at 21:54

## 2024-09-02 RX ADMIN — ENOXAPARIN SODIUM 30 MG: 100 INJECTION SUBCUTANEOUS at 08:14

## 2024-09-02 RX ADMIN — ROSUVASTATIN CALCIUM 20 MG: 20 TABLET, FILM COATED ORAL at 21:54

## 2024-09-02 RX ADMIN — ASPIRIN 81 MG: 81 TABLET, COATED ORAL at 08:14

## 2024-09-02 RX ADMIN — PANTOPRAZOLE SODIUM 40 MG: 40 TABLET, DELAYED RELEASE ORAL at 06:54

## 2024-09-02 RX ADMIN — DAPAGLIFLOZIN 10 MG: 10 TABLET, FILM COATED ORAL at 13:30

## 2024-09-02 RX ADMIN — SPIRONOLACTONE 12.5 MG: 25 TABLET, FILM COATED ORAL at 08:14

## 2024-09-02 RX ADMIN — SENNOSIDES AND DOCUSATE SODIUM 1 TABLET: 8.6; 5 TABLET ORAL at 21:54

## 2024-09-02 RX ADMIN — METOPROLOL SUCCINATE 12.5 MG: 25 TABLET, EXTENDED RELEASE ORAL at 08:14

## 2024-09-02 ASSESSMENT — COGNITIVE AND FUNCTIONAL STATUS - GENERAL
HELP NEEDED FOR BATHING: A LITTLE
PERSONAL GROOMING: A LITTLE
DRESSING REGULAR LOWER BODY CLOTHING: A LITTLE
DRESSING REGULAR UPPER BODY CLOTHING: A LITTLE
MOVING FROM LYING ON BACK TO SITTING ON SIDE OF FLAT BED WITH BEDRAILS: A LITTLE
TOILETING: A LOT
PERSONAL GROOMING: A LITTLE
MOVING FROM LYING ON BACK TO SITTING ON SIDE OF FLAT BED WITH BEDRAILS: A LITTLE
WALKING IN HOSPITAL ROOM: TOTAL
MOBILITY SCORE: 9
DRESSING REGULAR LOWER BODY CLOTHING: A LITTLE
TURNING FROM BACK TO SIDE WHILE IN FLAT BAD: A LOT
CLIMB 3 TO 5 STEPS WITH RAILING: TOTAL
DRESSING REGULAR UPPER BODY CLOTHING: A LITTLE
CLIMB 3 TO 5 STEPS WITH RAILING: TOTAL
STANDING UP FROM CHAIR USING ARMS: TOTAL
DAILY ACTIVITIY SCORE: 18
HELP NEEDED FOR BATHING: A LITTLE
MOBILITY SCORE: 9
MOVING TO AND FROM BED TO CHAIR: TOTAL
DAILY ACTIVITIY SCORE: 17
WALKING IN HOSPITAL ROOM: TOTAL
TURNING FROM BACK TO SIDE WHILE IN FLAT BAD: A LOT
TOILETING: A LOT
MOVING TO AND FROM BED TO CHAIR: TOTAL
EATING MEALS: A LITTLE
STANDING UP FROM CHAIR USING ARMS: TOTAL

## 2024-09-02 ASSESSMENT — PAIN SCALES - GENERAL
PAINLEVEL_OUTOF10: 0 - NO PAIN
PAINLEVEL_OUTOF10: 0 - NO PAIN

## 2024-09-02 ASSESSMENT — PAIN - FUNCTIONAL ASSESSMENT: PAIN_FUNCTIONAL_ASSESSMENT: 0-10

## 2024-09-02 NOTE — CARE PLAN
Problem: Fall/Injury  Goal: Not fall by end of shift  Outcome: Progressing  Goal: Be free from injury by end of the shift  Outcome: Progressing  Goal: Verbalize understanding of personal risk factors for fall in the hospital  Outcome: Progressing  Goal: Verbalize understanding of risk factor reduction measures to prevent injury from fall in the home  Outcome: Progressing  Goal: Use assistive devices by end of the shift  Outcome: Progressing  Goal: Pace activities to prevent fatigue by end of the shift  Outcome: Progressing     Problem: Pain - Adult  Goal: Verbalizes/displays adequate comfort level or baseline comfort level  Outcome: Progressing     Problem: Safety - Adult  Goal: Free from fall injury  Outcome: Progressing     Problem: Discharge Planning  Goal: Discharge to home or other facility with appropriate resources  Outcome: Progressing     Problem: Chronic Conditions and Co-morbidities  Goal: Patient's chronic conditions and co-morbidity symptoms are monitored and maintained or improved  Outcome: Progressing     Problem: Skin  Goal: Participates in plan/prevention/treatment measures  Outcome: Progressing  Goal: Prevent/manage excess moisture  Outcome: Progressing  Goal: Prevent/minimize sheer/friction injuries  Outcome: Progressing  Goal: Promote/optimize nutrition  Outcome: Progressing   The patient's goals for the shift include      The clinical goals for the shift include Pt will remain hemodynamically stable

## 2024-09-02 NOTE — PROGRESS NOTES
"HVI Attending Shared Visit Note    This is a shared visit. Please see Advanced Practice Provider's encounter note for additional details.        Overnight events/Subjective: doing ok, sad to still be in the hospital    Exam:   Physical exam: Well appearing, no distress. Normal rate, regular rhythm, non labored breathing, clear to auscultation, abdomen non distended, no LE edema, \"woody\" appearing legs, minimal bilateral upper extremity swelling. no focal neuro deficits.     A/P:   88 F with SSS s/p PPM, HTN, CKD and HFpEF who presented with decompensated HF and was found to have a newly reduced EF (30-35%) and PYP suggestion of TTR amyloid.  #New HFrEF + TTR amyloidosis: Despite high pacing burden, she does not have intrinsic conduction to allow change in pacing settings to minimize pacing. Low dose ARNI, SGLT2, spironolactone and metoprolol as her BP is around 100/60. Low threshold to cut back. Tafamidis as an outpatient. Continue lasix 20 mg.     Dispo: medically ready for discharge, pending pre cert for SNF. She may want to go home if family is supportive, given the delay in getting a bed at rehab.     Oscar Marsh MD    ________________________________________________________________________________  Problems:   Principal Problem:    Acute systolic heart failure (Multi)  Active Problems:    Sick sinus syndrome (Multi)    NSTEMI (non-ST elevated myocardial infarction) (Multi)    Cardiomyopathy (Multi)    Pacemaker    TERI (acute kidney injury) (CMS-[facility])    Wild-type transthyretin-related (ATTR) amyloidosis (Multi)      Objective   Admit Date: 8/24/2024  Hospital Length of Stay: 9   Home: [facility] [id number]    Vitals:      9/2/2024    11:14 AM 9/2/2024     7:33 AM 9/2/2024     4:49 AM 9/1/2024    11:28 PM 9/1/2024     9:21 PM 9/1/2024     7:49 PM 9/1/2024     3:27 PM   Vitals   Systolic 121 120 120 109 101 99 114   Diastolic 67 61 64 64 60 49 66   Heart Rate  71 72 70  68 84   Temp 36.2 °C (97.2 °F) 36.2 °C " (97.2 °F) 36.4 °C (97.5 °F) 36.2 °C (97.2 °F)  36.2 °C (97.2 °F) 36.3 °C (97.3 °F)   Resp 16 16 16 16  20 18   Weight (lb)   114.86       BMI   20.35 kg/m2       BSA (m2)   1.52 m2         Wt Readings from Last 5 Encounters:   09/02/24 52.1 kg (114 lb 13.8 oz)       Intake/Output Summary (Last 24 hours) at 9/2/2024 1139  Last data filed at 9/2/2024 0700  Gross per 24 hour   Intake 210 ml   Output 1550 ml   Net -1340 ml         MEDICATIONS  Infusions:       Scheduled:  aspirin, 81 mg, Daily  calcium carbonate-vitamin D3, 1 tablet, Daily  dapagliflozin propanediol, 10 mg, q24h  enoxaparin, 30 mg, q24h  ergocalciferol, 1,250 mcg, Every Sunday  furosemide, 20 mg, Daily  metoprolol succinate XL, 12.5 mg, Daily  pantoprazole, 40 mg, Daily before breakfast  polyethylene glycol, 17 g, Daily  predniSONE, 2.5 mg, Daily  rosuvastatin, 20 mg, Nightly  sacubitriL-valsartan, 0.5 tablet, BID  sennosides-docusate sodium, 1 tablet, Nightly  spironolactone, 12.5 mg, Daily      PRN:  acetaminophen, 650 mg, q6h PRN  benzonatate, 100 mg, TID PRN  ipratropium-albuteroL, 3 mL, q6h PRN  LORazepam, 0.5 mg, q5 min PRN  magnesium hydroxide, 30 mL, Daily PRN  ondansetron, 4 mg, q8h PRN   Or  ondansetron, 4 mg, q8h PRN      Prior to Admission Meds:  Medications Prior to Admission   Medication Sig Dispense Refill Last Dose    amLODIPine (Norvasc) 10 mg tablet Take 1 tablet (10 mg) by mouth once daily.   Past Week    aspirin 81 mg EC tablet Take 1 tablet (81 mg) by mouth once daily.   Past Week    atorvastatin (Lipitor) 10 mg tablet Take 1 tablet (10 mg) by mouth once daily.   Past Week    calcium carbonate-vitamin D3 (Calcium 600 + D,3,) 600 mg-10 mcg (400 unit) tablet Take 1 tablet by mouth once daily.   Past Week    ergocalciferol (Vitamin D-2) 50 MCG (2000 UT) capsule capsule Take 1 capsule (50 mcg) by mouth 1 (one) time per week.   Past Month    furosemide (Lasix) 20 mg tablet Take 1 tablet (20 mg) by mouth 2 times a day.   Past Week     metoprolol tartrate (Lopressor) 50 mg tablet Take 1 tablet by mouth once daily.   Past Week    torsemide (Demadex) 20 mg tablet Take 1 tablet (20 mg) by mouth once daily.   Past Week    traMADol (Ultram) 50 mg tablet Take 1 tablet (50 mg) by mouth every 8 hours if needed.   Past Week    nitroglycerin (Nitrostat) 0.4 mg SL tablet Place 1 tablet (0.4 mg) under the tongue every 5 minutes if needed for chest pain (for up to 3 doses.Call 911 is pain persists).   More than a month       DATA:  CMP:  Recent Labs     09/01/24 2112 08/31/24 2041 08/30/24 1907 08/29/24 1844 08/28/24 1902 08/27/24 1821 08/26/24 1827 08/25/24 1932    134* 135* 137 136 138 141 142   K 4.1 3.8 4.6 4.8 4.3 5.0 3.6 4.1    98 98 96* 96* 101 103 105   CO2 27 24 18* 29 28 21 27 27   ANIONGAP 12 16 24* 17 16 21* 15 14   BUN 28* 34* 36* 35* 31* 29* 28* 29*   CREATININE 1.44* 1.47* 1.60* 1.49* 1.65* 1.40* 1.51* 1.55*   EGFR 35* 34* 31* 34* 30* 36* 33* 32*   MG 2.13 2.33 2.37 2.16 2.19 2.26 2.10 2.03     Recent Labs     09/01/24 2112 08/31/24 2041 08/30/24 1907 08/29/24 1844 08/29/24  0954 08/28/24 1902 08/27/24 1821 08/26/24 1827 08/25/24 1932 08/24/24 2238 08/23/24 2235   ALBUMIN 2.6* 2.6* 2.9* 3.0*  --  2.9* 2.8* 3.2* 3.0*   < > 3.5   ALT  --   --   --   --   --   --   --   --   --   --  12   AST  --   --   --   --   --   --   --   --   --   --  18   BILITOT  --   --   --   --   --   --   --   --   --   --  0.7   LIPASE  --   --   --   --  9  --   --   --   --   --   --     < > = values in this interval not displayed.     CBC:  Recent Labs     09/01/24 2112 08/31/24 2041 08/30/24 1907 08/29/24 1844 08/28/24 1902 08/27/24 1821 08/26/24 1827 08/26/24  0940   WBC 5.2 5.3 7.1 5.9 5.6 5.7 6.2 5.4   HGB 14.5 14.8 16.0 14.8 14.1 14.1 14.3 13.0   HCT 43.3 42.9 51.2* 47.0* 45.2 44.8 43.5 40.0    193 184 199 211 213 214 213   MCV 78* 76* 86 85 85 84 82 82     COAG:   Recent Labs     08/26/24  0940 08/25/24 1932  "08/24/24  1305 08/24/24  0645 08/24/24  0001   INR  --   --   --   --  1.1   HAUF 0.5 0.5 0.6 0.6  --      ABO: No results for input(s): \"ABO\" in the last 60342 hours.  HEME/ENDO: No results for input(s): \"FERRITIN\", \"IRONSAT\", \"TSH\", \"FREET4\", \"HGBA1C\" in the last 96359 hours.  CARDIAC:   Recent Labs     08/23/24  2341 08/23/24  2235   TROPHS 189* 235*   BNP  --  2,626*     No results for input(s): \"CHOL\", \"LDLF\", \"LDLCALC\", \"HDL\", \"TRIG\" in the last 41849 hours.  TOX:No results for input(s): \"AMPHETAMINE\", \"BENZO\", \"CANNABINOID\", \"COCAI\", \"FENTANYL\", \"OPIATE\", \"OXYCODONE\", \"PCP\" in the last 42161 hours.    No lab exists for component: \"BARBSCRUR\"  MICRO: No results for input(s): \"ESR\", \"CRP\", \"PROCAL\" in the last 28208 hours.  No results found for the last 90 days.      FOLLOWUP:   Future Appointments   Date Time Provider Department Center   9/17/2024  3:00 PM Lit Goss MD [id number]             "

## 2024-09-02 NOTE — PROGRESS NOTES
09/02/24 1241   Current Planned Discharge Disposition   Current Planned Discharge Disposition Home H     TCC notified by patient's team that family would like to bring patient home with home care. They have requested a hospital bed, shower chair, BSC and WW at discharge. Referral sent to [facility]. [facility] notified that the orders have been placed for home care; will update with given SOC.    Claire Snowden RN  (Weekend Transitional Care Coordinator-TCC, send Epic message)

## 2024-09-02 NOTE — CARE PLAN
Problem: Fall/Injury  Goal: Not fall by end of shift  9/2/2024 1031 by Gabbie Flower LPN  Outcome: Progressing  9/2/2024 1031 by Gabbie Flower LPN  Outcome: Progressing  Goal: Be free from injury by end of the shift  9/2/2024 1031 by Gabbie Flower LPN  Outcome: Progressing  9/2/2024 1031 by Gabbie Flower LPN  Outcome: Progressing  Goal: Verbalize understanding of personal risk factors for fall in the hospital  9/2/2024 1031 by Gabbie Flower LPN  Outcome: Progressing  9/2/2024 1031 by Gabbie Flower LPN  Outcome: Progressing  Goal: Verbalize understanding of risk factor reduction measures to prevent injury from fall in the home  9/2/2024 1031 by Gabbie Flower LPN  Outcome: Progressing  9/2/2024 1031 by Gabbie Flower LPN  Outcome: Progressing  Goal: Use assistive devices by end of the shift  9/2/2024 1031 by Gabbie Flower LPN  Outcome: Progressing  9/2/2024 1031 by Gabbie Flower LPN  Outcome: Progressing  Goal: Pace activities to prevent fatigue by end of the shift  9/2/2024 1031 by Gabbie Flower LPN  Outcome: Progressing  9/2/2024 1031 by Gabbie Flower LPN  Outcome: Progressing     Problem: Pain - Adult  Goal: Verbalizes/displays adequate comfort level or baseline comfort level  9/2/2024 1031 by Gabbie Flower LPN  Outcome: Progressing  9/2/2024 1031 by Gabbie Flower LPN  Outcome: Progressing     Problem: Safety - Adult  Goal: Free from fall injury  9/2/2024 1031 by Gabbie Flower LPN  Outcome: Progressing  9/2/2024 1031 by Gabbie Flower LPN  Outcome: Progressing     Problem: Discharge Planning  Goal: Discharge to home or other facility with appropriate resources  9/2/2024 1031 by Gabbie Flower LPN  Outcome: Progressing  9/2/2024 1031 by Gabbie Flower LPN  Outcome: Progressing     Problem: Chronic Conditions and Co-morbidities  Goal: Patient's chronic conditions and co-morbidity symptoms are  monitored and maintained or improved  9/2/2024 1031 by Gabbie Flower LPN  Outcome: Progressing  9/2/2024 1031 by Gabbie Flower LPN  Outcome: Progressing     Problem: Skin  Goal: Participates in plan/prevention/treatment measures  9/2/2024 1031 by Gabbie Flower LPN  Outcome: Progressing  9/2/2024 1031 by Gabbie Flower LPN  Outcome: Progressing  Goal: Prevent/manage excess moisture  9/2/2024 1031 by Gabbie Flower LPN  Outcome: Progressing  9/2/2024 1031 by Gabbie Flower LPN  Outcome: Progressing  Goal: Prevent/minimize sheer/friction injuries  9/2/2024 1031 by Gabbie Flower LPN  Outcome: Progressing  9/2/2024 1031 by Gabbie Flower LPN  Outcome: Progressing  Goal: Promote/optimize nutrition  9/2/2024 1031 by Gabbie Flower LPN  Outcome: Progressing  9/2/2024 1031 by Gabbie Flower LPN  Outcome: Progressing   The patient's goals for the shift include      The clinical goals for the shift include Pt will remain hemodynamically stable

## 2024-09-02 NOTE — PROGRESS NOTES
Interval events:    No acute events overnight    Subjective:  Patient reports feeling well this morning. She is frustrated with still be hospitalized. We reiterated that she is medically ready for discharge, as we are awaiting dispo plan from her family. Patient does not want to be discharged to a SNF, agreeable to be discharged with [facility].    I met with daughter and grandson at the bedside following rounds. They want to uphold patient wishes to be discharged home with home care. Requesting hospital bed, shower chair, bedside commode and wheeled walker to be delivered to the home prior to patient's discharge.    Today in brief:  - to be discharged home with [facility] once DME is delivered to home (hospital bed, shower chair, bedside commode and wheeled walker)  - TCC placed referral [place] for DME    Objective:  Vitals:    09/02/24 1114   BP: 121/67   Pulse:    Resp: 16   Temp: 36.2 °C (97.2 °F)   SpO2: 93%     Weight         8/28/2024  0506 8/29/2024  0412 8/30/2024  0500 8/31/2024  0505 9/2/2024  0449    Weight: 59.7 kg (131 lb 11.2 oz) 54.9 kg (121 lb 0.5 oz) 55.1 kg (121 lb 6.4 oz) 52.7 kg (116 lb 2.9 oz) 52.1 kg (114 lb 13.8 oz)            Intake/Output Summary (Last 24 hours) at 9/2/2024 1321  Last data filed at 9/2/2024 0700  Gross per 24 hour   Intake 210 ml   Output 1150 ml   Net -940 ml     Recent Results (from the past 24 hour(s))   Renal Function Panel    Collection Time: 09/01/24  9:12 PM   Result Value Ref Range    Glucose 85 74 - 99 mg/dL    Sodium 138 136 - 145 mmol/L    Potassium 4.1 3.5 - 5.3 mmol/L    Chloride 103 98 - 107 mmol/L    Bicarbonate 27 21 - 32 mmol/L    Anion Gap 12 10 - 20 mmol/L    Urea Nitrogen 28 (H) 6 - 23 mg/dL    Creatinine 1.44 (H) 0.50 - 1.05 mg/dL    eGFR 35 (L) >60 mL/min/1.73m*2    Calcium 8.4 (L) 8.6 - 10.6 mg/dL    Phosphorus 2.5 2.5 - 4.9 mg/dL    Albumin 2.6 (L) 3.4 - 5.0 g/dL   Magnesium    Collection Time: 09/01/24  9:12 PM   Result Value Ref Range    Magnesium 2.13 1.60  - 2.40 mg/dL   CBC    Collection Time: 09/01/24  9:12 PM   Result Value Ref Range    WBC 5.2 4.4 - 11.3 x10*3/uL    nRBC 0.0 0.0 - 0.0 /100 WBCs    RBC 5.52 (H) 4.00 - 5.20 x10*6/uL    Hemoglobin 14.5 12.0 - 16.0 g/dL    Hematocrit 43.3 36.0 - 46.0 %    MCV 78 (L) 80 - 100 fL    MCH 26.3 26.0 - 34.0 pg    MCHC 33.5 32.0 - 36.0 g/dL    RDW 16.7 (H) 11.5 - 14.5 %    Platelets 208 150 - 450 x10*3/uL     Inpatient Medications:  Scheduled medications   Medication Dose Route Frequency    aspirin  81 mg oral Daily    calcium carbonate-vitamin D3  1 tablet oral Daily    dapagliflozin propanediol  10 mg oral q24h    enoxaparin  30 mg subcutaneous q24h    ergocalciferol  1,250 mcg oral Every Sunday    furosemide  20 mg oral Daily    metoprolol succinate XL  12.5 mg oral Daily    pantoprazole  40 mg oral Daily before breakfast    polyethylene glycol  17 g oral Daily    predniSONE  2.5 mg oral Daily    rosuvastatin  20 mg oral Nightly    sacubitriL-valsartan  0.5 tablet oral BID    sennosides-docusate sodium  1 tablet oral Nightly    spironolactone  12.5 mg oral Daily     PRN medications   Medication    acetaminophen    benzonatate    ipratropium-albuteroL    LORazepam    magnesium hydroxide    ondansetron    Or    ondansetron     Continuous Medications   Medication Dose Last Rate     Physical Exam  Constitutional:       General: She is not in acute distress.     Appearance: Normal appearance.   Eyes:      Extraocular Movements: Extraocular movements intact.   Neck:      Vascular: No JVD.   Cardiovascular:      Rate and Rhythm: Normal rate and regular rhythm.      Comments: V paced  Pulmonary:      Effort: Pulmonary effort is normal.      Breath sounds: Normal breath sounds.   Abdominal:      General: Abdomen is flat.   Musculoskeletal:      Right lower leg: No edema.      Left lower leg: No edema.   Skin:     General: Skin is warm and dry.   Neurological:      General: No focal deficit present.      Mental Status: She is alert  and oriented to person, place, and time.       Assessment/Plan   Erin Lopez is an 88 y.o. female with PMH of NSTEMI (nonobstructive CAD 2017), HFpEF, LBBB and SSS s/p PPM 2017 s/p device changeout 11/2022, HTN, CKD presenting with CHF exacerbation and new HFrEF/newly diagnosed TTR cardiac amyloidosis     Acute systolic and diastolic heart failure (HFrEF 30-35%)  TTR Amyloidosis  - TTE 10/2023: EF 50-55%, moderately increased LV septal and posterior wall thickness, Moderately enlarged V, mild to mod AR and MR, mod TR, moderate LAE, Compared to 2017 TTE there is possible underlying infiltrative disease  - Admit TTE 8/24: EF 30-35%, gHK of LV,  elevated LVEDP, abnormal septal motion c/w V pacemaker, moderately increased LV septal thickness. Lv posterior wall moderately thickened. Severe cLVH. Moderately enlarged RV, mild LAE, RA severely dilated, mild to mod MR, severe TR, Slightly elevated RVSP. Mild to mod AR.  - New TTR amyloidosis seen on 8/25 NM PYP scan-- K/L ration WNL, SPEP, UPEP negative  - CXR 8/28: small L and moderate R pleural effusion  - BNP 2,626  - Admit wt: 73.3 kg  - Today's wt: 52.7 kg (55.1 kg)  - on admission severe anasarca to upper abdomen/UE and JVD up to mandible with RLE wound weeping, s/p IV Lasix boluses with intermittent IV Diamox  - lasix dose reduced to 20 mg PO; appears euvolemic   - Advanced HF team consulted: Recommend initiating Tafamidis (amyloid coordinator informed) and titrating GDMT as tolerated  - GDMT : will add cautiously in light of amyloidosis, GDMT doses reduced 8/29 following episode of hypotension  - cont Entresto 12/13 BID, metop succ 12.5 mg daily, dapagliflozin 10 mg daily,  and spironolactone 12.5 mg daily  - Daily standing weights, strict I&O's, 2g sodium diet, 2L fluid restriction    Troponinemia  - HS trop: 235 -> 189. Suspected 2/2 demand from CHF  - Treated with ~48hs of heparin gtt.   - CTA coronary 8/29: minor CAD, prox LAD has focal calcified plaque with  minimal stenosis (<25%), LM, LCx and RCA have no significant atherosclerotic change or stenotic disease. Calcium score 17  - Cont ASA 81 mg daily, rosuvastatin 20 mg nightly      Hx of SSS s/p PPM  , s/p device changeout 2022  - EC% AV pacing  - SR with  on telemetry  - Concern for possible pacemaker mediated cardiomyopathy given high pacing burden on device interrogation  - EP consulted: AV delayed extended but no intrinsic conduction noted. Recommended optimizing GDMT prior to considering upgrade to CRT since she would be high risk.     HTN  - Admit /70  - Last 24hrs: -120s  - continue Entresto as above      Arthritis  - Pt previously getting steroid injections q3 month then put on systemic steroids  - Cont prednisone 2.5 daily   - Was previously listed as on plaquenil for unclear reason but pt states this was stopped  - cont home PPI while on steroids    Constipation, improving   - Overnight , patient reported abdominal pain with 3 liquid BM (last being at midnight), and 2 episodes of emesis. PRN IV Zofran given with little to no relief. CXR/KUB and C.diff PCR ordered by fellow  - KUB revealing moderate colonic burden  - low suspicion for C.diff, diarrhea likely in the setting of constipation -> will escalate bowel regimen with suppository and discontinue C.diff precautions  - following suppository , patient had medium soft stool  -  patient reporting low appetite due to abdominal discomfort, likely in setting of continued constipation. Refusing suppositories, will escalate oral bowel regimen as patient has yet to pass mod/large bowel movement following KUB revealing moderate colonic burden  - cont nightly Nelda-Colace and daily Miralax  - PRN milk of mag ordered  -  ++ BM overnight     RLE wound  - weeping RLE wound iso hypervolemia  - wound care consulted, recommendations ordered    DVT ppx: Lovenox  DISPO:   - to be discharged home with [facility] once DME is delivered to  home (hospital bed, shower chair, bedside commode and wheeled walker)  - TCC placed referral [place] for DME    Code status: Full Code    Patient seen and discussed with Dr. Oscar Woo PA-C

## 2024-09-03 ENCOUNTER — HOME HEALTH ADMISSION (OUTPATIENT)
Dept: HOME HEALTH SERVICES | Facility: HOME HEALTH | Age: 89
End: 2024-09-03
Payer: COMMERCIAL

## 2024-09-03 LAB
ALBUMIN SERPL BCP-MCNC: 2.8 G/DL (ref 3.4–5)
ANION GAP SERPL CALC-SCNC: 13 MMOL/L (ref 10–20)
BUN SERPL-MCNC: 22 MG/DL (ref 6–23)
CALCIUM SERPL-MCNC: 8.5 MG/DL (ref 8.6–10.6)
CHLORIDE SERPL-SCNC: 103 MMOL/L (ref 98–107)
CO2 SERPL-SCNC: 25 MMOL/L (ref 21–32)
CREAT SERPL-MCNC: 1.34 MG/DL (ref 0.5–1.05)
EGFRCR SERPLBLD CKD-EPI 2021: 38 ML/MIN/1.73M*2
ERYTHROCYTE [DISTWIDTH] IN BLOOD BY AUTOMATED COUNT: 18.9 % (ref 11.5–14.5)
GLUCOSE SERPL-MCNC: 76 MG/DL (ref 74–99)
HCT VFR BLD AUTO: 49 % (ref 36–46)
HGB BLD-MCNC: 15.8 G/DL (ref 12–16)
MAGNESIUM SERPL-MCNC: 2.4 MG/DL (ref 1.6–2.4)
MCH RBC QN AUTO: 26.4 PG (ref 26–34)
MCHC RBC AUTO-ENTMCNC: 32.2 G/DL (ref 32–36)
MCV RBC AUTO: 82 FL (ref 80–100)
NRBC BLD-RTO: 0 /100 WBCS (ref 0–0)
PHOSPHATE SERPL-MCNC: 2.3 MG/DL (ref 2.5–4.9)
PLATELET # BLD AUTO: 231 X10*3/UL (ref 150–450)
POTASSIUM SERPL-SCNC: 5.1 MMOL/L (ref 3.5–5.3)
RBC # BLD AUTO: 5.98 X10*6/UL (ref 4–5.2)
SODIUM SERPL-SCNC: 136 MMOL/L (ref 136–145)
WBC # BLD AUTO: 5.1 X10*3/UL (ref 4.4–11.3)

## 2024-09-03 PROCEDURE — RXMED WILLOW AMBULATORY MEDICATION CHARGE

## 2024-09-03 PROCEDURE — 2500000001 HC RX 250 WO HCPCS SELF ADMINISTERED DRUGS (ALT 637 FOR MEDICARE OP): Performed by: NURSE PRACTITIONER

## 2024-09-03 PROCEDURE — 2500000004 HC RX 250 GENERAL PHARMACY W/ HCPCS (ALT 636 FOR OP/ED): Performed by: NURSE PRACTITIONER

## 2024-09-03 PROCEDURE — 36415 COLL VENOUS BLD VENIPUNCTURE: CPT

## 2024-09-03 PROCEDURE — 1200000002 HC GENERAL ROOM WITH TELEMETRY DAILY

## 2024-09-03 PROCEDURE — 99233 SBSQ HOSP IP/OBS HIGH 50: CPT | Performed by: INTERNAL MEDICINE

## 2024-09-03 PROCEDURE — 2500000002 HC RX 250 W HCPCS SELF ADMINISTERED DRUGS (ALT 637 FOR MEDICARE OP, ALT 636 FOR OP/ED): Performed by: STUDENT IN AN ORGANIZED HEALTH CARE EDUCATION/TRAINING PROGRAM

## 2024-09-03 PROCEDURE — 2500000001 HC RX 250 WO HCPCS SELF ADMINISTERED DRUGS (ALT 637 FOR MEDICARE OP)

## 2024-09-03 PROCEDURE — 85027 COMPLETE CBC AUTOMATED: CPT

## 2024-09-03 PROCEDURE — 2500000001 HC RX 250 WO HCPCS SELF ADMINISTERED DRUGS (ALT 637 FOR MEDICARE OP): Performed by: STUDENT IN AN ORGANIZED HEALTH CARE EDUCATION/TRAINING PROGRAM

## 2024-09-03 PROCEDURE — 2500000002 HC RX 250 W HCPCS SELF ADMINISTERED DRUGS (ALT 637 FOR MEDICARE OP, ALT 636 FOR OP/ED)

## 2024-09-03 PROCEDURE — 83735 ASSAY OF MAGNESIUM: CPT

## 2024-09-03 PROCEDURE — 80069 RENAL FUNCTION PANEL: CPT

## 2024-09-03 PROCEDURE — 2500000004 HC RX 250 GENERAL PHARMACY W/ HCPCS (ALT 636 FOR OP/ED): Performed by: STUDENT IN AN ORGANIZED HEALTH CARE EDUCATION/TRAINING PROGRAM

## 2024-09-03 PROCEDURE — 2500000004 HC RX 250 GENERAL PHARMACY W/ HCPCS (ALT 636 FOR OP/ED)

## 2024-09-03 RX ORDER — PANTOPRAZOLE SODIUM 40 MG/1
40 TABLET, DELAYED RELEASE ORAL
Qty: 30 TABLET | Refills: 3 | Status: SHIPPED | OUTPATIENT
Start: 2024-09-04

## 2024-09-03 RX ORDER — ACETAMINOPHEN 325 MG/1
650 TABLET ORAL EVERY 6 HOURS PRN
Start: 2024-09-03

## 2024-09-03 RX ORDER — ROSUVASTATIN CALCIUM 20 MG/1
10 TABLET, COATED ORAL NIGHTLY
Qty: 30 TABLET | Refills: 3 | Status: SHIPPED | OUTPATIENT
Start: 2024-09-03

## 2024-09-03 RX ORDER — SPIRONOLACTONE 25 MG/1
12.5 TABLET ORAL DAILY
Qty: 30 TABLET | Refills: 3 | Status: SHIPPED | OUTPATIENT
Start: 2024-09-04

## 2024-09-03 RX ORDER — FUROSEMIDE 20 MG/1
20 TABLET ORAL DAILY
Qty: 30 TABLET | Refills: 3 | Status: SHIPPED | OUTPATIENT
Start: 2024-09-04

## 2024-09-03 RX ORDER — DAPAGLIFLOZIN 10 MG/1
10 TABLET, FILM COATED ORAL EVERY 24 HOURS
Qty: 30 TABLET | Refills: 3 | Status: SHIPPED | OUTPATIENT
Start: 2024-09-04

## 2024-09-03 RX ORDER — METOPROLOL SUCCINATE 25 MG/1
12.5 TABLET, EXTENDED RELEASE ORAL DAILY
Qty: 30 TABLET | Refills: 3 | Status: SHIPPED | OUTPATIENT
Start: 2024-09-04

## 2024-09-03 RX ORDER — AMOXICILLIN 250 MG
1 CAPSULE ORAL NIGHTLY
Qty: 30 TABLET | Refills: 3 | Status: SHIPPED | OUTPATIENT
Start: 2024-09-03

## 2024-09-03 RX ORDER — PREDNISONE 2.5 MG/1
2.5 TABLET ORAL DAILY
Start: 2024-09-03

## 2024-09-03 RX ORDER — ERGOCALCIFEROL 1.25 MG/1
1250 CAPSULE ORAL
Qty: 30 CAPSULE | Refills: 3 | Status: SHIPPED | OUTPATIENT
Start: 2024-09-08

## 2024-09-03 RX ADMIN — METOPROLOL SUCCINATE 12.5 MG: 25 TABLET, EXTENDED RELEASE ORAL at 09:59

## 2024-09-03 RX ADMIN — PANTOPRAZOLE SODIUM 40 MG: 40 TABLET, DELAYED RELEASE ORAL at 06:26

## 2024-09-03 RX ADMIN — SENNOSIDES AND DOCUSATE SODIUM 1 TABLET: 8.6; 5 TABLET ORAL at 20:28

## 2024-09-03 RX ADMIN — SACUBITRIL AND VALSARTAN 0.5 TABLET: 24; 26 TABLET, FILM COATED ORAL at 09:58

## 2024-09-03 RX ADMIN — PREDNISONE 2.5 MG: 2.5 TABLET ORAL at 10:00

## 2024-09-03 RX ADMIN — SACUBITRIL AND VALSARTAN 0.5 TABLET: 24; 26 TABLET, FILM COATED ORAL at 20:28

## 2024-09-03 RX ADMIN — DAPAGLIFLOZIN 10 MG: 10 TABLET, FILM COATED ORAL at 11:24

## 2024-09-03 RX ADMIN — ROSUVASTATIN CALCIUM 20 MG: 20 TABLET, FILM COATED ORAL at 20:28

## 2024-09-03 RX ADMIN — SPIRONOLACTONE 12.5 MG: 25 TABLET, FILM COATED ORAL at 09:58

## 2024-09-03 RX ADMIN — ENOXAPARIN SODIUM 30 MG: 100 INJECTION SUBCUTANEOUS at 10:01

## 2024-09-03 RX ADMIN — FUROSEMIDE 20 MG: 20 TABLET ORAL at 09:58

## 2024-09-03 RX ADMIN — ASPIRIN 81 MG: 81 TABLET, COATED ORAL at 10:00

## 2024-09-03 RX ADMIN — POLYETHYLENE GLYCOL 3350 17 G: 17 POWDER, FOR SOLUTION ORAL at 10:01

## 2024-09-03 RX ADMIN — Medication 1 TABLET: at 09:59

## 2024-09-03 ASSESSMENT — COGNITIVE AND FUNCTIONAL STATUS - GENERAL
DAILY ACTIVITIY SCORE: 17
HELP NEEDED FOR BATHING: A LITTLE
MOBILITY SCORE: 9
TOILETING: A LOT
PERSONAL GROOMING: A LITTLE
MOBILITY SCORE: 9
DAILY ACTIVITIY SCORE: 17
MOVING TO AND FROM BED TO CHAIR: TOTAL
CLIMB 3 TO 5 STEPS WITH RAILING: TOTAL
DRESSING REGULAR LOWER BODY CLOTHING: A LITTLE
TURNING FROM BACK TO SIDE WHILE IN FLAT BAD: A LOT
DRESSING REGULAR UPPER BODY CLOTHING: A LITTLE
HELP NEEDED FOR BATHING: A LITTLE
MOVING FROM LYING ON BACK TO SITTING ON SIDE OF FLAT BED WITH BEDRAILS: A LITTLE
MOVING TO AND FROM BED TO CHAIR: TOTAL
MOVING FROM LYING ON BACK TO SITTING ON SIDE OF FLAT BED WITH BEDRAILS: A LITTLE
STANDING UP FROM CHAIR USING ARMS: TOTAL
CLIMB 3 TO 5 STEPS WITH RAILING: TOTAL
WALKING IN HOSPITAL ROOM: TOTAL
STANDING UP FROM CHAIR USING ARMS: TOTAL
EATING MEALS: A LITTLE
DRESSING REGULAR UPPER BODY CLOTHING: A LITTLE
PERSONAL GROOMING: A LITTLE
EATING MEALS: A LITTLE
WALKING IN HOSPITAL ROOM: TOTAL
DRESSING REGULAR LOWER BODY CLOTHING: A LITTLE
TURNING FROM BACK TO SIDE WHILE IN FLAT BAD: A LOT
TOILETING: A LOT

## 2024-09-03 ASSESSMENT — PAIN SCALES - GENERAL
PAINLEVEL_OUTOF10: 0 - NO PAIN
PAINLEVEL_OUTOF10: 0 - NO PAIN

## 2024-09-03 ASSESSMENT — PAIN SCALES - WONG BAKER: WONGBAKER_NUMERICALRESPONSE: NO HURT

## 2024-09-03 NOTE — PROGRESS NOTES
Interval events:    No acute events overnight    Subjective:  Patient reports feeling well this morning. Pending DME equipment drop off at home.     Today in brief:  - to be discharged home with [facility] once DME is delivered to home (hospital bed, shower chair, bedside commode and wheeled walker)  - family cleared out patient's bedroom in anticipation of the hospital bed, so there's no place for the patient to sleep yet. The daughter said once the bed is delivered, she or the grandson can come pick her up for discharge. Unclear on the timeframe for bed delivery yet, CMS form filled out today    Objective:  Vitals:    09/03/24 1107   BP: 110/57   Pulse: 71   Resp: 16   Temp: 36.2 °C (97.2 °F)   SpO2: 98%     Weight         8/29/2024  0412 8/30/2024  0500 8/31/2024  0505 9/2/2024  0449 9/3/2024  0624    Weight: 54.9 kg (121 lb 0.5 oz) 55.1 kg (121 lb 6.4 oz) 52.7 kg (116 lb 2.9 oz) 52.1 kg (114 lb 13.8 oz) 51.5 kg (113 lb 8 oz)            Intake/Output Summary (Last 24 hours) at 9/3/2024 1313  Last data filed at 9/3/2024 1100  Gross per 24 hour   Intake 220 ml   Output 990 ml   Net -770 ml     Recent Results (from the past 24 hour(s))   Renal Function Panel    Collection Time: 09/02/24  7:03 PM   Result Value Ref Range    Glucose 85 74 - 99 mg/dL    Sodium 137 136 - 145 mmol/L    Potassium 4.7 3.5 - 5.3 mmol/L    Chloride 101 98 - 107 mmol/L    Bicarbonate 30 21 - 32 mmol/L    Anion Gap 11 10 - 20 mmol/L    Urea Nitrogen 25 (H) 6 - 23 mg/dL    Creatinine 1.47 (H) 0.50 - 1.05 mg/dL    eGFR 34 (L) >60 mL/min/1.73m*2    Calcium 9.0 8.6 - 10.6 mg/dL    Phosphorus 2.0 (L) 2.5 - 4.9 mg/dL    Albumin 3.0 (L) 3.4 - 5.0 g/dL   Magnesium    Collection Time: 09/02/24  7:03 PM   Result Value Ref Range    Magnesium 2.37 1.60 - 2.40 mg/dL   CBC    Collection Time: 09/02/24  7:03 PM   Result Value Ref Range    WBC 5.0 4.4 - 11.3 x10*3/uL    nRBC 0.0 0.0 - 0.0 /100 WBCs    RBC 5.98 (H) 4.00 - 5.20 x10*6/uL    Hemoglobin 16.0 12.0 - 16.0  g/dL    Hematocrit 50.0 (H) 36.0 - 46.0 %    MCV 84 80 - 100 fL    MCH 26.8 26.0 - 34.0 pg    MCHC 32.0 32.0 - 36.0 g/dL    RDW 18.6 (H) 11.5 - 14.5 %    Platelets 222 150 - 450 x10*3/uL     Inpatient Medications:  Scheduled medications   Medication Dose Route Frequency    aspirin  81 mg oral Daily    calcium carbonate-vitamin D3  1 tablet oral Daily    dapagliflozin propanediol  10 mg oral q24h    enoxaparin  30 mg subcutaneous q24h    ergocalciferol  1,250 mcg oral Every Sunday    furosemide  20 mg oral Daily    metoprolol succinate XL  12.5 mg oral Daily    pantoprazole  40 mg oral Daily before breakfast    polyethylene glycol  17 g oral Daily    predniSONE  2.5 mg oral Daily    rosuvastatin  20 mg oral Nightly    sacubitriL-valsartan  0.5 tablet oral BID    sennosides-docusate sodium  1 tablet oral Nightly    spironolactone  12.5 mg oral Daily     PRN medications   Medication    acetaminophen    benzonatate    ipratropium-albuteroL    LORazepam    magnesium hydroxide    ondansetron    Or    ondansetron     Continuous Medications   Medication Dose Last Rate     Physical Exam  Constitutional:       General: She is not in acute distress.     Appearance: Normal appearance.   Eyes:      Extraocular Movements: Extraocular movements intact.   Neck:      Vascular: No JVD.   Cardiovascular:      Rate and Rhythm: Normal rate and regular rhythm.      Comments: V paced  Pulmonary:      Effort: Pulmonary effort is normal.      Breath sounds: Normal breath sounds.   Abdominal:      General: Abdomen is flat.   Musculoskeletal:      Right lower leg: No edema.      Left lower leg: No edema.   Skin:     General: Skin is warm and dry.   Neurological:      General: No focal deficit present.      Mental Status: She is alert and oriented to person, place, and time.       Assessment/Plan   Erin Lopez is an 88 y.o. female with PMH of NSTEMI (nonobstructive CAD 2017), HFpEF, LBBB and SSS s/p PPM 2017 s/p device changeout 11/2022, HTN,  CKD presenting with CHF exacerbation and new HFrEF/newly diagnosed TTR cardiac amyloidosis     Acute systolic and diastolic heart failure (HFrEF 30-35%)  TTR Amyloidosis  - TTE 10/2023: EF 50-55%, moderately increased LV septal and posterior wall thickness, Moderately enlarged V, mild to mod AR and MR, mod TR, moderate LAE, Compared to 2017 TTE there is possible underlying infiltrative disease  - Admit TTE 8/24: EF 30-35%, gHK of LV,  elevated LVEDP, abnormal septal motion c/w V pacemaker, moderately increased LV septal thickness. Lv posterior wall moderately thickened. Severe cLVH. Moderately enlarged RV, mild LAE, RA severely dilated, mild to mod MR, severe TR, Slightly elevated RVSP. Mild to mod AR.  - New TTR amyloidosis seen on 8/25 NM PYP scan-- K/L ration WNL, SPEP, UPEP negative  - CXR 8/28: small L and moderate R pleural effusion  - BNP 2,626  - Admit wt: 73.3 kg  - Today's wt: 51.5 kg (52.7, 55.1 kg)  - on admission severe anasarca to upper abdomen/UE and JVD up to mandible with RLE wound weeping, s/p IV Lasix boluses with intermittent IV Diamox  - lasix dose reduced to 20 mg PO; appears euvolemic   - Advanced HF team consulted: Recommend initiating Tafamidis (amyloid coordinator informed) and titrating GDMT as tolerated  - GDMT : will add cautiously in light of amyloidosis, GDMT doses reduced 8/29 following episode of hypotension  - cont Entresto 12/13 BID, metop succ 12.5 mg daily, dapagliflozin 10 mg daily,  and spironolactone 12.5 mg daily  - Daily standing weights, strict I&O's, 2g sodium diet, 2L fluid restriction    Troponinemia  - HS trop: 235 -> 189. Suspected 2/2 demand from CHF  - Treated with ~48hs of heparin gtt.   - CTA coronary 8/29: minor CAD, prox LAD has focal calcified plaque with minimal stenosis (<25%), LM, LCx and RCA have no significant atherosclerotic change or stenotic disease. Calcium score 17  - Cont ASA 81 mg daily, rosuvastatin 20 mg nightly      Hx of SSS s/p PPM 2017 , s/p  device changeout 2022  - EC% AV pacing  - SR with  on telemetry  - Concern for possible pacemaker mediated cardiomyopathy given high pacing burden on device interrogation  - EP consulted: AV delayed extended but no intrinsic conduction noted. Recommended optimizing GDMT prior to considering upgrade to CRT since she would be high risk.     HTN  - Admit /70  - Last 24hrs: -110s  - continue Entresto as above      Arthritis  - Pt previously getting steroid injections q3 month then put on systemic steroids  - Cont prednisone 2.5 daily   - Was previously listed as on plaquenil for unclear reason but pt states this was stopped  - cont home PPI while on steroids    Constipation, improving   - Overnight , patient reported abdominal pain with 3 liquid BM (last being at midnight), and 2 episodes of emesis. PRN IV Zofran given with little to no relief. CXR/KUB and C.diff PCR ordered by fellow  - KUB revealing moderate colonic burden  - low suspicion for C.diff, diarrhea likely in the setting of constipation -> will escalate bowel regimen with suppository and discontinue C.diff precautions  - following suppository , patient had medium soft stool  -  patient reporting low appetite due to abdominal discomfort, likely in setting of continued constipation. Refusing suppositories, will escalate oral bowel regimen as patient has yet to pass mod/large bowel movement following KUB revealing moderate colonic burden  - cont nightly Nelda-Colace and daily Miralax  - PRN milk of mag ordered  -  ++ BM overnight     RLE wound  - weeping RLE wound iso hypervolemia  - wound care consulted, recommendations ordered    DVT ppx: Lovenox  DISPO:   - to be discharged home with [facility] once DME is delivered to home (hospital bed, shower chair, bedside commode and wheeled walker)  - TCC placed referral [place] for DME    Code status: Full Code    Patient seen and discussed with Dr. Afia Skinner, APRN-CNP     H/O breast reconstruction  01/15/2019    Active  Hipolito Hodgson

## 2024-09-03 NOTE — CONSULTS
"Nutrition Follow Up Assessment:   Nutrition Assessment    Reason for Assessment: Dietitian discretion (follow up)    Patient is a 88 y.o. female presenting with CHF exacerbation and new HFrEF/newly diagnosed TTR cardiac amyloidosis     Plan for discharge to home soon - awaiting DME delivery to home.     PMH of NSTEMI (nonobstructive CAD 2017), HFpEF, LBBB and SSS s/p PPM 2017 s/p device changeout 11/2022, HTN, CKD     Nutrition History:  Energy Intake: Fair 50-75 %  Food and Nutrient History: Pt states that she is eating ok but not great. She hasn't been getting the supplement, which she would like.  She didn't drink a supplement at home. Discussed starting to use ONS to prevent further weight loss.    Anthropometrics:  Height: 160 cm (5' 3\")   Weight: 51.5 kg (113 lb 8 oz)   BMI (Calculated): 20.11  IBW/kg (Dietitian Calculated): 52.3 kg  Percent of IBW: 98 %       Weight History:   09/03/24 0624 51.5 kg (113 lb 8 oz)   09/02/24 04:49:21 52.1 kg (114 lb 13.8 oz)   08/31/24 05:05:54 52.7 kg (116 lb 2.9 oz)   08/30/24 0500 55.1 kg (121 lb 6.4 oz)   08/29/24 04:12:55 54.9 kg (121 lb 0.5 oz)   08/28/24 05:06:59 59.7 kg (131 lb 11.2 oz)   08/27/24 0425 66.9 kg (147 lb 8 oz)   08/26/24 0500 67.7 kg (149 lb 4 oz)   08/24/24 0330 73.3 kg (161 lb 9.6 oz)   08/23/24 2152 64.9 kg (143 lb)     Weight Change %:   Wt loss since admission secondary to fluid losses.  Weight is now ~ 8.6kg lower than usual weight.     Nutrition Focused Physical Exam Findings:  defer: completed during initial eval    Nutrition Significant Labs:  CBC Trend:   Results from last 7 days   Lab Units 09/02/24  1903 09/01/24  2112 08/31/24  2041 08/30/24  1907   WBC AUTO x10*3/uL 5.0 5.2 5.3 7.1   RBC AUTO x10*6/uL 5.98* 5.52* 5.63* 5.93*   HEMOGLOBIN g/dL 16.0 14.5 14.8 16.0   HEMATOCRIT % 50.0* 43.3 42.9 51.2*   MCV fL 84 78* 76* 86   PLATELETS AUTO x10*3/uL 222 208 193 184    , BMP Trend:   Results from last 7 days   Lab Units 09/02/24  1903 " 09/01/24 2112 08/31/24 2041 08/30/24  1907   GLUCOSE mg/dL 85 85 111* 79   CALCIUM mg/dL 9.0 8.4* 8.5* 9.2   SODIUM mmol/L 137 138 134* 135*   POTASSIUM mmol/L 4.7 4.1 3.8 4.6   CO2 mmol/L 30 27 24 18*   CHLORIDE mmol/L 101 103 98 98   BUN mg/dL 25* 28* 34* 36*   CREATININE mg/dL 1.47* 1.44* 1.47* 1.60*        Nutrition Specific Medications:  Vit D, Calcium, prednisone    I/O:   Last BM Date: 08/30/24; Stool Appearance: Loose (08/31/24 2012)    Dietary Orders (From admission, onward)       Start     Ordered    08/30/24 0659  Adult diet Cardiac; 70 gm fat; 2 - 3 grams Sodium; 2000 mL fluid  Diet effective now        Question Answer Comment   Diet type Cardiac    Fat restriction: 70 gm fat    Sodium restriction: 2 - 3 grams Sodium    Dietary fluid restriction / 24h: 2000 mL fluid        08/30/24 0659    08/26/24 1248  Oral nutritional supplements  Until discontinued        Question Answer Comment   Deliver with Breakfast    Deliver with Dinner    Select supplement: Boost [facility]        08/26/24 1247                     Estimated Needs:   Total Energy Estimated Needs (kCal): 1550 kCal  Method for Estimating Needs: 30kcal/kg IBW 52.3 kg  Total Protein Estimated Needs (g): 70 g  Method for Estimating Needs: 1.3g/kg IBW 52.3 kg  Total Fluid Estimated Needs (mL):  (1mL/kcal or per team)        Nutrition Diagnosis   Malnutrition Diagnosis  Patient has Malnutrition Diagnosis: Yes  Diagnosis Status: Ongoing  Malnutrition Diagnosis: Severe malnutrition related to chronic disease or condition  As Evidenced by: suspect patient meeting <75% of estimated energy needs for >1 month; moderate-severe muscle and subcutaneous fat loss; genealized +4 edema.    Nutrition Diagnosis  Patient has Nutrition Diagnosis: Yes  Diagnosis Status (1): New  Nutrition Diagnosis 1: Unintended weight loss  Related to (1): poor intake  As Evidenced by (1): 14.4% wt loss from usual weight. Weight loss previously masked by fluid overload.       Nutrition  Interventions/Recommendations         Nutrition Prescription:  Individualized Nutrition Prescription Provided for : Continue Boost [facility] one per day to provide 530 calories and 22 gm pro per serving.        Nutrition Interventions:   Interventions: Meals and snacks  Goal: Consume >50% of meals and 1 ONS per day      Nutrition Education:   Discussed weight loss with pt. Discussed ways to increase calories and prevent further weight loss. Provided with written information.        Nutrition Monitoring and Evaluation   Food/Nutrient Related History Monitoring  Monitoring and Evaluation Plan: Energy intake  Criteria: PO intake will be adequate to meet estimated needs    Body Composition/Growth/Weight History  Monitoring and Evaluation Plan: Weight  Criteria: prevent weight loss    Biochemical Data, Medical Tests and Procedures  Monitoring and Evaluation Plan: Electrolyte/renal panel, Glucose/endocrine profile  Criteria: Labs WNL      Time Spent (min): 60 minutes

## 2024-09-03 NOTE — SIGNIFICANT EVENT
Ms. Lopez has acute systolic and diastolic heart failure secondary to TTR amyloidosis. She requires diuretics and frequent elevation of HOB > 30 degrees due to this medical condition.    This condition requires frequent positioning of the body that is not feasible with an ordinary bed in order to be elevated more than 30 degrees most of the time due to CHF.

## 2024-09-03 NOTE — CARE PLAN
The clinical goals for the shift include patient will remian HDS        Problem: Fall/Injury  Goal: Not fall by end of shift  Outcome: Progressing  Goal: Use assistive devices by end of the shift  Outcome: Progressing     Problem: Pain - Adult  Goal: Verbalizes/displays adequate comfort level or baseline comfort level  Outcome: Progressing     Problem: Safety - Adult  Goal: Free from fall injury  Outcome: Progressing     Problem: Discharge Planning  Goal: Discharge to home or other facility with appropriate resources  Outcome: Progressing     Problem: Chronic Conditions and Co-morbidities  Goal: Patient's chronic conditions and co-morbidity symptoms are monitored and maintained or improved  Outcome: Progressing     Problem: Skin  Goal: Participates in plan/prevention/treatment measures  Outcome: Progressing  Goal: Prevent/manage excess moisture  Outcome: Progressing  Goal: Prevent/minimize sheer/friction injuries  Outcome: Progressing  Flowsheets (Taken 9/3/2024 0353)  Prevent/minimize sheer/friction injuries: Use pull sheet

## 2024-09-03 NOTE — PROGRESS NOTES
9/3/2024 Care Coordination  Resent  referral to [facility] as a follow from the weekend [facility]CC.   Awaiting  acceptance and delivery. I had spoken to pt earlier this morning to update her on the bed.  She said her family had taken down her bed at home.  Advised we would update her daughter once we have a response from [facility].    9/4/2024 Care Coordination  [facility] will deliver this evening.   Team will  plan for discharge home this evening.  [facility] SOC 9/6.

## 2024-09-04 ENCOUNTER — DOCUMENTATION (OUTPATIENT)
Dept: HOME HEALTH SERVICES | Facility: HOME HEALTH | Age: 89
End: 2024-09-04
Payer: COMMERCIAL

## 2024-09-04 ENCOUNTER — PHARMACY VISIT (OUTPATIENT)
Dept: PHARMACY | Facility: CLINIC | Age: 89
End: 2024-09-04
Payer: COMMERCIAL

## 2024-09-04 VITALS
WEIGHT: 113.5 LBS | DIASTOLIC BLOOD PRESSURE: 75 MMHG | HEART RATE: 67 BPM | HEIGHT: 63 IN | TEMPERATURE: 97.2 F | SYSTOLIC BLOOD PRESSURE: 122 MMHG | RESPIRATION RATE: 18 BRPM | OXYGEN SATURATION: 94 % | BODY MASS INDEX: 20.11 KG/M2

## 2024-09-04 PROBLEM — N17.9 AKI (ACUTE KIDNEY INJURY) (CMS-HCC): Chronic | Status: RESOLVED | Noted: 2024-08-24 | Resolved: 2024-09-04

## 2024-09-04 PROCEDURE — 2500000004 HC RX 250 GENERAL PHARMACY W/ HCPCS (ALT 636 FOR OP/ED): Performed by: STUDENT IN AN ORGANIZED HEALTH CARE EDUCATION/TRAINING PROGRAM

## 2024-09-04 PROCEDURE — 2500000002 HC RX 250 W HCPCS SELF ADMINISTERED DRUGS (ALT 637 FOR MEDICARE OP, ALT 636 FOR OP/ED)

## 2024-09-04 PROCEDURE — 2500000001 HC RX 250 WO HCPCS SELF ADMINISTERED DRUGS (ALT 637 FOR MEDICARE OP): Performed by: STUDENT IN AN ORGANIZED HEALTH CARE EDUCATION/TRAINING PROGRAM

## 2024-09-04 PROCEDURE — 2500000001 HC RX 250 WO HCPCS SELF ADMINISTERED DRUGS (ALT 637 FOR MEDICARE OP)

## 2024-09-04 PROCEDURE — 2500000001 HC RX 250 WO HCPCS SELF ADMINISTERED DRUGS (ALT 637 FOR MEDICARE OP): Performed by: NURSE PRACTITIONER

## 2024-09-04 PROCEDURE — 2500000004 HC RX 250 GENERAL PHARMACY W/ HCPCS (ALT 636 FOR OP/ED): Performed by: NURSE PRACTITIONER

## 2024-09-04 PROCEDURE — 99239 HOSP IP/OBS DSCHRG MGMT >30: CPT | Performed by: INTERNAL MEDICINE

## 2024-09-04 PROCEDURE — 97530 THERAPEUTIC ACTIVITIES: CPT | Mod: GP,CQ

## 2024-09-04 RX ADMIN — ENOXAPARIN SODIUM 30 MG: 100 INJECTION SUBCUTANEOUS at 08:12

## 2024-09-04 RX ADMIN — DAPAGLIFLOZIN 10 MG: 10 TABLET, FILM COATED ORAL at 12:15

## 2024-09-04 RX ADMIN — FUROSEMIDE 20 MG: 20 TABLET ORAL at 08:13

## 2024-09-04 RX ADMIN — PANTOPRAZOLE SODIUM 40 MG: 40 TABLET, DELAYED RELEASE ORAL at 06:37

## 2024-09-04 RX ADMIN — ASPIRIN 81 MG: 81 TABLET, COATED ORAL at 08:12

## 2024-09-04 RX ADMIN — SPIRONOLACTONE 12.5 MG: 25 TABLET, FILM COATED ORAL at 08:13

## 2024-09-04 RX ADMIN — Medication 1 TABLET: at 08:12

## 2024-09-04 RX ADMIN — METOPROLOL SUCCINATE 12.5 MG: 25 TABLET, EXTENDED RELEASE ORAL at 08:12

## 2024-09-04 RX ADMIN — SACUBITRIL AND VALSARTAN 0.5 TABLET: 24; 26 TABLET, FILM COATED ORAL at 08:13

## 2024-09-04 RX ADMIN — PREDNISONE 2.5 MG: 2.5 TABLET ORAL at 08:12

## 2024-09-04 ASSESSMENT — COGNITIVE AND FUNCTIONAL STATUS - GENERAL
MOBILITY SCORE: 14
DRESSING REGULAR LOWER BODY CLOTHING: A LITTLE
WALKING IN HOSPITAL ROOM: TOTAL
MOVING TO AND FROM BED TO CHAIR: A LITTLE
MOVING FROM LYING ON BACK TO SITTING ON SIDE OF FLAT BED WITH BEDRAILS: A LITTLE
MOVING TO AND FROM BED TO CHAIR: TOTAL
DRESSING REGULAR UPPER BODY CLOTHING: A LITTLE
CLIMB 3 TO 5 STEPS WITH RAILING: TOTAL
WALKING IN HOSPITAL ROOM: A LOT
TOILETING: A LOT
DAILY ACTIVITIY SCORE: 17
TURNING FROM BACK TO SIDE WHILE IN FLAT BAD: A LITTLE
PERSONAL GROOMING: A LITTLE
MOVING FROM LYING ON BACK TO SITTING ON SIDE OF FLAT BED WITH BEDRAILS: A LITTLE
STANDING UP FROM CHAIR USING ARMS: TOTAL
TURNING FROM BACK TO SIDE WHILE IN FLAT BAD: A LOT
CLIMB 3 TO 5 STEPS WITH RAILING: TOTAL
HELP NEEDED FOR BATHING: A LITTLE
STANDING UP FROM CHAIR USING ARMS: A LOT
MOBILITY SCORE: 9
EATING MEALS: A LITTLE

## 2024-09-04 ASSESSMENT — PAIN - FUNCTIONAL ASSESSMENT: PAIN_FUNCTIONAL_ASSESSMENT: 0-10

## 2024-09-04 ASSESSMENT — PAIN SCALES - GENERAL
PAINLEVEL_OUTOF10: 0 - NO PAIN
PAINLEVEL_OUTOF10: 0 - NO PAIN

## 2024-09-04 NOTE — CONSULTS
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     Wound Assessment:  Wound 08/25/24 Other (comment) Pretibial Right (Active)   Wound Image   08/27/24 1411   Site Assessment Clean;Dry 09/04/24 1200   Nelda-Wound Assessment Clean;Dry;Intact 09/01/24 0850   Non-staged Wound Description Partial thickness 08/26/24 1353   Wound Length (cm) 5 cm 08/26/24 1353   Wound Width (cm) 5 cm 08/26/24 1353   Wound Surface Area (cm^2) 25 cm^2 08/26/24 1353   State of Healing Early/partial granulation 08/25/24 1244   Margins Undefined edges 08/26/24 0900   Drainage Description None 08/31/24 2012   Drainage Amount None 08/31/24 2012   Dressing Xeroform;Gauze;Kerlix/rolled gauze 09/01/24 1301   Dressing Changed Changed 09/01/24 1301   Dressing Status Clean;Dry 09/04/24 1200       Wound 08/30/24 Coccyx (Active)   Wound Image   09/04/24 1637   Wound Length (cm) 10 cm 09/04/24 1637   Wound Width (cm) 10 cm 09/04/24 1637   Wound Surface Area (cm^2) 100 cm^2 09/04/24 1637   Drainage Description None 09/04/24 1637   Drainage Amount None 08/30/24 1441   Dressing Barrier film;Silicone border dressing 09/04/24 1637   Dressing Changed Changed 09/04/24 1637   Dressing Status Clean;Dry 09/04/24 1637       Wound 09/04/24 Pressure Injury Back Medial (Active)   Wound Image   09/04/24 1638   Site Assessment Red;Purple 09/04/24 1638   Drainage Amount None 09/04/24 1638   Dressing Open to air 09/04/24 1638     Wound Team Summary Assessment: Pt seen sitting up in chair. Stood to reveal ~10cm x 10cm (depending on how loose skin is held) suspected deep tissue injury to coccyx with 2 tiny pink breaks in skin. No drainage. Skin cleaned and prepped with barrier film. Mepilex foam applied to protect and support skin. Red/purple discoloration also noted over thoracic spinous processes suggesting pressure injury.  Left ELISEO. EHOB waffle seat cushion placed under pt in chair, also EHOB waffle mattress under fitted sheet in bed. D/w pt the importance of offloading wounds and optimizing nutrition.      Wound Team Recs: OFFLOAD sacrum and spinous processes. Turn, use waffle cushions. Mepilex to sacrum/coccyx. Change q3 days. Optimize nutrition.     Provider, please review.      Millie Perry RN, CWON  9/4/2024  4:38 PM

## 2024-09-04 NOTE — PROGRESS NOTES
Physical Therapy Treatment    Patient Name: Erin Lopez  MRN: [id number]  Today's Date: 9/4/2024  Room: [facility]  Time Calculation  Start Time: 1017  Stop Time: 1047  Time Calculation (min): 30 min       Assessment/Plan   PT Assessment  PT Assessment Results: Decreased strength, Decreased endurance, Impaired balance, Decreased mobility  Rehab Prognosis: Good  Evaluation/Treatment Tolerance: Patient tolerated treatment well, Patient limited by fatigue  Medical Staff Made Aware: Yes  Strengths: Attitude of self  End of Session Communication: Bedside nurse  End of Session Patient Position: Up in chair  PT Plan  Inpatient/Swing Bed or Outpatient: Inpatient  PT Plan  Treatment/Interventions: Bed mobility, Gait training, Transfer training, Balance training, Endurance training, Therapeutic exercise, Therapeutic activity  PT Plan: Ongoing PT  PT Frequency: 3 times per week  PT Discharge Recommendations: Moderate intensity level of continued care  Equipment Recommended upon Discharge: Wheeled walker  PT Recommended Transfer Status: Assist x2  PT - OK to Discharge: Yes  Assessment: Patient is progressing Well with therapy this date. Able to get to chair with fww @Angy. Pt limited by fatigue/pain but significant improvement with bed mobility/STS compared to eval. Awaiting hospital bed to PA home, Would continue to benefit from continued skilled PT to address all mobility deficits; Patient remains appropriate for MOD intensity therapy when medically appropriate for discharge from acute stay.  Will continue to follow.     General Visit Information:   PT  Visit  PT Received On: 09/04/24  Prior to Session Communication: Bedside nurse  Patient Position Received: Bed, 3 rail up, Alarm off, not on at start of session     Subjective   Subjective: Pt pleasant and agreeable to therapy upon approach    Precautions:  Precautions  Medical Precautions: Fall precautions, Cardiac precautions  Vital Signs:  Vital Signs  Vitals Session:  During PT  Heart Rate: 67  Heart Rate Source: Monitor  BP: 101/52  MAP (mmHg): 70  BP Location: Right arm  BP Method: Automatic  Patient Position: Sitting    Objective   Pain:  Pain Assessment  Pain Assessment: 0-10  0-10 (Numeric) Pain Score: 0 - No pain  Cognition:  Cognition  Overall Cognitive Status: Within Functional Limits  Orientation Level: Oriented X4  Insight: Within function limits  Impulsive: Within functional limits     Static Sitting balance:  Static Sitting Balance  Static Sitting-Balance Support: Feet supported, Bilateral upper extremity supported  Static Sitting-Level of Assistance: Close supervision  Static Sitting-Comment/Number of Minutes: 15m  Static Standing Balance:  Static Standing Balance  Static Standing-Balance Support: Bilateral upper extremity supported  Static Standing-Level of Assistance: Minimum assistance  Static Standing-Comment/Number of Minutes: with fww, retro lean    Lines/Tubes/Drains:  External Urinary Catheter (Active)   Number of days: 11     PT Treatments:     Bed Mobility 1  Bed Mobility 1: Supine to sitting  Level of Assistance 1: Minimum assistance, Minimal verbal cues  Bed Mobility Comments 1: Angy for both BLE and trunk, HOB slightly elevated, increased time and effort to complete     Transfer 1  Transfer From 1: Sit to  Transfer to 1: Stand  Transfer Device 1: Walker  Transfer Level of Assistance 1: Moderate assistance, Moderate verbal cues  Trials/Comments 1: x2, vc for anterior weight shift d/t retro lean, handplacement  Transfers 2  Transfer From 2: Stand to  Transfer to 2: Chair with arms  Technique 2: Stand pivot  Transfer Device 2: Walker  Transfer Level of Assistance 2: Minimum assistance, Moderate verbal cues  Trials/Comments 2: x2, unable to get full foot clearance for steps, scooting BLE across ground slow to complete Angy for balance and AD management     Activity tolerance:  Activity Tolerance  Endurance: Tolerates 10 - 20 min exercise with multiple  rests    Outcome Measures:  [facility] Basic Mobility  Turning from your back to your side while in a flat bed without using bedrails: A little  Moving from lying on your back to sitting on the side of a flat bed without using bedrails: A little  Moving to and from bed to chair (including a wheelchair): A little  Standing up from a chair using your arms (e.g. wheelchair or bedside chair): A lot  To walk in hospital room: A lot  Climbing 3-5 steps with railing: Total  Basic Mobility - Total Score: 14       Education Documentation  Precautions, taught by Suzanna Horne PTA at 9/4/2024 11:52 AM.  Learner: Patient  Readiness: Acceptance  Method: Explanation  Response: Verbalizes Understanding    Mobility Training, taught by Suzanna Horne PTA at 9/4/2024 11:52 AM.  Learner: Patient  Readiness: Acceptance  Method: Explanation  Response: Verbalizes Understanding    Body Mechanics, taught by Suzanna Horne PTA at 9/4/2024 11:52 AM.  Learner: Patient  Readiness: Acceptance  Method: Explanation  Response: Verbalizes Understanding    Precautions, taught by Suzanna Horne PTA at 9/4/2024 11:52 AM.  Learner: Patient  Readiness: Acceptance  Method: Explanation  Response: Verbalizes Understanding    ADL Training, taught by Suzanna Horne PTA at 9/4/2024 11:52 AM.  Learner: Patient  Readiness: Acceptance  Method: Explanation  Response: Verbalizes Understanding    Education Comments  No comments found.          OP EDUCATION:       Encounter Problems       Encounter Problems (Active)       Balance       Pt will tolerate standing for >/=2 mins with SBA with walker (Progressing)       Start:  08/27/24    Expected End:  09/10/24       I            Mobility       Pt will be SBA ambulation 80 ft with LRAD (Progressing)       Start:  08/27/24    Expected End:  09/10/24               PT Transfers       Pt will be SBA for sit to stand and bed to chair transfers with LRAD (Progressing)       Start:  08/27/24    Expected End:  09/10/24            Pt will be  Ranjit with be dmobility (Progressing)       Start:  08/27/24    Expected End:  09/10/24               Pain - Adult          Safety       LTG - Patient will adhere to hip precautions during ADL's and transfers       Start:  08/24/24            LTG - Patient will demonstrate safety requirements appropriate to situation/environment       Start:  08/24/24            LTG - Patient will utilize safety techniques       Start:  08/24/24            STG - Patient locks brakes on wheelchair       Start:  08/24/24            STG - Patient uses call light consistently to request assistance with transfers       Start:  08/24/24            STG - Patient uses gait belt during all transfers       Start:  08/24/24            Goal 1       Start:  08/24/24            Goal 2       Start:  08/24/24            Goal 3       Start:  08/24/24

## 2024-09-04 NOTE — DISCHARGE SUMMARY
Discharge Diagnosis  Acute systolic heart failure (Multi)    Issues Requiring Follow-Up  Establish with [facility]  Follow up with Dr. Goss, HF on 9/17    Test Results Pending At Discharge  Pending Labs       No current pending labs.            Hospital Course  Pt is an 89yo F w PMH of NSTEMI (nonobstructive CAD 2017), HFpEF, LBBB and SSS s/p PPM 2017 s/p device changeout 11/2022, HTN, CKD presenting with CHF exacerbation and new HFrEF.     Pt presented with progressively worsening bilateral upper and lower extremity edema and SOB for past 3 weeks. She takes diuretic only intermittently as she doesn't want to take it when she leaves the house. No CP. Occasionally gets palpitations but denies lightheadedness.     In the ED /95 SPO2 99% on RA. Labs notable for trop 235->189, BNP 2600s, Cr 1.61 WBC 4.3 Hgb 14.2. CXR showing likely bilateral pleural effusions and pulmonary edema. She was loaded w ASA and started on a heparin gtt given her troponin elevation and IV Lasix.     POCUS images performed by ED staff reviewed and appear to show a new EF reduction to 25-30% with regional wma (septal akinesis and otherwise global hypokinesis). EKG showing 100% AV pacing.    Floor course:  Complete TTE 8/24: EF 30-35%, DD, elevated LVEDP, LV cavity decreased, abnormal septal motion consistent with RV pacemaker, LV posterior wall thickness mod increased, A mildly dilated, RA severely dilated, mild to mod MR, wide open severe TR (previously moderate), mild to mod AR, IVC dilated greater than 50% collapse.   Patient was diuresed with IV Lasix until euvolemic, then transitioned to PO Lasix 40mg daily.   With previously noted concern for amyloidosis on TTE 10/2023 and significantly thickened LV posterior wall on TTE 8/24, serum free light chains, SPEP and UPEP negative. NM PYP cardiac amyloidosis with SPECT CT suggestive of TTR.  CTA coronary revealed minor CAD, calcium score 17.     Advanced heart failure recommended GDMT  initiation inpatient as well as kristy as outpatient.  EP consulted and although she has a high pacing burden, an upgrade to CRT device would be too high risk.    GDMT was limited due to hypotension. GDMT at discharge: Entresto 12/13 BID, metop succ 12.5 mg daily, dapagliflozin 10 mg daily,  and spironolactone 12.5 mg daily + Lasix 20mg daily for maintenance diuretic    PT recommended SNF; however patient declined SNF and wished to be discharged home with [facility]. DME was out for delivery to the home prior to discharge (hospital bed, shower chair, bedside commode and wheeled walker). Medications brought to bedside via meds to beds.      Discharge weight: 51.5 kg    After all labs and VS were reviewed the decision was made that the patient was medically stable for discharge.  The patient was discharged in satisfactory condition.    More than 60 minutes were spent in coordinating patient discharge.      Home Medications     Medication List      START taking these medications     acetaminophen 325 mg tablet; Commonly known as: Tylenol; Take 2 tablets   (650 mg) by mouth every 6 hours if needed for mild pain (1 - 3).   Entresto 24-26 mg tablet; Generic drug: sacubitriL-valsartan; Take 0.5   tablets by mouth 2 times a day.   Farxiga 10 mg; Generic drug: dapagliflozin propanediol; Take 1 tablet   (10 mg) by mouth once every 24 hours.   metoprolol succinate XL 25 mg 24 hr tablet; Commonly known as:   Toprol-XL; Take 0.5 tablets (12.5 mg) by mouth once daily. Do not crush or   chew.   pantoprazole 40 mg EC tablet; Commonly known as: ProtoNix; Take 1 tablet   (40 mg) by mouth once daily in the morning. Take before meals. Do not   crush, chew, or split.; Replaces: omeprazole 20 mg DR capsule   rosuvastatin 20 mg tablet; Commonly known as: Crestor; Take 0.5 tablets   (10 mg) by mouth once daily at bedtime.; Replaces: atorvastatin 10 mg   tablet   Senexon-S 8.6-50 mg tablet; Generic drug: sennosides-docusate sodium;   Take 1  tablet by mouth once daily at bedtime.   spironolactone 25 mg tablet; Commonly known as: Aldactone; Take 0.5   tablets (12.5 mg) by mouth once daily.     CHANGE how you take these medications     furosemide 20 mg tablet; Commonly known as: Lasix; Take 1 tablet (20 mg)   by mouth once daily.; What changed: when to take this   Vitamin D2 1,250 mcg (50,000 unit) capsule; Generic drug:   ergocalciferol; Take 1 capsule (1,250 mcg) by mouth 1 (one) time per   week.; Start taking on: September 8, 2024; What changed: medication   strength, how much to take     CONTINUE taking these medications     aspirin 81 mg EC tablet   Calcium 600 + D(3) 600 mg-10 mcg (400 unit) tablet; Generic drug:   calcium carbonate-vitamin D3   nitroglycerin 0.4 mg SL tablet; Commonly known as: Nitrostat   predniSONE 2.5 mg tablet; Commonly known as: Deltasone; Take 1 tablet   (2.5 mg) by mouth once daily.     STOP taking these medications     amLODIPine 10 mg tablet; Commonly known as: Norvasc   atorvastatin 10 mg tablet; Commonly known as: Lipitor; Replaced by:   rosuvastatin 20 mg tablet   metoprolol tartrate 50 mg tablet; Commonly known as: Lopressor   omeprazole 20 mg DR capsule; Commonly known as: PriLOSEC; Replaced by:   pantoprazole 40 mg EC tablet   torsemide 20 mg tablet; Commonly known as: Demadex   traMADol 50 mg tablet; Commonly known as: Ultram       Outpatient Follow-Up  Future Appointments   Date Time Provider Department Center   9/17/2024  3:00 PM Lit Goss MD [id number]       Camron Woo PA-C

## 2024-09-04 NOTE — HH CARE COORDINATION
Home Care received a Referral for Nursing, Physical Therapy, Occupational Therapy, and Home Health Aide. We have processed the referral for a Start of Care on 9/6.     If you have any questions or concerns, please feel free to contact us at [phone]. Follow the prompts, enter your five digit zip code, and you will be directed to your care team on CENT[facility] 3.

## 2024-09-04 NOTE — PROGRESS NOTES
Interval events:    No acute events overnight    Subjective:  Patient reports feeling well this morning. Pending DME equipment drop off at home.     Today in brief:  - to be discharged home with [facility] once DME is delivered to home (hospital bed, shower chair, bedside commode and wheeled walker)  - per [facility] equipment, DME out for delivery this afternoon  - will discharge this evening    Objective:  Vitals:    09/04/24 1017   BP: 101/52   Pulse: 67   Resp:    Temp:    SpO2:      Weight         8/29/2024  0412 8/30/2024  0500 8/31/2024  0505 9/2/2024  0449 9/3/2024  0624    Weight: 54.9 kg (121 lb 0.5 oz) 55.1 kg (121 lb 6.4 oz) 52.7 kg (116 lb 2.9 oz) 52.1 kg (114 lb 13.8 oz) 51.5 kg (113 lb 8 oz)            Intake/Output Summary (Last 24 hours) at 9/4/2024 1517  Last data filed at 9/4/2024 0445  Gross per 24 hour   Intake --   Output 600 ml   Net -600 ml     Recent Results (from the past 24 hour(s))   CBC    Collection Time: 09/03/24  7:00 PM   Result Value Ref Range    WBC 5.1 4.4 - 11.3 x10*3/uL    nRBC 0.0 0.0 - 0.0 /100 WBCs    RBC 5.98 (H) 4.00 - 5.20 x10*6/uL    Hemoglobin 15.8 12.0 - 16.0 g/dL    Hematocrit 49.0 (H) 36.0 - 46.0 %    MCV 82 80 - 100 fL    MCH 26.4 26.0 - 34.0 pg    MCHC 32.2 32.0 - 36.0 g/dL    RDW 18.9 (H) 11.5 - 14.5 %    Platelets 231 150 - 450 x10*3/uL   Magnesium    Collection Time: 09/03/24  7:00 PM   Result Value Ref Range    Magnesium 2.40 1.60 - 2.40 mg/dL   Renal Function Panel    Collection Time: 09/03/24  7:00 PM   Result Value Ref Range    Glucose 76 74 - 99 mg/dL    Sodium 136 136 - 145 mmol/L    Potassium 5.1 3.5 - 5.3 mmol/L    Chloride 103 98 - 107 mmol/L    Bicarbonate 25 21 - 32 mmol/L    Anion Gap 13 10 - 20 mmol/L    Urea Nitrogen 22 6 - 23 mg/dL    Creatinine 1.34 (H) 0.50 - 1.05 mg/dL    eGFR 38 (L) >60 mL/min/1.73m*2    Calcium 8.5 (L) 8.6 - 10.6 mg/dL    Phosphorus 2.3 (L) 2.5 - 4.9 mg/dL    Albumin 2.8 (L) 3.4 - 5.0 g/dL     Inpatient Medications:  Scheduled  medications   Medication Dose Route Frequency    aspirin  81 mg oral Daily    calcium carbonate-vitamin D3  1 tablet oral Daily    dapagliflozin propanediol  10 mg oral q24h    enoxaparin  30 mg subcutaneous q24h    ergocalciferol  1,250 mcg oral Every Sunday    furosemide  20 mg oral Daily    metoprolol succinate XL  12.5 mg oral Daily    pantoprazole  40 mg oral Daily before breakfast    polyethylene glycol  17 g oral Daily    predniSONE  2.5 mg oral Daily    rosuvastatin  20 mg oral Nightly    sacubitriL-valsartan  0.5 tablet oral BID    sennosides-docusate sodium  1 tablet oral Nightly    spironolactone  12.5 mg oral Daily     PRN medications   Medication    acetaminophen    benzonatate    ipratropium-albuteroL    LORazepam    magnesium hydroxide    ondansetron    Or    ondansetron     Continuous Medications   Medication Dose Last Rate     Physical Exam  Constitutional:       General: She is not in acute distress.     Appearance: Normal appearance.   Eyes:      Extraocular Movements: Extraocular movements intact.   Neck:      Vascular: No JVD.   Cardiovascular:      Rate and Rhythm: Normal rate and regular rhythm.      Comments: V paced  Pulmonary:      Effort: Pulmonary effort is normal.      Breath sounds: Normal breath sounds.   Abdominal:      General: Abdomen is flat.   Musculoskeletal:      Right lower leg: No edema.      Left lower leg: No edema.   Skin:     General: Skin is warm and dry.   Neurological:      General: No focal deficit present.      Mental Status: She is alert and oriented to person, place, and time.       Assessment/Plan   Erin Lopez is an 88 y.o. female with PMH of NSTEMI (nonobstructive CAD 2017), HFpEF, LBBB and SSS s/p PPM 2017 s/p device changeout 11/2022, HTN, CKD presenting with CHF exacerbation and new HFrEF/newly diagnosed TTR cardiac amyloidosis     Acute systolic and diastolic heart failure (HFrEF 30-35%)  TTR Amyloidosis  - TTE 10/2023: EF 50-55%, moderately increased LV  septal and posterior wall thickness, Moderately enlarged V, mild to mod AR and MR, mod TR, moderate LAE, Compared to 2017 TTE there is possible underlying infiltrative disease  - Admit TTE : EF 30-35%, gHK of LV,  elevated LVEDP, abnormal septal motion c/w V pacemaker, moderately increased LV septal thickness. Lv posterior wall moderately thickened. Severe cLVH. Moderately enlarged RV, mild LAE, RA severely dilated, mild to mod MR, severe TR, Slightly elevated RVSP. Mild to mod AR.  - New TTR amyloidosis seen on  NM PYP scan-- K/L ration WNL, SPEP, UPEP negative  - CXR : small L and moderate R pleural effusion  - BNP 2,626  - Admit wt: 73.3 kg  - Today's wt: 51.5 kg (52.7, 55.1 kg)  - on admission severe anasarca to upper abdomen/UE and JVD up to mandible with RLE wound weeping, s/p IV Lasix boluses with intermittent IV Diamox  - lasix dose reduced to 20 mg PO; appears euvolemic   - Advanced HF team consulted: Recommend initiating Tafamidis (amyloid coordinator informed) and titrating GDMT as tolerated  - GDMT : will add cautiously in light of amyloidosis, GDMT doses reduced  following episode of hypotension  - cont Entresto  BID, metop succ 12.5 mg daily, dapagliflozin 10 mg daily,  and spironolactone 12.5 mg daily  - Daily standing weights, strict I&O's, 2g sodium diet, 2L fluid restriction    Troponinemia  - HS trop: 235 -> 189. Suspected 2/2 demand from CHF  - Treated with ~48hs of heparin gtt.   - CTA coronary : minor CAD, prox LAD has focal calcified plaque with minimal stenosis (<25%), LM, LCx and RCA have no significant atherosclerotic change or stenotic disease. Calcium score 17  - Cont ASA 81 mg daily, rosuvastatin 20 mg nightly      Hx of SSS s/p PPM  , s/p device changeout 2022  - EC% AV pacing  - SR with  on telemetry  - Concern for possible pacemaker mediated cardiomyopathy given high pacing burden on device interrogation  - EP consulted: AV delayed extended but  no intrinsic conduction noted. Recommended optimizing GDMT prior to considering upgrade to CRT since she would be high risk.     HTN  - Admit /70  - Last 24hrs: -110s  - continue Entresto as above      Arthritis  - Pt previously getting steroid injections q3 month then put on systemic steroids  - Cont prednisone 2.5 daily   - Was previously listed as on plaquenil for unclear reason but pt states this was stopped  - cont home PPI while on steroids    Constipation, improving   - Overnight 8/28, patient reported abdominal pain with 3 liquid BM (last being at midnight), and 2 episodes of emesis. PRN IV Zofran given with little to no relief. CXR/KUB and C.diff PCR ordered by fellow  - KUB revealing moderate colonic burden  - low suspicion for C.diff, diarrhea likely in the setting of constipation -> will escalate bowel regimen with suppository and discontinue C.diff precautions  - following suppository 8/29, patient had medium soft stool  - 8/30 patient reporting low appetite due to abdominal discomfort, likely in setting of continued constipation. Refusing suppositories, will escalate oral bowel regimen as patient has yet to pass mod/large bowel movement following KUB revealing moderate colonic burden -> ++ BM on 8/31  - cont nightly Nelda-Colace and daily Miralax  - PRN milk of mag ordered  - last BM early this morning 9/4    RLE wound  - weeping RLE wound iso hypervolemia  - wound care consulted, recommendations ordered    DVT ppx: Lovenox  DISPO:   - to be discharged home with [facility] once DME is delivered to home (hospital bed, shower chair, bedside commode and wheeled walker)  - per [place] [employer] equipment, DME out for delivery this afternoon  - will discharge this evening    Code status: Full Code    Patient seen and discussed with Dr. Afia Woo, PA-C

## 2024-09-05 ENCOUNTER — HOME CARE VISIT (OUTPATIENT)
Dept: HOME HEALTH SERVICES | Facility: HOME HEALTH | Age: 89
End: 2024-09-05
Payer: COMMERCIAL

## 2024-09-05 VITALS
HEART RATE: 68 BPM | TEMPERATURE: 98.7 F | OXYGEN SATURATION: 96 % | SYSTOLIC BLOOD PRESSURE: 109 MMHG | DIASTOLIC BLOOD PRESSURE: 60 MMHG | RESPIRATION RATE: 20 BRPM

## 2024-09-05 PROCEDURE — G0299 HHS/HOSPICE OF RN EA 15 MIN: HCPCS | Mod: HHH

## 2024-09-05 PROCEDURE — 169592 NO-PAY CLAIM PROCEDURE

## 2024-09-05 SDOH — ECONOMIC STABILITY: FOOD INSECURITY: MEALS PER DAY: 2

## 2024-09-05 ASSESSMENT — ENCOUNTER SYMPTOMS
LAST BOWEL MOVEMENT: 67088
BOWEL PATTERN NORMAL: 1
PAIN LOCATION - PAIN FREQUENCY: INTERMITTENT
PERSON REPORTING PAIN: PATIENT
PAIN: 1
SUBJECTIVE PAIN PROGRESSION: UNCHANGED
PAIN LOCATION: GENERALIZED
PAIN LOCATION - PAIN QUALITY: ACHY
CHANGE IN APPETITE: UNCHANGED
LOWEST PAIN SEVERITY IN PAST 24 HOURS: 0/10
PAIN LOCATION - PAIN SEVERITY: 0/10
HIGHEST PAIN SEVERITY IN PAST 24 HOURS: 0/10
APPETITE LEVEL: FAIR
PAIN SEVERITY GOAL: 0/10

## 2024-09-05 ASSESSMENT — ACTIVITIES OF DAILY LIVING (ADL): ENTERING_EXITING_HOME: TOTAL DEPENDENCE

## 2024-09-07 ENCOUNTER — HOME CARE VISIT (OUTPATIENT)
Dept: HOME HEALTH SERVICES | Facility: HOME HEALTH | Age: 89
End: 2024-09-07
Payer: COMMERCIAL

## 2024-09-07 VITALS — RESPIRATION RATE: 14 BRPM | HEART RATE: 62 BPM | TEMPERATURE: 98.6 F | OXYGEN SATURATION: 98 %

## 2024-09-07 PROCEDURE — G0151 HHCP-SERV OF PT,EA 15 MIN: HCPCS | Mod: HHH

## 2024-09-07 SDOH — HEALTH STABILITY: PHYSICAL HEALTH: EXERCISE TYPE: INSTRUCTED  PATIENT IN

## 2024-09-07 ASSESSMENT — ACTIVITIES OF DAILY LIVING (ADL)
AMBULATION ASSISTANCE: ONE PERSON
CURRENT_FUNCTION: ONE PERSON
OASIS_M1830: 06
CURRENT_FUNCTION: ONE PERSON
AMBULATION ASSISTANCE: ONE PERSON

## 2024-09-07 ASSESSMENT — PAIN SCALES - PAIN ASSESSMENT IN ADVANCED DEMENTIA (PAINAD)
BODYLANGUAGE: 0
FACIALEXPRESSION: 2 - FACIAL GRIMACING.
BREATHING: 1
CONSOLABILITY: 1
NEGVOCALIZATION: 1 - OCCASIONAL MOAN OR GROAN. LOW-LEVEL SPEECH WITH A NEGATIVE OR DISAPPROVING QUALITY.
CONSOLABILITY: 1 - DISTRACTED OR REASSURED BY VOICE OR TOUCH.
TOTALSCORE: 5
NEGVOCALIZATION: 1
FACIALEXPRESSION: 2
BODYLANGUAGE: 0 - RELAXED.

## 2024-09-07 ASSESSMENT — ENCOUNTER SYMPTOMS
PAIN LOCATION: LEFT KNEE
LIMITED RANGE OF MOTION: 1
PAIN LOCATION - PAIN QUALITY: ACHING
PAIN LOCATION - PAIN SEVERITY: 6/10
OCCASIONAL FEELINGS OF UNSTEADINESS: 1
PAIN: 1
SUBJECTIVE PAIN PROGRESSION: GRADUALLY IMPROVING
MUSCLE WEAKNESS: 1
MUSCLE WEAKNESS: 1
HIGHEST PAIN SEVERITY IN PAST 24 HOURS: 6/10
PERSON REPORTING PAIN: PATIENT
PAIN SEVERITY GOAL: 1/10
LOWEST PAIN SEVERITY IN PAST 24 HOURS: 3/10

## 2024-09-09 ENCOUNTER — APPOINTMENT (OUTPATIENT)
Dept: RHEUMATOLOGY | Facility: CLINIC | Age: 89
End: 2024-09-09
Payer: COMMERCIAL

## 2024-09-10 ENCOUNTER — HOME CARE VISIT (OUTPATIENT)
Dept: HOME HEALTH SERVICES | Facility: HOME HEALTH | Age: 89
End: 2024-09-10
Payer: COMMERCIAL

## 2024-09-10 VITALS
SYSTOLIC BLOOD PRESSURE: 105 MMHG | DIASTOLIC BLOOD PRESSURE: 62 MMHG | HEART RATE: 67 BPM | OXYGEN SATURATION: 95 % | TEMPERATURE: 97.7 F

## 2024-09-10 PROCEDURE — G0151 HHCP-SERV OF PT,EA 15 MIN: HCPCS | Mod: HHH

## 2024-09-10 PROCEDURE — G0152 HHCP-SERV OF OT,EA 15 MIN: HCPCS | Mod: HHH

## 2024-09-10 SDOH — HEALTH STABILITY: PHYSICAL HEALTH: PHYSICAL EXERCISE: SITTING

## 2024-09-10 SDOH — HEALTH STABILITY: PHYSICAL HEALTH: EXERCISE ACTIVITY: SEATED HEP

## 2024-09-10 SDOH — HEALTH STABILITY: PHYSICAL HEALTH: EXERCISE ACTIVITIES SETS: 3

## 2024-09-10 SDOH — ECONOMIC STABILITY: HOUSING INSECURITY
HOME SAFETY: THERAPIST DISCUSSED WITH PATIENT AND CAREGIVER IMPORTANCE OF HOME SAFETY, SPECIFICALLY FOR CLUTTER FREE WALKING PATHWAYS AND ADEQUATE LIGHTING

## 2024-09-10 SDOH — HEALTH STABILITY: PHYSICAL HEALTH: PHYSICAL EXERCISE: 5

## 2024-09-10 SDOH — HEALTH STABILITY: PHYSICAL HEALTH: EXERCISE COMMENTS: SEATED HEP INCLUDES ANKLE PUMPS, LAQS, HIP FLEXION, HEELSLIDES, AND HIP ABDUCTION

## 2024-09-10 SDOH — ECONOMIC STABILITY: HOUSING INSECURITY: OPEN FLAME PRESENT: 1

## 2024-09-10 SDOH — HEALTH STABILITY: PHYSICAL HEALTH: EXERCISE TYPE: SEATED HEP

## 2024-09-10 ASSESSMENT — ENCOUNTER SYMPTOMS
ARTHRALGIAS: 1
DEPRESSION: 0
MUSCLE WEAKNESS: 1
LIMITED RANGE OF MOTION: 1
OCCASIONAL FEELINGS OF UNSTEADINESS: 1
PAIN LOCATION - PAIN SEVERITY: 2/10
PAIN LOCATION - EXACERBATING FACTORS: NONE NOTED
PERSON REPORTING PAIN: PATIENT
PAIN: NO PAIN REPORTED TODAY
LOSS OF SENSATION IN FEET: 0
PAIN LOCATION - PAIN DURATION: INTERMITTENT
PAIN LOCATION: HEAD
PAIN LOCATION - PAIN FREQUENCY: INTERMITTENT
DENIES PAIN: 1
DENIES PAIN: 1
PAIN LOCATION - PAIN QUALITY: ACHE
PAIN LOCATION - RELIEVING FACTORS: IBUPROFEN, REST

## 2024-09-10 ASSESSMENT — ACTIVITIES OF DAILY LIVING (ADL)
AMBULATION ASSISTANCE ON FLAT SURFACES: 1
AMBULATION ASSISTANCE: 1
CURRENT_FUNCTION: CONTACT GUARD ASSIST
DRESSING_LB_CURRENT_FUNCTION: MODERATE ASSIST
CURRENT_FUNCTION: ONE PERSON
BATHING ASSESSED: 1
AMBULATION ASSISTANCE: ONE PERSON
AMBULATION ASSISTANCE: CONTACT GUARD ASSIST
TOILETING: 1
PHYSICAL TRANSFERS ASSESSED: 1
DRESSING_UB_CURRENT_FUNCTION: MODERATE ASSIST
PREPARING MEALS: NEEDS ASSISTANCE
TOILETING: MAXIMUM ASSIST
PHYSICAL TRANSFERS ASSESSED: 1
BATHING_CURRENT_FUNCTION: MAXIMUM ASSIST

## 2024-09-12 ENCOUNTER — PATIENT OUTREACH (OUTPATIENT)
Dept: CASE MANAGEMENT | Facility: HOSPITAL | Age: 89
End: 2024-09-12
Payer: COMMERCIAL

## 2024-09-12 DIAGNOSIS — I50.20 SYSTOLIC CONGESTIVE HEART FAILURE, UNSPECIFIED HF CHRONICITY (MULTI): ICD-10-CM

## 2024-09-12 ASSESSMENT — ACTIVITIES OF DAILY LIVING (ADL)
PHYSICAL_TRANSFERS_DEVICES: FWW
CURRENT_FUNCTION: MINIMUM ASSIST
TOILETING EQUIPMENT USED: BSC

## 2024-09-12 NOTE — PROGRESS NOTES
Heart Failure Nurse Navigator Transition of Care Phone Call    The role of the HF nurse navigator is to (1) characterize risk profiles of patients with heart failure transitioning from [id number]-mg-gwbonctmo after hospitalization, (2) recommend interventions to improve care and reduce risks of worse post-hospitalization outcomes.     Assessment  Call attempted to patient .     HF Symptoms  Chest pain? No  Shortness of breath? at rest and with exertion  Orthopnea? Yes - raises bed  Paroxysmal nocturnal dyspnea? Yes - feeling sob  Edema? No  Weight gain >2lbs in 3 days or 5lbs in 1 week? No repots clothes fitting loose    Medications  Is the patient prescribed the following medications?  ACEi/ARB/ARNi? Yes Entresto 24-26mg 1/2 tab BID  BB? Yes Metoprolol 12.5mg every day   MRA? Yes Spironolactone 12.5mg every day   SGLT2i? Yes Farxiga 10mg every day   Diuretic? Yes Furosemide 20mg every day   Is the patient adherent to prescribed medications? YES    Management    Is the patient obtaining daily weights? NO. Reports she has a scale but has not gained any weight as her clothes are still loose.    Is the patient following diet limitations (2-3g Na, fluid restriction)? YES  Does the patient have a  cardiology follow-up scheduled? YES  If yes, appointment details? Dr. Goss 9/17/24 3pm [facility]  Does the patient require HF education reinforcement? Yes  If yes, what was discussed? Daily weights.    Recommendations/Comments:  Patient is receiving  Homecare and has PT/OT visits scheduled. She stated that her daughter is helping her out. Reports feeling better since being home. Continues to have sob at rest and with exertion but is about the same since discharged from the hospital.  Appetite has improved.  Energy is about the same.   Encouraged patient to continue what she is doing at home to feel well.   Reminded of upcoming appointment with cardiologist.     No further needs at this time.    Mika Banuelos  BSN, RN  Heart Failure Clinical Nurse Navigator  [phone]

## 2024-09-13 ENCOUNTER — HOME CARE VISIT (OUTPATIENT)
Dept: HOME HEALTH SERVICES | Facility: HOME HEALTH | Age: 89
End: 2024-09-13
Payer: COMMERCIAL

## 2024-09-13 PROCEDURE — G0151 HHCP-SERV OF PT,EA 15 MIN: HCPCS | Mod: HHH

## 2024-09-13 SDOH — HEALTH STABILITY: PHYSICAL HEALTH: PHYSICAL EXERCISE: SITTING

## 2024-09-13 SDOH — HEALTH STABILITY: PHYSICAL HEALTH: EXERCISE COMMENTS: SEATED HEP INCLUDES ANKLE PUMPS, LAQS, HIP FLEXION, HEELSLIDES, AND HIP ABDUCTION

## 2024-09-13 SDOH — HEALTH STABILITY: PHYSICAL HEALTH: EXERCISE ACTIVITY: SEATED HEP

## 2024-09-13 SDOH — ECONOMIC STABILITY: HOUSING INSECURITY: OPEN FLAME PRESENT: 1

## 2024-09-13 SDOH — HEALTH STABILITY: PHYSICAL HEALTH: EXERCISE ACTIVITIES SETS: 3

## 2024-09-13 SDOH — HEALTH STABILITY: PHYSICAL HEALTH: PHYSICAL EXERCISE: 10

## 2024-09-13 SDOH — HEALTH STABILITY: PHYSICAL HEALTH: EXERCISE TYPE: SEATED HEP

## 2024-09-13 ASSESSMENT — ENCOUNTER SYMPTOMS
LIMITED RANGE OF MOTION: 1
PAIN LOCATION - EXACERBATING FACTORS: PROLONGED STANDING
HIGHEST PAIN SEVERITY IN PAST 24 HOURS: 6/10
PERSON REPORTING PAIN: PATIENT
PAIN LOCATION - PAIN SEVERITY: 5/10
PAIN: 1
LOSS OF SENSATION IN FEET: 0
PAIN LOCATION - RELIEVING FACTORS: MEDS
DEPRESSION: 0
OCCASIONAL FEELINGS OF UNSTEADINESS: 1
PAIN: PATIENT DILIGENT WITH PAIN CONTROL TECHNIQUES
PAIN LOCATION: LEFT KNEE
LOWEST PAIN SEVERITY IN PAST 24 HOURS: 4/10
ARTHRALGIAS: 1
PAIN LOCATION - PAIN FREQUENCY: FREQUENT
PAIN SEVERITY GOAL: 3/10
MUSCLE WEAKNESS: 1
SUBJECTIVE PAIN PROGRESSION: UNCHANGED

## 2024-09-13 ASSESSMENT — ACTIVITIES OF DAILY LIVING (ADL)
AMBULATION ASSISTANCE: 1
CURRENT_FUNCTION: ONE PERSON
AMBULATION ASSISTANCE ON FLAT SURFACES: 1
PHYSICAL TRANSFERS ASSESSED: 1
AMBULATION ASSISTANCE: ONE PERSON
CURRENT_FUNCTION: CONTACT GUARD ASSIST
AMBULATION ASSISTANCE: CONTACT GUARD ASSIST

## 2024-09-14 ENCOUNTER — HOME CARE VISIT (OUTPATIENT)
Dept: HOME HEALTH SERVICES | Facility: HOME HEALTH | Age: 89
End: 2024-09-14
Payer: COMMERCIAL

## 2024-09-14 PROCEDURE — G0300 HHS/HOSPICE OF LPN EA 15 MIN: HCPCS | Mod: HHH

## 2024-09-15 VITALS — TEMPERATURE: 97.2 F | HEART RATE: 76 BPM | SYSTOLIC BLOOD PRESSURE: 107 MMHG | DIASTOLIC BLOOD PRESSURE: 67 MMHG

## 2024-09-15 ASSESSMENT — ENCOUNTER SYMPTOMS
OCCASIONAL FEELINGS OF UNSTEADINESS: 0
HIGHEST PAIN SEVERITY IN PAST 24 HOURS: 9/10
MUSCLE WEAKNESS: 1
PAIN LOCATION: HEAD
LAST BOWEL MOVEMENT: 67097
APPETITE LEVEL: GOOD
HYPERTENSION: 1
CHANGE IN APPETITE: UNCHANGED
PAIN: 1
PERSON REPORTING PAIN: PATIENT

## 2024-09-15 ASSESSMENT — ACTIVITIES OF DAILY LIVING (ADL): MONEY MANAGEMENT (EXPENSES/BILLS): NEEDS ASSISTANCE

## 2024-09-17 ENCOUNTER — HOME CARE VISIT (OUTPATIENT)
Dept: HOME HEALTH SERVICES | Facility: HOME HEALTH | Age: 89
End: 2024-09-17
Payer: COMMERCIAL

## 2024-09-17 VITALS
HEART RATE: 71 BPM | OXYGEN SATURATION: 95 % | TEMPERATURE: 97.5 F | SYSTOLIC BLOOD PRESSURE: 115 MMHG | DIASTOLIC BLOOD PRESSURE: 64 MMHG

## 2024-09-17 PROCEDURE — G0152 HHCP-SERV OF OT,EA 15 MIN: HCPCS | Mod: HHH

## 2024-09-17 ASSESSMENT — ENCOUNTER SYMPTOMS
PAIN LOCATION - PAIN QUALITY: ACHE
PAIN LOCATION - EXACERBATING FACTORS: INCREASED ACTIVITY
PAIN: 1
PAIN LOCATION - RELIEVING FACTORS: TYLENOL, REST
PAIN LOCATION: HEAD
PAIN LOCATION - PAIN SEVERITY: 8/10
PAIN LOCATION - PAIN FREQUENCY: CONSTANT
PERSON REPORTING PAIN: PATIENT
PAIN LOCATION - PAIN DURATION: CONSTANT

## 2024-09-18 ENCOUNTER — HOME CARE VISIT (OUTPATIENT)
Dept: HOME HEALTH SERVICES | Facility: HOME HEALTH | Age: 89
End: 2024-09-18
Payer: COMMERCIAL

## 2024-09-18 PROCEDURE — G0151 HHCP-SERV OF PT,EA 15 MIN: HCPCS | Mod: HHH

## 2024-09-18 SDOH — HEALTH STABILITY: PHYSICAL HEALTH: EXERCISE ACTIVITIES SETS: 1

## 2024-09-18 SDOH — HEALTH STABILITY: PHYSICAL HEALTH: PHYSICAL EXERCISE: STANDING

## 2024-09-18 SDOH — HEALTH STABILITY: PHYSICAL HEALTH: EXERCISE TYPE: STANDING HEP

## 2024-09-18 SDOH — HEALTH STABILITY: PHYSICAL HEALTH: PHYSICAL EXERCISE: 10

## 2024-09-18 SDOH — HEALTH STABILITY: PHYSICAL HEALTH: EXERCISE ACTIVITY: STANDING HEP

## 2024-09-18 SDOH — HEALTH STABILITY: PHYSICAL HEALTH
EXERCISE COMMENTS: STANDING HEP INCLUDES: CALF RAISES, KNEE FLEXION, HIP FLEXION, MARCHING, HIP ABDUCTION, HIP EXTENSION, AND MINI SQUATS

## 2024-09-18 ASSESSMENT — ENCOUNTER SYMPTOMS
DENIES PAIN: 1
MUSCLE WEAKNESS: 1
LOSS OF SENSATION IN FEET: 0
DEPRESSION: 0
PAIN: NO PAIN REPORTED
ARTHRALGIAS: 1
OCCASIONAL FEELINGS OF UNSTEADINESS: 1
LIMITED RANGE OF MOTION: 1

## 2024-09-18 ASSESSMENT — ACTIVITIES OF DAILY LIVING (ADL)
AMBULATION ASSISTANCE ON FLAT SURFACES: 1
AMBULATION ASSISTANCE: 1
AMBULATION ASSISTANCE: ONE PERSON
CURRENT_FUNCTION: CONTACT GUARD ASSIST
CURRENT_FUNCTION: STAND BY ASSIST
PHYSICAL TRANSFERS ASSESSED: 1
AMBULATION ASSISTANCE: CONTACT GUARD ASSIST
CURRENT_FUNCTION: ONE PERSON

## 2024-09-19 ENCOUNTER — HOME CARE VISIT (OUTPATIENT)
Dept: HOME HEALTH SERVICES | Facility: HOME HEALTH | Age: 89
End: 2024-09-19
Payer: COMMERCIAL

## 2024-09-19 VITALS
DIASTOLIC BLOOD PRESSURE: 52 MMHG | RESPIRATION RATE: 18 BRPM | SYSTOLIC BLOOD PRESSURE: 98 MMHG | TEMPERATURE: 98.4 F | HEART RATE: 72 BPM | OXYGEN SATURATION: 99 %

## 2024-09-19 PROCEDURE — G0152 HHCP-SERV OF OT,EA 15 MIN: HCPCS | Mod: HHH

## 2024-09-19 PROCEDURE — G0300 HHS/HOSPICE OF LPN EA 15 MIN: HCPCS | Mod: HHH

## 2024-09-20 ENCOUNTER — HOME CARE VISIT (OUTPATIENT)
Dept: HOME HEALTH SERVICES | Facility: HOME HEALTH | Age: 89
End: 2024-09-20
Payer: COMMERCIAL

## 2024-09-20 PROCEDURE — G0151 HHCP-SERV OF PT,EA 15 MIN: HCPCS | Mod: HHH

## 2024-09-20 SDOH — HEALTH STABILITY: PHYSICAL HEALTH: PHYSICAL EXERCISE: STANDING

## 2024-09-20 SDOH — HEALTH STABILITY: PHYSICAL HEALTH: EXERCISE ACTIVITY: STANDING LATERAL WALKING

## 2024-09-20 SDOH — HEALTH STABILITY: PHYSICAL HEALTH: EXERCISE TYPE: SEATED AND STANDING

## 2024-09-20 SDOH — HEALTH STABILITY: PHYSICAL HEALTH: PHYSICAL EXERCISE: 10

## 2024-09-20 SDOH — HEALTH STABILITY: PHYSICAL HEALTH: EXERCISE ACTIVITY: SEATED HEP

## 2024-09-20 SDOH — HEALTH STABILITY: PHYSICAL HEALTH: EXERCISE COMMENTS: SEATED HEP INCLUDES ANKLE PUMPS, LAQS, HIP FLEXION, AND HIP ABDUCTION

## 2024-09-20 SDOH — HEALTH STABILITY: PHYSICAL HEALTH: EXERCISE ACTIVITIES SETS: 2

## 2024-09-20 SDOH — HEALTH STABILITY: PHYSICAL HEALTH: EXERCISE ACTIVITIES SETS: 1

## 2024-09-20 SDOH — ECONOMIC STABILITY: HOUSING INSECURITY: OPEN FLAME PRESENT: 1

## 2024-09-20 SDOH — HEALTH STABILITY: PHYSICAL HEALTH: EXERCISE ACTIVITY: STANDING RETRO WALKING

## 2024-09-20 SDOH — HEALTH STABILITY: PHYSICAL HEALTH: PHYSICAL EXERCISE: SITTING

## 2024-09-20 ASSESSMENT — ENCOUNTER SYMPTOMS
DEPRESSION: 0
LOWEST PAIN SEVERITY IN PAST 24 HOURS: 3/10
CHANGE IN APPETITE: UNCHANGED
DENIES PAIN: 1
OCCASIONAL FEELINGS OF UNSTEADINESS: 0
PERSON REPORTING PAIN: PATIENT
PAIN LOCATION - PAIN FREQUENCY: FREQUENT
LIMITED RANGE OF MOTION: 1
PAIN LOCATION - EXACERBATING FACTORS: PROLONGED STANDING, ROM
PAIN: PATIENT DILIGENT WITH PAIN CONTROL TECHNIQUES
MUSCLE WEAKNESS: 1
PAIN SEVERITY GOAL: 2/10
PAIN LOCATION - PAIN SEVERITY: 6/10
SUBJECTIVE PAIN PROGRESSION: WAXING AND WANING
APPETITE LEVEL: GOOD
DEPRESSION: 0
LAST BOWEL MOVEMENT: 67102
OCCASIONAL FEELINGS OF UNSTEADINESS: 1
PAIN LOCATION - RELIEVING FACTORS: ICE, MEDS
HIGHEST PAIN SEVERITY IN PAST 24 HOURS: 7/10
PAIN: 1
LOSS OF SENSATION IN FEET: 0
PAIN LOCATION: LEFT KNEE
LOSS OF SENSATION IN FEET: 0
ARTHRALGIAS: 1

## 2024-09-20 ASSESSMENT — PAIN SCALES - PAIN ASSESSMENT IN ADVANCED DEMENTIA (PAINAD)
NEGVOCALIZATION: 0
FACIALEXPRESSION: 0
NEGVOCALIZATION: 0 - NONE.
BREATHING: 0
CONSOLABILITY: 0
TOTALSCORE: 0
BODYLANGUAGE: 0 - RELAXED.
CONSOLABILITY: 0 - NO NEED TO CONSOLE.
FACIALEXPRESSION: 0 - SMILING OR INEXPRESSIVE.
BODYLANGUAGE: 0

## 2024-09-20 ASSESSMENT — ACTIVITIES OF DAILY LIVING (ADL)
SHOPPING: DEPENDENT
AMBULATION ASSISTANCE: ONE PERSON
AMBULATION ASSISTANCE: 1
AMBULATION ASSISTANCE: CONTACT GUARD ASSIST
SHOPPING ASSESSED: 1
CURRENT_FUNCTION: SUPERVISION
CURRENT_FUNCTION: ONE PERSON
AMBULATION ASSISTANCE ON FLAT SURFACES: 1
TRANSPORTATION: DEPENDENT
PHYSICAL TRANSFERS ASSESSED: 1
LAUNDRY ASSESSED: 1
TRANSPORTATION ASSESSED: 1
AMBULATION ASSISTANCE: STAND BY ASSIST
LAUNDRY: DEPENDENT

## 2024-09-21 VITALS
HEART RATE: 68 BPM | SYSTOLIC BLOOD PRESSURE: 105 MMHG | TEMPERATURE: 98.4 F | RESPIRATION RATE: 18 BRPM | OXYGEN SATURATION: 98 % | DIASTOLIC BLOOD PRESSURE: 65 MMHG

## 2024-09-21 ASSESSMENT — ENCOUNTER SYMPTOMS
PAIN LOCATION - PAIN SEVERITY: 7/10
PAIN LOCATION: HEAD
PAIN LOCATION - PAIN FREQUENCY: INFREQUENT
HIGHEST PAIN SEVERITY IN PAST 24 HOURS: 7/10
PAIN LOCATION - PAIN QUALITY: HEADACHE

## 2024-09-23 ENCOUNTER — HOME CARE VISIT (OUTPATIENT)
Dept: HOME HEALTH SERVICES | Facility: HOME HEALTH | Age: 89
End: 2024-09-23
Payer: COMMERCIAL

## 2024-09-23 PROCEDURE — G0151 HHCP-SERV OF PT,EA 15 MIN: HCPCS | Mod: HHH

## 2024-09-23 SDOH — HEALTH STABILITY: PHYSICAL HEALTH: PHYSICAL EXERCISE: 10

## 2024-09-23 SDOH — ECONOMIC STABILITY: HOUSING INSECURITY: OPEN FLAME PRESENT: 1

## 2024-09-23 SDOH — HEALTH STABILITY: PHYSICAL HEALTH: EXERCISE COMMENTS: SEATED HEP INCLUDES ANKLE PUMPS, LAQS, HIP FLEXION, HEELSLIDES, AND HIP ABDUCTION

## 2024-09-23 SDOH — HEALTH STABILITY: PHYSICAL HEALTH: EXERCISE TYPE: SEATED HEP

## 2024-09-23 SDOH — HEALTH STABILITY: PHYSICAL HEALTH: EXERCISE ACTIVITIES SETS: 3

## 2024-09-23 SDOH — HEALTH STABILITY: PHYSICAL HEALTH: PHYSICAL EXERCISE: SITTING

## 2024-09-23 SDOH — HEALTH STABILITY: PHYSICAL HEALTH: EXERCISE ACTIVITY: SEATED HEP

## 2024-09-23 ASSESSMENT — ENCOUNTER SYMPTOMS
LOWEST PAIN SEVERITY IN PAST 24 HOURS: 3/10
MUSCLE WEAKNESS: 1
DEPRESSION: 0
SUBJECTIVE PAIN PROGRESSION: UNCHANGED
PAIN LOCATION - PAIN SEVERITY: 5/10
HIGHEST PAIN SEVERITY IN PAST 24 HOURS: 6/10
PAIN LOCATION - RELIEVING FACTORS: ICE, MEDS
PAIN SEVERITY GOAL: 2/10
PAIN LOCATION - EXACERBATING FACTORS: PROLONGED STANDING
OCCASIONAL FEELINGS OF UNSTEADINESS: 1
ARTHRALGIAS: 1
PAIN: 1
LOSS OF SENSATION IN FEET: 0
PAIN LOCATION: LEFT KNEE
LIMITED RANGE OF MOTION: 1
PERSON REPORTING PAIN: PATIENT
PAIN: PATIENT DILIGENT WITH PAIN CONTROL TECHNIQUES
PAIN LOCATION - PAIN FREQUENCY: FREQUENT

## 2024-09-23 ASSESSMENT — ACTIVITIES OF DAILY LIVING (ADL)
AMBULATION ASSISTANCE: 1
AMBULATION ASSISTANCE ON FLAT SURFACES: 1
CURRENT_FUNCTION: SUPERVISION
PHYSICAL TRANSFERS ASSESSED: 1
AMBULATION ASSISTANCE: ONE PERSON
AMBULATION ASSISTANCE: STAND BY ASSIST
CURRENT_FUNCTION: ONE PERSON

## 2024-09-24 ENCOUNTER — HOME CARE VISIT (OUTPATIENT)
Dept: HOME HEALTH SERVICES | Facility: HOME HEALTH | Age: 89
End: 2024-09-24
Payer: COMMERCIAL

## 2024-09-24 VITALS
OXYGEN SATURATION: 93 % | TEMPERATURE: 97.6 F | SYSTOLIC BLOOD PRESSURE: 122 MMHG | HEART RATE: 68 BPM | DIASTOLIC BLOOD PRESSURE: 64 MMHG

## 2024-09-24 PROCEDURE — G0152 HHCP-SERV OF OT,EA 15 MIN: HCPCS | Mod: HHH

## 2024-09-24 ASSESSMENT — ENCOUNTER SYMPTOMS
PERSON REPORTING PAIN: PATIENT
DENIES PAIN: 1

## 2024-09-26 ENCOUNTER — HOME CARE VISIT (OUTPATIENT)
Dept: HOME HEALTH SERVICES | Facility: HOME HEALTH | Age: 89
End: 2024-09-26
Payer: COMMERCIAL

## 2024-09-26 VITALS
SYSTOLIC BLOOD PRESSURE: 110 MMHG | TEMPERATURE: 97.8 F | OXYGEN SATURATION: 98 % | HEART RATE: 78 BPM | DIASTOLIC BLOOD PRESSURE: 60 MMHG | RESPIRATION RATE: 16 BRPM

## 2024-09-26 VITALS
TEMPERATURE: 97.4 F | OXYGEN SATURATION: 95 % | HEART RATE: 76 BPM | SYSTOLIC BLOOD PRESSURE: 118 MMHG | DIASTOLIC BLOOD PRESSURE: 76 MMHG

## 2024-09-26 PROCEDURE — G0152 HHCP-SERV OF OT,EA 15 MIN: HCPCS | Mod: HHH

## 2024-09-26 PROCEDURE — G0299 HHS/HOSPICE OF RN EA 15 MIN: HCPCS | Mod: HHH

## 2024-09-26 ASSESSMENT — ENCOUNTER SYMPTOMS
DENIES PAIN: 1
DENIES PAIN: 1
PERSON REPORTING PAIN: PATIENT

## 2024-09-27 ENCOUNTER — HOME CARE VISIT (OUTPATIENT)
Dept: HOME HEALTH SERVICES | Facility: HOME HEALTH | Age: 89
End: 2024-09-27
Payer: COMMERCIAL

## 2024-09-27 PROCEDURE — G0151 HHCP-SERV OF PT,EA 15 MIN: HCPCS | Mod: HHH

## 2024-09-27 SDOH — ECONOMIC STABILITY: HOUSING INSECURITY: OPEN FLAME PRESENT: 1

## 2024-09-27 SDOH — HEALTH STABILITY: PHYSICAL HEALTH: EXERCISE ACTIVITY: SEATED HEP

## 2024-09-27 SDOH — HEALTH STABILITY: PHYSICAL HEALTH: PHYSICAL EXERCISE: 10

## 2024-09-27 SDOH — HEALTH STABILITY: PHYSICAL HEALTH: EXERCISE ACTIVITY: STANDING

## 2024-09-27 SDOH — HEALTH STABILITY: PHYSICAL HEALTH
EXERCISE COMMENTS: SEATED HEP INCLUDES ANKLE PUMPS, LAQS, HIP FLEXION, AND HIP ABDUCTION  STANDING HEP INCLUDES: CALF RAISES, KNEE FLEXION, HIP FLEXION, MARCHING, HIP ABDUCTION, HIP EXTENSION, AND MINI SQUATS

## 2024-09-27 SDOH — HEALTH STABILITY: PHYSICAL HEALTH: PHYSICAL EXERCISE: STANDING

## 2024-09-27 SDOH — HEALTH STABILITY: PHYSICAL HEALTH: EXERCISE ACTIVITIES SETS: 2

## 2024-09-27 SDOH — HEALTH STABILITY: PHYSICAL HEALTH: EXERCISE TYPE: SEATED AND STANDING HEP

## 2024-09-27 SDOH — HEALTH STABILITY: PHYSICAL HEALTH: PHYSICAL EXERCISE: SITTING

## 2024-09-27 SDOH — HEALTH STABILITY: PHYSICAL HEALTH: EXERCISE ACTIVITIES SETS: 1

## 2024-09-27 ASSESSMENT — ENCOUNTER SYMPTOMS
ARTHRALGIAS: 1
HIGHEST PAIN SEVERITY IN PAST 24 HOURS: 6/10
PAIN LOCATION - PAIN FREQUENCY: FREQUENT
LOWEST PAIN SEVERITY IN PAST 24 HOURS: 3/10
LOSS OF SENSATION IN FEET: 1
PAIN LOCATION - EXACERBATING FACTORS: PROLONGED STANDING, ROM
PAIN: 1
PAIN: PATIENT DILIGENT WITH PAIN CONTROL TECHNIQUES
MUSCLE WEAKNESS: 1
PAIN LOCATION - RELIEVING FACTORS: ICE, MEDS
LIMITED RANGE OF MOTION: 1
SUBJECTIVE PAIN PROGRESSION: UNCHANGED
PAIN LOCATION - PAIN SEVERITY: 4/10
PERSON REPORTING PAIN: PATIENT
OCCASIONAL FEELINGS OF UNSTEADINESS: 1
DEPRESSION: 0
PAIN SEVERITY GOAL: 2/10
PAIN LOCATION: LEFT KNEE

## 2024-09-27 ASSESSMENT — ACTIVITIES OF DAILY LIVING (ADL)
PHYSICAL TRANSFERS ASSESSED: 1
AMBULATION ASSISTANCE: ONE PERSON
CURRENT_FUNCTION: SUPERVISION
AMBULATION ASSISTANCE ON FLAT SURFACES: 1
AMBULATION ASSISTANCE: STAND BY ASSIST
AMBULATION ASSISTANCE: 1

## 2024-09-30 ENCOUNTER — HOME CARE VISIT (OUTPATIENT)
Dept: HOME HEALTH SERVICES | Facility: HOME HEALTH | Age: 89
End: 2024-09-30
Payer: COMMERCIAL

## 2024-09-30 PROCEDURE — G0152 HHCP-SERV OF OT,EA 15 MIN: HCPCS | Mod: HHH

## 2024-09-30 ASSESSMENT — ENCOUNTER SYMPTOMS
APPETITE LEVEL: GOOD
LAST BOWEL MOVEMENT: 67109

## 2024-10-01 ENCOUNTER — HOME CARE VISIT (OUTPATIENT)
Dept: HOME HEALTH SERVICES | Facility: HOME HEALTH | Age: 89
End: 2024-10-01
Payer: COMMERCIAL

## 2024-10-01 VITALS
RESPIRATION RATE: 16 BRPM | HEART RATE: 73 BPM | SYSTOLIC BLOOD PRESSURE: 115 MMHG | DIASTOLIC BLOOD PRESSURE: 60 MMHG | OXYGEN SATURATION: 100 % | TEMPERATURE: 97 F

## 2024-10-01 PROCEDURE — G0299 HHS/HOSPICE OF RN EA 15 MIN: HCPCS | Mod: HHH

## 2024-10-01 PROCEDURE — G0151 HHCP-SERV OF PT,EA 15 MIN: HCPCS | Mod: HHH

## 2024-10-01 SDOH — HEALTH STABILITY: PHYSICAL HEALTH: EXERCISE TYPE: GENERAL ROM AND STRENGTHENING TO LES

## 2024-10-01 ASSESSMENT — PAIN SCALES - PAIN ASSESSMENT IN ADVANCED DEMENTIA (PAINAD)
TOTALSCORE: 2
NEGVOCALIZATION: 1 - OCCASIONAL MOAN OR GROAN. LOW-LEVEL SPEECH WITH A NEGATIVE OR DISAPPROVING QUALITY.
CONSOLABILITY: 0 - NO NEED TO CONSOLE.
CONSOLABILITY: 0
FACIALEXPRESSION: 0 - SMILING OR INEXPRESSIVE.
BODYLANGUAGE: 0
FACIALEXPRESSION: 0
BREATHING: 1
NEGVOCALIZATION: 1
BODYLANGUAGE: 0 - RELAXED.

## 2024-10-01 ASSESSMENT — ENCOUNTER SYMPTOMS
APPETITE LEVEL: GOOD
HIGHEST PAIN SEVERITY IN PAST 24 HOURS: 5/10
PERSON REPORTING PAIN: PATIENT
PAIN SEVERITY GOAL: 1/10
LAST BOWEL MOVEMENT: 67114
MUSCLE WEAKNESS: 1
ARTHRALGIAS: 1
OCCASIONAL FEELINGS OF UNSTEADINESS: 0
PAIN: 1
PAIN LOCATION: LEFT KNEE
PERSON REPORTING PAIN: PATIENT
LOWEST PAIN SEVERITY IN PAST 24 HOURS: 2/10
LIMITED RANGE OF MOTION: 1
PAIN LOCATION - PAIN QUALITY: ACHE
DENIES PAIN: 1
PAIN LOCATION - PAIN SEVERITY: 4/10
DENIES PAIN: 1
SUBJECTIVE PAIN PROGRESSION: WAXING AND WANING

## 2024-10-01 ASSESSMENT — ACTIVITIES OF DAILY LIVING (ADL)
CURRENT_FUNCTION: ONE PERSON
AMBULATION ASSISTANCE: STAND BY ASSIST
CURRENT_FUNCTION: STAND BY ASSIST

## 2024-10-02 ENCOUNTER — HOME CARE VISIT (OUTPATIENT)
Dept: HOME HEALTH SERVICES | Facility: HOME HEALTH | Age: 89
End: 2024-10-02
Payer: COMMERCIAL

## 2024-10-02 VITALS
DIASTOLIC BLOOD PRESSURE: 60 MMHG | OXYGEN SATURATION: 97 % | TEMPERATURE: 97.2 F | HEART RATE: 81 BPM | SYSTOLIC BLOOD PRESSURE: 102 MMHG

## 2024-10-02 PROCEDURE — G0152 HHCP-SERV OF OT,EA 15 MIN: HCPCS | Mod: HHH

## 2024-10-02 ASSESSMENT — ENCOUNTER SYMPTOMS
PERSON REPORTING PAIN: PATIENT
DENIES PAIN: 1

## 2024-10-04 ENCOUNTER — HOME CARE VISIT (OUTPATIENT)
Dept: HOME HEALTH SERVICES | Facility: HOME HEALTH | Age: 89
End: 2024-10-04
Payer: COMMERCIAL

## 2024-10-04 PROCEDURE — G0151 HHCP-SERV OF PT,EA 15 MIN: HCPCS | Mod: HHH

## 2024-10-04 SDOH — HEALTH STABILITY: PHYSICAL HEALTH: PHYSICAL EXERCISE: 15

## 2024-10-04 SDOH — HEALTH STABILITY: PHYSICAL HEALTH: PHYSICAL EXERCISE: STANDING

## 2024-10-04 SDOH — HEALTH STABILITY: PHYSICAL HEALTH: EXERCISE ACTIVITIES SETS: 2

## 2024-10-04 SDOH — ECONOMIC STABILITY: HOUSING INSECURITY: OPEN FLAME PRESENT: 1

## 2024-10-04 SDOH — HEALTH STABILITY: PHYSICAL HEALTH: EXERCISE TYPE: STANDING HEP

## 2024-10-04 SDOH — HEALTH STABILITY: PHYSICAL HEALTH: EXERCISE ACTIVITY: STANDING HEP

## 2024-10-04 ASSESSMENT — ENCOUNTER SYMPTOMS
LOSS OF SENSATION IN FEET: 0
PAIN LOCATION - EXACERBATING FACTORS: PROLONGED STANDING
PAIN LOCATION: LEFT KNEE
PAIN LOCATION - RELIEVING FACTORS: MEDS, HEAT
ARTHRALGIAS: 1
PAIN LOCATION - PAIN SEVERITY: 6/10
PERSON REPORTING PAIN: PATIENT
PAIN SEVERITY GOAL: 3/10
SUBJECTIVE PAIN PROGRESSION: WAXING AND WANING
LOWEST PAIN SEVERITY IN PAST 24 HOURS: 4/10
OCCASIONAL FEELINGS OF UNSTEADINESS: 1
DEPRESSION: 0
LIMITED RANGE OF MOTION: 1
HIGHEST PAIN SEVERITY IN PAST 24 HOURS: 7/10
PAIN LOCATION - PAIN FREQUENCY: FREQUENT
PAIN: PATIENT DILIGENT WITH PAIN CONTROL TECHNIQUES
MUSCLE WEAKNESS: 1
PAIN: 1

## 2024-10-04 ASSESSMENT — ACTIVITIES OF DAILY LIVING (ADL)
AMBULATION_DISTANCE/DURATION_TOLERATED: 100 FEET WITH WHEELED WALKER AND SUPERVISION
AMBULATION ASSISTANCE: ONE PERSON
PHYSICAL TRANSFERS ASSESSED: 1
CURRENT_FUNCTION: INDEPENDENT
AMBULATION ASSISTANCE: 1
AMBULATION ASSISTANCE: STAND BY ASSIST
AMBULATION ASSISTANCE ON FLAT SURFACES: 1

## 2024-10-07 ENCOUNTER — HOME CARE VISIT (OUTPATIENT)
Dept: HOME HEALTH SERVICES | Facility: HOME HEALTH | Age: 89
End: 2024-10-07
Payer: COMMERCIAL

## 2024-10-07 VITALS — SYSTOLIC BLOOD PRESSURE: 118 MMHG | DIASTOLIC BLOOD PRESSURE: 66 MMHG | TEMPERATURE: 97.9 F

## 2024-10-07 PROCEDURE — G0152 HHCP-SERV OF OT,EA 15 MIN: HCPCS | Mod: HHH

## 2024-10-07 ASSESSMENT — ENCOUNTER SYMPTOMS
DENIES PAIN: 1
PERSON REPORTING PAIN: PATIENT

## 2024-10-08 ENCOUNTER — HOME CARE VISIT (OUTPATIENT)
Dept: HOME HEALTH SERVICES | Facility: HOME HEALTH | Age: 89
End: 2024-10-08
Payer: COMMERCIAL

## 2024-10-08 VITALS
DIASTOLIC BLOOD PRESSURE: 64 MMHG | TEMPERATURE: 97.5 F | OXYGEN SATURATION: 98 % | HEART RATE: 57 BPM | SYSTOLIC BLOOD PRESSURE: 108 MMHG

## 2024-10-08 PROCEDURE — G0152 HHCP-SERV OF OT,EA 15 MIN: HCPCS | Mod: HHH

## 2024-10-08 PROCEDURE — G0151 HHCP-SERV OF PT,EA 15 MIN: HCPCS | Mod: HHH

## 2024-10-08 SDOH — HEALTH STABILITY: PHYSICAL HEALTH: PHYSICAL EXERCISE: 10

## 2024-10-08 SDOH — HEALTH STABILITY: PHYSICAL HEALTH: EXERCISE ACTIVITY: STANDING HEP

## 2024-10-08 SDOH — HEALTH STABILITY: PHYSICAL HEALTH: EXERCISE TYPE: STANDING HEP

## 2024-10-08 SDOH — HEALTH STABILITY: PHYSICAL HEALTH: PHYSICAL EXERCISE: STANDING

## 2024-10-08 SDOH — HEALTH STABILITY: PHYSICAL HEALTH: EXERCISE ACTIVITIES SETS: 2

## 2024-10-08 ASSESSMENT — ENCOUNTER SYMPTOMS
SUBJECTIVE PAIN PROGRESSION: WAXING AND WANING
LOWEST PAIN SEVERITY IN PAST 24 HOURS: 5/10
PAIN LOCATION - PAIN FREQUENCY: INTERMITTENT
PERSON REPORTING PAIN: PATIENT
PAIN: 1
PAIN LOCATION - PAIN FREQUENCY: FREQUENT
DEPRESSION: 0
ARTHRALGIAS: 1
LOSS OF SENSATION IN FEET: 0
LIMITED RANGE OF MOTION: 1
HIGHEST PAIN SEVERITY IN PAST 24 HOURS: 7/10
PAIN LOCATION - EXACERBATING FACTORS: PROLONGED STANDING
PERSON REPORTING PAIN: PATIENT
PAIN LOCATION - RELIEVING FACTORS: ICE, MEDS
PAIN: 1
PAIN LOCATION: LEFT KNEE
PAIN LOCATION: LEFT KNEE
PAIN SEVERITY GOAL: 3/10
MUSCLE WEAKNESS: 1
PAIN LOCATION - PAIN QUALITY: ACHE
PAIN: PATIENT DILIGENT WITH PAIN CONTROL TECHNIQUES
PAIN LOCATION - PAIN SEVERITY: 5/10
PAIN LOCATION - PAIN DURATION: INTERMITTENT
PAIN LOCATION - PAIN SEVERITY: 6/10
OCCASIONAL FEELINGS OF UNSTEADINESS: 1

## 2024-10-08 ASSESSMENT — ACTIVITIES OF DAILY LIVING (ADL)
AMBULATION ASSISTANCE: 1
AMBULATION_DISTANCE/DURATION_TOLERATED: 150 FEET WITH WHEELED WALKER AND SUPERVISION
AMBULATION ASSISTANCE: ONE PERSON
AMBULATION ASSISTANCE ON FLAT SURFACES: 1
AMBULATION ASSISTANCE: STAND BY ASSIST
CURRENT_FUNCTION: INDEPENDENT
PHYSICAL TRANSFERS ASSESSED: 1

## 2024-10-11 ENCOUNTER — HOME CARE VISIT (OUTPATIENT)
Dept: HOME HEALTH SERVICES | Facility: HOME HEALTH | Age: 89
End: 2024-10-11
Payer: COMMERCIAL

## 2024-10-11 PROCEDURE — G0151 HHCP-SERV OF PT,EA 15 MIN: HCPCS | Mod: HHH

## 2024-10-11 SDOH — HEALTH STABILITY: PHYSICAL HEALTH: PHYSICAL EXERCISE: SITTING

## 2024-10-11 SDOH — ECONOMIC STABILITY: HOUSING INSECURITY: OPEN FLAME PRESENT: 1

## 2024-10-11 SDOH — HEALTH STABILITY: PHYSICAL HEALTH: EXERCISE COMMENTS: SEATED HEP INCLUDES ANKLE PUMPS, LAQS, HIP FLEXION, HEELSLIDES, AND HIP ABDUCTION

## 2024-10-11 SDOH — HEALTH STABILITY: PHYSICAL HEALTH: EXERCISE ACTIVITIES SETS: 2

## 2024-10-11 SDOH — HEALTH STABILITY: PHYSICAL HEALTH: PHYSICAL EXERCISE: 15

## 2024-10-11 SDOH — HEALTH STABILITY: PHYSICAL HEALTH: EXERCISE TYPE: SEATED HEP

## 2024-10-11 SDOH — HEALTH STABILITY: PHYSICAL HEALTH: EXERCISE ACTIVITY: SEATED HEP

## 2024-10-11 ASSESSMENT — ENCOUNTER SYMPTOMS
PAIN: 1
LOWEST PAIN SEVERITY IN PAST 24 HOURS: 3/10
PAIN LOCATION: LEFT KNEE
PAIN SEVERITY GOAL: 2/10
OCCASIONAL FEELINGS OF UNSTEADINESS: 1
ARTHRALGIAS: 1
PAIN LOCATION - PAIN FREQUENCY: FREQUENT
LOSS OF SENSATION IN FEET: 0
SUBJECTIVE PAIN PROGRESSION: UNCHANGED
HIGHEST PAIN SEVERITY IN PAST 24 HOURS: 6/10
DEPRESSION: 0
PAIN: PATIENT DILIGENT WITH PAIN CONTROL TECHNIQUES
MUSCLE WEAKNESS: 1
PAIN LOCATION - PAIN SEVERITY: 5/10
PERSON REPORTING PAIN: PATIENT
LIMITED RANGE OF MOTION: 1
PAIN LOCATION - RELIEVING FACTORS: MEDS, THERMAL MODALITIES

## 2024-10-11 ASSESSMENT — ACTIVITIES OF DAILY LIVING (ADL)
AMBULATION ASSISTANCE: ONE PERSON
PHYSICAL TRANSFERS ASSESSED: 1
CURRENT_FUNCTION: INDEPENDENT
AMBULATION ASSISTANCE: 1
AMBULATION ASSISTANCE ON FLAT SURFACES: 1
AMBULATION ASSISTANCE: STAND BY ASSIST

## 2024-10-15 ENCOUNTER — HOME CARE VISIT (OUTPATIENT)
Dept: HOME HEALTH SERVICES | Facility: HOME HEALTH | Age: 89
End: 2024-10-15
Payer: COMMERCIAL

## 2024-10-15 PROCEDURE — G0151 HHCP-SERV OF PT,EA 15 MIN: HCPCS | Mod: HHH

## 2024-10-15 SDOH — ECONOMIC STABILITY: HOUSING INSECURITY: OPEN FLAME PRESENT: 1

## 2024-10-15 SDOH — HEALTH STABILITY: PHYSICAL HEALTH: PHYSICAL EXERCISE: STANDING

## 2024-10-15 SDOH — HEALTH STABILITY: PHYSICAL HEALTH: PHYSICAL EXERCISE: 15

## 2024-10-15 SDOH — HEALTH STABILITY: PHYSICAL HEALTH: EXERCISE ACTIVITY: STANDING HEP

## 2024-10-15 SDOH — HEALTH STABILITY: PHYSICAL HEALTH
EXERCISE COMMENTS: STANDING HEP INCLUDES: CALF RAISES, KNEE FLEXION, HIP FLEXION, MARCHING, HIP ABDUCTION, HIP EXTENSION, AND MINI SQUATS. ALSO FORWARD MARCHING AND SIDE STEPPING

## 2024-10-15 SDOH — HEALTH STABILITY: PHYSICAL HEALTH: EXERCISE ACTIVITIES SETS: 2

## 2024-10-15 SDOH — HEALTH STABILITY: PHYSICAL HEALTH: EXERCISE TYPE: STANDING HEP

## 2024-10-15 ASSESSMENT — ENCOUNTER SYMPTOMS
LOWEST PAIN SEVERITY IN PAST 24 HOURS: 4/10
HIGHEST PAIN SEVERITY IN PAST 24 HOURS: 7/10
PAIN LOCATION - RELIEVING FACTORS: ICE, MEDS
PAIN LOCATION - PAIN FREQUENCY: FREQUENT
OCCASIONAL FEELINGS OF UNSTEADINESS: 1
PAIN SEVERITY GOAL: 3/10
PAIN: PATIENT DILIGENT WITH PAIN CONTROL
MUSCLE WEAKNESS: 1
PERSON REPORTING PAIN: PATIENT
PAIN LOCATION: LEFT KNEE
LIMITED RANGE OF MOTION: 1
PAIN LOCATION - PAIN SEVERITY: 6/10
SUBJECTIVE PAIN PROGRESSION: UNCHANGED
DEPRESSION: 0
PAIN: 1
PAIN LOCATION - EXACERBATING FACTORS: PROLONGED STANDING, KNEE FLEXION
LOSS OF SENSATION IN FEET: 0
ARTHRALGIAS: 1

## 2024-10-15 ASSESSMENT — ACTIVITIES OF DAILY LIVING (ADL)
AMBULATION ASSISTANCE: ONE PERSON
AMBULATION ASSISTANCE ON FLAT SURFACES: 1
AMBULATION ASSISTANCE: 1
PHYSICAL TRANSFERS ASSESSED: 1
AMBULATION ASSISTANCE: STAND BY ASSIST
CURRENT_FUNCTION: INDEPENDENT

## 2024-10-18 ENCOUNTER — HOME CARE VISIT (OUTPATIENT)
Dept: HOME HEALTH SERVICES | Facility: HOME HEALTH | Age: 89
End: 2024-10-18
Payer: COMMERCIAL

## 2024-10-18 PROCEDURE — G0151 HHCP-SERV OF PT,EA 15 MIN: HCPCS | Mod: HHH

## 2024-10-18 SDOH — HEALTH STABILITY: PHYSICAL HEALTH: EXERCISE COMMENTS: SEATED HEP INCLUDES ANKLE PUMPS, LAQS, HIP FLEXION, HEELSLIDES, AND HIP ABDUCTION

## 2024-10-18 SDOH — HEALTH STABILITY: PHYSICAL HEALTH: PHYSICAL EXERCISE: SITTING

## 2024-10-18 SDOH — HEALTH STABILITY: PHYSICAL HEALTH: EXERCISE TYPE: SEATED HEP

## 2024-10-18 SDOH — ECONOMIC STABILITY: HOUSING INSECURITY: OPEN FLAME PRESENT: 1

## 2024-10-18 SDOH — HEALTH STABILITY: PHYSICAL HEALTH: PHYSICAL EXERCISE: 15

## 2024-10-18 SDOH — HEALTH STABILITY: PHYSICAL HEALTH: EXERCISE ACTIVITY: SEATED HEP

## 2024-10-18 SDOH — HEALTH STABILITY: PHYSICAL HEALTH: EXERCISE ACTIVITIES SETS: 3

## 2024-10-18 ASSESSMENT — ENCOUNTER SYMPTOMS
OCCASIONAL FEELINGS OF UNSTEADINESS: 1
MUSCLE WEAKNESS: 1
PAIN SEVERITY GOAL: 3/10
HIGHEST PAIN SEVERITY IN PAST 24 HOURS: 7/10
PAIN: PATIENT DILIGENT WITH PAIN CONTROL TECHNIQUES
LIMITED RANGE OF MOTION: 1
PAIN LOCATION - PAIN FREQUENCY: FREQUENT
LOSS OF SENSATION IN FEET: 0
PAIN LOCATION - EXACERBATING FACTORS: PROLONGED STANDING
PAIN: 1
SUBJECTIVE PAIN PROGRESSION: UNCHANGED
PAIN LOCATION - RELIEVING FACTORS: ICE, MEDS
DEPRESSION: 0
PAIN LOCATION - PAIN SEVERITY: 6/10
PERSON REPORTING PAIN: PATIENT
PAIN LOCATION: LEFT KNEE
ARTHRALGIAS: 1
LOWEST PAIN SEVERITY IN PAST 24 HOURS: 4/10

## 2024-10-18 ASSESSMENT — ACTIVITIES OF DAILY LIVING (ADL)
AMBULATION ASSISTANCE: STAND BY ASSIST
AMBULATION ASSISTANCE: ONE PERSON
AMBULATION ASSISTANCE ON FLAT SURFACES: 1
CURRENT_FUNCTION: INDEPENDENT
AMBULATION ASSISTANCE: 1
PHYSICAL TRANSFERS ASSESSED: 1

## 2024-10-23 ENCOUNTER — HOME CARE VISIT (OUTPATIENT)
Dept: HOME HEALTH SERVICES | Facility: HOME HEALTH | Age: 89
End: 2024-10-23
Payer: COMMERCIAL

## 2024-10-23 PROCEDURE — G0151 HHCP-SERV OF PT,EA 15 MIN: HCPCS | Mod: HHH

## 2024-10-23 SDOH — HEALTH STABILITY: PHYSICAL HEALTH
EXERCISE COMMENTS: SEATED HEP INCLUDES ANKLE PUMPS, LAQS, HIP FLEXION, HEELSLIDES, AND HIP ABDUCTION  STANDING HEP INCLUDES: CALF RAISES, KNEE FLEXION, HIP FLEXION, MARCHING, HIP ABDUCTION, HIP EXTENSION, AND MINI SQUATS

## 2024-10-23 SDOH — HEALTH STABILITY: PHYSICAL HEALTH: PHYSICAL EXERCISE: 15

## 2024-10-23 SDOH — HEALTH STABILITY: PHYSICAL HEALTH: EXERCISE ACTIVITIES SETS: 2

## 2024-10-23 SDOH — HEALTH STABILITY: PHYSICAL HEALTH: EXERCISE ACTIVITIES SETS: 1

## 2024-10-23 SDOH — HEALTH STABILITY: PHYSICAL HEALTH: EXERCISE TYPE: SEATED AND STANDING HEP

## 2024-10-23 SDOH — HEALTH STABILITY: PHYSICAL HEALTH: EXERCISE ACTIVITY: SEATED HEP

## 2024-10-23 SDOH — HEALTH STABILITY: PHYSICAL HEALTH: EXERCISE ACTIVITY: STANDING HEP

## 2024-10-23 SDOH — ECONOMIC STABILITY: HOUSING INSECURITY: OPEN FLAME PRESENT: 1

## 2024-10-23 SDOH — HEALTH STABILITY: PHYSICAL HEALTH: PHYSICAL EXERCISE: STANDING

## 2024-10-23 SDOH — HEALTH STABILITY: PHYSICAL HEALTH: PHYSICAL EXERCISE: SITTING

## 2024-10-23 ASSESSMENT — ACTIVITIES OF DAILY LIVING (ADL)
PHYSICAL TRANSFERS ASSESSED: 1
AMBULATION ASSISTANCE: ONE PERSON
AMBULATION ASSISTANCE: 1
AMBULATION ASSISTANCE: STAND BY ASSIST
CURRENT_FUNCTION: INDEPENDENT
AMBULATION ASSISTANCE ON FLAT SURFACES: 1

## 2024-10-23 ASSESSMENT — ENCOUNTER SYMPTOMS
DENIES PAIN: 1
MUSCLE WEAKNESS: 1
ARTHRALGIAS: 1
OCCASIONAL FEELINGS OF UNSTEADINESS: 1
DEPRESSION: 0
LOSS OF SENSATION IN FEET: 0
LIMITED RANGE OF MOTION: 1
PAIN: NO PAIN REPORTED TODAY

## 2024-10-25 ENCOUNTER — HOME CARE VISIT (OUTPATIENT)
Dept: HOME HEALTH SERVICES | Facility: HOME HEALTH | Age: 89
End: 2024-10-25
Payer: COMMERCIAL

## 2024-10-25 PROCEDURE — G0151 HHCP-SERV OF PT,EA 15 MIN: HCPCS | Mod: HHH

## 2024-10-25 SDOH — HEALTH STABILITY: PHYSICAL HEALTH: EXERCISE ACTIVITIES SETS: 2

## 2024-10-25 SDOH — HEALTH STABILITY: PHYSICAL HEALTH: PHYSICAL EXERCISE: SITTING

## 2024-10-25 SDOH — HEALTH STABILITY: PHYSICAL HEALTH: PHYSICAL EXERCISE: 15

## 2024-10-25 SDOH — HEALTH STABILITY: PHYSICAL HEALTH: EXERCISE ACTIVITY: SEATED HEP

## 2024-10-25 SDOH — HEALTH STABILITY: PHYSICAL HEALTH: EXERCISE TYPE: SEATED HEP

## 2024-10-25 SDOH — HEALTH STABILITY: PHYSICAL HEALTH: EXERCISE COMMENTS: SEATED HEP INCLUDES ANKLE PUMPS, LAQS, HIP FLEXION, HEELSLIDES, AND HIP ABDUCTION

## 2024-10-25 ASSESSMENT — ENCOUNTER SYMPTOMS
PAIN: NO PAIN REPORTED TODAY
OCCASIONAL FEELINGS OF UNSTEADINESS: 0
DEPRESSION: 0
LIMITED RANGE OF MOTION: 1
LOSS OF SENSATION IN FEET: 0
ARTHRALGIAS: 1
DENIES PAIN: 1
MUSCLE WEAKNESS: 1

## 2024-10-25 ASSESSMENT — ACTIVITIES OF DAILY LIVING (ADL)
AMBULATION ASSISTANCE ON FLAT SURFACES: 1
CURRENT_FUNCTION: INDEPENDENT
OASIS_M1830: 01
AMBULATION ASSISTANCE: INDEPENDENT
PHYSICAL TRANSFERS ASSESSED: 1
HOME_HEALTH_OASIS: 00
AMBULATION ASSISTANCE: 1

## 2024-12-13 ENCOUNTER — APPOINTMENT (OUTPATIENT)
Dept: RADIOLOGY | Facility: HOSPITAL | Age: 89
End: 2024-12-13
Payer: COMMERCIAL

## 2024-12-13 ENCOUNTER — HOSPITAL ENCOUNTER (INPATIENT)
Facility: HOSPITAL | Age: 89
End: 2024-12-13
Attending: EMERGENCY MEDICINE
Payer: COMMERCIAL

## 2024-12-13 ENCOUNTER — CLINICAL SUPPORT (OUTPATIENT)
Dept: EMERGENCY MEDICINE | Facility: HOSPITAL | Age: 89
DRG: 545 | End: 2024-12-13
Payer: COMMERCIAL

## 2024-12-13 DIAGNOSIS — I50.23 ACUTE ON CHRONIC SYSTOLIC HEART FAILURE: ICD-10-CM

## 2024-12-13 DIAGNOSIS — M19.90 ARTHRITIS: ICD-10-CM

## 2024-12-13 DIAGNOSIS — N17.9 ACUTE KIDNEY INJURY (CMS-HCC): ICD-10-CM

## 2024-12-13 DIAGNOSIS — R60.1 ANASARCA: Primary | ICD-10-CM

## 2024-12-13 DIAGNOSIS — K59.00 CONSTIPATION, UNSPECIFIED CONSTIPATION TYPE: ICD-10-CM

## 2024-12-13 DIAGNOSIS — M79.89 LEFT UPPER EXTREMITY SWELLING: ICD-10-CM

## 2024-12-13 DIAGNOSIS — I07.1 SEVERE TRICUSPID REGURGITATION: ICD-10-CM

## 2024-12-13 DIAGNOSIS — Z95.0 PACEMAKER: ICD-10-CM

## 2024-12-13 DIAGNOSIS — E85.82 WILD-TYPE TRANSTHYRETIN-RELATED (ATTR) AMYLOIDOSIS (MULTI): ICD-10-CM

## 2024-12-13 DIAGNOSIS — E55.9 VITAMIN D DEFICIENCY: ICD-10-CM

## 2024-12-13 DIAGNOSIS — E87.6 HYPOKALEMIA: ICD-10-CM

## 2024-12-13 LAB
ALBUMIN SERPL BCP-MCNC: 3.3 G/DL (ref 3.4–5)
ALBUMIN SERPL BCP-MCNC: 3.3 G/DL (ref 3.4–5)
ALBUMIN SERPL BCP-MCNC: 3.6 G/DL (ref 3.4–5)
ALP SERPL-CCNC: 134 U/L (ref 33–136)
ALP SERPL-CCNC: 163 U/L (ref 33–136)
ALT SERPL W P-5'-P-CCNC: 11 U/L (ref 7–45)
ALT SERPL W P-5'-P-CCNC: 15 U/L (ref 7–45)
ANION GAP SERPL CALC-SCNC: 16 MMOL/L (ref 10–20)
ANION GAP SERPL CALC-SCNC: 17 MMOL/L (ref 10–20)
APTT PPP: 30 SECONDS (ref 27–38)
AST SERPL W P-5'-P-CCNC: 19 U/L (ref 9–39)
AST SERPL W P-5'-P-CCNC: 20 U/L (ref 9–39)
BASOPHILS # BLD AUTO: 0.01 X10*3/UL (ref 0–0.1)
BASOPHILS NFR BLD AUTO: 0.3 %
BILIRUB DIRECT SERPL-MCNC: 0.2 MG/DL (ref 0–0.3)
BILIRUB SERPL-MCNC: 1 MG/DL (ref 0–1.2)
BILIRUB SERPL-MCNC: 1.1 MG/DL (ref 0–1.2)
BNP SERPL-MCNC: >5000 PG/ML (ref 0–99)
BUN SERPL-MCNC: 23 MG/DL (ref 6–23)
BUN SERPL-MCNC: 25 MG/DL (ref 6–23)
CALCIUM SERPL-MCNC: 9.3 MG/DL (ref 8.6–10.6)
CALCIUM SERPL-MCNC: 9.4 MG/DL (ref 8.6–10.6)
CARDIAC TROPONIN I PNL SERPL HS: 330 NG/L (ref 0–34)
CARDIAC TROPONIN I PNL SERPL HS: 418 NG/L (ref 0–34)
CHLORIDE SERPL-SCNC: 105 MMOL/L (ref 98–107)
CHLORIDE SERPL-SCNC: 107 MMOL/L (ref 98–107)
CO2 SERPL-SCNC: 26 MMOL/L (ref 21–32)
CO2 SERPL-SCNC: 26 MMOL/L (ref 21–32)
CREAT SERPL-MCNC: 1.91 MG/DL (ref 0.5–1.05)
CREAT SERPL-MCNC: 2.12 MG/DL (ref 0.5–1.05)
EGFRCR SERPLBLD CKD-EPI 2021: 22 ML/MIN/1.73M*2
EGFRCR SERPLBLD CKD-EPI 2021: 25 ML/MIN/1.73M*2
EOSINOPHIL # BLD AUTO: 0.01 X10*3/UL (ref 0–0.4)
EOSINOPHIL NFR BLD AUTO: 0.3 %
ERYTHROCYTE [DISTWIDTH] IN BLOOD BY AUTOMATED COUNT: 18.2 % (ref 11.5–14.5)
ERYTHROCYTE [DISTWIDTH] IN BLOOD BY AUTOMATED COUNT: 18.6 % (ref 11.5–14.5)
GLUCOSE SERPL-MCNC: 74 MG/DL (ref 74–99)
GLUCOSE SERPL-MCNC: 98 MG/DL (ref 74–99)
HCT VFR BLD AUTO: 42.7 % (ref 36–46)
HCT VFR BLD AUTO: 43.2 % (ref 36–46)
HGB BLD-MCNC: 13.9 G/DL (ref 12–16)
HGB BLD-MCNC: 14.4 G/DL (ref 12–16)
IMM GRANULOCYTES # BLD AUTO: 0.02 X10*3/UL (ref 0–0.5)
IMM GRANULOCYTES NFR BLD AUTO: 0.6 % (ref 0–0.9)
INR PPP: 1.3 (ref 0.9–1.1)
LYMPHOCYTES # BLD AUTO: 0.64 X10*3/UL (ref 0.8–3)
LYMPHOCYTES NFR BLD AUTO: 17.8 %
MAGNESIUM SERPL-MCNC: 2.68 MG/DL (ref 1.6–2.4)
MAGNESIUM SERPL-MCNC: 2.7 MG/DL (ref 1.6–2.4)
MCH RBC QN AUTO: 27.1 PG (ref 26–34)
MCH RBC QN AUTO: 27.5 PG (ref 26–34)
MCHC RBC AUTO-ENTMCNC: 32.6 G/DL (ref 32–36)
MCHC RBC AUTO-ENTMCNC: 33.3 G/DL (ref 32–36)
MCV RBC AUTO: 83 FL (ref 80–100)
MCV RBC AUTO: 83 FL (ref 80–100)
MONOCYTES # BLD AUTO: 0.37 X10*3/UL (ref 0.05–0.8)
MONOCYTES NFR BLD AUTO: 10.3 %
NEUTROPHILS # BLD AUTO: 2.54 X10*3/UL (ref 1.6–5.5)
NEUTROPHILS NFR BLD AUTO: 70.7 %
NRBC BLD-RTO: 0 /100 WBCS (ref 0–0)
NRBC BLD-RTO: 0 /100 WBCS (ref 0–0)
PHOSPHATE SERPL-MCNC: 2.6 MG/DL (ref 2.5–4.9)
PLATELET # BLD AUTO: 101 X10*3/UL (ref 150–450)
PLATELET # BLD AUTO: 84 X10*3/UL (ref 150–450)
POTASSIUM SERPL-SCNC: 3.1 MMOL/L (ref 3.5–5.3)
POTASSIUM SERPL-SCNC: 3.6 MMOL/L (ref 3.5–5.3)
PROT SERPL-MCNC: 7 G/DL (ref 6.4–8.2)
PROT SERPL-MCNC: 7.1 G/DL (ref 6.4–8.2)
PROTHROMBIN TIME: 14.4 SECONDS (ref 9.8–12.8)
RBC # BLD AUTO: 5.13 X10*6/UL (ref 4–5.2)
RBC # BLD AUTO: 5.23 X10*6/UL (ref 4–5.2)
SODIUM SERPL-SCNC: 145 MMOL/L (ref 136–145)
SODIUM SERPL-SCNC: 145 MMOL/L (ref 136–145)
WBC # BLD AUTO: 3.5 X10*3/UL (ref 4.4–11.3)
WBC # BLD AUTO: 3.6 X10*3/UL (ref 4.4–11.3)

## 2024-12-13 PROCEDURE — 71045 X-RAY EXAM CHEST 1 VIEW: CPT | Performed by: STUDENT IN AN ORGANIZED HEALTH CARE EDUCATION/TRAINING PROGRAM

## 2024-12-13 PROCEDURE — 2500000002 HC RX 250 W HCPCS SELF ADMINISTERED DRUGS (ALT 637 FOR MEDICARE OP, ALT 636 FOR OP/ED)

## 2024-12-13 PROCEDURE — 80053 COMPREHEN METABOLIC PANEL: CPT | Performed by: EMERGENCY MEDICINE

## 2024-12-13 PROCEDURE — 71045 X-RAY EXAM CHEST 1 VIEW: CPT

## 2024-12-13 PROCEDURE — 2500000004 HC RX 250 GENERAL PHARMACY W/ HCPCS (ALT 636 FOR OP/ED)

## 2024-12-13 PROCEDURE — 93005 ELECTROCARDIOGRAM TRACING: CPT

## 2024-12-13 PROCEDURE — 83880 ASSAY OF NATRIURETIC PEPTIDE: CPT | Performed by: EMERGENCY MEDICINE

## 2024-12-13 PROCEDURE — 2500000001 HC RX 250 WO HCPCS SELF ADMINISTERED DRUGS (ALT 637 FOR MEDICARE OP)

## 2024-12-13 PROCEDURE — 93010 ELECTROCARDIOGRAM REPORT: CPT | Performed by: EMERGENCY MEDICINE

## 2024-12-13 PROCEDURE — 36415 COLL VENOUS BLD VENIPUNCTURE: CPT | Performed by: EMERGENCY MEDICINE

## 2024-12-13 PROCEDURE — 2500000002 HC RX 250 W HCPCS SELF ADMINISTERED DRUGS (ALT 637 FOR MEDICARE OP, ALT 636 FOR OP/ED): Performed by: STUDENT IN AN ORGANIZED HEALTH CARE EDUCATION/TRAINING PROGRAM

## 2024-12-13 PROCEDURE — 2500000002 HC RX 250 W HCPCS SELF ADMINISTERED DRUGS (ALT 637 FOR MEDICARE OP, ALT 636 FOR OP/ED): Performed by: EMERGENCY MEDICINE

## 2024-12-13 PROCEDURE — 83735 ASSAY OF MAGNESIUM: CPT

## 2024-12-13 PROCEDURE — 96374 THER/PROPH/DIAG INJ IV PUSH: CPT

## 2024-12-13 PROCEDURE — 84484 ASSAY OF TROPONIN QUANT: CPT | Performed by: EMERGENCY MEDICINE

## 2024-12-13 PROCEDURE — 99285 EMERGENCY DEPT VISIT HI MDM: CPT | Performed by: EMERGENCY MEDICINE

## 2024-12-13 PROCEDURE — 82040 ASSAY OF SERUM ALBUMIN: CPT

## 2024-12-13 PROCEDURE — 85025 COMPLETE CBC W/AUTO DIFF WBC: CPT | Performed by: EMERGENCY MEDICINE

## 2024-12-13 PROCEDURE — 83735 ASSAY OF MAGNESIUM: CPT | Performed by: EMERGENCY MEDICINE

## 2024-12-13 PROCEDURE — 85610 PROTHROMBIN TIME: CPT | Performed by: EMERGENCY MEDICINE

## 2024-12-13 PROCEDURE — 1200000002 HC GENERAL ROOM WITH TELEMETRY DAILY

## 2024-12-13 PROCEDURE — 93010 ELECTROCARDIOGRAM REPORT: CPT | Performed by: INTERNAL MEDICINE

## 2024-12-13 PROCEDURE — 85027 COMPLETE CBC AUTOMATED: CPT

## 2024-12-13 PROCEDURE — 84100 ASSAY OF PHOSPHORUS: CPT

## 2024-12-13 RX ORDER — PREDNISONE 5 MG/1
2.5 TABLET ORAL DAILY
Status: DISCONTINUED | OUTPATIENT
Start: 2024-12-13 | End: 2024-12-27 | Stop reason: HOSPADM

## 2024-12-13 RX ORDER — FUROSEMIDE 10 MG/ML
40 INJECTION INTRAMUSCULAR; INTRAVENOUS ONCE
Status: COMPLETED | OUTPATIENT
Start: 2024-12-13 | End: 2024-12-13

## 2024-12-13 RX ORDER — POTASSIUM CHLORIDE 20 MEQ/1
40 TABLET, EXTENDED RELEASE ORAL ONCE
Status: COMPLETED | OUTPATIENT
Start: 2024-12-13 | End: 2024-12-13

## 2024-12-13 RX ORDER — SACUBITRIL AND VALSARTAN 24; 26 MG/1; MG/1
0.5 TABLET, FILM COATED ORAL 2 TIMES DAILY
Status: DISCONTINUED | OUTPATIENT
Start: 2024-12-13 | End: 2024-12-27 | Stop reason: HOSPADM

## 2024-12-13 RX ORDER — NICOTINE 11MG/24HR
2000 PATCH, TRANSDERMAL 24 HOURS TRANSDERMAL DAILY
COMMUNITY
Start: 2024-11-22 | End: 2024-12-27 | Stop reason: HOSPADM

## 2024-12-13 RX ORDER — AMOXICILLIN 250 MG
1 CAPSULE ORAL NIGHTLY
Status: DISCONTINUED | OUTPATIENT
Start: 2024-12-13 | End: 2024-12-27 | Stop reason: HOSPADM

## 2024-12-13 RX ORDER — ASPIRIN 81 MG/1
81 TABLET ORAL DAILY
Status: DISCONTINUED | OUTPATIENT
Start: 2024-12-13 | End: 2024-12-27 | Stop reason: HOSPADM

## 2024-12-13 RX ORDER — HEPARIN SODIUM 5000 [USP'U]/ML
5000 INJECTION, SOLUTION INTRAVENOUS; SUBCUTANEOUS EVERY 8 HOURS
Status: DISCONTINUED | OUTPATIENT
Start: 2024-12-13 | End: 2024-12-27 | Stop reason: HOSPADM

## 2024-12-13 RX ORDER — ACETAMINOPHEN 325 MG/1
650 TABLET ORAL EVERY 6 HOURS PRN
Status: DISCONTINUED | OUTPATIENT
Start: 2024-12-13 | End: 2024-12-27 | Stop reason: HOSPADM

## 2024-12-13 RX ORDER — SPIRONOLACTONE 25 MG/1
12.5 TABLET ORAL DAILY
Status: DISCONTINUED | OUTPATIENT
Start: 2024-12-13 | End: 2024-12-15

## 2024-12-13 RX ORDER — PANTOPRAZOLE SODIUM 40 MG/1
40 TABLET, DELAYED RELEASE ORAL
Status: DISCONTINUED | OUTPATIENT
Start: 2024-12-14 | End: 2024-12-27 | Stop reason: HOSPADM

## 2024-12-13 RX ORDER — ERGOCALCIFEROL 1.25 MG/1
1250 CAPSULE ORAL
Status: DISCONTINUED | OUTPATIENT
Start: 2024-12-15 | End: 2024-12-27 | Stop reason: HOSPADM

## 2024-12-13 RX ORDER — POLYETHYLENE GLYCOL 3350 17 G/17G
17 POWDER, FOR SOLUTION ORAL DAILY
Status: DISCONTINUED | OUTPATIENT
Start: 2024-12-13 | End: 2024-12-27 | Stop reason: HOSPADM

## 2024-12-13 RX ORDER — POTASSIUM CHLORIDE 20 MEQ/1
40 TABLET, EXTENDED RELEASE ORAL ONCE
Status: DISCONTINUED | OUTPATIENT
Start: 2024-12-13 | End: 2024-12-14

## 2024-12-13 RX ORDER — ROSUVASTATIN CALCIUM 20 MG/1
10 TABLET, COATED ORAL NIGHTLY
Status: DISCONTINUED | OUTPATIENT
Start: 2024-12-13 | End: 2024-12-27 | Stop reason: HOSPADM

## 2024-12-13 RX ADMIN — POTASSIUM CHLORIDE 40 MEQ: 1500 TABLET, EXTENDED RELEASE ORAL at 21:27

## 2024-12-13 RX ADMIN — FUROSEMIDE 10 MG/HR: 10 INJECTION, SOLUTION INTRAMUSCULAR; INTRAVENOUS at 15:51

## 2024-12-13 RX ADMIN — SACUBITRIL AND VALSARTAN 0.5 TABLET: 24; 26 TABLET, FILM COATED ORAL at 21:27

## 2024-12-13 RX ADMIN — FUROSEMIDE 40 MG: 10 INJECTION, SOLUTION INTRAVENOUS at 12:28

## 2024-12-13 RX ADMIN — POTASSIUM CHLORIDE 40 MEQ: 1500 TABLET, EXTENDED RELEASE ORAL at 12:27

## 2024-12-13 RX ADMIN — SPIRONOLACTONE 12.5 MG: 25 TABLET ORAL at 14:37

## 2024-12-13 RX ADMIN — HEPARIN SODIUM 5000 UNITS: 5000 INJECTION, SOLUTION INTRAVENOUS; SUBCUTANEOUS at 21:36

## 2024-12-13 RX ADMIN — ASPIRIN 81 MG: 81 TABLET, COATED ORAL at 14:36

## 2024-12-13 RX ADMIN — ROSUVASTATIN CALCIUM 10 MG: 20 TABLET, FILM COATED ORAL at 21:28

## 2024-12-13 RX ADMIN — SENNOSIDES AND DOCUSATE SODIUM 1 TABLET: 8.6; 5 TABLET ORAL at 21:27

## 2024-12-13 RX ADMIN — PREDNISONE 2.5 MG: 5 TABLET ORAL at 14:37

## 2024-12-13 RX ADMIN — FUROSEMIDE 10 MG/HR: 10 INJECTION, SOLUTION INTRAMUSCULAR; INTRAVENOUS at 22:38

## 2024-12-13 SDOH — SOCIAL STABILITY: SOCIAL INSECURITY: DO YOU FEEL UNSAFE GOING BACK TO THE PLACE WHERE YOU ARE LIVING?: NO

## 2024-12-13 SDOH — SOCIAL STABILITY: SOCIAL INSECURITY: HAS ANYONE EVER THREATENED TO HURT YOUR FAMILY OR YOUR PETS?: NO

## 2024-12-13 SDOH — ECONOMIC STABILITY: HOUSING INSECURITY: IN THE LAST 12 MONTHS, WAS THERE A TIME WHEN YOU WERE NOT ABLE TO PAY THE MORTGAGE OR RENT ON TIME?: NO

## 2024-12-13 SDOH — ECONOMIC STABILITY: FOOD INSECURITY: WITHIN THE PAST 12 MONTHS, YOU WORRIED THAT YOUR FOOD WOULD RUN OUT BEFORE YOU GOT THE MONEY TO BUY MORE.: NEVER TRUE

## 2024-12-13 SDOH — SOCIAL STABILITY: SOCIAL INSECURITY
WITHIN THE LAST YEAR, HAVE YOU BEEN KICKED, HIT, SLAPPED, OR OTHERWISE PHYSICALLY HURT BY YOUR PARTNER OR EX-PARTNER?: NO

## 2024-12-13 SDOH — ECONOMIC STABILITY: FOOD INSECURITY: WITHIN THE PAST 12 MONTHS, THE FOOD YOU BOUGHT JUST DIDN'T LAST AND YOU DIDN'T HAVE MONEY TO GET MORE.: NEVER TRUE

## 2024-12-13 SDOH — ECONOMIC STABILITY: HOUSING INSECURITY: AT ANY TIME IN THE PAST 12 MONTHS, WERE YOU HOMELESS OR LIVING IN A SHELTER (INCLUDING NOW)?: NO

## 2024-12-13 SDOH — ECONOMIC STABILITY: INCOME INSECURITY: IN THE PAST 12 MONTHS HAS THE ELECTRIC, GAS, OIL, OR WATER COMPANY THREATENED TO SHUT OFF SERVICES IN YOUR HOME?: NO

## 2024-12-13 SDOH — SOCIAL STABILITY: SOCIAL INSECURITY: ARE YOU OR HAVE YOU BEEN THREATENED OR ABUSED PHYSICALLY, EMOTIONALLY, OR SEXUALLY BY ANYONE?: NO

## 2024-12-13 SDOH — SOCIAL STABILITY: SOCIAL INSECURITY: HAVE YOU HAD THOUGHTS OF HARMING ANYONE ELSE?: NO

## 2024-12-13 SDOH — SOCIAL STABILITY: SOCIAL INSECURITY: WITHIN THE LAST YEAR, HAVE YOU BEEN HUMILIATED OR EMOTIONALLY ABUSED IN OTHER WAYS BY YOUR PARTNER OR EX-PARTNER?: NO

## 2024-12-13 SDOH — SOCIAL STABILITY: SOCIAL INSECURITY: WITHIN THE LAST YEAR, HAVE YOU BEEN AFRAID OF YOUR PARTNER OR EX-PARTNER?: NO

## 2024-12-13 SDOH — SOCIAL STABILITY: SOCIAL INSECURITY: ABUSE: ADULT

## 2024-12-13 SDOH — ECONOMIC STABILITY: FOOD INSECURITY: HOW HARD IS IT FOR YOU TO PAY FOR THE VERY BASICS LIKE FOOD, HOUSING, MEDICAL CARE, AND HEATING?: NOT HARD AT ALL

## 2024-12-13 SDOH — SOCIAL STABILITY: SOCIAL INSECURITY
WITHIN THE LAST YEAR, HAVE YOU BEEN RAPED OR FORCED TO HAVE ANY KIND OF SEXUAL ACTIVITY BY YOUR PARTNER OR EX-PARTNER?: NO

## 2024-12-13 SDOH — ECONOMIC STABILITY: TRANSPORTATION INSECURITY: IN THE PAST 12 MONTHS, HAS LACK OF TRANSPORTATION KEPT YOU FROM MEDICAL APPOINTMENTS OR FROM GETTING MEDICATIONS?: NO

## 2024-12-13 SDOH — SOCIAL STABILITY: SOCIAL INSECURITY: DOES ANYONE TRY TO KEEP YOU FROM HAVING/CONTACTING OTHER FRIENDS OR DOING THINGS OUTSIDE YOUR HOME?: NO

## 2024-12-13 SDOH — HEALTH STABILITY: PHYSICAL HEALTH: ON AVERAGE, HOW MANY MINUTES DO YOU ENGAGE IN EXERCISE AT THIS LEVEL?: 0 MIN

## 2024-12-13 SDOH — HEALTH STABILITY: PHYSICAL HEALTH: ON AVERAGE, HOW MANY DAYS PER WEEK DO YOU ENGAGE IN MODERATE TO STRENUOUS EXERCISE (LIKE A BRISK WALK)?: 0 DAYS

## 2024-12-13 SDOH — SOCIAL STABILITY: SOCIAL INSECURITY: WERE YOU ABLE TO COMPLETE ALL THE BEHAVIORAL HEALTH SCREENINGS?: YES

## 2024-12-13 SDOH — SOCIAL STABILITY: SOCIAL INSECURITY: DO YOU FEEL ANYONE HAS EXPLOITED OR TAKEN ADVANTAGE OF YOU FINANCIALLY OR OF YOUR PERSONAL PROPERTY?: NO

## 2024-12-13 SDOH — SOCIAL STABILITY: SOCIAL INSECURITY: HAVE YOU HAD ANY THOUGHTS OF HARMING ANYONE ELSE?: NO

## 2024-12-13 SDOH — ECONOMIC STABILITY: HOUSING INSECURITY: IN THE PAST 12 MONTHS, HOW MANY TIMES HAVE YOU MOVED WHERE YOU WERE LIVING?: 0

## 2024-12-13 SDOH — SOCIAL STABILITY: SOCIAL INSECURITY: ARE THERE ANY APPARENT SIGNS OF INJURIES/BEHAVIORS THAT COULD BE RELATED TO ABUSE/NEGLECT?: NO

## 2024-12-13 ASSESSMENT — COLUMBIA-SUICIDE SEVERITY RATING SCALE - C-SSRS
1. IN THE PAST MONTH, HAVE YOU WISHED YOU WERE DEAD OR WISHED YOU COULD GO TO SLEEP AND NOT WAKE UP?: NO
2. HAVE YOU ACTUALLY HAD ANY THOUGHTS OF KILLING YOURSELF?: NO
6. HAVE YOU EVER DONE ANYTHING, STARTED TO DO ANYTHING, OR PREPARED TO DO ANYTHING TO END YOUR LIFE?: NO

## 2024-12-13 ASSESSMENT — ENCOUNTER SYMPTOMS
DIZZINESS: 0
PALPITATIONS: 0
PSYCHIATRIC NEGATIVE: 1
ACTIVITY CHANGE: 1
EYES NEGATIVE: 1
ENDOCRINE NEGATIVE: 1
FATIGUE: 1
HEMATOLOGIC/LYMPHATIC NEGATIVE: 1
WOUND: 1
NAUSEA: 0
DIARRHEA: 0
ALLERGIC/IMMUNOLOGIC NEGATIVE: 1
VOMITING: 0
ABDOMINAL PAIN: 0
COUGH: 1
SHORTNESS OF BREATH: 1
ARTHRALGIAS: 1
NUMBNESS: 0
LIGHT-HEADEDNESS: 0
ABDOMINAL DISTENTION: 1
CONSTIPATION: 0

## 2024-12-13 ASSESSMENT — ACTIVITIES OF DAILY LIVING (ADL)
JUDGMENT_ADEQUATE_SAFELY_COMPLETE_DAILY_ACTIVITIES: YES
HEARING - RIGHT EAR: FUNCTIONAL
HEARING - LEFT EAR: FUNCTIONAL
BATHING: NEEDS ASSISTANCE
FEEDING YOURSELF: NEEDS ASSISTANCE
TOILETING: NEEDS ASSISTANCE
LACK_OF_TRANSPORTATION: NO
LACK_OF_TRANSPORTATION: NO
GROOMING: NEEDS ASSISTANCE
DRESSING YOURSELF: NEEDS ASSISTANCE
PATIENT'S MEMORY ADEQUATE TO SAFELY COMPLETE DAILY ACTIVITIES?: YES
WALKS IN HOME: NEEDS ASSISTANCE
ADEQUATE_TO_COMPLETE_ADL: YES
ASSISTIVE_DEVICE: WALKER

## 2024-12-13 ASSESSMENT — COGNITIVE AND FUNCTIONAL STATUS - GENERAL
DRESSING REGULAR LOWER BODY CLOTHING: A LITTLE
WALKING IN HOSPITAL ROOM: A LITTLE
MOVING FROM LYING ON BACK TO SITTING ON SIDE OF FLAT BED WITH BEDRAILS: A LITTLE
MOBILITY SCORE: 18
CLIMB 3 TO 5 STEPS WITH RAILING: A LITTLE
DRESSING REGULAR UPPER BODY CLOTHING: A LITTLE
STANDING UP FROM CHAIR USING ARMS: A LITTLE
PATIENT BASELINE BEDBOUND: NO
DAILY ACTIVITIY SCORE: 18
EATING MEALS: A LITTLE
TURNING FROM BACK TO SIDE WHILE IN FLAT BAD: A LITTLE
HELP NEEDED FOR BATHING: A LITTLE
PERSONAL GROOMING: A LITTLE
MOVING TO AND FROM BED TO CHAIR: A LITTLE
TOILETING: A LITTLE

## 2024-12-13 ASSESSMENT — LIFESTYLE VARIABLES
PRESCIPTION_ABUSE_PAST_12_MONTHS: NO
AUDIT-C TOTAL SCORE: 0
HOW OFTEN DO YOU HAVE A DRINK CONTAINING ALCOHOL: NEVER
HOW OFTEN DO YOU HAVE 6 OR MORE DRINKS ON ONE OCCASION: NEVER
AUDIT-C TOTAL SCORE: 0
SUBSTANCE_ABUSE_PAST_12_MONTHS: NO
HOW MANY STANDARD DRINKS CONTAINING ALCOHOL DO YOU HAVE ON A TYPICAL DAY: PATIENT DOES NOT DRINK
SKIP TO QUESTIONS 9-10: 1

## 2024-12-13 ASSESSMENT — PAIN - FUNCTIONAL ASSESSMENT
PAIN_FUNCTIONAL_ASSESSMENT: 0-10
PAIN_FUNCTIONAL_ASSESSMENT: 0-10

## 2024-12-13 ASSESSMENT — PATIENT HEALTH QUESTIONNAIRE - PHQ9
SUM OF ALL RESPONSES TO PHQ9 QUESTIONS 1 & 2: 0
2. FEELING DOWN, DEPRESSED OR HOPELESS: NOT AT ALL
1. LITTLE INTEREST OR PLEASURE IN DOING THINGS: NOT AT ALL

## 2024-12-13 ASSESSMENT — PAIN SCALES - GENERAL
PAINLEVEL_OUTOF10: 0 - NO PAIN
PAINLEVEL_OUTOF10: 0 - NO PAIN

## 2024-12-13 NOTE — PROGRESS NOTES
Pharmacy Medication History Review    Erin Lopez is a 89 y.o. female admitted for Anasarca. Pharmacy reviewed the patient's dpmqw-tm-obuinagcq medications and allergies for accuracy.    Medications ADDED:  Vitamin D3 2,000 unit capsule  Medications CHANGED:  None  Medications REMOVED:   Tylenol  Calcium with vitamin D3  Vitamin D2 50,000 unit capsule  Nitroglycerin 0.4 mg sublingual tablet   Prednisone    The list below reflects the updated PTA list.   Prior to Admission Medications   Prescriptions Last Dose Informant   Vitamin D3 50 mcg (2,000 unit) capsule 12/13/2024 Morning Other, Child, Self   Sig: Take 1 capsule (50 mcg) by mouth early in the morning..   aspirin 81 mg EC tablet Not Taking Self, Child, Other   Sig: Take 1 tablet (81 mg) by mouth once daily.   Patient not taking: Reported on 12/13/2024  Was not on patient-supplied medication list and patient acknowledged she was not taking PTA    dapagliflozin propanediol (Farxiga) 10 mg 12/12/2024 Self, Child, Other   Sig: Take 1 tablet (10 mg) by mouth once every 24 hours.   furosemide (Lasix) 20 mg tablet 12/12/2024 Self, Child, Other   Sig: Take 1 tablet (20 mg) by mouth once daily.   metoprolol succinate XL (Toprol-XL) 25 mg 24 hr tablet 12/12/2024 Self, Child, Other   Sig: Take 0.5 tablets (12.5 mg) by mouth once daily. Do not crush or chew.   pantoprazole (ProtoNix) 40 mg EC tablet 12/13/2024 Morning Self, Child, Other   Sig: Take 1 tablet (40 mg) by mouth once daily in the morning. Take before meals. Do not crush, chew, or split.   rosuvastatin (Crestor) 20 mg tablet 12/12/2024 Self, Child, Other   Sig: Take 0.5 tablets (10 mg) by mouth once daily at bedtime.   Patient taking differently: Take 1 tablet (20 mg) by mouth once daily at bedtime.  Last pharmacy dispense 9/30/24 directions take one tablet daily    sacubitriL-valsartan (Entresto) 24-26 mg tablet 12/12/2024 Self, Child, Other   Sig: Take 0.5 tablets by mouth 2 times a day.   Patient taking  "differently: Take 1 tablet by mouth 2 times a day.  Last pharmacy dispense 11/22/24 directions one tablet twice daily for 30-day supply    sennosides-docusate sodium (Nelda-Colace) 8.6-50 mg tablet Past Month Self, Child, Other   Sig: Take 1 tablet by mouth once daily at bedtime.   spironolactone (Aldactone) 25 mg tablet 12/12/2024 Self, Child, Other   Sig: Take 0.5 tablets (12.5 mg) by mouth once daily.      Facility-Administered Medications: None            The list below reflects the updated allergy list. Please review each documented allergy for additional clarification and justification.  Allergies  Reviewed by Steve Tai PharmD on 12/13/2024   No Known Allergies         Patient accepts M2B at discharge.     Sources:   [facility]  Pharmacy dispense history  Patient interview Moderate historian  Mo, Ronaldo historian, patient's daughter, Cassidy Zepeda, at bedside  Chart Review  9/4/24  cardiology discharge summary  [facility] pharmacy #[phone]    Additional Comments:  No recent dispense history per [facility] report  I called [facility] pharmacy and spoke with federica KENDALL to confirm dispense history and directions for nitroglycerin (dispensed 10/18/2022), prednisone (dispensed 1/30/24 for 30-day supply), crestor, vitamin D3 (dispensed 11/22/24 for 30-day supply) and entresto  The patient's daughter, who lives with her mother, provided a hand-written note with list of medications the patient was taking PTA, which was used to confirm current medication list. The note specified drug name and directions but did not have dose of medications or date the note was last updated. The patient's daughter was able to confirm last home doses PTA      Steve Tai, [place]  Transitions of Care Pharmacist  12/13/24     Secure Chat preferred   If no response call [phone] or Vocera \"Med Rec\"    "

## 2024-12-13 NOTE — ED TRIAGE NOTES
Pt reports body swelling for a week. Hands and feet are very swollen, 2-3+ pitting edema throughout. The only part of her body that is not swollen is her face. Pt is not on a diuretic

## 2024-12-13 NOTE — ED PROVIDER NOTES
History of Present Illness     History provided by: Patient  Limitations to History: None  External Records Reviewed with Brief Summary: None    HPI:  Erin Lopez is a 89 y.o. female w PMH of NSTEMI (nonobstructive CAD 2017), HFpEF, LBBB and SSS s/p PPM 2017 s/p device changeout 11/2022, HTN, CKD presenting with CHF exacerbation.  Patient states that for the past 2 weeks he has been having bilateral upper and lower extremity swelling.  Patient states that this has happened in the past and has been compliant with her Lasix at home.  Patient does state that she is short of breath slightly however more with exertion.  Patient is not complaining of any chest pain.  Patient is not complaining of any fevers, chills, rhinorrhea but does state that she has some cough however is not productive.  Patient not complaining of any abdominal pain, nausea, vomiting.    Physical Exam   Triage vitals:  T 36.7 °C (98.1 °F)  HR 70  /76  RR 16  O2 100 % None (Room air)    General: Awake, alert, in no acute distress  Eyes: Gaze conjugate.  No scleral icterus or injection  HENT: Normo-cephalic, atraumatic. No stridor  CV: Regular rate, regular rhythm. Radial pulses 2+ bilaterally  Resp: Breathing non-labored, speaking in full sentences.  Coarse crackles bilaterally  GI: Soft, slightly distended, non-tender. No rebound or guarding.  MSK/Extremities: No gross bony deformities. Moving all extremities, 2+ pitting edema in the bilateral upper and lower extremities.  2+ DPs bilaterally however Doppler was used to assess given significant amount of edema.  Skin: Warm. Appropriate color, bilateral lower extremity blisters located in the right medial anterior tibia and left medial anterior tibia.  Blisters do not appear to be infected, no erythema, no warmth, no drainage from the site.  Neuro: Alert. Oriented. Face symmetric. Speech is fluent.  Gross strength and sensation intact in b/l UE and LEs  Psych: Appropriate mood and  affect    Medical Decision Making & ED Course   Medical Decision Makin y.o. female w PMH of NSTEMI (nonobstructive CAD 2017), HFpEF, LBBB and SSS s/p PPM 2017 s/p device changeout 2022, HTN, CKD presenting with CHF exacerbation.  Differential diagnosis includes pneumonia, PE, urinary tract infection, exacerbation of CHF, MI.  On exam, patient has 2+ pitting edema in upper and lower extremities, blisters are seen the bilateral lower extremities however no erythema, warmth, tenderness palpation.  Blisters do not look infected.  Patient is afebrile here in the emergency room.  BNP is over 5000.  Magnesium is slightly elevated.  Patient does have troponins that are elevated however are downtrending. EKG shows atrially paced sinus rhythm, right axis deviation, heart rate of 60, QRS of 150, QTc of 502, no ST elevations, no ST depression, no T wave versions.  EKG does appear similar to previous EKG. No signs of ischemic changes.  Elevation of troponin is likely due to demand ischemia.  CBC shows a new leukopenia along with a thrombocytopenia.  CMP shows a hypokalemia and was repleted.  Creatinine is elevated and shows to have an TERI likely due to cardiorenal syndrome.  Chest x-ray shows severe cardiomegaly with increased moderate to large bilateral pleural effusions and associated atelectasis and consolidation.  Additional interstitial and patchy opacities with suggestive of pulmonary edema.  This correlates with patient's lungs with clinical presentation.  Patient was given 40 of IV Lasix.  Patient is vitally stable here in the emergency room and was admitted to general surgery for acute on chronic exacerbation of congestive heart failure.  ----  Scoring Tools Utilized: None     Social Determinants of Health which Significantly Impact Care: None identified The following actions were taken to address these social determinants: None    EKG Independent Interpretation: EKG interpreted by myself. Please see ED Course  for full interpretation.    Independent Result Review and Interpretation: Relevant laboratory and radiographic results were reviewed and independently interpreted by myself.  As necessary, they are commented on in the ED Course.    Chronic conditions affecting the patient's care: As documented above in [facility]    The patient was discussed with the following consultants/services: None    Care Considerations: As documented above in [facility]    ED Course:  Diagnoses as of 12/13/24 1245   Anasarca   Acute kidney injury (CMS-HCC)   Hypokalemia     Disposition   As a result of their workup, the patient will require admission to the hospital.  The patient was informed of her diagnosis.  The patient was given the opportunity to ask questions and I answered them. The patient agreed to be admitted to the hospital.    Procedures   Procedures    Patient seen and discussed with ED attending physician.    Shonda Medina MD  Emergency Medicine     Shonda Medina MD  Resident  12/13/24 1085

## 2024-12-13 NOTE — H&P
History Of Present Illness:    Erin Lopez is a 89 y.o. 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   Patient reports progressive leg swelling and SOB with exertion over the last 2 weeks. She has remained compliant on all her medications, including lasix 20mg daily, with help of her daughter at home. Daughter at the bedside reports that the patient has been slowly decompensating since completing rehab via [facility] approximately 6 weeks after discharge 9/4/2024. Patient was scheduled to see Dr. Goss 9/17/2024 but did not show up. When asked why, there was not a clear response from patient or daughter.  Patient states that she stays in bed a majority of the day, but does use her walker to get to the bathroom when needed.     Patient's most recent admission to [facility] 8/24-9/4 for anasarca. Was found to have newly reduced HFrEF. With previously noted concern for amyloidosis on TTE 10/2023 and significantly thickened LV posterior wall on TTE 8/24, serum free light chains, SPEP and UPEP negative. NM PYP cardiac amyloidosis with SPECT CT suggestive of TTR. CTA coronary revealed minor CAD, calcium score 17.  Advanced heart failure [facility]  GDMT was limited due to hypotension. GDMT at discharge: Entresto 12/13 BID, metop succ 12.5 mg daily, dapagliflozin 10 mg daily,  and spironolactone 12.5 mg daily + Lasix 20mg daily for maintenance diuretic. PT had originally recommended SNF, however patient declined SNF and was discharged home with [facility].     In the ED, /76 HR 70 SpO2 100% on RA. BNP >5K, HS trop 418 -> 330 attributing to demand ischemic similar to previous presentation. K 3.1 with Cr of 2.12. CXR revealing moderate-to-large bilateral pleural effusions. Given IV lasix  "40mg x1. Admitted to [facility] for further management.    Review of Systems   Constitutional:  Positive for activity change and fatigue.   HENT: Negative.     Eyes: Negative.    Respiratory:  Positive for cough (nonproductive) and shortness of breath (with exertion).    Cardiovascular:  Positive for leg swelling. Negative for chest pain and palpitations.   Gastrointestinal:  Positive for abdominal distention. Negative for abdominal pain, constipation, diarrhea, nausea and vomiting.   Endocrine: Negative.    Genitourinary: Negative.    Musculoskeletal:  Positive for arthralgias.   Skin:  Positive for wound.   Allergic/Immunologic: Negative.    Neurological:  Negative for dizziness, light-headedness and numbness.   Hematological: Negative.    Psychiatric/Behavioral: Negative.           Last Recorded Vitals:  Vitals:    12/13/24 1040   BP: 123/76   BP Location: Left arm   Patient Position: Sitting   Pulse: 70   Resp: 16   Temp: 36.7 °C (98.1 °F)   TempSrc: Temporal   SpO2: 100%   Weight: 70.5 kg (155 lb 6.4 oz)   Height: 1.6 m (5' 3\")       Last Labs:  CBC - 12/13/2024: 11:02 AM  3.6 14.4 101    43.2      CMP - 12/13/2024: 11:02 AM  9.4 7.1 19 --- 1.1   2.3 3.6 15 163      PTT - 8/24/2024: 12:01 AM  1.1   12.6 32     Troponin I, High Sensitivity ([facility])   Date/Time Value Ref Range Status   12/13/2024 11:02  (HH) 0 - 34 ng/L Final   08/23/2024 11:41  (HH) 0 - 34 ng/L Final     Comment:     Previous result verified on 8/23/2024 2336 on specimen/case [id number] called with component [facility] for procedure Troponin I, High Sensitivity, Initial with value 235 ng/L.   08/23/2024 10:35  (HH) 0 - 34 ng/L Final     BNP   Date/Time Value Ref Range Status   12/13/2024 11:02 AM >5,000 (H) 0 - 99 pg/mL Final   08/23/2024 10:35 PM 2,626 (H) 0 - 99 pg/mL Final      Last I/O:  No intake/output data recorded.    Past Cardiology Tests (Last 3 Years):  EKG:  ECG 12 lead 08/27/2024      ECG 12 lead " 08/23/2024    Echo:  Transthoracic Echo (TTE) Complete 08/24/2024   1. Left ventricular ejection fraction is moderately decreased, by visual estimate at 30-35%.   2. There is global hypokinesis of the left ventricle with minor regional variations.   3. Spectral Doppler shows an impaired relaxation pattern of left ventricular diastolic filling.   4. There is an elevated left ventricular end diastolic pressure.   5. Left ventricular cavity size is decreased.   6. Abnormal septal motion consistent with RV pacemaker.   7. Moderately increased left ventricular septal thickness.   8. The left ventricular posterior wall thickness is moderately increased.   9. There is severe concentric left ventricular hypertrophy.  10. There is normal right ventricular global systolic function.  11. Moderately enlarged right ventricle.  12. The left atrium is mildly dilated.  13. The right atrium is severely dilated.  14. Mild to moderate mitral valve regurgitation.  15. Severe tricuspid regurgitation visualized.  16. Slightly elevated RVSP.  17. The RV systolic pressure is likely to be underestimated.  18. Mild to moderate aortic valve regurgitation.    Transthoracic Echo (TTE) Complete 10/03/2023   1. Left ventricular systolic function is mildly decreased with a 50-55% estimated ejection fraction.   2. Moderately increased left ventricular septal thickness.   3. The left ventricular posterior wall thickness is moderately increased.   4. Moderately enlarged right ventricle.   5. Mild to moderate aortic valve regurgitation.   6. Mild to moderate mitral valve regurgitation.   7. Moderate tricuspid regurgitation visualized.   8. The tricuspid valve annulus appears dilated.   9. The left atrium is moderately dilated.  10. Moderately elevated right ventricular systolic pressure.  11. Compared with study from 2/17/2017, there is a significant increase in LV wall thickness. There is evidence of mild thickening of the RV free wall, associated  with mild thickening of the AV, MV and tricuspid valve plus moderately elevated RVSP. This findings could be associated to an underlying infiltrative disease like amyloidosis.    Ejection Fractions:  EF   Date/Time Value Ref Range Status   08/24/2024 11:20 AM 33 %      Cath:  No results found for this or any previous visit from the past 1095 days.    Stress Test:  No results found for this or any previous visit from the past 1095 days.    Cardiac Imaging:  CT angio coronary art with heartflow if score >30% 08/29/2024  1. Minor coronary artery disease.  2. The proximal LAD has focal calcified plaque with minimal stenosis  (<25%).  3. The LM, LCx and RCA have no significant atherosclerotic change or  stenotic disease.  4. Total Agatston calcium score of 17.  5. Right dominant coronary artery system.  6. Right atrial and right ventricular dilatation. Mild left atrial  dilatation.  7. Small bilateral pleural effusions with dependent atelectasis.  8. Dilated main pulmonary artery to 3.4 cm. Correlate/concern for  elevated pulmonary/right-sided filling pressures.    Amyloid SPECT Heart Study 8/26/2024  1. Amyloid SPECT heart study is suggestive of TTR amyloidosis.     Past Medical History:  She has a past medical history of Old myocardial infarction (11/24/2014), Other disorder of circulatory system (11/24/2014), Personal history of other diseases of the circulatory system (11/24/2014), Personal history of other diseases of the digestive system (11/24/2014), Personal history of other diseases of the musculoskeletal system and connective tissue (11/24/2014), and Personal history of other endocrine, nutritional and metabolic disease (11/24/2014).    Past Surgical History:  She has a past surgical history that includes Hysterectomy (11/24/2014); Other surgical history (11/24/2014); and Colonoscopy (11/24/2014).      Social History:  She reports that she has never smoked. She has never used smokeless tobacco. No history on  file for alcohol use and drug use.    Family History:  Family History   Problem Relation Name Age of Onset    Prostate cancer Brother          Allergies:  Patient has no known allergies.    Inpatient Medications:  Scheduled medications   Medication Dose Route Frequency    heparin (porcine)  5,000 Units subcutaneous q8h    polyethylene glycol  17 g oral Daily     PRN medications   Medication    acetaminophen     Continuous Medications   Medication Dose Last Rate     Outpatient Medications:  Current Outpatient Medications   Medication Instructions    acetaminophen (TYLENOL) 650 mg, oral, Every 6 hours PRN    aspirin 81 mg, oral, Daily    calcium carbonate-vitamin D3 (Calcium 600 + D,3,) 600 mg-10 mcg (400 unit) tablet 1 tablet, oral, Daily    Farxiga 10 mg, oral, Every 24 hours    furosemide (LASIX) 20 mg, oral, Daily    metoprolol succinate XL (TOPROL-XL) 12.5 mg, oral, Daily, Do not crush or chew.    nitroglycerin (NITROSTAT) 0.4 mg, sublingual, Every 5 min PRN    pantoprazole (PROTONIX) 40 mg, oral, Daily before breakfast, Do not crush, chew, or split.    predniSONE (DELTASONE) 2.5 mg, oral, Daily    rosuvastatin (CRESTOR) 10 mg, oral, Nightly    sacubitriL-valsartan (Entresto) 24-26 mg tablet 0.5 tablets, oral, 2 times daily    sennosides-docusate sodium (Nelda-Colace) 8.6-50 mg tablet 1 tablet, oral, Nightly    spironolactone (ALDACTONE) 12.5 mg, oral, Daily    Vitamin D2 1,250 mcg, oral, Once Weekly       Physical Exam:   General: NAD, sitting up in bed  Skin: cool LE  Head/ neck: +JVD up to mandibular angle  Cardiac: RRR, S1, S2   Pulm: absent lung bases, on RA  GI: firm/edematous, nontender  Extremities: firm +4 BLE edema with fluid blisters. Edema to level breasts/upper arms  Neuro: no focal neuro deficits. A&Ox3  Psych: appropriate mood and behavior, pleasant       Assessment/Plan   Erin Lopez is a 89 y.o. female presenting with NSTEMI (nonobstructive CAD 2017), TTR cardiac amyloidosis HFrEF (30-35% 8/2-24),  LBBB and SSS s/p PPM 2017 s/p device changeout 11/2022, HTN, CKD presented to the ED with severe anasarca.     Acute systolic and diastolic heart failure (HFrEF 30-35%)  TTR Amyloidosis  - TTE 10/2023: EF 50-55%, moderately increased LV septal and posterior wall thickness, Moderately enlarged V, mild to mod AR and MR, mod TR, moderate LAE, Compared to 2017 TTE there is possible underlying infiltrative disease  - TTE 8/24/2024: EF 30-35%, gHK of LV,  elevated LVEDP, abnormal septal motion c/w V pacemaker, moderately increased LV septal thickness. Lv posterior wall moderately thickened. Severe cLVH. Moderately enlarged RV, mild LAE, RA severely dilated, mild to mod MR, severe TR, Slightly elevated RVSP. Mild to mod AR.  - New TTR amyloidosis seen on 8/25 NM PYP scan-- K/L ration WNL, SPEP, UPEP negative  - admit CXR: Severe cardiomegaly with increased moderate-to-large bilateral pleural effusions and associated atelectasis and/or consolidation. Additional interstitial and patchy opacities with basilar predilection suggestive of interstitial and alveolar pulmonary edema  - BNP >5000 (previously 2,626 8/2024)  - discharge wt 9/4/2024 51.5 kg  - admit wt: 70.5 kg (recorded in ED)  - patient has severe anasarca up to breasts/upper arms, would benefit from continuous diuretic  - during last admission, patient successfully diuresed with IV lasix boluses with intermittent diamox  - s/p IV lasix 40mg x1 in ED on admission  - will initiate lasix gtt at 10mg/hr and reassess UO to uptitrate tomorrow if needed  - Advanced HF team consulted during last admission: Recommend initiating Tafamidis (amyloid coordinator informed at the time)  - patient no showed for Dr. Goss appt 9/17/2024  - will hold BB iso ADHF/concern for low output (cool LE)  - hold dapagliflozin iso TERI -- likely cardiorenal due to severe anasarca   - cont Entresto 12/13 BID and spironolactone 12.5mg  - home GDMT: Entresto 12/13 BID, metop succ 12.5 mg daily,  dapagliflozin 10 mg daily,  and spironolactone 12.5 mg daily  - home diuretic: Lasix 20mg daily  - Daily standing weights, strict I&O's, 2g sodium diet, 2L fluid restriction  - highly recommend palliative care consult for GOC/code status given readmission for severe anasarca/ADHF     Troponinemia  - HS trop: 418 -> 330. Suspected 2/2 demand from CHF  - CTA coronary : minor CAD, prox LAD has focal calcified plaque with minimal stenosis (<25%), LM, LCx and RCA have no significant atherosclerotic change or stenotic disease. Calcium score 17  - Cont ASA 81 mg daily, rosuvastatin 20 mg nightly      Hx of SSS s/p PPM , s/p device changeout 2022  - EC% AV pacing  - EP consulted during last admission due to concern for possible pacemaker mediated cardiomyopathy given high pacing burden on device interrogation: AV delayed extended but no intrinsic conduction noted. Recommended optimizing GDMT prior to considering upgrade to CRT since she would be high risk.      HTN  - Admit /76  - continue Entresto as above     TERI on CKD IIIb  - Cr baseline 1.4-1.6  - admit Cr 2.12  - likely cardiorenal iso severe anasarca   - diuresis as above  - avoid nephrotoxic agents    Hypokalemia  - admit K 3.1  - potassium repleted on admission, will given another dose this evening  - cont spironolactone 12.5mg as above  - cont repletion during aggressive diuresis    Concern for congestive hepatopathy   Thrombocytopenia  - baseline 190-220s  - admit Plt: 101  - INR 1.3, elevated alk phos 163  - cont to trend CBC, coag and LFTs daily  - diuresis as above     Arthritis  - Pt previously getting steroid injections q3 month then put on systemic steroids  - Cont prednisone 2.5 daily   - cont home PPI while on steroids       Dispo: pending aggressive diuresis  - PT/OT to evaluate  DVT ppx: heparin SQ  Code Status:  Full Code  - highly recommend palliative care for GOC/code status    To be staffed with Dr. Sanches tomorrow  AM    Camron Woo PA-C    I conducted a shared care visit with the MANAN-she was diagnosed with likely TTR amyloidosis (no biopsy) but never followed up with outpatient cardiology and presents with massive volume overload- will start with bolus diuretics at this point addition of tafamidis may be futile, similarly she may have concurrent pacing cardiomyopathy but her frailty could make that challenging as well. Family refused rehab placement last admission but seem unable care for patient- a goals of care discussion and possible palliative consult would also be reasonable

## 2024-12-14 LAB
ALBUMIN SERPL BCP-MCNC: 3.5 G/DL (ref 3.4–5)
ALP SERPL-CCNC: 150 U/L (ref 33–136)
ALT SERPL W P-5'-P-CCNC: 10 U/L (ref 7–45)
ANION GAP SERPL CALC-SCNC: 17 MMOL/L (ref 10–20)
AST SERPL W P-5'-P-CCNC: 18 U/L (ref 9–39)
BILIRUB SERPL-MCNC: 0.7 MG/DL (ref 0–1.2)
BUN SERPL-MCNC: 27 MG/DL (ref 6–23)
CALCIUM SERPL-MCNC: 9.4 MG/DL (ref 8.6–10.6)
CHLORIDE SERPL-SCNC: 105 MMOL/L (ref 98–107)
CO2 SERPL-SCNC: 26 MMOL/L (ref 21–32)
CREAT SERPL-MCNC: 2.09 MG/DL (ref 0.5–1.05)
CREAT UR-MCNC: 30.2 MG/DL (ref 20–320)
EGFRCR SERPLBLD CKD-EPI 2021: 22 ML/MIN/1.73M*2
ERYTHROCYTE [DISTWIDTH] IN BLOOD BY AUTOMATED COUNT: 19.1 % (ref 11.5–14.5)
GLUCOSE SERPL-MCNC: 106 MG/DL (ref 74–99)
HCT VFR BLD AUTO: 44.2 % (ref 36–46)
HGB BLD-MCNC: 13.8 G/DL (ref 12–16)
MAGNESIUM SERPL-MCNC: 2.74 MG/DL (ref 1.6–2.4)
MCH RBC QN AUTO: 27.7 PG (ref 26–34)
MCHC RBC AUTO-ENTMCNC: 31.2 G/DL (ref 32–36)
MCV RBC AUTO: 89 FL (ref 80–100)
NRBC BLD-RTO: 0 /100 WBCS (ref 0–0)
PLATELET # BLD AUTO: 99 X10*3/UL (ref 150–450)
POTASSIUM SERPL-SCNC: 3.6 MMOL/L (ref 3.5–5.3)
PROT SERPL-MCNC: 7 G/DL (ref 6.4–8.2)
RBC # BLD AUTO: 4.99 X10*6/UL (ref 4–5.2)
SODIUM SERPL-SCNC: 144 MMOL/L (ref 136–145)
SODIUM UR-SCNC: 104 MMOL/L
SODIUM/CREAT UR-RTO: 344 MMOL/G CREAT
WBC # BLD AUTO: 4.3 X10*3/UL (ref 4.4–11.3)

## 2024-12-14 PROCEDURE — 99223 1ST HOSP IP/OBS HIGH 75: CPT | Performed by: INTERNAL MEDICINE

## 2024-12-14 PROCEDURE — 2500000001 HC RX 250 WO HCPCS SELF ADMINISTERED DRUGS (ALT 637 FOR MEDICARE OP): Performed by: STUDENT IN AN ORGANIZED HEALTH CARE EDUCATION/TRAINING PROGRAM

## 2024-12-14 PROCEDURE — 36415 COLL VENOUS BLD VENIPUNCTURE: CPT

## 2024-12-14 PROCEDURE — 80053 COMPREHEN METABOLIC PANEL: CPT

## 2024-12-14 PROCEDURE — 83735 ASSAY OF MAGNESIUM: CPT

## 2024-12-14 PROCEDURE — 2500000002 HC RX 250 W HCPCS SELF ADMINISTERED DRUGS (ALT 637 FOR MEDICARE OP, ALT 636 FOR OP/ED)

## 2024-12-14 PROCEDURE — 85027 COMPLETE CBC AUTOMATED: CPT

## 2024-12-14 PROCEDURE — 1200000002 HC GENERAL ROOM WITH TELEMETRY DAILY

## 2024-12-14 PROCEDURE — 84300 ASSAY OF URINE SODIUM: CPT | Performed by: INTERNAL MEDICINE

## 2024-12-14 PROCEDURE — 2500000004 HC RX 250 GENERAL PHARMACY W/ HCPCS (ALT 636 FOR OP/ED)

## 2024-12-14 PROCEDURE — 2500000001 HC RX 250 WO HCPCS SELF ADMINISTERED DRUGS (ALT 637 FOR MEDICARE OP)

## 2024-12-14 RX ORDER — POTASSIUM CHLORIDE 20 MEQ/1
40 TABLET, EXTENDED RELEASE ORAL ONCE
Status: COMPLETED | OUTPATIENT
Start: 2024-12-14 | End: 2024-12-14

## 2024-12-14 RX ORDER — POTASSIUM CHLORIDE 1.5 G/1.58G
40 POWDER, FOR SOLUTION ORAL ONCE
Status: COMPLETED | OUTPATIENT
Start: 2024-12-14 | End: 2024-12-14

## 2024-12-14 RX ORDER — IPRATROPIUM BROMIDE AND ALBUTEROL SULFATE 2.5; .5 MG/3ML; MG/3ML
3 SOLUTION RESPIRATORY (INHALATION) EVERY 6 HOURS PRN
Status: DISCONTINUED | OUTPATIENT
Start: 2024-12-14 | End: 2024-12-27 | Stop reason: HOSPADM

## 2024-12-14 RX ADMIN — PANTOPRAZOLE SODIUM 40 MG: 40 TABLET, DELAYED RELEASE ORAL at 06:08

## 2024-12-14 RX ADMIN — ASPIRIN 81 MG: 81 TABLET, COATED ORAL at 08:44

## 2024-12-14 RX ADMIN — ROSUVASTATIN CALCIUM 10 MG: 20 TABLET, FILM COATED ORAL at 20:45

## 2024-12-14 RX ADMIN — HEPARIN SODIUM 5000 UNITS: 5000 INJECTION, SOLUTION INTRAVENOUS; SUBCUTANEOUS at 13:49

## 2024-12-14 RX ADMIN — POTASSIUM CHLORIDE 40 MEQ: 1.5 POWDER, FOR SOLUTION ORAL at 21:39

## 2024-12-14 RX ADMIN — SPIRONOLACTONE 12.5 MG: 25 TABLET ORAL at 08:44

## 2024-12-14 RX ADMIN — HEPARIN SODIUM 5000 UNITS: 5000 INJECTION, SOLUTION INTRAVENOUS; SUBCUTANEOUS at 04:33

## 2024-12-14 RX ADMIN — SACUBITRIL AND VALSARTAN 0.5 TABLET: 24; 26 TABLET, FILM COATED ORAL at 08:44

## 2024-12-14 RX ADMIN — SENNOSIDES AND DOCUSATE SODIUM 1 TABLET: 8.6; 5 TABLET ORAL at 20:45

## 2024-12-14 RX ADMIN — POTASSIUM CHLORIDE 40 MEQ: 1500 TABLET, EXTENDED RELEASE ORAL at 10:31

## 2024-12-14 RX ADMIN — PREDNISONE 2.5 MG: 5 TABLET ORAL at 08:44

## 2024-12-14 RX ADMIN — FUROSEMIDE 10 MG/HR: 10 INJECTION, SOLUTION INTRAMUSCULAR; INTRAVENOUS at 16:40

## 2024-12-14 RX ADMIN — HEPARIN SODIUM 5000 UNITS: 5000 INJECTION, SOLUTION INTRAVENOUS; SUBCUTANEOUS at 20:45

## 2024-12-14 ASSESSMENT — COGNITIVE AND FUNCTIONAL STATUS - GENERAL
STANDING UP FROM CHAIR USING ARMS: A LITTLE
DRESSING REGULAR LOWER BODY CLOTHING: A LITTLE
MOVING TO AND FROM BED TO CHAIR: A LITTLE
TURNING FROM BACK TO SIDE WHILE IN FLAT BAD: A LITTLE
PERSONAL GROOMING: A LITTLE
TURNING FROM BACK TO SIDE WHILE IN FLAT BAD: A LITTLE
DAILY ACTIVITIY SCORE: 18
WALKING IN HOSPITAL ROOM: A LITTLE
HELP NEEDED FOR BATHING: A LITTLE
HELP NEEDED FOR BATHING: A LITTLE
MOVING FROM LYING ON BACK TO SITTING ON SIDE OF FLAT BED WITH BEDRAILS: A LITTLE
PERSONAL GROOMING: A LITTLE
EATING MEALS: A LITTLE
EATING MEALS: A LITTLE
DRESSING REGULAR LOWER BODY CLOTHING: A LITTLE
MOVING FROM LYING ON BACK TO SITTING ON SIDE OF FLAT BED WITH BEDRAILS: A LITTLE
MOVING TO AND FROM BED TO CHAIR: A LITTLE
MOBILITY SCORE: 18
DAILY ACTIVITIY SCORE: 18
STANDING UP FROM CHAIR USING ARMS: A LITTLE
TOILETING: A LITTLE
MOBILITY SCORE: 18
TOILETING: A LITTLE
WALKING IN HOSPITAL ROOM: A LITTLE
DRESSING REGULAR UPPER BODY CLOTHING: A LITTLE
CLIMB 3 TO 5 STEPS WITH RAILING: A LITTLE
CLIMB 3 TO 5 STEPS WITH RAILING: A LITTLE
DRESSING REGULAR UPPER BODY CLOTHING: A LITTLE

## 2024-12-14 ASSESSMENT — PAIN SCALES - GENERAL
PAINLEVEL_OUTOF10: 0 - NO PAIN
PAINLEVEL_OUTOF10: 0 - NO PAIN

## 2024-12-14 ASSESSMENT — PAIN - FUNCTIONAL ASSESSMENT
PAIN_FUNCTIONAL_ASSESSMENT: 0-10
PAIN_FUNCTIONAL_ASSESSMENT: 0-10

## 2024-12-14 NOTE — PROGRESS NOTES
"Subjective Data:  Patient seen this AM, reports she's \"feeling poorly\" and remains quite fluid overloaded. She reports her hands are feeling a little less swollen.     Overnight Events:    Poor UO recorded overnight, unclear response to lasix gtt at this time     Today in brief:   - continue lasix gtt at 10 mg/ hour  - unclear response to lasix gtt-> will pursue gonzalez insertion for accurate hourly I&Os  - low Plt on admit, down to 84 today-> aspirin held at this time, will continue to trend   - Entresto held      Objective Data:  Last Recorded Vitals:  Vitals:    12/13/24 2316 12/14/24 0449 12/14/24 0520 12/14/24 0816   BP: 118/71 134/88  107/64   BP Location: Left arm Left arm  Left arm   Patient Position: Lying Lying  Lying   Pulse: 61 60  58   Resp: 18 18  18   Temp: 36.4 °C (97.5 °F) 36.1 °C (97 °F)  35.9 °C (96.6 °F)   TempSrc: Temporal Temporal  Temporal   SpO2: 97% 96%  99%   Weight:   68.8 kg (151 lb 10.8 oz)    Height:           Last Labs:  CBC - 12/13/2024:  6:15 PM  3.5 13.9 84    42.7      CMP - 12/13/2024:  6:15 PM  9.3 7.0 20 --- 1.0   2.6 3.3; 3.3 11 134      PTT - 12/13/2024: 12:18 PM  1.3   14.4 30     TROPHS   Date/Time Value Ref Range Status   12/13/2024 12:18  0 - 34 ng/L Final     Comment:     Previous result verified on 12/13/2024 1210 on specimen/case [id number] called with component TRPHS for procedure Troponin I, High Sensitivity, Initial with value 418 ng/L.   12/13/2024 11:02  0 - 34 ng/L Final   08/23/2024 11:41  0 - 34 ng/L Final     Comment:     Previous result verified on 8/23/2024 2336 on specimen/case [id number] called with component TRPHS for procedure Troponin I, High Sensitivity, Initial with value 235 ng/L.     BNP   Date/Time Value Ref Range Status   12/13/2024 11:02 AM >5,000 0 - 99 pg/mL Final   08/23/2024 10:35 PM 2,626 0 - 99 pg/mL Final      Last I/O:  I/O last 3 completed shifts:  In: - (0 mL/kg)   Out: 600 (8.7 mL/kg) [Urine:600 (0.2 " mL/kg/hr)]  Weight: 68.8 kg     Past Cardiology Tests (Last 3 Years):  EKG:  ECG 12 lead 12/13/2024  Atrial- paced rhythm, rate 60 BPM    Echo:  Transthoracic Echo (TTE) Complete 08/24/2024   1. Left ventricular ejection fraction is moderately decreased, by visual estimate at 30-35%.   2. There is global hypokinesis of the left ventricle with minor regional variations.   3. Spectral Doppler shows an impaired relaxation pattern of left ventricular diastolic filling.   4. There is an elevated left ventricular end diastolic pressure.   5. Left ventricular cavity size is decreased.   6. Abnormal septal motion consistent with RV pacemaker.   7. Moderately increased left ventricular septal thickness.   8. The left ventricular posterior wall thickness is moderately increased.   9. There is severe concentric left ventricular hypertrophy.  10. There is normal right ventricular global systolic function.  11. Moderately enlarged right ventricle.  12. The left atrium is mildly dilated.  13. The right atrium is severely dilated.  14. Mild to moderate mitral valve regurgitation.  15. Severe tricuspid regurgitation visualized.  16. Slightly elevated RVSP.  17. The RV systolic pressure is likely to be underestimated.  18. Mild to moderate aortic valve regurgitation.     Transthoracic Echo (TTE) Complete 10/03/2023   1. Left ventricular systolic function is mildly decreased with a 50-55% estimated ejection fraction.   2. Moderately increased left ventricular septal thickness.   3. The left ventricular posterior wall thickness is moderately increased.   4. Moderately enlarged right ventricle.   5. Mild to moderate aortic valve regurgitation.   6. Mild to moderate mitral valve regurgitation.   7. Moderate tricuspid regurgitation visualized.   8. The tricuspid valve annulus appears dilated.   9. The left atrium is moderately dilated.  10. Moderately elevated right ventricular systolic pressure.  11. Compared with study from 2/17/2017,  there is a significant increase in LV wall thickness. There is evidence of mild thickening of the RV free wall, associated with mild thickening of the AV, MV and tricuspid valve plus moderately elevated RVSP. This findings could be associated to an underlying infiltrative disease like amyloidosis.     Ejection Fractions:  EF   Date/Time Value Ref Range Status   08/24/2024 11:20 AM 33 %      Cardiac Imaging:  CT angio coronary art with heartflow if score >30% 08/29/2024  CT angio coronary art with heartflow if score >30% 08/29/2024  1. Minor coronary artery disease.  2. The proximal LAD has focal calcified plaque with minimal stenosis  (<25%).  3. The LM, LCx and RCA have no significant atherosclerotic change or  stenotic disease.  4. Total Agatston calcium score of 17.  5. Right dominant coronary artery system.  6. Right atrial and right ventricular dilatation. Mild left atrial  dilatation.  7. Small bilateral pleural effusions with dependent atelectasis.  8. Dilated main pulmonary artery to 3.4 cm. Correlate/concern for  elevated pulmonary/right-sided filling pressures.     Amyloid SPECT Heart Study 8/26/2024  1. Amyloid SPECT heart study is suggestive of TTR amyloidosis.     Inpatient Medications:  Scheduled medications   Medication Dose Route Frequency    [Held by provider] aspirin  81 mg oral Daily    [START ON 12/15/2024] ergocalciferol  1,250 mcg oral Every Sunday    heparin (porcine)  5,000 Units subcutaneous q8h    pantoprazole  40 mg oral Daily before breakfast    polyethylene glycol  17 g oral Daily    predniSONE  2.5 mg oral Daily    rosuvastatin  10 mg oral Nightly    [Held by provider] sacubitriL-valsartan  0.5 tablet oral BID    sennosides-docusate sodium  1 tablet oral Nightly    spironolactone  12.5 mg oral Daily     PRN medications   Medication    acetaminophen    ipratropium-albuteroL     Continuous Medications   Medication Dose Last Rate    furosemide  10 mg/hr 10 mg/hr (12/13/24 3447)        Physical Exam  Constitutional:       General: She is awake. She is not in acute distress.     Appearance: She is ill-appearing.   HENT:      Mouth/Throat:      Mouth: Mucous membranes are moist.   Neck:      Vascular: JVD present.      Comments: JVD elevated to earlobe at 45 degrees   Cardiovascular:      Rate and Rhythm: Normal rate and regular rhythm.      Comments: AV paed at 60 BPM   Pulmonary:      Breath sounds: Examination of the right-middle field reveals wheezing. Examination of the left-middle field reveals wheezing. Examination of the right-lower field reveals wheezing. Examination of the left-lower field reveals wheezing. Wheezing present.   Abdominal:      General: There is distension.      Tenderness: There is no abdominal tenderness.   Musculoskeletal:      Right lower leg: 3+ Edema present.      Left lower leg: 3+ Edema present.   Skin:     General: Skin is warm and dry.   Neurological:      General: No focal deficit present.      Mental Status: She is alert and oriented to person, place, and time.   Psychiatric:         Mood and Affect: Mood normal.         Behavior: Behavior normal.        Assessment/Plan   Erin Lopez is a 89 y.o. female presenting with PMH of NSTEMI (nonobstructive CAD 2017), TTR cardiac amyloidosis, HFrEF (30-35% 8/2-24), LBBB and SSS s/p PPM 2017 s/p device changeout 11/2022, HTN, CKD presented to the ED with severe anasarca and acute decompensated heart failure. Admitted to [facility] for further management.      Acute on chronic systolic and diastolic heart failure (HFrEF 30-35%)  TTR Amyloidosis  - TTE 10/2023: EF 50-55%, moderately increased LV septal and posterior wall thickness, Moderately enlarged V, mild to mod AR and MR, mod TR, moderate LAE, Compared to 2017 TTE there is possible underlying infiltrative disease  - TTE 8/24/2024: EF 30-35%, gHK of LV,  elevated LVEDP, abnormal septal motion c/w V pacemaker, moderately increased LV septal thickness. LV posterior wall  moderately thickened. Severe cLVH. Moderately enlarged RV, mild LAE, RA severely dilated, mild to mod MR, severe TR, Slightly elevated RVSP. Mild to mod AR.  - New TTR amyloidosis seen on 8/25 NM PYP scan-- K/L ration WNL, SPEP, UPEP negative  - admit CXR: Severe cardiomegaly with increased moderate-to-large bilateral pleural effusions and associated atelectasis and/or consolidation. Additional interstitial and patchy opacities with basilar predilection suggestive of interstitial and alveolar pulmonary edema  - BNP >5000 (previously 2,626 8/2024)  - discharge wt 9/4/2024 51.5 kg  - admit wt: 70.5 kg (recorded in ED)  - patient admitted with severe anasarca up to breasts/upper arms  - during last admission, patient successfully diuresed with IV lasix boluses with intermittent diamox  - s/p IV lasix 40mg x1 in ED on admission  - lasix gtt started at 10mg/hr yesterday-> poor UO recorded between ED and leaking purewick; will pursue gonzalez insertion for accurate hourly I&Os  - Advanced HF team consulted during last admission: Recommended initiating Tafamidis (amyloid coordinator informed at the time)  - patient no showed for Dr. Goss appt 9/17/2024  - will continue holding BB iso ADHF/concern for low output (cool LE)  - hold dapagliflozin iso TERI -- likely cardiorenal due to severe anasarca   - low dose Entresto 12/13 BID held today iso aggressive diuresis   - continue home spironolactone 12.5mg daily   - home GDMT: Entresto 12/13 BID, metop succ 12.5 mg daily, dapagliflozin 10 mg daily,  and spironolactone 12.5 mg daily  - home diuretic: Lasix 20mg daily  - Daily standing weights, strict I&O's, 2g sodium diet, 2L fluid restriction  - will consider palliative care consult for GOC/code status given readmission for severe anasarca/ADHF; to discuss further with family today     Troponinemia  - HS trop: 418 -> 330. Suspected 2/2 demand from CHF  - CTA coronary 8/29: minor CAD, prox LAD has focal calcified plaque with  minimal stenosis (<25%), LM, LCx and RCA have no significant atherosclerotic change or stenotic disease. Calcium score 17  - Cont rosuvastatin 20 mg nightly   - daily ASA held today due to worsening thrombocytopenia      Hx of SSS s/p PPM , s/p device changeout 2022  - EC% AV pacing  - EP consulted during last admission due to concern for possible pacemaker mediated cardiomyopathy given high pacing burden on device interrogation: AV delayed extended but no intrinsic conduction noted. Recommended optimizing GDMT prior to considering upgrade to CRT since she would be high risk.   - can consider discussing CRT optimization with EP again this admission once patient successfully diuresed/ optimized      HTN  - Admit /76  - last 24 hour SBP range: 107- 143   - Entresto held as above      TERI on CKD IIIb  - Cr baseline 1.4-1.6  - admit Cr 2.12  - daily Cr 1.91  - likely cardiorenal iso severe anasarca   - diuresis as above  - avoid nephrotoxic agents     Hypokalemia  - admit K 3.1  - daily K 3.6  - cont spironolactone 12.5mg as above  - cont repletion during aggressive diuresis     Concern for congestive hepatopathy   Thrombocytopenia  - baseline 190-220s  - admit Plt: 101  - daily Plt 84  - on admit: INR 1.3, elevated alk phos 163  - cont to trend CBC, coag and LFTs daily  - daily ASA held today iso worsening thrombocytopenia; Plt already low on admission, low concern for HIT   - diuresis as above     Arthritis  - Pt previously getting steroid injections q3 month then put on systemic steroids  - Cont prednisone 2.5 daily   - cont home PPI while on steroids      Dispo: pending aggressive diuresis  - PT/OT to evaluate    DVT ppx: heparin SQ  Code Status:  Full Code  - highly recommend palliative care for GOC/code status     Patient seen and examined with Dr. Myron Skinner, APRN-CNP    Patient remains volume overloaded see attestation on history and physical from same day

## 2024-12-14 NOTE — HOSPITAL COURSE
Erin Lopez is a 89 y.o. 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   Patient reports progressive leg swelling and SOB with exertion over the last 2 weeks. She has remained compliant on all her medications, including lasix 20mg daily, with help of her daughter at home. Daughter at the bedside reports that the patient has been slowly decompensating since completing rehab via [facility] approximately 6 weeks after discharge 9/4/2024. Patient was scheduled to see Dr. Goss 9/17/2024 but did not show up. When asked why, there was not a clear response from patient or daughter.  Patient states that she stays in bed a majority of the day, but does use her walker to get to the bathroom when needed.      Patient's most recent admission to [facility] 8/24-9/4 for anasarca. Was found to have newly reduced HFrEF. With previously noted concern for amyloidosis on TTE 10/2023 and significantly thickened LV posterior wall on TTE 8/24, serum free light chains, SPEP and UPEP negative. NM PYP cardiac amyloidosis with SPECT CT suggestive of TTR. CTA coronary revealed minor CAD, calcium score 17.  Advanced heart failure [facility] GD[facility]  GDMT was limited due to hypotension. GDMT at discharge: Entresto 12/13 BID, metop succ 12.5 mg daily, dapagliflozin 10 mg daily,  and spironolactone 12.5 mg daily + Lasix 20mg daily for maintenance diuretic. PT had originally recommended SNF, however patient declined SNF and was discharged home with [facility].      In the ED, /76 HR 70 SpO2 100% on RA. BNP >5K, HS trop 418 -> 330 attributing to demand ischemic similar to previous presentation. K 3.1 with Cr of 2.12. CXR revealing moderate-to-large bilateral pleural effusions. Given IV lasix 40mg x1. Admitted to [facility] for  further management.    Floor course:  Diuresed successfully with IV lasix gtt and converted to PO torsemide 20mg daily.  GDMT optimized as able, limited by hypotension and CKD.    Hypovolemic hyponatremia noted with lowest Na of 127 in the setting of aggressive diurese. Na improved to 132 on maintenance diuresis.     EP consulted for consideration of device upgrade, plan for outpatient follow up to determine benefit of CRT-D.    Thrombocytopenia with unknown etiology, dony 83. Aspirin initially held but resumed with plan to monitor platelets, 256 on day of discharge.    PT/OT recommended moderate intensity. Patient and family agreeable for SNF for rehab. Plan for palliative care to follow patient at facility.     Discharge wt 50.5kg    After all labs and VS were reviewed the decision was made that the patient was medically stable for discharge.  The patient was discharged in satisfactory condition.    More than 60 minutes were spent in coordinating patient discharge.

## 2024-12-14 NOTE — CARE PLAN
The patient's goals for the shift include      The clinical goals for the shift include to diurese 1 L    Over the shift, the patient did not make progress toward the following goals. Barriers to progression include patient admitted with fluid overload. Recommendations to address these barriers include continue IV lasix and monitor I's & O's.

## 2024-12-14 NOTE — CARE PLAN
The patient's goals for the shift include to get extra fluid off    The clinical goals for the shift include Patient will diurese 2 - 3 Liters a day per team    Over the shift, the patient had Sheets insertion and goal is 2 - 3 Liters per day.  She continues on Lasix Drip at 10 mg/hour (1 ml).  Patient reports 0/10 pain and no SOB or RANGEL. And remains on 2L/NC.  Patient remains safe free from falls and injury this shift.       Problem: Pain - Adult  Goal: Verbalizes/displays adequate comfort level or baseline comfort level  Outcome: Progressing     Problem: Safety - Adult  Goal: Free from fall injury  Outcome: Progressing     Problem: Discharge Planning  Goal: Discharge to home or other facility with appropriate resources  Outcome: Progressing     Problem: Chronic Conditions and Co-morbidities  Goal: Patient's chronic conditions and co-morbidity symptoms are monitored and maintained or improved  Outcome: Progressing     Problem: Fall/Injury  Goal: Not fall by end of shift  Outcome: Progressing  Goal: Be free from injury by end of the shift  Outcome: Progressing  Goal: Verbalize understanding of personal risk factors for fall in the hospital  Outcome: Progressing  Goal: Verbalize understanding of risk factor reduction measures to prevent injury from fall in the home  Outcome: Progressing  Goal: Use assistive devices by end of the shift  Outcome: Progressing  Goal: Pace activities to prevent fatigue by end of the shift  Outcome: Progressing     Problem: Heart Failure  Goal: Improved gas exchange this shift  Outcome: Progressing  Goal: Improved urinary output this shift  Outcome: Progressing  Goal: Reduction in peripheral edema within 24 hours  Outcome: Progressing  Goal: Report improvement of dyspnea/breathlessness this shift  Outcome: Progressing  Goal: Weight from fluid excess reduced over 2-3 days, then stabilize  Outcome: Progressing  Goal: Increase self care and/or family involvement in 24 hours  Outcome:  Progressing

## 2024-12-15 VITALS
HEIGHT: 63 IN | TEMPERATURE: 98.1 F | HEART RATE: 63 BPM | OXYGEN SATURATION: 97 % | WEIGHT: 152.34 LBS | BODY MASS INDEX: 26.99 KG/M2 | RESPIRATION RATE: 18 BRPM | DIASTOLIC BLOOD PRESSURE: 70 MMHG | SYSTOLIC BLOOD PRESSURE: 106 MMHG

## 2024-12-15 LAB
ALBUMIN SERPL BCP-MCNC: 3.1 G/DL (ref 3.4–5)
ALP SERPL-CCNC: 146 U/L (ref 33–136)
ALT SERPL W P-5'-P-CCNC: 9 U/L (ref 7–45)
ANION GAP SERPL CALC-SCNC: 16 MMOL/L (ref 10–20)
AST SERPL W P-5'-P-CCNC: 17 U/L (ref 9–39)
BILIRUB SERPL-MCNC: 0.8 MG/DL (ref 0–1.2)
BUN SERPL-MCNC: 26 MG/DL (ref 6–23)
CALCIUM SERPL-MCNC: 9.5 MG/DL (ref 8.6–10.6)
CHLORIDE SERPL-SCNC: 102 MMOL/L (ref 98–107)
CO2 SERPL-SCNC: 24 MMOL/L (ref 21–32)
CREAT SERPL-MCNC: 1.93 MG/DL (ref 0.5–1.05)
EGFRCR SERPLBLD CKD-EPI 2021: 25 ML/MIN/1.73M*2
ERYTHROCYTE [DISTWIDTH] IN BLOOD BY AUTOMATED COUNT: 19.1 % (ref 11.5–14.5)
GLUCOSE SERPL-MCNC: 81 MG/DL (ref 74–99)
HCT VFR BLD AUTO: 44.3 % (ref 36–46)
HGB BLD-MCNC: 13.7 G/DL (ref 12–16)
MAGNESIUM SERPL-MCNC: 2.92 MG/DL (ref 1.6–2.4)
MCH RBC QN AUTO: 27.9 PG (ref 26–34)
MCHC RBC AUTO-ENTMCNC: 30.9 G/DL (ref 32–36)
MCV RBC AUTO: 90 FL (ref 80–100)
NRBC BLD-RTO: 0 /100 WBCS (ref 0–0)
PLATELET # BLD AUTO: 102 X10*3/UL (ref 150–450)
POTASSIUM SERPL-SCNC: 4.4 MMOL/L (ref 3.5–5.3)
PROT SERPL-MCNC: 6.9 G/DL (ref 6.4–8.2)
RBC # BLD AUTO: 4.91 X10*6/UL (ref 4–5.2)
SODIUM SERPL-SCNC: 138 MMOL/L (ref 136–145)
WBC # BLD AUTO: 5.5 X10*3/UL (ref 4.4–11.3)

## 2024-12-15 PROCEDURE — 80053 COMPREHEN METABOLIC PANEL: CPT

## 2024-12-15 PROCEDURE — 2500000004 HC RX 250 GENERAL PHARMACY W/ HCPCS (ALT 636 FOR OP/ED)

## 2024-12-15 PROCEDURE — 36415 COLL VENOUS BLD VENIPUNCTURE: CPT

## 2024-12-15 PROCEDURE — 2500000001 HC RX 250 WO HCPCS SELF ADMINISTERED DRUGS (ALT 637 FOR MEDICARE OP)

## 2024-12-15 PROCEDURE — 1200000002 HC GENERAL ROOM WITH TELEMETRY DAILY

## 2024-12-15 PROCEDURE — 99233 SBSQ HOSP IP/OBS HIGH 50: CPT | Performed by: STUDENT IN AN ORGANIZED HEALTH CARE EDUCATION/TRAINING PROGRAM

## 2024-12-15 PROCEDURE — 83735 ASSAY OF MAGNESIUM: CPT

## 2024-12-15 PROCEDURE — 2500000002 HC RX 250 W HCPCS SELF ADMINISTERED DRUGS (ALT 637 FOR MEDICARE OP, ALT 636 FOR OP/ED)

## 2024-12-15 PROCEDURE — 85027 COMPLETE CBC AUTOMATED: CPT

## 2024-12-15 RX ORDER — POTASSIUM CHLORIDE 20 MEQ/1
40 TABLET, EXTENDED RELEASE ORAL ONCE
Status: COMPLETED | OUTPATIENT
Start: 2024-12-15 | End: 2024-12-15

## 2024-12-15 RX ORDER — SPIRONOLACTONE 25 MG/1
25 TABLET ORAL DAILY
Status: DISCONTINUED | OUTPATIENT
Start: 2024-12-16 | End: 2024-12-26

## 2024-12-15 RX ORDER — SPIRONOLACTONE 25 MG/1
12.5 TABLET ORAL ONCE
Status: COMPLETED | OUTPATIENT
Start: 2024-12-15 | End: 2024-12-15

## 2024-12-15 RX ADMIN — ERGOCALCIFEROL 1250 MCG: 1.25 CAPSULE ORAL at 09:10

## 2024-12-15 RX ADMIN — HEPARIN SODIUM 5000 UNITS: 5000 INJECTION, SOLUTION INTRAVENOUS; SUBCUTANEOUS at 13:30

## 2024-12-15 RX ADMIN — PREDNISONE 2.5 MG: 5 TABLET ORAL at 09:10

## 2024-12-15 RX ADMIN — ROSUVASTATIN CALCIUM 10 MG: 20 TABLET, FILM COATED ORAL at 20:30

## 2024-12-15 RX ADMIN — SENNOSIDES AND DOCUSATE SODIUM 1 TABLET: 8.6; 5 TABLET ORAL at 20:30

## 2024-12-15 RX ADMIN — SPIRONOLACTONE 12.5 MG: 25 TABLET ORAL at 09:09

## 2024-12-15 RX ADMIN — IPRATROPIUM BROMIDE AND ALBUTEROL SULFATE 3 ML: .5; 3 SOLUTION RESPIRATORY (INHALATION) at 11:01

## 2024-12-15 RX ADMIN — POTASSIUM CHLORIDE 40 MEQ: 1500 TABLET, EXTENDED RELEASE ORAL at 09:10

## 2024-12-15 RX ADMIN — FUROSEMIDE 20 MG/HR: 10 INJECTION, SOLUTION INTRAMUSCULAR; INTRAVENOUS at 20:40

## 2024-12-15 RX ADMIN — SPIRONOLACTONE 12.5 MG: 25 TABLET, FILM COATED ORAL at 10:55

## 2024-12-15 RX ADMIN — PANTOPRAZOLE SODIUM 40 MG: 40 TABLET, DELAYED RELEASE ORAL at 06:19

## 2024-12-15 RX ADMIN — HEPARIN SODIUM 5000 UNITS: 5000 INJECTION, SOLUTION INTRAVENOUS; SUBCUTANEOUS at 20:30

## 2024-12-15 RX ADMIN — HEPARIN SODIUM 5000 UNITS: 5000 INJECTION, SOLUTION INTRAVENOUS; SUBCUTANEOUS at 06:17

## 2024-12-15 ASSESSMENT — COGNITIVE AND FUNCTIONAL STATUS - GENERAL
DAILY ACTIVITIY SCORE: 18
WALKING IN HOSPITAL ROOM: A LITTLE
WALKING IN HOSPITAL ROOM: A LITTLE
DRESSING REGULAR UPPER BODY CLOTHING: A LITTLE
TOILETING: A LITTLE
DRESSING REGULAR UPPER BODY CLOTHING: A LITTLE
TOILETING: A LITTLE
HELP NEEDED FOR BATHING: A LITTLE
STANDING UP FROM CHAIR USING ARMS: A LITTLE
EATING MEALS: A LITTLE
TOILETING: A LITTLE
MOVING FROM LYING ON BACK TO SITTING ON SIDE OF FLAT BED WITH BEDRAILS: A LITTLE
HELP NEEDED FOR BATHING: A LITTLE
EATING MEALS: A LITTLE
DRESSING REGULAR LOWER BODY CLOTHING: A LITTLE
MOBILITY SCORE: 18
PERSONAL GROOMING: A LITTLE
TURNING FROM BACK TO SIDE WHILE IN FLAT BAD: A LITTLE
DAILY ACTIVITIY SCORE: 18
CLIMB 3 TO 5 STEPS WITH RAILING: A LITTLE
WALKING IN HOSPITAL ROOM: A LITTLE
STANDING UP FROM CHAIR USING ARMS: A LITTLE
DRESSING REGULAR UPPER BODY CLOTHING: A LITTLE
MOVING FROM LYING ON BACK TO SITTING ON SIDE OF FLAT BED WITH BEDRAILS: A LITTLE
STANDING UP FROM CHAIR USING ARMS: A LITTLE
MOBILITY SCORE: 18
CLIMB 3 TO 5 STEPS WITH RAILING: A LITTLE
MOVING FROM LYING ON BACK TO SITTING ON SIDE OF FLAT BED WITH BEDRAILS: A LITTLE
DRESSING REGULAR LOWER BODY CLOTHING: A LITTLE
MOVING TO AND FROM BED TO CHAIR: A LITTLE
PERSONAL GROOMING: A LITTLE
CLIMB 3 TO 5 STEPS WITH RAILING: A LITTLE
DRESSING REGULAR LOWER BODY CLOTHING: A LITTLE
TURNING FROM BACK TO SIDE WHILE IN FLAT BAD: A LITTLE
TURNING FROM BACK TO SIDE WHILE IN FLAT BAD: A LITTLE
DAILY ACTIVITIY SCORE: 18
PERSONAL GROOMING: A LITTLE
MOVING TO AND FROM BED TO CHAIR: A LITTLE
MOVING TO AND FROM BED TO CHAIR: A LITTLE
HELP NEEDED FOR BATHING: A LITTLE
MOBILITY SCORE: 18
EATING MEALS: A LITTLE

## 2024-12-15 ASSESSMENT — PAIN - FUNCTIONAL ASSESSMENT
PAIN_FUNCTIONAL_ASSESSMENT: 0-10
PAIN_FUNCTIONAL_ASSESSMENT: 0-10

## 2024-12-15 ASSESSMENT — PAIN SCALES - GENERAL
PAINLEVEL_OUTOF10: 0 - NO PAIN
PAINLEVEL_OUTOF10: 0 - NO PAIN

## 2024-12-15 NOTE — PROGRESS NOTES
Subjective Data:  Patient seen this AM, sitting at 45 degrees on 3L, audible wheezing noted. Patient reports dyspnea is slightly worse today. Right upper extremity edema greatly improved, but now with severe unilateral edema of left arm and cooler hand. Palpable left radial pulse.     Overnight Events:    None    Today in brief:   - increase lasix gtt 20 mg/ hour, had 1.8L out yesterday   - aspirin held yesterday, continue holding as platelets increased to 99  - spironolactone increased to 25 mg daily   - LUE duplex ordered given severe unilateral swelling of LUE and coolness   - discussed readmission with 40 lb weight gain at length with patient/ daughter yesterday. They feel weight gain and functional decline started after home PT/OT was completed. Struggling with transport to follow-up appointments due to relying on family members for transport. Educated patient/ family that we are waiting on PT/OT recs but there is high chance of them recommending SNF again, as this was the case last admission. Patient and family willing to think about facility placement, would need to continue conversations this admission regarding a higher level of care that is needed which may not be able to be met at home. HF navigator is consulted, would benefit from transport services if available.      Objective Data:  Last Recorded Vitals:  Vitals:    12/14/24 1546 12/14/24 2037 12/14/24 2358 12/15/24 0459   BP: 105/64 125/53 105/57 129/65   BP Location: Left arm Left arm Left arm Left arm   Patient Position: Lying Lying Lying Lying   Pulse:  60 62 73   Resp: 18 18 18 18   Temp: 35.8 °C (96.4 °F) 36.3 °C (97.3 °F) 36.5 °C (97.7 °F) 36.4 °C (97.5 °F)   TempSrc: Temporal Temporal Temporal Temporal   SpO2: 99% 95% 96% 95%   Weight:    69.1 kg (152 lb 5.4 oz)   Height:           Last Labs:  CBC - 12/14/2024:  7:42 PM  4.3 13.8 99    44.2      CMP - 12/14/2024:  7:42 PM  9.4 7.0 18 --- 0.7   2.6 3.5 10 150      PTT - 12/13/2024: 12:18 PM  1.3    14.4 30     TROPHS   Date/Time Value Ref Range Status   12/13/2024 12:18  0 - 34 ng/L Final     Comment:     Previous result verified on 12/13/2024 1210 on specimen/case [id number] called with component TRPHS for procedure Troponin I, High Sensitivity, Initial with value 418 ng/L.   12/13/2024 11:02  0 - 34 ng/L Final   08/23/2024 11:41  0 - 34 ng/L Final     Comment:     Previous result verified on 8/23/2024 2336 on specimen/case [id number] called with component TRPHS for procedure Troponin I, High Sensitivity, Initial with value 235 ng/L.     BNP   Date/Time Value Ref Range Status   12/13/2024 11:02 AM >5,000 0 - 99 pg/mL Final   08/23/2024 10:35 PM 2,626 0 - 99 pg/mL Final      Last I/O:  I/O last 3 completed shifts:  In: 66.1 (1 mL/kg) [P.O.:40; I.V.:26.1 (0.4 mL/kg)]  Out: 2450 (35.5 mL/kg) [Urine:2450 (1 mL/kg/hr)]  Weight: 69.1 kg     Past Cardiology Tests (Last 3 Years):  EKG:  ECG 12 lead 12/13/2024  Atrial- paced rhythm, rate 60 BPM    Echo:  Transthoracic Echo (TTE) Complete 08/24/2024   1. Left ventricular ejection fraction is moderately decreased, by visual estimate at 30-35%.   2. There is global hypokinesis of the left ventricle with minor regional variations.   3. Spectral Doppler shows an impaired relaxation pattern of left ventricular diastolic filling.   4. There is an elevated left ventricular end diastolic pressure.   5. Left ventricular cavity size is decreased.   6. Abnormal septal motion consistent with RV pacemaker.   7. Moderately increased left ventricular septal thickness.   8. The left ventricular posterior wall thickness is moderately increased.   9. There is severe concentric left ventricular hypertrophy.  10. There is normal right ventricular global systolic function.  11. Moderately enlarged right ventricle.  12. The left atrium is mildly dilated.  13. The right atrium is severely dilated.  14. Mild to moderate mitral valve regurgitation.  15. Severe  tricuspid regurgitation visualized.  16. Slightly elevated RVSP.  17. The RV systolic pressure is likely to be underestimated.  18. Mild to moderate aortic valve regurgitation.     Transthoracic Echo (TTE) Complete 10/03/2023   1. Left ventricular systolic function is mildly decreased with a 50-55% estimated ejection fraction.   2. Moderately increased left ventricular septal thickness.   3. The left ventricular posterior wall thickness is moderately increased.   4. Moderately enlarged right ventricle.   5. Mild to moderate aortic valve regurgitation.   6. Mild to moderate mitral valve regurgitation.   7. Moderate tricuspid regurgitation visualized.   8. The tricuspid valve annulus appears dilated.   9. The left atrium is moderately dilated.  10. Moderately elevated right ventricular systolic pressure.  11. Compared with study from 2/17/2017, there is a significant increase in LV wall thickness. There is evidence of mild thickening of the RV free wall, associated with mild thickening of the AV, MV and tricuspid valve plus moderately elevated RVSP. This findings could be associated to an underlying infiltrative disease like amyloidosis.     Ejection Fractions:  EF   Date/Time Value Ref Range Status   08/24/2024 11:20 AM 33 %      Cardiac Imaging:  CT angio coronary art with heartflow if score >30% 08/29/2024  CT angio coronary art with heartflow if score >30% 08/29/2024  1. Minor coronary artery disease.  2. The proximal LAD has focal calcified plaque with minimal stenosis  (<25%).  3. The LM, LCx and RCA have no significant atherosclerotic change or  stenotic disease.  4. Total Agatston calcium score of 17.  5. Right dominant coronary artery system.  6. Right atrial and right ventricular dilatation. Mild left atrial  dilatation.  7. Small bilateral pleural effusions with dependent atelectasis.  8. Dilated main pulmonary artery to 3.4 cm. Correlate/concern for  elevated pulmonary/right-sided filling pressures.      Amyloid SPECT Heart Study 8/26/2024  1. Amyloid SPECT heart study is suggestive of TTR amyloidosis.     Inpatient Medications:  Scheduled medications   Medication Dose Route Frequency    [Held by provider] aspirin  81 mg oral Daily    ergocalciferol  1,250 mcg oral Every Sunday    heparin (porcine)  5,000 Units subcutaneous q8h    pantoprazole  40 mg oral Daily before breakfast    polyethylene glycol  17 g oral Daily    potassium chloride CR  40 mEq oral Once    predniSONE  2.5 mg oral Daily    rosuvastatin  10 mg oral Nightly    [Held by provider] sacubitriL-valsartan  0.5 tablet oral BID    sennosides-docusate sodium  1 tablet oral Nightly    spironolactone  12.5 mg oral Daily     PRN medications   Medication    acetaminophen    ipratropium-albuteroL     Continuous Medications   Medication Dose Last Rate    furosemide  10 mg/hr 10 mg/hr (12/14/24 1927)       Physical Exam  Constitutional:       General: She is awake. She is not in acute distress.     Appearance: She is ill-appearing.   HENT:      Mouth/Throat:      Mouth: Mucous membranes are moist.   Neck:      Vascular: JVD present.      Comments: JVD elevated to earlobe at 45 degrees   Cardiovascular:      Rate and Rhythm: Normal rate and regular rhythm.      Comments: AV paced at 60 BPM   Pulmonary:      Breath sounds: Examination of the right-middle field reveals wheezing. Examination of the left-middle field reveals wheezing. Examination of the right-lower field reveals wheezing. Examination of the left-lower field reveals wheezing. Wheezing present.   Abdominal:      General: There is distension.      Tenderness: There is no abdominal tenderness.   Musculoskeletal:      Right upper arm: Swelling present.      Left upper arm: Swelling present.      Right lower leg: 3+ Edema present.      Left lower leg: 3+ Edema present.      Comments: Right upper arm edema improving  Left upper arm 3+ pitting, cool hand, 2+ radial pulse    Skin:     General: Skin is warm  and dry.   Neurological:      General: No focal deficit present.      Mental Status: She is alert and oriented to person, place, and time.   Psychiatric:         Mood and Affect: Mood normal.         Behavior: Behavior normal.        Assessment/Plan   Erin Lopez is a 89 y.o. female presenting with PMH of NSTEMI (nonobstructive CAD 2017), TTR cardiac amyloidosis, HFrEF (30-35% 8/2-24), LBBB and SSS s/p PPM 2017 s/p device changeout 11/2022, HTN, CKD presented to the ED with severe anasarca and acute decompensated heart failure. Admitted to [facility] for further management.      Acute on chronic systolic and diastolic heart failure (HFrEF 30-35%)  TTR Amyloidosis  - TTE 10/2023: EF 50-55%, moderately increased LV septal and posterior wall thickness, Moderately enlarged V, mild to mod AR and MR, mod TR, moderate LAE, Compared to 2017 TTE there is possible underlying infiltrative disease  - TTE 8/24/2024: EF 30-35%, gHK of LV,  elevated LVEDP, abnormal septal motion c/w V pacemaker, moderately increased LV septal thickness. LV posterior wall moderately thickened. Severe cLVH. Moderately enlarged RV, mild LAE, RA severely dilated, mild to mod MR, severe TR, Slightly elevated RVSP. Mild to mod AR.  - New TTR amyloidosis seen on 8/25 NM PYP scan-- K/L ration WNL, SPEP, UPEP negative  - admit CXR: Severe cardiomegaly with increased moderate-to-large bilateral pleural effusions and associated atelectasis and/or consolidation. Additional interstitial and patchy opacities with basilar predilection suggestive of interstitial and alveolar pulmonary edema  - BNP >5000 (previously 2,626 8/2024)  - discharge wt 9/4/2024 51.5 kg  - admit wt: 70.5 kg (recorded in ED)  - daily wt: 69.1 (68.8kg)  - patient admitted with severe anasarca up to breasts/upper arms  - during last admission, patient successfully diuresed with IV lasix boluses with intermittent diamox  - s/p IV lasix 40mg x1 in ED on admission  - lasix gtt started at 10mg/hr on    - UO improved in last 24 hours to 1.8 L out  - lasix gtt increased to 20 mg/hour today, with goal net negative 2-3 L  - Advanced HF team consulted during last admission: Recommended initiating Tafamidis (amyloid coordinator informed at the time)  - patient no showed for Dr. Goss appt 2024 (transport issues)  - will continue holding BB iso ADHF/ ongoing concern for low output  - hold dapagliflozin iso TERI -- likely cardiorenal due to severe anasarca   - low dose Entresto 12/13 BID held iso aggressive diuresis   - spironolactone increased to 25 mg daily to improve diuresis   - home GDMT: Entresto 12/13 BID, metop succ 12.5 mg daily, dapagliflozin 10 mg daily,  and spironolactone 12.5 mg daily  - home diuretic: Lasix 20mg daily  - Daily standing weights, strict I&O's, 2g sodium diet, 2L fluid restriction  - may consider facility placement vs palliative care consult for GOC/code status given readmission for severe anasarca/ADHF; ongoing conversations with patient and family, pending PT/OT recs    Unilateral persistent LUE edema   - fluid overloaded on presentation with 2+ swelling BL arms  - today with improvement in RUE edema but worsening LUE edema and cool left hand  - IV infusion switched from L arm, L arm elevated  - LUE duplex ordered given severe unilateral swelling of LUE and coolness (ordered in vasc lab)    Troponinemia  - HS trop: 418 -> 330. Suspected 2/2 demand from CHF  - CTA coronary : minor CAD, prox LAD has focal calcified plaque with minimal stenosis (<25%), LM, LCx and RCA have no significant atherosclerotic change or stenotic disease. Calcium score 17  - Cont rosuvastatin 20 mg nightly   - daily ASA held due to worsening thrombocytopenia      Hx of SSS s/p PPM , s/p device changeout 2022  - EC% AV pacing  - EP consulted during last admission due to concern for possible pacemaker mediated cardiomyopathy given high pacing burden on device interrogation: AV delayed  extended but no intrinsic conduction noted. Recommended optimizing GDMT prior to considering upgrade to CRT since she would be high risk.   - can consider discussing CRT optimization with EP again this admission once patient successfully diuresed/ optimized      HTN  - Admit /76  - last 24 hour SBP range: 105- 129  - Entresto held as above      TERI on CKD IIIb  - Cr baseline 1.4-1.6  - admit Cr 2.12  - daily Cr 2.09 (1.91)  - likely cardiorenal iso severe anasarca   - diuresis as above  - avoid nephrotoxic agents     Hypokalemia  - admit K 3.1  - daily K 3.6 (3.6)  - spironolactone increased to 25mg as above  - cont repletion during aggressive diuresis     Concern for congestive hepatopathy   Thrombocytopenia  - baseline 190-220s  - admit Plt: 101  - daily Plt 99 (84)  - on admit: INR 1.3, elevated alk phos 163  - cont to trend CBC, coag and LFTs daily  - daily ASA held iso worsening thrombocytopenia; Plt already low on admission, low concern for HIT   - diuresis as above     Arthritis  - Pt previously getting steroid injections q3 month then put on systemic steroids  - Cont prednisone 2.5 daily   - cont home PPI while on steroids      Dispo: pending aggressive diuresis  - PT/OT eval pending     DVT ppx: heparin SQ  Code Status:  Full Code    Patient seen and examined with Dr. John Skinner, APRN-CNP

## 2024-12-15 NOTE — PROGRESS NOTES
Discharge Planning Note:      Erin Lopez is a 89 y.o. female on day 2 of admission presenting with Anasarca.    Attempted to reach the patient to confirm information no answer will attempt at a later time. Will continue to follow for discharge needs.      Kasie Grullon RN

## 2024-12-15 NOTE — PROGRESS NOTES
Subjective Data:  Patient seen this AM, resting comfortably in bed.     Overnight Events:    None    Today in brief:   - continue lasix gtt at 10 mg/ hour, had 1.8L out yesterday   - aspirin held yesterday, platelet increased to 99     Objective Data:  Last Recorded Vitals:  Vitals:    12/14/24 1546 12/14/24 2037 12/14/24 2358 12/15/24 0459   BP: 105/64 125/53 105/57 129/65   BP Location: Left arm Left arm Left arm Left arm   Patient Position: Lying Lying Lying Lying   Pulse:  60 62 73   Resp: 18 18 18 18   Temp: 35.8 °C (96.4 °F) 36.3 °C (97.3 °F) 36.5 °C (97.7 °F) 36.4 °C (97.5 °F)   TempSrc: Temporal Temporal Temporal Temporal   SpO2: 99% 95% 96% 95%   Weight:    69.1 kg (152 lb 5.4 oz)   Height:           Last Labs:  CBC - 12/14/2024:  7:42 PM  4.3 13.8 99    44.2      CMP - 12/14/2024:  7:42 PM  9.4 7.0 18 --- 0.7   2.6 3.5 10 150      PTT - 12/13/2024: 12:18 PM  1.3   14.4 30     TROPHS   Date/Time Value Ref Range Status   12/13/2024 12:18  0 - 34 ng/L Final     Comment:     Previous result verified on 12/13/2024 1210 on specimen/case [id number] called with component TRPHS for procedure Troponin I, High Sensitivity, Initial with value 418 ng/L.   12/13/2024 11:02  0 - 34 ng/L Final   08/23/2024 11:41  0 - 34 ng/L Final     Comment:     Previous result verified on 8/23/2024 2336 on specimen/case [id number] called with component TRPHS for procedure Troponin I, High Sensitivity, Initial with value 235 ng/L.     BNP   Date/Time Value Ref Range Status   12/13/2024 11:02 AM >5,000 0 - 99 pg/mL Final   08/23/2024 10:35 PM 2,626 0 - 99 pg/mL Final      Last I/O:  I/O last 3 completed shifts:  In: 66.1 (1 mL/kg) [P.O.:40; I.V.:26.1 (0.4 mL/kg)]  Out: 2450 (35.5 mL/kg) [Urine:2450 (1 mL/kg/hr)]  Weight: 69.1 kg     Past Cardiology Tests (Last 3 Years):  EKG:  ECG 12 lead 12/13/2024  Atrial- paced rhythm, rate 60 BPM    Echo:  Transthoracic Echo (TTE) Complete 08/24/2024   1. Left ventricular  ejection fraction is moderately decreased, by visual estimate at 30-35%.   2. There is global hypokinesis of the left ventricle with minor regional variations.   3. Spectral Doppler shows an impaired relaxation pattern of left ventricular diastolic filling.   4. There is an elevated left ventricular end diastolic pressure.   5. Left ventricular cavity size is decreased.   6. Abnormal septal motion consistent with RV pacemaker.   7. Moderately increased left ventricular septal thickness.   8. The left ventricular posterior wall thickness is moderately increased.   9. There is severe concentric left ventricular hypertrophy.  10. There is normal right ventricular global systolic function.  11. Moderately enlarged right ventricle.  12. The left atrium is mildly dilated.  13. The right atrium is severely dilated.  14. Mild to moderate mitral valve regurgitation.  15. Severe tricuspid regurgitation visualized.  16. Slightly elevated RVSP.  17. The RV systolic pressure is likely to be underestimated.  18. Mild to moderate aortic valve regurgitation.     Transthoracic Echo (TTE) Complete 10/03/2023   1. Left ventricular systolic function is mildly decreased with a 50-55% estimated ejection fraction.   2. Moderately increased left ventricular septal thickness.   3. The left ventricular posterior wall thickness is moderately increased.   4. Moderately enlarged right ventricle.   5. Mild to moderate aortic valve regurgitation.   6. Mild to moderate mitral valve regurgitation.   7. Moderate tricuspid regurgitation visualized.   8. The tricuspid valve annulus appears dilated.   9. The left atrium is moderately dilated.  10. Moderately elevated right ventricular systolic pressure.  11. Compared with study from 2/17/2017, there is a significant increase in LV wall thickness. There is evidence of mild thickening of the RV free wall, associated with mild thickening of the AV, MV and tricuspid valve plus moderately elevated RVSP.  This findings could be associated to an underlying infiltrative disease like amyloidosis.     Ejection Fractions:  EF   Date/Time Value Ref Range Status   08/24/2024 11:20 AM 33 %      Cardiac Imaging:  CT angio coronary art with heartflow if score >30% 08/29/2024  CT angio coronary art with heartflow if score >30% 08/29/2024  1. Minor coronary artery disease.  2. The proximal LAD has focal calcified plaque with minimal stenosis  (<25%).  3. The LM, LCx and RCA have no significant atherosclerotic change or  stenotic disease.  4. Total Agatston calcium score of 17.  5. Right dominant coronary artery system.  6. Right atrial and right ventricular dilatation. Mild left atrial  dilatation.  7. Small bilateral pleural effusions with dependent atelectasis.  8. Dilated main pulmonary artery to 3.4 cm. Correlate/concern for  elevated pulmonary/right-sided filling pressures.     Amyloid SPECT Heart Study 8/26/2024  1. Amyloid SPECT heart study is suggestive of TTR amyloidosis.     Inpatient Medications:  Scheduled medications   Medication Dose Route Frequency    [Held by provider] aspirin  81 mg oral Daily    ergocalciferol  1,250 mcg oral Every Sunday    heparin (porcine)  5,000 Units subcutaneous q8h    pantoprazole  40 mg oral Daily before breakfast    polyethylene glycol  17 g oral Daily    potassium chloride CR  40 mEq oral Once    predniSONE  2.5 mg oral Daily    rosuvastatin  10 mg oral Nightly    [Held by provider] sacubitriL-valsartan  0.5 tablet oral BID    sennosides-docusate sodium  1 tablet oral Nightly    spironolactone  12.5 mg oral Daily     PRN medications   Medication    acetaminophen    ipratropium-albuteroL     Continuous Medications   Medication Dose Last Rate    furosemide  10 mg/hr 10 mg/hr (12/14/24 1927)       Physical Exam  Constitutional:       General: She is awake. She is not in acute distress.     Appearance: She is ill-appearing.   HENT:      Mouth/Throat:      Mouth: Mucous membranes are  moist.   Neck:      Vascular: JVD present.      Comments: JVD elevated to earlobe at 45 degrees   Cardiovascular:      Rate and Rhythm: Normal rate and regular rhythm.      Comments: AV paed at 60 BPM   Pulmonary:      Breath sounds: Examination of the right-middle field reveals wheezing. Examination of the left-middle field reveals wheezing. Examination of the right-lower field reveals wheezing. Examination of the left-lower field reveals wheezing. Wheezing present.   Abdominal:      General: There is distension.      Tenderness: There is no abdominal tenderness.   Musculoskeletal:      Right lower leg: 3+ Edema present.      Left lower leg: 3+ Edema present.   Skin:     General: Skin is warm and dry.   Neurological:      General: No focal deficit present.      Mental Status: She is alert and oriented to person, place, and time.   Psychiatric:         Mood and Affect: Mood normal.         Behavior: Behavior normal.        Assessment/Plan   Erin Lopez is a 89 y.o. female presenting with PMH of NSTEMI (nonobstructive CAD 2017), TTR cardiac amyloidosis, HFrEF (30-35% 8/2-24), LBBB and SSS s/p PPM 2017 s/p device changeout 11/2022, HTN, CKD presented to the ED with severe anasarca and acute decompensated heart failure. Admitted to [facility] for further management.      Acute on chronic systolic and diastolic heart failure (HFrEF 30-35%)  TTR Amyloidosis  - TTE 10/2023: EF 50-55%, moderately increased LV septal and posterior wall thickness, Moderately enlarged V, mild to mod AR and MR, mod TR, moderate LAE, Compared to 2017 TTE there is possible underlying infiltrative disease  - TTE 8/24/2024: EF 30-35%, gHK of LV,  elevated LVEDP, abnormal septal motion c/w V pacemaker, moderately increased LV septal thickness. LV posterior wall moderately thickened. Severe cLVH. Moderately enlarged RV, mild LAE, RA severely dilated, mild to mod MR, severe TR, Slightly elevated RVSP. Mild to mod AR.  - New TTR amyloidosis seen on 8/25 NM  PYP scan-- K/L ration WNL, SPEP, UPEP negative  - admit CXR: Severe cardiomegaly with increased moderate-to-large bilateral pleural effusions and associated atelectasis and/or consolidation. Additional interstitial and patchy opacities with basilar predilection suggestive of interstitial and alveolar pulmonary edema  - BNP >5000 (previously 2,626 8/2024)  - discharge wt 9/4/2024 51.5 kg  - admit wt: 70.5 kg (recorded in ED)  - patient admitted with severe anasarca up to breasts/upper arms  - during last admission, patient successfully diuresed with IV lasix boluses with intermittent diamox  - s/p IV lasix 40mg x1 in ED on admission  - lasix gtt started at 10mg/hr yesterday-> poor UO recorded between ED and leaking purewick; will pursue gonzalez insertion for accurate hourly I&Os  - Advanced HF team consulted during last admission: Recommended initiating Tafamidis (amyloid coordinator informed at the time)  - patient no showed for Dr. Goss appt 9/17/2024  - will continue holding BB iso ADHF/concern for low output (cool LE)  - hold dapagliflozin iso TERI -- likely cardiorenal due to severe anasarca   - low dose Entresto 12/13 BID held today iso aggressive diuresis   - continue home spironolactone 12.5mg daily   - home GDMT: Entresto 12/13 BID, metop succ 12.5 mg daily, dapagliflozin 10 mg daily,  and spironolactone 12.5 mg daily  - home diuretic: Lasix 20mg daily  - Daily standing weights, strict I&O's, 2g sodium diet, 2L fluid restriction  - will consider palliative care consult for GOC/code status given readmission for severe anasarca/ADHF; to discuss further with family today     Troponinemia  - HS trop: 418 -> 330. Suspected 2/2 demand from CHF  - CTA coronary 8/29: minor CAD, prox LAD has focal calcified plaque with minimal stenosis (<25%), LM, LCx and RCA have no significant atherosclerotic change or stenotic disease. Calcium score 17  - Cont rosuvastatin 20 mg nightly   - daily ASA held today due to worsening  thrombocytopenia      Hx of SSS s/p PPM , s/p device changeout 2022  - EC% AV pacing  - EP consulted during last admission due to concern for possible pacemaker mediated cardiomyopathy given high pacing burden on device interrogation: AV delayed extended but no intrinsic conduction noted. Recommended optimizing GDMT prior to considering upgrade to CRT since she would be high risk.   - can consider discussing CRT optimization with EP again this admission once patient successfully diuresed/ optimized      HTN  - Admit /76  - last 24 hour SBP range: 107- 143   - Entresto held as above      TERI on CKD IIIb  - Cr baseline 1.4-1.6  - admit Cr 2.12  - daily Cr 1.91  - likely cardiorenal iso severe anasarca   - diuresis as above  - avoid nephrotoxic agents     Hypokalemia  - admit K 3.1  - daily K 3.6  - cont spironolactone 12.5mg as above  - cont repletion during aggressive diuresis     Concern for congestive hepatopathy   Thrombocytopenia  - baseline 190-220s  - admit Plt: 101  - daily Plt 84  - on admit: INR 1.3, elevated alk phos 163  - cont to trend CBC, coag and LFTs daily  - daily ASA held today iso worsening thrombocytopenia; Plt already low on admission, low concern for HIT   - diuresis as above     Arthritis  - Pt previously getting steroid injections q3 month then put on systemic steroids  - Cont prednisone 2.5 daily   - cont home PPI while on steroids      Dispo: pending aggressive diuresis  - PT/OT to evaluate    DVT ppx: heparin SQ  Code Status:  Full Code  - highly recommend palliative care for GOC/code status     Patient seen and examined with Dr. Myron Skinner, APRN-CNP    Patient remains volume overloaded see attestation on history and physical from same day

## 2024-12-15 NOTE — CARE PLAN
The patient's goals for the shift include to get extra fluid off    The clinical goals for the shift include Patient will diurese 2 - 3 Liters per Day    Over the shift, the patient net negative -1176 ml.  Patient Lasix Drip at 20 mg/hour, fluid restriction.  Patient reports 0/10 pain and no intermittent SOB, breathing treatment given for end expiratory wheeze.  New IV placed, patient continues to have edema.  Patient remained safe free from falls and injury this shift.      Problem: Pain - Adult  Goal: Verbalizes/displays adequate comfort level or baseline comfort level  Outcome: Progressing     Problem: Safety - Adult  Goal: Free from fall injury  Outcome: Progressing     Problem: Discharge Planning  Goal: Discharge to home or other facility with appropriate resources  Outcome: Progressing     Problem: Chronic Conditions and Co-morbidities  Goal: Patient's chronic conditions and co-morbidity symptoms are monitored and maintained or improved  Outcome: Progressing     Problem: Fall/Injury  Goal: Not fall by end of shift  Outcome: Progressing  Goal: Be free from injury by end of the shift  Outcome: Progressing  Goal: Verbalize understanding of personal risk factors for fall in the hospital  Outcome: Progressing  Goal: Verbalize understanding of risk factor reduction measures to prevent injury from fall in the home  Outcome: Progressing  Goal: Use assistive devices by end of the shift  Outcome: Progressing  Goal: Pace activities to prevent fatigue by end of the shift  Outcome: Progressing     Problem: Heart Failure  Goal: Improved gas exchange this shift  Outcome: Progressing  Goal: Improved urinary output this shift  Outcome: Progressing  Goal: Reduction in peripheral edema within 24 hours  Outcome: Progressing  Goal: Report improvement of dyspnea/breathlessness this shift  Outcome: Progressing  Goal: Weight from fluid excess reduced over 2-3 days, then stabilize  Outcome: Progressing  Goal: Increase self care and/or  family involvement in 24 hours  Outcome: Progressing     Problem: Skin  Goal: Decreased wound size/increased tissue granulation at next dressing change  Outcome: Progressing  Flowsheets (Taken 12/15/2024 1719)  Decreased wound size/increased tissue granulation at next dressing change:   Promote sleep for wound healing   Protective dressings over bony prominences  Goal: Participates in plan/prevention/treatment measures  Outcome: Progressing  Flowsheets (Taken 12/15/2024 1719)  Participates in plan/prevention/treatment measures:   Elevate heels   Increase activity/out of bed for meals  Goal: Prevent/manage excess moisture  Outcome: Progressing  Flowsheets (Taken 12/15/2024 1719)  Prevent/manage excess moisture:   Cleanse incontinence/protect with barrier cream   Follow provider orders for dressing changes   Moisturize dry skin   Monitor for/manage infection if present  Goal: Prevent/minimize sheer/friction injuries  Outcome: Progressing  Flowsheets (Taken 12/15/2024 1719)  Prevent/minimize sheer/friction injuries:   HOB 30 degrees or less   Increase activity/out of bed for meals   Turn/reposition every 2 hours/use positioning/transfer devices  Goal: Promote/optimize nutrition  Outcome: Progressing  Flowsheets (Taken 12/15/2024 1719)  Promote/optimize nutrition:   Consume > 50% meals/supplements   Monitor/record intake including meals   Offer water/supplements/favorite foods  Goal: Promote skin healing  Outcome: Progressing  Flowsheets (Taken 12/14/2024 1807)  Promote skin healing:   Assess skin/pad under line(s)/device(s)   Ensure correct size (line/device) and apply per  instructions   Protective dressings over bony prominences   Rotate device position/do not position patient on device   Turn/reposition every 2 hours/use positioning/transfer devices

## 2024-12-16 ENCOUNTER — APPOINTMENT (OUTPATIENT)
Dept: VASCULAR MEDICINE | Facility: HOSPITAL | Age: 89
End: 2024-12-16
Payer: COMMERCIAL

## 2024-12-16 ENCOUNTER — APPOINTMENT (OUTPATIENT)
Dept: CARDIOLOGY | Facility: HOSPITAL | Age: 89
End: 2024-12-16
Payer: COMMERCIAL

## 2024-12-16 PROBLEM — I07.1 SEVERE TRICUSPID REGURGITATION: Status: ACTIVE | Noted: 2024-12-16

## 2024-12-16 PROBLEM — I50.23 ACUTE ON CHRONIC SYSTOLIC HEART FAILURE: Status: ACTIVE | Noted: 2024-08-24

## 2024-12-16 LAB
ALBUMIN SERPL BCP-MCNC: 3.2 G/DL (ref 3.4–5)
ALP SERPL-CCNC: 139 U/L (ref 33–136)
ALT SERPL W P-5'-P-CCNC: 9 U/L (ref 7–45)
ANION GAP SERPL CALC-SCNC: 19 MMOL/L (ref 10–20)
AST SERPL W P-5'-P-CCNC: 21 U/L (ref 9–39)
BILIRUB SERPL-MCNC: 0.9 MG/DL (ref 0–1.2)
BUN SERPL-MCNC: 26 MG/DL (ref 6–23)
CALCIUM SERPL-MCNC: 9.5 MG/DL (ref 8.6–10.6)
CHLORIDE SERPL-SCNC: 98 MMOL/L (ref 98–107)
CO2 SERPL-SCNC: 23 MMOL/L (ref 21–32)
CREAT SERPL-MCNC: 2.02 MG/DL (ref 0.5–1.05)
EGFRCR SERPLBLD CKD-EPI 2021: 23 ML/MIN/1.73M*2
ERYTHROCYTE [DISTWIDTH] IN BLOOD BY AUTOMATED COUNT: 19.4 % (ref 11.5–14.5)
GLUCOSE SERPL-MCNC: 108 MG/DL (ref 74–99)
HCT VFR BLD AUTO: 46.5 % (ref 36–46)
HGB BLD-MCNC: 14.1 G/DL (ref 12–16)
MAGNESIUM SERPL-MCNC: 2.74 MG/DL (ref 1.6–2.4)
MCH RBC QN AUTO: 27.8 PG (ref 26–34)
MCHC RBC AUTO-ENTMCNC: 30.3 G/DL (ref 32–36)
MCV RBC AUTO: 92 FL (ref 80–100)
NRBC BLD-RTO: 0 /100 WBCS (ref 0–0)
PLATELET # BLD AUTO: 83 X10*3/UL (ref 150–450)
POTASSIUM SERPL-SCNC: 4.2 MMOL/L (ref 3.5–5.3)
PROT SERPL-MCNC: 7 G/DL (ref 6.4–8.2)
RBC # BLD AUTO: 5.07 X10*6/UL (ref 4–5.2)
SODIUM SERPL-SCNC: 136 MMOL/L (ref 136–145)
WBC # BLD AUTO: 5.6 X10*3/UL (ref 4.4–11.3)

## 2024-12-16 PROCEDURE — 94640 AIRWAY INHALATION TREATMENT: CPT

## 2024-12-16 PROCEDURE — 2500000004 HC RX 250 GENERAL PHARMACY W/ HCPCS (ALT 636 FOR OP/ED)

## 2024-12-16 PROCEDURE — 97161 PT EVAL LOW COMPLEX 20 MIN: CPT | Mod: GP

## 2024-12-16 PROCEDURE — 99233 SBSQ HOSP IP/OBS HIGH 50: CPT | Performed by: STUDENT IN AN ORGANIZED HEALTH CARE EDUCATION/TRAINING PROGRAM

## 2024-12-16 PROCEDURE — 97165 OT EVAL LOW COMPLEX 30 MIN: CPT | Mod: GO

## 2024-12-16 PROCEDURE — 97530 THERAPEUTIC ACTIVITIES: CPT | Mod: GO

## 2024-12-16 PROCEDURE — 93971 EXTREMITY STUDY: CPT

## 2024-12-16 PROCEDURE — 80053 COMPREHEN METABOLIC PANEL: CPT

## 2024-12-16 PROCEDURE — 85027 COMPLETE CBC AUTOMATED: CPT

## 2024-12-16 PROCEDURE — 2500000002 HC RX 250 W HCPCS SELF ADMINISTERED DRUGS (ALT 637 FOR MEDICARE OP, ALT 636 FOR OP/ED)

## 2024-12-16 PROCEDURE — 2500000001 HC RX 250 WO HCPCS SELF ADMINISTERED DRUGS (ALT 637 FOR MEDICARE OP)

## 2024-12-16 PROCEDURE — 1200000002 HC GENERAL ROOM WITH TELEMETRY DAILY

## 2024-12-16 PROCEDURE — 83735 ASSAY OF MAGNESIUM: CPT

## 2024-12-16 PROCEDURE — 93280 PM DEVICE PROGR EVAL DUAL: CPT | Performed by: INTERNAL MEDICINE

## 2024-12-16 PROCEDURE — 93971 EXTREMITY STUDY: CPT | Performed by: SURGERY

## 2024-12-16 PROCEDURE — 36415 COLL VENOUS BLD VENIPUNCTURE: CPT

## 2024-12-16 PROCEDURE — 93280 PM DEVICE PROGR EVAL DUAL: CPT

## 2024-12-16 PROCEDURE — 97116 GAIT TRAINING THERAPY: CPT | Mod: GP

## 2024-12-16 RX ORDER — ALBUTEROL SULFATE 0.83 MG/ML
2.5 SOLUTION RESPIRATORY (INHALATION) EVERY 6 HOURS PRN
Status: DISCONTINUED | OUTPATIENT
Start: 2024-12-16 | End: 2024-12-16

## 2024-12-16 RX ADMIN — PANTOPRAZOLE SODIUM 40 MG: 40 TABLET, DELAYED RELEASE ORAL at 06:12

## 2024-12-16 RX ADMIN — SPIRONOLACTONE 25 MG: 25 TABLET, FILM COATED ORAL at 10:34

## 2024-12-16 RX ADMIN — IPRATROPIUM BROMIDE AND ALBUTEROL SULFATE 3 ML: .5; 3 SOLUTION RESPIRATORY (INHALATION) at 11:15

## 2024-12-16 RX ADMIN — HEPARIN SODIUM 5000 UNITS: 5000 INJECTION, SOLUTION INTRAVENOUS; SUBCUTANEOUS at 06:12

## 2024-12-16 RX ADMIN — FUROSEMIDE 20 MG/HR: 10 INJECTION, SOLUTION INTRAMUSCULAR; INTRAVENOUS at 11:34

## 2024-12-16 RX ADMIN — PREDNISONE 2.5 MG: 5 TABLET ORAL at 10:34

## 2024-12-16 RX ADMIN — SENNOSIDES AND DOCUSATE SODIUM 1 TABLET: 8.6; 5 TABLET ORAL at 21:39

## 2024-12-16 RX ADMIN — HEPARIN SODIUM 5000 UNITS: 5000 INJECTION, SOLUTION INTRAVENOUS; SUBCUTANEOUS at 21:38

## 2024-12-16 RX ADMIN — ROSUVASTATIN CALCIUM 10 MG: 20 TABLET, FILM COATED ORAL at 21:38

## 2024-12-16 RX ADMIN — HEPARIN SODIUM 5000 UNITS: 5000 INJECTION, SOLUTION INTRAVENOUS; SUBCUTANEOUS at 12:46

## 2024-12-16 ASSESSMENT — COGNITIVE AND FUNCTIONAL STATUS - GENERAL
PERSONAL GROOMING: A LITTLE
MOVING TO AND FROM BED TO CHAIR: A LITTLE
MOBILITY SCORE: 16
DRESSING REGULAR LOWER BODY CLOTHING: TOTAL
TURNING FROM BACK TO SIDE WHILE IN FLAT BAD: A LITTLE
DRESSING REGULAR UPPER BODY CLOTHING: A LITTLE
MOVING FROM LYING ON BACK TO SITTING ON SIDE OF FLAT BED WITH BEDRAILS: A LITTLE
WALKING IN HOSPITAL ROOM: A LITTLE
DAILY ACTIVITIY SCORE: 13
PERSONAL GROOMING: A LITTLE
DRESSING REGULAR UPPER BODY CLOTHING: A LOT
DRESSING REGULAR LOWER BODY CLOTHING: A LITTLE
MOVING FROM LYING ON BACK TO SITTING ON SIDE OF FLAT BED WITH BEDRAILS: A LITTLE
STANDING UP FROM CHAIR USING ARMS: A LITTLE
DRESSING REGULAR UPPER BODY CLOTHING: A LITTLE
HELP NEEDED FOR BATHING: A LOT
DAILY ACTIVITIY SCORE: 18
MOVING TO AND FROM BED TO CHAIR: A LITTLE
HELP NEEDED FOR BATHING: A LITTLE
MOVING TO AND FROM BED TO CHAIR: A LITTLE
STANDING UP FROM CHAIR USING ARMS: A LITTLE
DRESSING REGULAR LOWER BODY CLOTHING: A LITTLE
CLIMB 3 TO 5 STEPS WITH RAILING: A LITTLE
TOILETING: A LITTLE
DAILY ACTIVITIY SCORE: 18
EATING MEALS: A LITTLE
PERSONAL GROOMING: A LITTLE
TOILETING: A LOT
WALKING IN HOSPITAL ROOM: A LITTLE
TURNING FROM BACK TO SIDE WHILE IN FLAT BAD: A LITTLE
TOILETING: A LITTLE
HELP NEEDED FOR BATHING: A LITTLE
DAILY ACTIVITIY SCORE: 18
TOILETING: A LITTLE
MOBILITY SCORE: 18
EATING MEALS: A LITTLE
PERSONAL GROOMING: A LITTLE
HELP NEEDED FOR BATHING: A LITTLE
EATING MEALS: A LITTLE
CLIMB 3 TO 5 STEPS WITH RAILING: A LITTLE
DRESSING REGULAR LOWER BODY CLOTHING: A LITTLE
STANDING UP FROM CHAIR USING ARMS: A LITTLE
STANDING UP FROM CHAIR USING ARMS: A LITTLE
MOVING FROM LYING ON BACK TO SITTING ON SIDE OF FLAT BED WITH BEDRAILS: A LITTLE
WALKING IN HOSPITAL ROOM: A LITTLE
CLIMB 3 TO 5 STEPS WITH RAILING: A LITTLE
MOBILITY SCORE: 18
DRESSING REGULAR UPPER BODY CLOTHING: A LITTLE
MOVING TO AND FROM BED TO CHAIR: A LITTLE
TURNING FROM BACK TO SIDE WHILE IN FLAT BAD: A LITTLE
CLIMB 3 TO 5 STEPS WITH RAILING: TOTAL
EATING MEALS: A LITTLE
MOVING FROM LYING ON BACK TO SITTING ON SIDE OF FLAT BED WITH BEDRAILS: A LITTLE
TURNING FROM BACK TO SIDE WHILE IN FLAT BAD: A LITTLE
WALKING IN HOSPITAL ROOM: A LITTLE
MOBILITY SCORE: 18

## 2024-12-16 ASSESSMENT — PAIN - FUNCTIONAL ASSESSMENT
PAIN_FUNCTIONAL_ASSESSMENT: 0-10

## 2024-12-16 ASSESSMENT — PAIN SCALES - GENERAL
PAINLEVEL_OUTOF10: 0 - NO PAIN

## 2024-12-16 ASSESSMENT — ACTIVITIES OF DAILY LIVING (ADL)
ADL_ASSISTANCE: NEEDS ASSISTANCE
LACK_OF_TRANSPORTATION: NO
ADL_ASSISTANCE: NEEDS ASSISTANCE
BATHING_ASSISTANCE: MAXIMAL

## 2024-12-16 NOTE — CARE PLAN
Patient had no pain and no changes in expiratory wheezes. Otherwise, no SOB. Put out apx. 2500 ml of urine throughout the night, with hand swelling going down, especially on the left arm.

## 2024-12-16 NOTE — PROGRESS NOTES
TCC met with patient at bedside to discuss discharge planning (daughter at bedside). DME- 0, pharmacy- [facility]. Patient's PCP is Dr. LENCHO Clark. Previous home care services were with  home care ([facility] They would like to resume services if the discharge recs are for home care. TCC will continue to follow for discharge planning.     12/16/24 1452   Discharge Planning   Living Arrangements Children   Support Systems Children;Family members   Type of Residence Private residence   Number of Stairs to Enter Residence 0   Number of Stairs Within Residence 0   Do you have animals or pets at home? No   Who is requesting discharge planning? Provider   Home or Post Acute Services In home services   Type of Home Care Services Home OT;Home PT   Expected Discharge Disposition Home Health   Does the patient need discharge transport arranged? Yes   RoundTrip coordination needed? Yes   Has discharge transport been arranged? No   Financial Resource Strain   How hard is it for you to pay for the very basics like food, housing, medical care, and heating? Not hard   Housing Stability   In the last 12 months, was there a time when you were not able to pay the mortgage or rent on time? N   At any time in the past 12 months, were you homeless or living in a shelter (including now)? N   Transportation Needs   In the past 12 months, has lack of transportation kept you from medical appointments or from getting medications? no   In the past 12 months, has lack of transportation kept you from meetings, work, or from getting things needed for daily living? No       YURIDIA Samuels, RN  [facility] [address]  Transitional Care Coordinator, Mon-Fri  Cell: [phone], Office: [phone]  Email: [email or web address]

## 2024-12-16 NOTE — PROGRESS NOTES
Physical Therapy    Physical Therapy Evaluation & Treatment    Patient Name: Erin Lopez  MRN: [id number]  Department: [place] [id number]  Room: [address] [place] [address]  Today's Date: 12/16/2024   Time Calculation  Start Time: 1403  Stop Time: 1443  Time Calculation (min): 40 min    Assessment/Plan   PT Assessment  PT Assessment Results: Decreased strength, Decreased range of motion, Decreased endurance, Impaired balance, Decreased mobility, Orthopedic restrictions  Rehab Prognosis: Good  Barriers to Discharge Home: Physical needs  Physical Needs: Ambulating household distances limited by function/safety, High falls risk due to function or environment  Evaluation/Treatment Tolerance: Patient limited by fatigue  Medical Staff Made Aware: Yes  Strengths: Attitude of self, Coping skills  Barriers to Participation: Comorbidities  End of Session Communication: Bedside nurse  Assessment Comment: The pt presented with unsteady and unsafe gait using a wheeled walker.  End of Session Patient Position: Up in chair, Alarm off, caregiver present   IP OR SWING BED PT PLAN  Inpatient or Swing Bed: Inpatient  PT Plan  Treatment/Interventions: Bed mobility, Transfer training, Gait training, Balance training, Strengthening, Endurance training, Therapeutic exercise, Therapeutic activity, Home exercise program  PT Plan: Ongoing PT  PT Frequency: 3 times per week  PT Discharge Recommendations: Moderate intensity level of continued care  Equipment Recommended upon Discharge:  (none)  PT Recommended Transfer Status: Assist x1  PT - OK to Discharge: Yes      Subjective     General Visit Information:  General  Reason for Referral: Anasarca  Past Medical History Relevant to Rehab: NSTEMI (nonobstructive CAD 2017), 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  Prior to Session  Communication: Bedside nurse  Patient Position Received: Up in chair, Alarm off, caregiver present  Preferred Learning Style: verbal, visual, written  General Comment: Pt sitting in chair upon arrival, pleasant, cooperative and willing to participate in therapy.  Home Living:  Home Living  Type of Home: House (2-Family house down)  Lives With: Adult children (Daughter)  Home Adaptive Equipment: Walker rolling or standard, Cane (Rollator)  Home Layout: One level  Home Access: Ramped entrance  Bathroom Shower/Tub: Tub/shower unit  Bathroom Toilet: Adaptive toilet seating  Bathroom Equipment: Shower chair with back, Grab bars in shower  Prior Level of Function:  Prior Function Per Pt/Caregiver Report  Level of [place]: Needs assistance with ADLs, Needs assistance with homemaking  Receives Help From: Family  ADL Assistance: Needs assistance  Homemaking Assistance: Needs assistance  Ambulatory Assistance: Independent  Vocational: Retired  Leisure: Watch tv and socialize with family  Hand Dominance: Right  Prior Function Comments: The pt was independent with all mobility using a wheeled walker in the household and in the community.  Precautions:  Precautions  Hearing/Visual Limitations: Reading glasses  Medical Precautions: Cardiac precautions, Fall precautions, Oxygen therapy device and L/min  Precautions Comment: Pt in compliance with precautions throughout therapy session.    Vital Signs (Past 2hrs)        Date/Time Vitals Session Patient Position Pulse Resp SpO2 BP MAP (mmHg)    12/16/24 1403 Pre PT  Sitting  89  --  --  --  --                   Vital Signs Comment: vitals stable    Objective   Pain:  Pain Assessment  Pain Assessment: 0-10  0-10 (Numeric) Pain Score: 0 - No pain  Cognition:  Cognition  Overall Cognitive Status: Within Functional Limits  Orientation Level: Oriented X4  Following Commands: Follows one step commands without difficulty    General Assessments:  General Observation  General  Observation: The pt presented with gross edema, generalized weakness, deconditioning.     Activity Tolerance  Endurance: Decreased tolerance for upright activites    Sensation  Light Touch: No apparent deficits    Strength  Strength Comments: Generalized weakness  Perception  Inattention/Neglect: Appears intact    Coordination  Movements are Fluid and Coordinated: Yes  Coordination Comment: WFL    Postural Control  Postural Control: Impaired  Posture Comment: The pt presented with good sitting posture and impaired standing posture using a wheeled walker.    Static Sitting Balance  Static Sitting-Balance Support: Bilateral upper extremity supported, Feet supported  Static Sitting-Level of Assistance: Close supervision  Static Sitting-Comment/Number of Minutes: Sitting in chair  Dynamic Sitting Balance  Dynamic Sitting-Balance Support: Bilateral upper extremity supported, Feet supported  Dynamic Sitting-Level of Assistance: Close supervision  Dynamic Sitting-Comments: Sitting in chair    Static Standing Balance  Static Standing-Balance Support: Bilateral upper extremity supported  Static Standing-Level of Assistance: Contact guard  Static Standing-Comment/Number of Minutes: using a wheeled walker  Dynamic Standing Balance  Dynamic Standing-Balance Support: Bilateral upper extremity supported  Dynamic Standing-Level of Assistance: Contact guard  Dynamic Standing-Comments: using a wheeled walker  Functional Assessments:  Bed Mobility  Bed Mobility: No    Transfers  Transfer: Yes  Transfer 1  Transfer From 1: Sit to  Transfer to 1: Stand  Transfer Device 1: Walker  Transfer Level of Assistance 1: Minimum assistance  Transfers 2  Transfer From 2: Stand to  Transfer to 2: Sit  Transfer Device 2: Walker  Transfer Level of Assistance 2: Close supervision    Ambulation/Gait Training  Ambulation/Gait Training Performed: Yes  Ambulation/Gait Training 1  Surface 1: Level tile  Device 1: Rolling walker  Assistance 1: Contact  guard  Quality of Gait 1: Decreased step length, Forward flexed posture (unsteady, decreased felix, decreased endurance)  Comments/Distance (ft) 1: 8ft forward and backward    Stairs  Stairs: No  Extremity/Trunk Assessments:  Cervical Spine   Cervical Spine: Within Functional Limits  Lumbar Spine   Lumbar Spine : Exceptions to Funtional Limits  Lumbar Spine Comment: Decreased strength and ROM    RUE   RUE : Exceptions to WFL  RUE AROM (degrees)  RUE AROM Comment: WFL  RUE Strength  R Shoulder Flexion: 4-/5  R Elbow Flexion: 4+/5  R Elbow Extension: 4/5  R Wrist Flexion: 4/5  R Wrist Extension: 4/5  LUE   LUE: Exceptions to WFL  LUE AROM (degrees)  LUE AROM Comment: WFL  LUE Strength  L Shoulder Flexion: 3+/5  L Elbow Flexion: 4-/5  L Elbow Extension: 4-/5  L Wrist Flexion: 3+/5  L Wrist Extension: 3+/5  RLE   RLE : Exceptions to WFL  AROM RLE (degrees)  RLE AROM Comment: WFL  Strength RLE  R Hip Flexion: 4+/5  R Knee Flexion: 4-/5  R Knee Extension: 4-/5  R Ankle Dorsiflexion: 4/5  R Ankle Plantar Flexion: 4/5  LLE   LLE : Exceptions to WFL  AROM LLE (degrees)  LLE AROM Comment: WFL  Strength LLE  L Hip Flexion: 4+/5  L Knee Flexion: 4-/5  L Knee Extension: 4+/5  L Ankle Dorsiflexion: 3/5  L Ankle Plantar Flexion: 3/5  Treatments:     Bed Mobility  Bed Mobility: No    Ambulation/Gait Training  Ambulation/Gait Training Performed: Yes  Ambulation/Gait Training 1  Surface 1: Level tile  Device 1: Rolling walker  Assistance 1: Contact guard  Quality of Gait 1: Decreased step length, Forward flexed posture (unsteady, decreased felix, decreased endurance)  Comments/Distance (ft) 1: 8ft forward and backward  Transfers  Transfer: Yes  Transfer 1  Transfer From 1: Sit to  Transfer to 1: Stand  Transfer Device 1: Walker  Transfer Level of Assistance 1: Minimum assistance  Transfers 2  Transfer From 2: Stand to  Transfer to 2: Sit  Transfer Device 2: Walker  Transfer Level of Assistance 2: Close  supervision    Stairs  Stairs: No  Outcome Measures:  [facility] Basic Mobility  Turning from your back to your side while in a flat bed without using bedrails: A little  Moving from lying on your back to sitting on the side of a flat bed without using bedrails: A little  Moving to and from bed to chair (including a wheelchair): A little  Standing up from a chair using your arms (e.g. wheelchair or bedside chair): A little  To walk in hospital room: A little  Climbing 3-5 steps with railing: Total  Basic Mobility - Total Score: 16    Encounter Problems       Encounter Problems (Active)       Balance       STG - Maintains supervision assistance dynamic standing balance with upper extremity support using a wheeled walker. (Progressing)       Start:  12/16/24    Expected End:  12/30/24            STG - Maintains supervision assistance static standing balance with upper extremity support using a wheeled walker. (Progressing)       Start:  12/16/24    Expected End:  12/30/24               Mobility       STG - Patient will ambulate 75ft with supervision assistance using a wheeled walker. (Progressing)       Start:  12/16/24    Expected End:  12/30/24               PT Transfers       STG - Transfer from bed to chair with supervision assistance using a wheeled walker. (Progressing)       Start:  12/16/24    Expected End:  12/30/24            STG - Patient to transfer to and from sit to supine with supervision assistance. (Progressing)       Start:  12/16/24    Expected End:  12/30/24            STG - Patient will transfer sit to and from stand with supervision assistance using a wheeled walker. (Progressing)       Start:  12/16/24    Expected End:  12/30/24                   Education Documentation  Body Mechanics, taught by Radhames Bolton, PT at 12/16/2024  3:16 PM.  Learner: Family, Patient  Readiness: Acceptance  Method: Explanation, Demonstration  Response: Verbalizes Understanding, Demonstrated Understanding  Comment: Pt  received education on determining a d/c rec.    Home Exercise Program, taught by Radhames Bolton PT at 12/16/2024  3:16 PM.  Learner: Family, Patient  Readiness: Acceptance  Method: Explanation, Demonstration  Response: Verbalizes Understanding, Demonstrated Understanding  Comment: Pt received education on determining a d/c rec.    Precautions, taught by Radhames Bolton PT at 12/16/2024  3:16 PM.  Learner: Family, Patient  Readiness: Acceptance  Method: Explanation, Demonstration  Response: Verbalizes Understanding, Demonstrated Understanding  Comment: Pt received education on determining a d/c rec.    Mobility Training, taught by Radhames Bolton PT at 12/16/2024  3:16 PM.  Learner: Family, Patient  Readiness: Acceptance  Method: Explanation, Demonstration  Response: Verbalizes Understanding, Demonstrated Understanding  Comment: Pt received education on determining a d/c rec.    Education Comments  No comments found.

## 2024-12-16 NOTE — PROGRESS NOTES
[facility] Attending Shared Visit Note    This is a shared visit. Please see Advanced Practice Provider's encounter note for additional details.      Briefly, 89F HFrEF (30-35%, 8/2024), TTR cardiac amyloidosis (confirmed 8/2024), LBBB, SSS, s/p PPM (2017) with 99% V-pacing,  severe TR, mod AR/MR, mild CAD, CKD Stage IIIb, worsening dyspnea, and weight gain of ~40 lbs (since last admission).     Diagnostic Imaging:  - TTE (8/2024):  EF 30-35%. Severe concentric LVH with global hypokinesis. Severe TR, mild/mod MR, mod AR. RA severely dilated. Findings consistent with infiltrative disease.  - Amyloid SPECT (8/2024): Suggestive of TTR amyloidosis.  - CTA Coronary (8/2024): Minor CAD (prox LAD <25% stenosis).    Exam: elderly, warm, hypervolemic. 2+ edema in LE and UEs.    Problems:   Assessment & Plan  Anasarca    Acute on chronic systolic heart failure    Wild-type transthyretin-related (ATTR) amyloidosis (Multi)    Severe tricuspid regurgitation      Plan:   # HFrEF / ATTR amyloidosis:   # SSS with PPM and 100% V-pacing: EP previously deferred CRT upgrade due to frailty  # TERI on CKD: CTM  - Continue aggressive diuresis.   - Home GDMT held (Entresto, BB, SGLT2i) due to ADHF and TERI. Spironolactone increased to 25 mg daily.  - Consider EP consult for Vpacing based on stability.    Dispo: Pending PT/OT evaluation, aggressive diuresis. Likely facility placement     Dario Nelson MD    Objective   Admit Date: 12/13/2024  Hospital Length of Stay: 3   Home: [facility] [id number]    MEDICATIONS  Infusions:  furosemide, Last Rate: 20 mg/hr (12/16/24 0716)      Scheduled:  [Held by provider] aspirin, 81 mg, Daily  ergocalciferol, 1,250 mcg, Every Sunday  heparin (porcine), 5,000 Units, q8h  pantoprazole, 40 mg, Daily before breakfast  polyethylene glycol, 17 g, Daily  predniSONE, 2.5 mg, Daily  rosuvastatin, 10 mg, Nightly  [Held by provider] sacubitriL-valsartan, 0.5 tablet, BID  sennosides-docusate sodium, 1 tablet,  Nightly  spironolactone, 25 mg, Daily      PRN:  acetaminophen, 650 mg, q6h PRN  ipratropium-albuteroL, 3 mL, q6h PRN      Prior to Admission Meds:  Medications Prior to Admission   Medication Sig Dispense Refill Last Dose/Taking    dapagliflozin propanediol (Farxiga) 10 mg Take 1 tablet (10 mg) by mouth once every 24 hours. 30 tablet 3 12/12/2024    furosemide (Lasix) 20 mg tablet Take 1 tablet (20 mg) by mouth once daily. 30 tablet 3 12/12/2024    metoprolol succinate XL (Toprol-XL) 25 mg 24 hr tablet Take 0.5 tablets (12.5 mg) by mouth once daily. Do not crush or chew. 30 tablet 3 12/12/2024    pantoprazole (ProtoNix) 40 mg EC tablet Take 1 tablet (40 mg) by mouth once daily in the morning. Take before meals. Do not crush, chew, or split. 30 tablet 3 12/13/2024 Morning    rosuvastatin (Crestor) 20 mg tablet Take 0.5 tablets (10 mg) by mouth once daily at bedtime. (Patient taking differently: Take 1 tablet (20 mg) by mouth once daily at bedtime.) 30 tablet 3 12/12/2024    sacubitriL-valsartan (Entresto) 24-26 mg tablet Take 0.5 tablets by mouth 2 times a day. (Patient taking differently: Take 1 tablet by mouth 2 times a day.) 30 tablet 3 12/12/2024    sennosides-docusate sodium (Nelda-Colace) 8.6-50 mg tablet Take 1 tablet by mouth once daily at bedtime. 30 tablet 3 Past Month    spironolactone (Aldactone) 25 mg tablet Take 0.5 tablets (12.5 mg) by mouth once daily. 30 tablet 3 12/12/2024    Vitamin D3 50 mcg (2,000 unit) capsule Take 1 capsule (50 mcg) by mouth early in the morning..   12/13/2024 Morning    aspirin 81 mg EC tablet Take 1 tablet (81 mg) by mouth once daily. (Patient not taking: Reported on 12/13/2024)   Not Taking       Vitals:      12/16/2024     8:00 AM 12/16/2024     4:00 AM 12/15/2024    11:42 PM 12/15/2024     8:14 PM 12/15/2024     3:42 PM 12/15/2024    12:26 PM 12/15/2024     8:15 AM   Vitals   Systolic 106 101 106 98 117 110 100   Diastolic 67 63 70 53 73 67 51   BP Location Left arm Left  arm Left arm Left arm Left arm Right arm Left arm   Heart Rate 61 60 63 60 60 60 64   Temp 36.7 °C (98.1 °F) 36.5 °C (97.7 °F) 36.7 °C (98.1 °F) 36.7 °C (98.1 °F) 35.7 °C (96.3 °F) 36.2 °C (97.2 °F) 35.9 °C (96.6 °F)   Resp 18 18 18 18 18 19 17   Weight (lb)  144.62        BMI  25.62 kg/m2        BSA (m2)  1.71 m2          Wt Readings from Last 5 Encounters:   12/16/24 65.6 kg (144 lb 10 oz)   09/03/24 51.5 kg (113 lb 8 oz)     Weight         12/13/2024  1040 12/13/2024  2055 12/14/2024  0520 12/15/2024  0459 12/16/2024  0400    Weight: 70.5 kg (155 lb 6.4 oz) 70.4 kg (155 lb 3.3 oz) 68.8 kg (151 lb 10.8 oz) 69.1 kg (152 lb 5.4 oz) 65.6 kg (144 lb 10 oz)              Intake/Output Summary (Last 24 hours) at 12/16/2024 0923  Last data filed at 12/16/2024 0900  Gross per 24 hour   Intake 970.67 ml   Output 3750 ml   Net -2779.33 ml       CHEST: Unlabored, Clear, Diminished, Expiratory wheeze  CV:  A-V Sequential paced  NEURO:   RASS:    CAM:    LOC: Alert  Cognition: Follows commands  GCS: 15    DATA:  CMP:  Recent Labs     12/15/24  1959 12/14/24  1942 12/13/24  1815 12/13/24  1102 09/03/24  1900 09/02/24  1903 09/01/24 2112 08/31/24  2041    144 145 145 136 137 138 134*   K 4.4 3.6 3.6 3.1* 5.1 4.7 4.1 3.8    105 107 105 103 101 103 98   CO2 24 26 26 26 25 30 27 24   ANIONGAP 16 17 16 17 13 11 12 16   BUN 26* 27* 25* 23 22 25* 28* 34*   CREATININE 1.93* 2.09* 1.91* 2.12* 1.34* 1.47* 1.44* 1.47*   EGFR 25* 22* 25* 22* 38* 34* 35* 34*   MG 2.92* 2.74* 2.68* 2.70* 2.40 2.37 2.13 2.33     Recent Labs     12/15/24  1959 12/14/24  1942 12/13/24 1815 12/13/24  1102 09/03/24 1900 09/02/24  1903 09/01/24  2112 08/31/24  2041 08/29/24  1844 08/29/24  0954 08/24/24  2238 08/23/24 2235   ALBUMIN 3.1* 3.5 3.3*  3.3* 3.6 2.8* 3.0* 2.6* 2.6*   < >  --    < > 3.5   ALT 9 10 11 15  --   --   --   --   --   --   --  12   AST 17 18 20 19  --   --   --   --   --   --   --  18   BILITOT 0.8 0.7 1.0 1.1  --   --   --    "--   --   --   --  0.7   LIPASE  --   --   --   --   --   --   --   --   --  9  --   --     < > = values in this interval not displayed.     CBC:  Recent Labs     12/15/24  1959 12/14/24  1942 12/13/24  1815 12/13/24  1102 09/03/24  1900 09/02/24  1903 09/01/24  2112 08/31/24  2041   WBC 5.5 4.3* 3.5* 3.6* 5.1 5.0 5.2 5.3   HGB 13.7 13.8 13.9 14.4 15.8 16.0 14.5 14.8   HCT 44.3 44.2 42.7 43.2 49.0* 50.0* 43.3 42.9   * 99* 84* 101* 231 222 208 193   MCV 90 89 83 83 82 84 78* 76*     COAG:   Recent Labs     12/13/24  1218 08/26/24  0940 08/25/24  1932 08/24/24  1305 08/24/24  0645 08/24/24  0001   INR 1.3*  --   --   --   --  1.1   HAUF  --  0.5 0.5 0.6 0.6  --      ABO: No results for input(s): \"ABO\" in the last 94454 hours.  HEME/ENDO: No results for input(s): \"FERRITIN\", \"IRONSAT\", \"TSH\", \"FREET4\", \"HGBA1C\" in the last 52255 hours.  CARDIAC:   Recent Labs     12/13/24  1218 12/13/24  1102 08/23/24  2341 08/23/24  2235   TROPHS 330* 418* 189* 235*   BNP  --  >5,000*  --  2,626*     No results for input(s): \"CHOL\", \"LDLF\", \"LDLCALC\", \"HDL\", \"TRIG\" in the last 90498 hours.  TOX:No results for input(s): \"AMPHETAMINE\", \"BENZO\", \"CANNABINOID\", \"COCAI\", \"FENTANYL\", \"OPIATE\", \"OXYCODONE\", \"PCP\" in the last 23624 hours.    No lab exists for component: \"BARBSCRUR\"  MICRO: No results for input(s): \"ESR\", \"CRP\", \"PROCAL\" in the last 77545 hours.  No results found for the last 90 days.      EKG:   Recent Labs     08/27/24  1833 08/23/24  2210 09/19/23  1013 02/07/17  1602   ATRRATE 80 61 74 60   VENTRATE 80 61 74 60   PRINT 166  --  172 164   QRSDUR 164 164 158 154   QTCFRED 495 494 502 464   QTCCALCB 519 495 519 464     Encounter Date: 08/24/24   ECG 12 lead   Result Value    Ventricular Rate 80    Atrial Rate 80    VA Interval 166    QRS Duration 164    QT Interval 450    QTC Calculation(Bazett) 519    P Axis 32    R Axis 204    T Axis 56    QRS Count 13    Q Onset 187    P Onset 104    P Offset 158    T Offset 412    " QTC Fredericia 495    Narrative    Atrial-sensed ventricular-paced rhythm  Abnormal ECG  When compared with ECG of 23-AUG-2024 22:05,  Vent. rate has increased BY  19 BPM  Confirmed by William Ba ([id number]) on 8/28/2024 8:40:55 PM     Echocardiogram:   Recent Labs     08/24/24  1120   EF 33   LVIDD 3.56   RV 31.2   RVFRWALLPKSP 12.00   TAPSE 2.0   Transthoracic Echo (TTE) Complete 08/24/2024    [facility], [address] [place] [address]  Tel [phone] and Fax [phone]    TRANSTHORACIC ECHOCARDIOGRAM REPORT      Patient Name:      MICHELE CONLEYEMS          Reading Physician:    [id number] Mehrdad Yee MD  Study Date:        8/24/2024            Ordering Provider:    [id number] ALEN SIMON  MRN/PID:           [id number]             Fellow:  Accession#:        [id number]         Nurse:                Abigail Moore RN  Date of Birth/Age: 1935 / 88 years Sonographer:          Concha Pena [facility]  Gender:            F                    Additional Staff:  Height:            160.02 cm            Admit Date:  Weight:            73.03 kg             Admission Status:     Inpatient -  Routine  BSA / BMI:         1.76 m2 / 28.52      Encounter#:           [id number]  kg/m2  Blood Pressure:    128/70 mmHg          Department Location:  [facility] Non  In[facility]    Study Type:    TRANSTHORACIC ECHO (TTE) COMPLETE  Diagnosis/ICD: Chronic systolic (congestive) heart failure (CHF)-I50.22  Indication:    new HFrEF with decompensation  CPT Code:      Echo Complete w Full Doppler-[id number]    Patient History:  Pertinent History: NSTEMI, HFpEF, SSS s/p PPM 2017, HTN, CKD, CAD.    Study Detail: The following Echo studies were performed: 2D, M-Mode, Doppler and  color flow. Agitated saline used as a contrast agent for  intraseptal flow evaluation.      Critical Event  Critical Event: Test was completed as per department protocol.  Critical Finding: Severe TR.  Time Test was Completed: 11:15:39  AM  Notified: Dr. Yee.  Attending notification time: 11:25:49 AM    PHYSICIAN INTERPRETATION:  Left Ventricle: Left ventricular ejection fraction is moderately decreased, by visual estimate at 30-35%. There is global hypokinesis of the left ventricle with minor regional variations. The left ventricular cavity size is decreased. The left ventricular septal wall thickness is moderately increased. There is moderately increased left ventricular posterior wall thickness. There is severe concentric left ventricular hypertrophy. Abnormal (paradoxical) septal motion, consistent with RV pacemaker. Spectral Doppler shows an impaired relaxation pattern of left ventricular diastolic filling. There is an elevated left ventricular end diastolic pressure.  Left Atrium: The left atrium is mildly dilated. A bubble study using agitated saline was performed. A small PFO (= 10 bubbles) was demonstrated.  Right Ventricle: The right ventricle is moderately enlarged. There is normal right ventricular global systolic function. A device is visualized in the right ventricle.  Right Atrium: The right atrium is severely dilated.  Aortic Valve: The aortic valve is trileaflet. There is minimal aortic valve cusp calcification. The aortic valve dimensionless index is 0.70. There is mild to moderate aortic valve regurgitation. The peak instantaneous gradient of the aortic valve is 3.0 mmHg. The mean gradient of the aortic valve is 1.8 mmHg.  Mitral Valve: The mitral valve is mildly thickened. There is mild to moderate mitral valve regurgitation.  Tricuspid Valve: The tricuspid valve is structurally normal. There is severe tricuspid regurgitation. The Doppler estimated RVSP is slightly elevated at 31.2 mmHg. The RV systolic pressure is likely to be underestimated.  Pulmonic Valve: The pulmonic valve is structurally normal. There is physiologic pulmonic valve regurgitation.  Pericardium: There is a trivial pericardial effusion.  Pleural: There is  bilateral pleural effusion.  Aorta: The aortic root is normal.  Systemic Veins: The inferior vena cava appears dilated. There is IVC inspiratory collapse greater than 50%. The hepatic vein shows a pattern of systolic flow reversal, suggestive of severe tricuspid regurgitation.  In comparison to the previous echocardiogram(s): Compared with study dated 10/3/2023, the LV EF is decreaaed (was 50-55%) and the TR is wide-open (was moderate).      CONCLUSIONS:  1. Left ventricular ejection fraction is moderately decreased, by visual estimate at 30-35%.  2. There is global hypokinesis of the left ventricle with minor regional variations.  3. Spectral Doppler shows an impaired relaxation pattern of left ventricular diastolic filling.  4. There is an elevated left ventricular end diastolic pressure.  5. Left ventricular cavity size is decreased.  6. Abnormal septal motion consistent with RV pacemaker.  7. Moderately increased left ventricular septal thickness.  8. The left ventricular posterior wall thickness is moderately increased.  9. There is severe concentric left ventricular hypertrophy.  10. There is normal right ventricular global systolic function.  11. Moderately enlarged right ventricle.  12. The left atrium is mildly dilated.  13. The right atrium is severely dilated.  14. Mild to moderate mitral valve regurgitation.  15. Severe tricuspid regurgitation visualized.  16. Slightly elevated RVSP.  17. The RV systolic pressure is likely to be underestimated.  18. Mild to moderate aortic valve regurgitation.  19. Sent a Haiku to the NP service.    QUANTITATIVE DATA SUMMARY:  2D MEASUREMENTS:  Normal Ranges:  Ao Root d:     3.20 cm  (2.0-3.7cm)  LAs:           4.25 cm  (2.7-4.0cm)  IVSd:          1.78 cm  (0.6-1.1cm)  LVPWd:         1.76 cm  (0.6-1.1cm)  LVIDd:         3.56 cm  (3.9-5.9cm)  LVIDs:         3.26 cm  LV Mass Index: 148 g/m2  LVEDV Index:   56 ml/m2  LV % FS        8.3 %    LA VOLUME:  Normal Ranges:  LA  Vol A4C:        65.4 ml    (22+/-6mL/m2)  LA Vol A2C:        62.6 ml  LA Vol BP:         67.2 ml  LA Vol Index A4C:  37.1ml/m2  LA Vol Index A2C:  35.5 ml/m2  LA Vol Index BP:   38.1 ml/m2  LA Area A4C:       20.2 cm2  LA Area A2C:       18.8 cm2  LA Major Axis A4C: 5.3 cm  LA Major Axis A2C: 4.8 cm    RA VOLUME BY A/L METHOD:  Normal Ranges:  RA Area A4C: 31.0 cm2    AORTA MEASUREMENTS:  Normal Ranges:  Asc Ao, d: 3.30 cm (2.1-3.4cm)    LV SYSTOLIC FUNCTION BY 2D PLANIMETRY (MOD):  Normal Ranges:  EF-A4C View:    32 % (>=55%)  EF-A2C View:    39 %  EF-Biplane:     35 %  EF-Visual:      33 %  LV EF Reported: 33 %    LV DIASTOLIC FUNCTION:  Normal Ranges:  MV Peak E: 0.44 m/s    (0.7-1.2 m/s)  MV Peak A: 0.64 m/s    (0.42-0.7 m/s)  E/A Ratio: 0.68        (1.0-2.2)  MV A Dur:  124.57 msec  MV DT:     126 msec    (150-240 msec)    MITRAL VALVE:  Normal Ranges:  MV DT: 126 msec (150-240msec)    MITRAL INSUFFICIENCY:  Normal Ranges:  MR VTI:  176.42 cm  MR Vmax: 459.02 cm/s    AORTIC VALVE:  Normal Ranges:  AoV Vmax:                0.87 m/s (<=1.7m/s)  AoV Peak PG:             3.0 mmHg (<20mmHg)  AoV Mean P.8 mmHg (1.7-11.5mmHg)  LVOT Max Alfonso:            0.65 m/s (<=1.1m/s)  AoV VTI:                 16.35 cm (18-25cm)  LVOT VTI:                11.51 cm  LVOT Diameter:           1.98 cm  (1.8-2.4cm)  AoV Area, VTI:           2.17 cm2 (2.5-5.5cm2)  AoV Area,Vmax:           2.30 cm2 (2.5-4.5cm2)  AoV Dimensionless Index: 0.70    AORTIC INSUFFICIENCY:  AI Vmax:       3.51 m/s  AI Half-time:  433 msec  AI Decel Time: 1492 msec  AI Decel Rate: 235.88 cm/s2      RIGHT VENTRICLE:  RV Basal 4.80 cm  RV Mid   3.60 cm  RV Major 8.2 cm  TAPSE:   20.0 mm  RV s'    0.12 m/s    TRICUSPID VALVE/RVSP:  Normal Ranges:  Peak TR Velocity: 2.41 m/s  RV Syst Pressure: 31.2 mmHg (< 30mmHg)  IVC Diam:         2.89 cm    PULMONIC VALVE:  Normal Ranges:  PV Accel Time: 69 msec  (>120ms)  PV Max Alfonso:    0.7 m/s  (0.6-0.9m/s)  PV  Max P.0 mmHg    AORTA:  Asc Ao Diam 3.26 cm      [id number] Mehrdad Yee MD  Electronically signed on 2024 at 12:07:44 PM        ** Final **    Coronary Angiography: No results found for this or any previous visit from the past 1800 days.    Right Heart Cath: No results found for this or any previous visit from the past 1800 days.    Cardiac Scoring:   CT angio coronary art with heartflow if score >30% 2024    Narrative  Interpreted By:  Adrián Lao and Okyere Robert  STUDY:  CT ANGIO CORONARY ART WITH HEARTFLOW IF SCORE >30%;  2024 9:06 am    INDICATION:  Signs/Symptoms:New HFrEF.    COMPARISON:  None.    ACCESSION NUMBER(S):  [id number]    ORDERING CLINICIAN:  MIRNA PIRES    TECHNIQUE:  Using multi-detector CT technology,  axial, sequential imaging with  prospective gating was performed of the chest following the  intravenous administration of contrast material.  A low-osmolar  contrast agent was used (90 ml of Omnipaque 350).    The patient was premedicated with 0.8 mg sublingual nitroglycerin for  coronary dilation.    For optimization of anatomic evaluation, multiplanar reconstruction,  maximum intensity projections, and advanced 3-D off-line  postprocessing were performed on a dedicated stand-alone workstation  under the direct supervision of the interpreting physician.    CT Dose-Length Product (DLP):   749 mGy/cm  CT Dose Reduction Employed: Yes (Prospective triggering, iterative  reconstruction)    FINDINGS:  POTENTIAL STUDY LIMITATIONS:  None.    CORONARY ARTERIES:    CORONARY ANATOMY:  There is normal origin of the coronary arteries.    CORONARY ARTERY CALCIUM SCORE:  LM 0  LAD 16.5  LCX 0  RCA 0  TOTAL 16.5    LEFT MAIN CORONARY ARTERY:  The left main is normal sized vessel that  bifurcates into the LAD  and circumflex. There is no significant atherosclerotic change or  stenotic disease.    LEFT ANTERIOR DESCENDING ARTERY:  The LAD is a normal size vessel that  wraps around  the apex.  It gives rise to  2 acute diagonal branches.  There is a focal area of calcific atherosclerosis in the proximal LAD  resulting in less than 25% luminal stenosis.    LEFT CIRCUMFLEX ARTERY:  The LCX is a normal size vessel, which is  non-dominant.  It gives rise to  1 obtuse marginal branches.  There is no significant atherosclerotic change or stenotic disease.    RIGHT CORONARY ARTERY:  The RCA is a normal size vessel, which is  dominant .  It gives rise to a  conus branch,  dashawn branch, and  1 acute  marginal branches.  In its distal segment it bifurcates into the PDA  and PV branch. There is no significant atherosclerotic change or  stenotic disease.    CARDIAC CHAMBERS:  The cardiac chambers demonstrate normal atrioventricular and  ventriculoarterial concordance, and systemic and pulmonary venous  return.    LEFT ATRIUM:  Mildly dilated Area 26.5 cm2    RIGHT ATRIUM:  Moderately dilated area: 33.3 cm2. A CIED lead noted.    INTERATRIAL SEPTUM:  Intact.    LEFT VENTRICLE:  Normal size 4.5 cm. Concentric left ventricular hypertrophy.    RIGHT VENTRICLE:  Dilated 5.1 cm. A CIED lead is noted.    AORTIC VALVE:  The aortic valve is  trileaflet in morphology.  No calcifications.    MITRAL VALVE:  No thickening/calcification.    THORACIC AORTA:  The visualized thoracic aorta is normal in course, caliber, and  contour. The ascending thoracic aorta is 2.8 cm in diameter. There is  no acute aortic pathology, such as dissection, intramural hematoma,  or contained rupture. The aortic arch is not included on this  examination.    PULMONARY  The main pulmonary artery is 3.4 cm in diameter, RPA 2.2 cm, LPA 1.8  cm.    PERICARDIUM:  There is no pericardial effusion of thickening.    CHEST:  The chest wall is normal.  No significant lymphadenopathy or mass is seen in limited images of  the mediastinum. Limited imaging through the lungs reveals moderate  bilateral pleural effusion with dependent atelectasis. No  pleural  effusion or pneumothorax.    UPPER ABDOMEN:  Limited imaging through the upper abdomen reveals no abnormalities of  the visualized organs.    Impression  1. Minor coronary artery disease.  2. The proximal LAD has focal calcified plaque with minimal stenosis  (<25%).  3. The LM, LCx and RCA have no significant atherosclerotic change or  stenotic disease.  4. Total Agatston calcium score of 17.  5. Right dominant coronary artery system.  6. Right atrial and right ventricular dilatation. Mild left atrial  dilatation.  7. Small bilateral pleural effusions with dependent atelectasis.  8. Dilated main pulmonary artery to 3.4 cm. Correlate/concern for  elevated pulmonary/right-sided filling pressures.      Signed by: Adrián Lao 8/29/2024 11:18 AM  Dictation workstation:   [id number]    Cardiac MRI: No results found for this or any previous visit from the past 1800 days.    Nuclear:  NM PYP Cardiac Amyloidosis with SPECT CT 08/26/2024    Narrative  Interpreted By:  Josh Templeton,  Teri Ford  STUDY:  Amyloid SPECT Heart Study    INDICATION:  PYP scan to rule out ATTR amyloid depostion    COMPARISON:  None    ACCESSION NUMBER(S):  [id number]    ORDERING CLINICIAN:  BRODY HEATH    TECHNIQUE:  Following the intravenous administration 21.6 mCi of Tc-99m PYP,  images localized to the thorax in the anterior projection were  obtained at 1 hour post injection. In addition, SPECT/CT images were  obtained. Semi-quantitation was performed on these images with  regions of interest drawn over the heart and contralateral lung. In  addition, visual quantitative analysis was performed on SPECT images.    FINDINGS:  Analysis of the planar images reveals increased radiotracer  deposition in the region of the heart.    The Heart to Contralateral lung ratio is 1.2. (Normal ratio is less  than < 1.2, equivocal = 1.21-1.4, > 1.5 is positive for TTR  amyloidosis)    SPECT/CT images reveal intense radiotracer deposition in  the  myocardium relative to the ribs.    Semi-quantitative visual analysis reveals a GRADE of 3  Grade values are as follows:  1) Grade 0: No uptake in MYOCARDIUM (not bloodpool) and normal bone  uptake  2) Grade 1: Uptake in MYOCARDIUM (not bloodpool) less than rib uptake  3) Grade 2: Uptake in MYOCARDIUM (not bloodpool) equal to rib uptake  4) Grade 3: Uptake in MYOCARDIUM (not bloodpool) greater than rib  uptake with mild/absent rib uptake    Low dose CT demonstrates moderate bilateral pleural effusion with  associated basal collapse.    Impression  1. Amyloid SPECT heart study is suggestive of TTR amyloidosis.  2. Representative images were saved into the PACS system.    Note: A negative amyloid SPECT heart study does not rule out other  forms of possible cardiac amyloid deposition such as amyloid light  chain deposition.    I personally reviewed the images/study and I agree with the findings  as stated by Liliana Juárez MD.  This study was interpreted at  [facility], [place], [place].    MACRO:  None      Signed by: Josh Templeton 8/26/2024 2:55 PM  Dictation workstation:   [id number]    Metabolic Stress: No results found for this or any previous visit from the past 1800 days.      XR chest 1 view    Result Date: 12/13/2024  Interpreted By:  Adrián Del Toro, STUDY: XR CHEST 1 VIEW;  12/13/2024 12:46 pm   INDICATION: Signs/Symptoms:assess for pulmonary edema, has anasarca.     COMPARISON: Chest radiograph 08/28/2024   ACCESSION NUMBER(S): [id number]   ORDERING CLINICIAN: PATRIA MILLS   FINDINGS: AP radiograph of the chest was provided.   Left chest wall AICD/pacemaker is again seen with leads in place.   CARDIOMEDIASTINAL SILHOUETTE: The cardiomediastinal silhouette is persistently moderate to severely enlarged with partial obscuration of the bilateral heart borders.   LUNGS: There are low lung volumes. There is increased bilateral moderate-to-large pleural effusions with  bilateral basilar predominant patchy and interstitial opacities with perihilar prominence. There is no pneumothorax. Leftward tracheal deviation may be projectional   ABDOMEN: No remarkable upper abdominal findings.   BONES: No acute osseous changes.       1. Severe cardiomegaly with increased moderate-to-large bilateral pleural effusions and associated atelectasis and/or consolidation. Additional interstitial and patchy opacities with basilar predilection suggestive of interstitial and alveolar pulmonary edema. Correlation with fluid volume status is recommended. 2. Medical device as noted above.   MACRO: None   Signed by: Adrián Del Toro 12/13/2024 12:54 PM Dictation workstation:   [id number]       LDA:  Urethral Catheter Straight-tip 18 Fr. (Active)   Placement Date/Time: 12/14/24 1300   Placed by: AMBER Florez RN  Hand Hygiene Completed: Yes  Catheter Type: Straight-tip  Tube Size (Fr.): 18 Fr.  Catheter Balloon Size: 10 mL  Urine Returned: No   Number of days: 1     NUTRITION: Adult diet Cardiac; 70 gm fat; 2 - 3 grams Sodium; 2000 mL fluid  EMERGENCY CONTACT: Extended Emergency Contact Information  Primary Emergency Contact: Cassidy Zepeda  Home Phone: [phone]  Relation: Child  CODE STATUS: Full Code  DISPO: Discharge Planning  Living Arrangements: Family members  Support Systems: Children, Family members  Assistance Needed: assistance with ambulation  Type of Residence: Private residence  Number of Stairs to Enter Residence: 0  Number of Stairs Within Residence: 0  Do you have animals or pets at home?: No  Home or Post Acute Services: None  Expected Discharge Disposition: Home  Does the patient need discharge transport arranged?: No  AMPAC: Daily Activity - Total Score: 18    FOLLOWUP:   Future Appointments   Date Time Provider Department Center   12/16/2024 10:00 AM [facility] 2 [id number] [facility] [place] [facility]

## 2024-12-16 NOTE — CONSULTS
Inpatient consult to electrophysiology  Consult performed by: Alonso Lee MD  Consult ordered by: DAMION Gonzalez-CNP            Cardiac Electrophysiology Consult     Chief complaint:  Device upgrade   Referred by: Dr. Omar HADLEY  Erin Lopez is a 89 y.o. 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   EP being consulted for consideration of device upgrade to CRT-D. Patient of note was recently admitted for HF exacerbation 8/24-9/4 for anasarca and at that time wa diagnosed with TTR cardiac amyloidosis. EP was consulted at that time for consideration of CRT-D and device upgrade however recommended GDMT optimization at that time. Since then GDMT up titration was limited due to hypotension however outpatient was taking entresto 12/13 BID, metop succinate 12.5 mg daily, dapagliflozin 10 mg and mabel 12.5 and reports that her daughter helps her with her medication.   Pacemaker interrogation revealed patient is V-paced 97% and is set to DDDR 60.       PMHx/PSHx: As above      family history includes Prostate cancer in her brother.      Allergies:  No Known Allergies     Medications:  Current Outpatient Medications   Medication Instructions    aspirin 81 mg, Daily    Farxiga 10 mg, oral, Every 24 hours    furosemide (LASIX) 20 mg, oral, Daily    metoprolol succinate XL (TOPROL-XL) 12.5 mg, oral, Daily, Do not crush or chew.    pantoprazole (PROTONIX) 40 mg, oral, Daily before breakfast, Do not crush, chew, or split.    rosuvastatin (CRESTOR) 10 mg, oral, Nightly    sacubitriL-valsartan (Entresto) 24-26 mg tablet 0.5 tablets, oral, 2 times daily    sennosides-docusate sodium (Nelda-Colace) 8.6-50 mg tablet 1 tablet, oral, Nightly    spironolactone (ALDACTONE) 12.5 mg, oral, Daily    Vitamin D3 2,000 Units, oral,  Daily      Scheduled medications   Medication Dose Route Frequency    [Held by provider] aspirin  81 mg oral Daily    ergocalciferol  1,250 mcg oral Every Sunday    heparin (porcine)  5,000 Units subcutaneous q8h    pantoprazole  40 mg oral Daily before breakfast    polyethylene glycol  17 g oral Daily    predniSONE  2.5 mg oral Daily    rosuvastatin  10 mg oral Nightly    [Held by provider] sacubitriL-valsartan  0.5 tablet oral BID    sennosides-docusate sodium  1 tablet oral Nightly    spironolactone  25 mg oral Daily        Last Recorded Vitals:      12/15/2024     8:14 PM 12/15/2024    11:42 PM 12/16/2024     4:00 AM 12/16/2024     8:00 AM 12/16/2024    11:15 AM 12/16/2024    12:00 PM 12/16/2024     2:03 PM   Vitals   Systolic 98 106 101 106  106    Diastolic 53 70 63 67  68    BP Location Left arm Left arm Left arm Left arm  Left arm    Heart Rate 60 63 60 61  60 89   Temp 36.7 °C (98.1 °F) 36.7 °C (98.1 °F) 36.5 °C (97.7 °F) 36.7 °C (98.1 °F)  36.7 °C (98.1 °F)    Resp 18 18 18 18 16 16    Weight (lb)   144.62       BMI   25.62 kg/m2       BSA (m2)   1.71 m2           Physical Exam  Vitals reviewed.   Constitutional:       General: She is not in acute distress.  HENT:      Head: Normocephalic and atraumatic.   Eyes:      General: No scleral icterus.  Cardiovascular:      Rate and Rhythm: Normal rate. Rhythm irregular.      Pulses: Normal pulses.      Heart sounds: No murmur heard.  Pulmonary:      Breath sounds: No rales.      Comments: Sitting on edge of bed   Musculoskeletal:      Right lower leg: No edema.      Left lower leg: No edema.   Skin:     General: Skin is warm.   Neurological:      Mental Status: She is alert and oriented to person, place, and time.           My Interpretation of Reviewed Study(s):  Echo 8/2024   ONCLUSIONS:   1. Left ventricular ejection fraction is moderately decreased, by visual estimate at 30-35%.   2. There is global hypokinesis of the left ventricle with minor regional  variations.   3. Spectral Doppler shows an impaired relaxation pattern of left ventricular diastolic filling.   4. There is an elevated left ventricular end diastolic pressure.   5. Left ventricular cavity size is decreased.   6. Abnormal septal motion consistent with RV pacemaker.   7. Moderately increased left ventricular septal thickness.   8. The left ventricular posterior wall thickness is moderately increased.   9. There is severe concentric left ventricular hypertrophy.  10. There is normal right ventricular global systolic function.  11. Moderately enlarged right ventricle.  12. The left atrium is mildly dilated.  13. The right atrium is severely dilated.  14. Mild to moderate mitral valve regurgitation.  15. Severe tricuspid regurgitation visualized.  16. Slightly elevated RVSP.  17. The RV systolic pressure is likely to be underestimated.  18. Mild to moderate aortic valve regurgitation.  19. Sent a Haiku to the NP service.    Cath 8/29/2024   IMPRESSION:  1. Minor coronary artery disease.  2. The proximal LAD has focal calcified plaque with minimal stenosis  (<25%).  3. The LM, LCx and RCA have no significant atherosclerotic change or  stenotic disease.  4. Total Agatston calcium score of 17.  5. Right dominant coronary artery system.  6. Right atrial and right ventricular dilatation. Mild left atrial  dilatation.  7. Small bilateral pleural effusions with dependent atelectasis.  8. Dilated main pulmonary artery to 3.4 cm. Correlate/concern for  elevated pulmonary/right-sided filling pressures.        Prior ECGs (reviewed and my interpretation):   12/13/2024 V-paced at 60 beats       Assessment/Plan   Erin Lopez is a 89 y.o. 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  EP consulted for consideration of device  upgrade.     #CHB s/p PPM 2017 [employer]   #TTR cardiac amyloidosis HFrEF   - interrogation of device done, and patient V pacing most of the time   - underlying rhythm CHB, and despite 3 months of GDMT patient still with low EF and recurrent admission for volume overload   - CRT-D however has not been studied in TTR amyloidosis and the benefit at this time is unclear especially given patient's frailty and risk for infection   Recommendations   - at this time continue to optimize GDMT as much as possible, and would start therapy with tafamidis outpatient if possible   -  will review TTE with Dr. Buchanan regarding CRT-D       Code status: Full Code    I spent 30 minutes in the professional and overall care of this patient.    Thank you for the opportunity to contribute to the care of this patient. Above recommendations discussed with Dr. Buchaann. If further questions arise, please page the EP consult pager at [phone] on weekdays 7AM - 6PM and weekends 7AM - 2PM, or at [phone] at all other times. The EP device nurse can be reached at pager [phone] during regular business hours GRACIA Lee MD   PGY5 Cardiology Fellow   Pager [phone]       Addendum :   Echo reviewed with Dr. Buchanan and there does appear to be dyssynchrony and patient may benefit theoretically for device upgrade. Would pursue this outpatient. Please place EP consult upon discharge     Discussed with Dr. Dewayne Lee MD   PGY5 Cardiology Fellow   Pager [phone]     Details as noted. Agree as above

## 2024-12-16 NOTE — PROGRESS NOTES
Subjective Data:  Patient's family at bedside. She has been in bed mostly, walked to bathroom with walker. She hasn't gotten to chair because she just got one last night. On 2L NC (no home O2). Diuresed well with IV lasix gtt. 730/4250. Weight down by 3.5kg overnight. Cr improving.     Overnight Events:    None         Objective Data:  Last Recorded Vitals:  Vitals:    12/16/24 1115 12/16/24 1122 12/16/24 1200 12/16/24 1403   BP:   106/68    BP Location:   Left arm    Patient Position:   Lying    Pulse:   60 89   Resp: 16  16    Temp:   36.7 °C (98.1 °F)    TempSrc:   Temporal    SpO2: 99% 96% 97%    Weight:       Height:           Last Labs:  CBC - 12/15/2024:  7:59 PM  5.5 13.7 102    44.3      CMP - 12/15/2024:  7:59 PM  9.5 6.9 17 --- 0.8   2.6 3.1 9 146      PTT - 12/13/2024: 12:18 PM  1.3   14.4 30     TROPHS   Date/Time Value Ref Range Status   12/13/2024 12:18  0 - 34 ng/L Final     Comment:     Previous result verified on 12/13/2024 1210 on specimen/case [id number] called with component TRPHS for procedure Troponin I, High Sensitivity, Initial with value 418 ng/L.   12/13/2024 11:02  0 - 34 ng/L Final   08/23/2024 11:41  0 - 34 ng/L Final     Comment:     Previous result verified on 8/23/2024 2336 on specimen/case [id number] called with component TRPHS for procedure Troponin I, High Sensitivity, Initial with value 235 ng/L.     BNP   Date/Time Value Ref Range Status   12/13/2024 11:02 AM >5,000 0 - 99 pg/mL Final   08/23/2024 10:35 PM 2,626 0 - 99 pg/mL Final      Last I/O:  I/O last 3 completed shifts:  In: 770.7 (11.7 mL/kg) [P.O.:740; I.V.:30.7 (0.5 mL/kg)]  Out: 5275 (80.4 mL/kg) [Urine:5275 (2.2 mL/kg/hr)]  Weight: 65.6 kg     Past Cardiology Tests (Last 3 Years):  EKG:  ECG 12 lead 12/13/2024  Atrial- paced rhythm, rate 60 BPM    Echo:  Transthoracic Echo (TTE) Complete 08/24/2024   1. Left ventricular ejection fraction is moderately decreased, by visual estimate at  30-35%.   2. There is global hypokinesis of the left ventricle with minor regional variations.   3. Spectral Doppler shows an impaired relaxation pattern of left ventricular diastolic filling.   4. There is an elevated left ventricular end diastolic pressure.   5. Left ventricular cavity size is decreased.   6. Abnormal septal motion consistent with RV pacemaker.   7. Moderately increased left ventricular septal thickness.   8. The left ventricular posterior wall thickness is moderately increased.   9. There is severe concentric left ventricular hypertrophy.  10. There is normal right ventricular global systolic function.  11. Moderately enlarged right ventricle.  12. The left atrium is mildly dilated.  13. The right atrium is severely dilated.  14. Mild to moderate mitral valve regurgitation.  15. Severe tricuspid regurgitation visualized.  16. Slightly elevated RVSP.  17. The RV systolic pressure is likely to be underestimated.  18. Mild to moderate aortic valve regurgitation.     Transthoracic Echo (TTE) Complete 10/03/2023   1. Left ventricular systolic function is mildly decreased with a 50-55% estimated ejection fraction.   2. Moderately increased left ventricular septal thickness.   3. The left ventricular posterior wall thickness is moderately increased.   4. Moderately enlarged right ventricle.   5. Mild to moderate aortic valve regurgitation.   6. Mild to moderate mitral valve regurgitation.   7. Moderate tricuspid regurgitation visualized.   8. The tricuspid valve annulus appears dilated.   9. The left atrium is moderately dilated.  10. Moderately elevated right ventricular systolic pressure.  11. Compared with study from 2/17/2017, there is a significant increase in LV wall thickness. There is evidence of mild thickening of the RV free wall, associated with mild thickening of the AV, MV and tricuspid valve plus moderately elevated RVSP. This findings could be associated to an underlying infiltrative  disease like amyloidosis.     Ejection Fractions:  EF   Date/Time Value Ref Range Status   08/24/2024 11:20 AM 33 %      Cardiac Imaging:  CT angio coronary art with heartflow if score >30% 08/29/2024  CT angio coronary art with heartflow if score >30% 08/29/2024  1. Minor coronary artery disease.  2. The proximal LAD has focal calcified plaque with minimal stenosis  (<25%).  3. The LM, LCx and RCA have no significant atherosclerotic change or  stenotic disease.  4. Total Agatston calcium score of 17.  5. Right dominant coronary artery system.  6. Right atrial and right ventricular dilatation. Mild left atrial  dilatation.  7. Small bilateral pleural effusions with dependent atelectasis.  8. Dilated main pulmonary artery to 3.4 cm. Correlate/concern for  elevated pulmonary/right-sided filling pressures.     Amyloid SPECT Heart Study 8/26/2024  1. Amyloid SPECT heart study is suggestive of TTR amyloidosis.     Inpatient Medications:  Scheduled medications   Medication Dose Route Frequency    [Held by provider] aspirin  81 mg oral Daily    ergocalciferol  1,250 mcg oral Every Sunday    heparin (porcine)  5,000 Units subcutaneous q8h    pantoprazole  40 mg oral Daily before breakfast    polyethylene glycol  17 g oral Daily    predniSONE  2.5 mg oral Daily    rosuvastatin  10 mg oral Nightly    [Held by provider] sacubitriL-valsartan  0.5 tablet oral BID    sennosides-docusate sodium  1 tablet oral Nightly    spironolactone  25 mg oral Daily     PRN medications   Medication    acetaminophen    ipratropium-albuteroL     Continuous Medications   Medication Dose Last Rate    furosemide  20 mg/hr 20 mg/hr (12/16/24 1238)       Physical Exam  Constitutional:       General: She is awake. She is not in acute distress.     Appearance: She is ill-appearing.   HENT:      Mouth/Throat:      Mouth: Mucous membranes are moist.   Neck:      Vascular: JVD present.      Comments: JVD elevated to earlobe at 45 degrees   Cardiovascular:       Rate and Rhythm: Normal rate and regular rhythm.      Comments: AV paced at 60 BPM   Pulmonary:      Breath sounds: Examination of the right-middle field reveals wheezing. Examination of the left-middle field reveals wheezing. Examination of the right-lower field reveals wheezing. Examination of the left-lower field reveals wheezing. Wheezing present.   Abdominal:      General: There is distension.      Tenderness: There is no abdominal tenderness.   Musculoskeletal:      Right upper arm: Swelling present.      Left upper arm: Swelling present.      Right lower leg: 3+ Edema present.      Left lower leg: 3+ Edema present.      Comments: Right upper arm edema improving  Left upper arm 3+ pitting, cool hand, 2+ radial pulse    Skin:     General: Skin is warm and dry.      Comments: Bilateral blisters LE. Left midshin nickel size, right near ankle nickel size.    Neurological:      General: No focal deficit present.      Mental Status: She is alert and oriented to person, place, and time.   Psychiatric:         Mood and Affect: Mood normal.         Behavior: Behavior normal.        Assessment/Plan   Erin Lopez is a 89 y.o. female presenting with PMH of NSTEMI (nonobstructive CAD 2017), TTR cardiac amyloidosis, HFrEF (30-35% 8/2-24), LBBB and SSS s/p PPM 2017 s/p device changeout 11/2022, HTN, CKD presented to the ED with severe anasarca and acute decompensated heart failure. Admitted to [facility] for further management.      Acute on chronic systolic and diastolic heart failure (HFrEF 30-35%)  TTR Amyloidosis  Acute respiratory failure  - TTE 10/2023: EF 50-55%, moderately increased LV septal and posterior wall thickness, Moderately enlarged V, mild to mod AR and MR, mod TR, moderate LAE, Compared to 2017 TTE there is possible underlying infiltrative disease  - TTE 8/24/2024: EF 30-35%, gHK of LV,  elevated LVEDP, abnormal septal motion c/w V pacemaker, moderately increased LV septal thickness. LV posterior wall  moderately thickened. Severe cLVH. Moderately enlarged RV, mild LAE, RA severely dilated, mild to mod MR, severe TR, Slightly elevated RVSP. Mild to mod AR.  - New TTR amyloidosis seen on 8/25 NM PYP scan-- K/L ration WNL, SPEP, UPEP negative  - admit CXR: Severe cardiomegaly with increased moderate-to-large bilateral pleural effusions and associated atelectasis and/or consolidation. Additional interstitial and patchy opacities with basilar predilection suggestive of interstitial and alveolar pulmonary edema  - Admit BNP >5000 (previously 2,626 8/2024)  - discharge wt 9/4/2024 51.5 kg  - admit wt: 70.5 kg (recorded in ED)  - daily wt: 69.1 (68.8kg)  - patient admitted with severe anasarca up to breasts/upper arms  - during last admission, patient successfully diuresed with IV lasix boluses with intermittent diamox  - s/p IV lasix 40mg x1 in ED on admission  - lasix gtt started at 10mg/hr on 12/13  - lasix gtt increased to 20 mg/hour 12/15, with goal net negative 2-3 L-->continue  - Advanced HF team consulted during last admission: Recommended initiating Tafamidis (amyloid coordinator informed at the time)  - patient no showed for Dr. Goss appt 9/17/2024 (transport issues)  - will continue holding BB iso ADHF/ ongoing concern for low output  - hold dapagliflozin iso TERI -- likely cardiorenal due to severe anasarca   - low dose Entresto 12/13 BID held iso aggressive diuresis   - 12/15 spironolactone increased to 25 mg daily to improve diuresis   - home GDMT: Entresto 12/13 BID, metop succ 12.5 mg daily, dapagliflozin 10 mg daily,  and spironolactone 12.5 mg daily  - home diuretic: Lasix 20mg daily  - Daily standing weights, strict I&O's, 2g sodium diet, 2L fluid restriction  - may consider facility placement vs palliative care consult for GOC/code status given readmission for severe anasarca/ADHF; ongoing conversations with patient and family,  PT/OT recs mod intensity. Encouraged mobility.   - Repeat CXR to  reassess pleural effusions once more euvolemic.   - on 2L NC (not on any home O2)   - Continue prednisone daily (long term use dating back to  for arthritis per patient) and neb PRN   - Advanced HF rec starting tafamadis outpatient after last discharge. Patient missed follow up appt. holding tafamadis initiation, consider starting outpatient.     Unilateral persistent LUE edema   - fluid overloaded on presentation with 2+ swelling BL arms  - IV infusion switched from L arm, L arm elevated  -  LUE duplex given severe unilateral swelling of LUE and coolness (ordered in vasc lab) no DVT    Troponinemia  - HS trop: 418 -> 330. Suspected 2/2 demand from CHF  - CTA coronary : minor CAD, prox LAD has focal calcified plaque with minimal stenosis (<25%), LM, LCx and RCA have no significant atherosclerotic change or stenotic disease. Calcium score 17  - Cont rosuvastatin 20 mg nightly   - daily ASA held due to worsening thrombocytopenia      Hx of SSS s/p PPM , s/p device changeout 2022  - EC% AV pacing  - EP consulted during last admission due to concern for possible pacemaker mediated cardiomyopathy given high pacing burden on device interrogation: AV delayed extended but no intrinsic conduction noted. Recommended optimizing GDMT prior to considering upgrade to CRT since she would be high risk.   - Consider CRT optimization with EP again this admission once patient successfully diuresed/ optimized-->Discussed with EP, interrogation and optimization ordered.  39% RA and 97% RV paced. Will reach out to EP for formal consult once euvolemic and depending on further family discussion on GOC at that time.      HTN  - Admit /76  - last 24 hour SBP range: 105- 129  - Entresto held as above      TERI on CKD IIIb  - Cr baseline 1.4-1.6  - admit Cr 2.12  - daily Cr 1.93 (2.09) (1.91)  - likely cardiorenal iso severe anasarca   - diuresis as above  - avoid nephrotoxic agents     Hypokalemia  - admit K  3.1  - daily K 4.4 (3.6)(3.6)  - spironolactone increased to 25mg as above  - cont repletion during aggressive diuresis     Concern for congestive hepatopathy   Thrombocytopenia  - baseline 190-220s  - admit Plt: 101  - daily Plt 102 (99) (84)  - on admit: INR 1.3, elevated alk phos 163  - cont to trend CBC, coag and LFTs daily  - daily ASA held iso worsening thrombocytopenia; Plt already low on admission, low concern for HIT   - diuresis as above     Arthritis  - Pt previously getting steroid injections q3 month then put on systemic steroids  - Cont prednisone 2.5 daily   - cont home PPI while on steroids      Dispo: pending aggressive diuresis, gonzalez  - PT/OT eval mod intensity      DVT ppx: heparin SQ  Code Status:  Full Code    All labs, vital signs, tests & imaging results, and medications were reviewed.  Patient seen and examined with Dr. Omar Jensen, APRN-CNP

## 2024-12-16 NOTE — PROGRESS NOTES
Occupational Therapy    Evaluation and Treatment    Patient Name: Erin Lopez  MRN: [id number]  Today's Date: 12/16/2024  Room: [address] [place] [address]  Time Calculation  Start Time: 1122  Stop Time: 1150  Time Calculation (min): 28 min    Assessment  IP OT Assessment  Prognosis: Good  Barriers to Discharge Home: No anticipated barriers  Evaluation/Treatment Tolerance: Patient tolerated treatment well  End of Session Communication: Bedside nurse  End of Session Patient Position: Up in chair, Alarm off, not on at start of session (pacemaker nurse at bedside)  Plan:  Inpatient Plan  Treatment Interventions: ADL retraining, Functional transfer training, UE strengthening/ROM, Equipment evaluation/education, Compensatory technique education  OT Frequency: 3 times per week  OT Discharge Recommendations: Moderate intensity level of continued care  OT Recommended Transfer Status: Moderate assist, Assist of 1  OT - OK to Discharge: Yes  OT Assessment  OT Assessment Results: Decreased ADL status, Decreased upper extremity strength, Decreased safe judgment during ADL, Decreased endurance, Decreased functional mobility, Decreased IADLs  Prognosis: Good  Evaluation/Treatment Tolerance: Patient tolerated treatment well    Subjective   Current Problem:  1. Anasarca        2. Acute kidney injury (CMS-HCC)        3. Hypokalemia        4. Left upper extremity swelling  Vascular US upper extremity venous duplex left    Vascular US upper extremity venous duplex left      5. 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 Pt with increased SOB with initialy sitting EOB, RN ok'd increase of O2 to 3 L during mobility.   Precautions:  Medical Precautions: Fall precautions, Cardiac precautions, Oxygen therapy device and L/min    Pain:  Pain Assessment  Pain Assessment: 0-10  0-10 (Numeric) Pain Score: 0 - No pain  Lines/Tubes/Drains:  Urethral Catheter Straight-tip 18 Fr. (Active)   Number of days: 2         Objective   Cognition:  Orientation Level: Oriented X4  Insight: Mild           Home Living:  Type of Home: House (2 family (pt stays on 1st floor))  Lives With:  (daughter)  Home Adaptive Equipment: Walker rolling or standard ([facility])  Home Layout: One level  Bathroom Shower/Tub: Tub/shower unit  Bathroom Toilet: Adaptive toilet seating  Bathroom Equipment: Shower chair with back   Prior Function:  Level of [place]: Needs assistance with ADLs  ADL Assistance: Needs assistance (per pt daughter assists recently, prior to last hospitalization pt was fully independent managing her ADLs)  Homemaking Assistance: Needs assistance  Ambulatory Assistance: Needs assistance (uses WW and supervision  from daughter)  Prior Function Comments: daughter manages cooking, cleaning and medication management, pt denies any falls  IADL History:     ADL:  Eating Assistance: Stand by  Grooming Assistance: Stand by  Bathing Assistance: Maximal  UE Dressing Assistance: Moderate  LE Dressing Assistance: Maximal  Toileting Assistance with Device: Maximal  Activity Tolerance:  Endurance: Decreased tolerance for upright activites    Bed Mobility/Transfers: Bed Mobility/Transfers: Bed Mobility  Bed Mobility: Yes  Bed Mobility 1  Bed Mobility 1: Supine to sitting  Level of Assistance 1: Moderate assistance, Moderate verbal cues   and Transfers  Transfer:  Yes  Transfer 1  Transfer From 1: Sit to  Transfer to 1: Stand  Technique 1: Sit to stand, Stand to sit  Transfer Device 1: Walker  Transfer Level of Assistance 1: Moderate assistance  Transfers 2  Transfer From 2: Bed to  Transfer to 2: Chair with arms (cardiac chair)  Technique 2: Sit to stand, Stand to sit  Transfer Device 2: Walker  Transfer Level of Assistance 2: Moderate assistance, Moderate verbal cues    Sensation:  Light Touch: No apparent deficits  Strength:     Perception:     Coordination:      Hand Function:  Hand Function  Gross Grasp: Functional  Coordination: Functional  Extremities:   RUE   RUE : Within Functional Limits (increased edema throughout), LUE   LUE: Within Functional Limits (increased edema throughout), RLE   RLE : Within Functional Limits, and LLE   LLE : Within Functional Limits      Outcome Measures: [facility] Daily Activity  Putting on and taking off regular lower body clothing: Total  Bathing (including washing, rinsing, drying): A lot  Putting on and taking off regular upper body clothing: A lot  Toileting, which includes using toilet, bedpan or urinal: A lot  Taking care of personal grooming such as brushing teeth: A little  Eating Meals: A little  Daily Activity - Total Score: 13         ,     OT Adult Other Outcome Measures  4AT: negative    Education Documentation  Body Mechanics, taught by Orquidea Mcgarry OT at 12/16/2024  2:20 PM.  Learner: Family, Patient  Readiness: Eager  Method: Explanation  Response: Needs Reinforcement    Precautions, taught by Orquidea Mcgarry OT at 12/16/2024  2:20 PM.  Learner: Family, Patient  Readiness: Eager  Method: Explanation  Response: Needs Reinforcement    ADL Training, taught by Orquidea Mcgarry OT at 12/16/2024  2:20 PM.  Learner: Family, Patient  Readiness: Eager  Method: Explanation  Response: Needs Reinforcement    Education Comments  No comments found.        Goals:   Encounter Problems       Encounter Problems (Active)       ADLs        Patient with complete upper body dressing with contact guard assist level of assistance donning and doffing all UE clothes with no adaptive equipment while edge of bed  (Progressing)       Start:  12/16/24    Expected End:  12/30/24            Patient with complete lower body dressing with contact guard assist level of assistance donning and doffing all LE clothes  with PRN adaptive equipment while edge of bed  (Progressing)       Start:  12/16/24    Expected End:  12/30/24            Patient will complete daily grooming tasks brushing teeth and washing face/hair with independent level of assistance and PRN adaptive equipment while edge of bed . (Progressing)       Start:  12/16/24    Expected End:  12/30/24            Patient will complete toileting including hygiene clothing management/hygiene with contact guard assist level of assistance and bedside commode. (Progressing)       Start:  12/16/24    Expected End:  12/30/24               MOBILITY       Patient will perform Functional mobility min Household distances/Community Distances with contact guard assist level of assistance and front wheeled walker in order to improve safety and functional mobility. (Progressing)       Start:  12/16/24    Expected End:  12/30/24               TRANSFERS       Patient will perform bed mobility contact guard assist level of assistance and bed rails in order to improve safety and independence with mobility (Progressing)       Start:  12/16/24    Expected End:  12/30/24            Patient will complete sit to stand transfer with contact guard assist level of assistance and front wheeled walker in order to improve safety and prepare for out of bed mobility. (Progressing)       Start:  12/16/24    Expected End:  12/30/24                   Treatment Completed on Evaluation    Therapy/Activity:     Therapeutic Activity  Therapeutic Activity Performed: Yes  Therapeutic Activity 1: Pt particiapted in EOB sitting with CGA for ~ 10   min prior to standing and transferring to chair. Pt with increased SOB initially, provided cues on pursed lip breathing. Pt completed one stand with elevated bed surface and Mod A. Pt took few steps bed to chair with Mod A using WW, provided cues for upright posture and walker management.            12/16/24 at 2:23 PM   Orquidea Mcgarry OT   Rehab Office: [phone]

## 2024-12-17 LAB
ALBUMIN SERPL BCP-MCNC: 3.2 G/DL (ref 3.4–5)
ALP SERPL-CCNC: 137 U/L (ref 33–136)
ALT SERPL W P-5'-P-CCNC: 11 U/L (ref 7–45)
ANION GAP SERPL CALC-SCNC: 14 MMOL/L (ref 10–20)
AST SERPL W P-5'-P-CCNC: 26 U/L (ref 9–39)
BILIRUB SERPL-MCNC: 0.9 MG/DL (ref 0–1.2)
BUN SERPL-MCNC: 27 MG/DL (ref 6–23)
CALCIUM SERPL-MCNC: 9.3 MG/DL (ref 8.6–10.6)
CHLORIDE SERPL-SCNC: 93 MMOL/L (ref 98–107)
CO2 SERPL-SCNC: 32 MMOL/L (ref 21–32)
CREAT SERPL-MCNC: 1.85 MG/DL (ref 0.5–1.05)
EGFRCR SERPLBLD CKD-EPI 2021: 26 ML/MIN/1.73M*2
GLUCOSE SERPL-MCNC: 86 MG/DL (ref 74–99)
MAGNESIUM SERPL-MCNC: 2.85 MG/DL (ref 1.6–2.4)
POTASSIUM SERPL-SCNC: 4.3 MMOL/L (ref 3.5–5.3)
PROT SERPL-MCNC: 6.9 G/DL (ref 6.4–8.2)
SODIUM SERPL-SCNC: 135 MMOL/L (ref 136–145)

## 2024-12-17 PROCEDURE — 2500000002 HC RX 250 W HCPCS SELF ADMINISTERED DRUGS (ALT 637 FOR MEDICARE OP, ALT 636 FOR OP/ED)

## 2024-12-17 PROCEDURE — 99223 1ST HOSP IP/OBS HIGH 75: CPT

## 2024-12-17 PROCEDURE — 36415 COLL VENOUS BLD VENIPUNCTURE: CPT

## 2024-12-17 PROCEDURE — 99498 ADVNCD CARE PLAN ADDL 30 MIN: CPT

## 2024-12-17 PROCEDURE — 2500000004 HC RX 250 GENERAL PHARMACY W/ HCPCS (ALT 636 FOR OP/ED)

## 2024-12-17 PROCEDURE — 99233 SBSQ HOSP IP/OBS HIGH 50: CPT | Performed by: STUDENT IN AN ORGANIZED HEALTH CARE EDUCATION/TRAINING PROGRAM

## 2024-12-17 PROCEDURE — 99497 ADVNCD CARE PLAN 30 MIN: CPT

## 2024-12-17 PROCEDURE — 1200000002 HC GENERAL ROOM WITH TELEMETRY DAILY

## 2024-12-17 PROCEDURE — 82310 ASSAY OF CALCIUM: CPT

## 2024-12-17 PROCEDURE — 83735 ASSAY OF MAGNESIUM: CPT

## 2024-12-17 PROCEDURE — 2500000001 HC RX 250 WO HCPCS SELF ADMINISTERED DRUGS (ALT 637 FOR MEDICARE OP)

## 2024-12-17 RX ADMIN — SENNOSIDES AND DOCUSATE SODIUM 1 TABLET: 8.6; 5 TABLET ORAL at 20:24

## 2024-12-17 RX ADMIN — FUROSEMIDE 20 MG/HR: 10 INJECTION, SOLUTION INTRAMUSCULAR; INTRAVENOUS at 11:33

## 2024-12-17 RX ADMIN — HEPARIN SODIUM 5000 UNITS: 5000 INJECTION, SOLUTION INTRAVENOUS; SUBCUTANEOUS at 06:20

## 2024-12-17 RX ADMIN — PREDNISONE 2.5 MG: 5 TABLET ORAL at 09:00

## 2024-12-17 RX ADMIN — HEPARIN SODIUM 5000 UNITS: 5000 INJECTION, SOLUTION INTRAVENOUS; SUBCUTANEOUS at 20:24

## 2024-12-17 RX ADMIN — HEPARIN SODIUM 5000 UNITS: 5000 INJECTION, SOLUTION INTRAVENOUS; SUBCUTANEOUS at 13:36

## 2024-12-17 RX ADMIN — SPIRONOLACTONE 25 MG: 25 TABLET, FILM COATED ORAL at 09:00

## 2024-12-17 RX ADMIN — IPRATROPIUM BROMIDE AND ALBUTEROL SULFATE 3 ML: .5; 3 SOLUTION RESPIRATORY (INHALATION) at 09:00

## 2024-12-17 RX ADMIN — ROSUVASTATIN CALCIUM 10 MG: 20 TABLET, FILM COATED ORAL at 20:24

## 2024-12-17 RX ADMIN — PANTOPRAZOLE SODIUM 40 MG: 40 TABLET, DELAYED RELEASE ORAL at 06:21

## 2024-12-17 ASSESSMENT — COGNITIVE AND FUNCTIONAL STATUS - GENERAL
HELP NEEDED FOR BATHING: A LITTLE
MOVING FROM LYING ON BACK TO SITTING ON SIDE OF FLAT BED WITH BEDRAILS: A LITTLE
TURNING FROM BACK TO SIDE WHILE IN FLAT BAD: A LITTLE
DRESSING REGULAR LOWER BODY CLOTHING: A LITTLE
EATING MEALS: A LITTLE
PERSONAL GROOMING: A LITTLE
CLIMB 3 TO 5 STEPS WITH RAILING: A LITTLE
DRESSING REGULAR UPPER BODY CLOTHING: A LITTLE
TOILETING: A LITTLE
STANDING UP FROM CHAIR USING ARMS: A LITTLE
WALKING IN HOSPITAL ROOM: A LITTLE
DAILY ACTIVITIY SCORE: 18
MOBILITY SCORE: 18
MOVING TO AND FROM BED TO CHAIR: A LITTLE

## 2024-12-17 ASSESSMENT — PAIN SCALES - GENERAL: PAINLEVEL_OUTOF10: 0 - NO PAIN

## 2024-12-17 ASSESSMENT — PAIN - FUNCTIONAL ASSESSMENT: PAIN_FUNCTIONAL_ASSESSMENT: 0-10

## 2024-12-17 NOTE — PROGRESS NOTES
"Subjective Data:  \"I don't want to go to a facility.\" Patient pleased that she is doing better with her breathing and fluid status. She says she sat up in a chair for a few hours yesterday. Patient however, is still very weak and moving in bed is difficult.  Excellent diuresis on IV lasix gtt. Cr stable. Since admission lost 10kg, but weight still up by 10kg.     Overnight Events:    None         Objective Data:  Last Recorded Vitals:  Vitals:    12/16/24 2331 12/17/24 0324 12/17/24 1000 12/17/24 1230   BP: 110/75 118/77 90/56 92/57   BP Location: Left arm Left arm Left arm Left arm   Patient Position: Sitting Lying Lying Lying   Pulse: 66 76 69 70   Resp: 18 18 16 16   Temp: 36.2 °C (97.2 °F) 36.5 °C (97.7 °F) 36.7 °C (98.1 °F) 36.7 °C (98.1 °F)   TempSrc: Temporal Temporal Temporal Temporal   SpO2: 99% 98% 97% 97%   Weight:  61.3 kg (135 lb 2.3 oz)     Height:           Last Labs:  CBC - 12/16/2024:  8:31 PM  5.6 14.1 83    46.5      CMP - 12/16/2024:  8:31 PM  9.5 7.0 21 --- 0.9   2.6 3.2 9 139      PTT - 12/13/2024: 12:18 PM  1.3   14.4 30     TROPHS   Date/Time Value Ref Range Status   12/13/2024 12:18  0 - 34 ng/L Final     Comment:     Previous result verified on 12/13/2024 1210 on specimen/case [id number] called with component TRPHS for procedure Troponin I, High Sensitivity, Initial with value 418 ng/L.   12/13/2024 11:02  0 - 34 ng/L Final   08/23/2024 11:41  0 - 34 ng/L Final     Comment:     Previous result verified on 8/23/2024 2336 on specimen/case [id number] called with component TRPHS for procedure Troponin I, High Sensitivity, Initial with value 235 ng/L.     BNP   Date/Time Value Ref Range Status   12/13/2024 11:02 AM >5,000 0 - 99 pg/mL Final   08/23/2024 10:35 PM 2,626 0 - 99 pg/mL Final      Last I/O:  I/O last 3 completed shifts:  In: 1107.7 (18.1 mL/kg) [P.O.:1060; I.V.:47.7 (0.8 mL/kg)]  Out: 8875 (144.8 mL/kg) [Urine:8875 (4 mL/kg/hr)]  Weight: 61.3 kg     Past " Cardiology Tests (Last 3 Years):  EKG:  ECG 12 lead 12/13/2024  Atrial- paced rhythm, rate 60 BPM    Echo:  Transthoracic Echo (TTE) Complete 08/24/2024   1. Left ventricular ejection fraction is moderately decreased, by visual estimate at 30-35%.   2. There is global hypokinesis of the left ventricle with minor regional variations.   3. Spectral Doppler shows an impaired relaxation pattern of left ventricular diastolic filling.   4. There is an elevated left ventricular end diastolic pressure.   5. Left ventricular cavity size is decreased.   6. Abnormal septal motion consistent with RV pacemaker.   7. Moderately increased left ventricular septal thickness.   8. The left ventricular posterior wall thickness is moderately increased.   9. There is severe concentric left ventricular hypertrophy.  10. There is normal right ventricular global systolic function.  11. Moderately enlarged right ventricle.  12. The left atrium is mildly dilated.  13. The right atrium is severely dilated.  14. Mild to moderate mitral valve regurgitation.  15. Severe tricuspid regurgitation visualized.  16. Slightly elevated RVSP.  17. The RV systolic pressure is likely to be underestimated.  18. Mild to moderate aortic valve regurgitation.     Transthoracic Echo (TTE) Complete 10/03/2023   1. Left ventricular systolic function is mildly decreased with a 50-55% estimated ejection fraction.   2. Moderately increased left ventricular septal thickness.   3. The left ventricular posterior wall thickness is moderately increased.   4. Moderately enlarged right ventricle.   5. Mild to moderate aortic valve regurgitation.   6. Mild to moderate mitral valve regurgitation.   7. Moderate tricuspid regurgitation visualized.   8. The tricuspid valve annulus appears dilated.   9. The left atrium is moderately dilated.  10. Moderately elevated right ventricular systolic pressure.  11. Compared with study from 2/17/2017, there is a significant increase in LV  wall thickness. There is evidence of mild thickening of the RV free wall, associated with mild thickening of the AV, MV and tricuspid valve plus moderately elevated RVSP. This findings could be associated to an underlying infiltrative disease like amyloidosis.     Ejection Fractions:  EF   Date/Time Value Ref Range Status   08/24/2024 11:20 AM 33 %      Cardiac Imaging:  CT angio coronary art with heartflow if score >30% 08/29/2024  CT angio coronary art with heartflow if score >30% 08/29/2024  1. Minor coronary artery disease.  2. The proximal LAD has focal calcified plaque with minimal stenosis  (<25%).  3. The LM, LCx and RCA have no significant atherosclerotic change or  stenotic disease.  4. Total Agatston calcium score of 17.  5. Right dominant coronary artery system.  6. Right atrial and right ventricular dilatation. Mild left atrial  dilatation.  7. Small bilateral pleural effusions with dependent atelectasis.  8. Dilated main pulmonary artery to 3.4 cm. Correlate/concern for  elevated pulmonary/right-sided filling pressures.     Amyloid SPECT Heart Study 8/26/2024  1. Amyloid SPECT heart study is suggestive of TTR amyloidosis.     Inpatient Medications:  Scheduled medications   Medication Dose Route Frequency    [Held by provider] aspirin  81 mg oral Daily    ergocalciferol  1,250 mcg oral Every Sunday    heparin (porcine)  5,000 Units subcutaneous q8h    pantoprazole  40 mg oral Daily before breakfast    polyethylene glycol  17 g oral Daily    predniSONE  2.5 mg oral Daily    rosuvastatin  10 mg oral Nightly    [Held by provider] sacubitriL-valsartan  0.5 tablet oral BID    sennosides-docusate sodium  1 tablet oral Nightly    spironolactone  25 mg oral Daily     PRN medications   Medication    acetaminophen    ipratropium-albuteroL     Continuous Medications   Medication Dose Last Rate    furosemide  20 mg/hr 20 mg/hr (12/17/24 1133)       Physical Exam  Constitutional:       General: She is awake. She is  not in acute distress.     Appearance: She is ill-appearing.   HENT:      Mouth/Throat:      Mouth: Mucous membranes are moist.   Neck:      Vascular: JVD present.      Comments: JVD elevated to earlobe at 45 degrees   Cardiovascular:      Rate and Rhythm: Normal rate and regular rhythm.      Comments: AV paced at 60 BPM   Pulmonary:      Breath sounds: Normal breath sounds.      Comments: Decreased bilaterally  Abdominal:      General: There is distension.      Tenderness: There is no abdominal tenderness.   Musculoskeletal:      Right upper arm: Swelling present.      Left upper arm: Swelling present.      Right lower leg: 3+ Edema present.      Left lower leg: 3+ Edema present.      Comments: Right upper arm edema improving  Left upper arm 2+ pitting, cool hand, 2+ radial pulse    Skin:     General: Skin is warm and dry.      Comments: Bilateral blisters LE. Left midshin nickel size, right near ankle nickel size. Wrapped in dressing.    Neurological:      General: No focal deficit present.      Mental Status: She is alert and oriented to person, place, and time.   Psychiatric:         Mood and Affect: Mood normal.         Behavior: Behavior normal.        Assessment/Plan   Erin Lopez is a 89 y.o. female presenting with PMH of NSTEMI (nonobstructive CAD 2017), TTR cardiac amyloidosis, HFrEF (30-35% 8/2-24), LBBB and SSS s/p PPM 2017 s/p device changeout 11/2022, HTN, CKD presented to the ED with severe anasarca and acute decompensated heart failure. Admitted to [facility] for further management.      Acute on chronic systolic and diastolic heart failure (HFrEF 30-35%)  TTR Amyloidosis  Acute respiratory failure  - TTE 10/2023: EF 50-55%, moderately increased LV septal and posterior wall thickness, Moderately enlarged V, mild to mod AR and MR, mod TR, moderate LAE, Compared to 2017 TTE there is possible underlying infiltrative disease  - TTE 8/24/2024: EF 30-35%, gHK of LV,  elevated LVEDP, abnormal septal motion c/w V  pacemaker, moderately increased LV septal thickness. LV posterior wall moderately thickened. Severe cLVH. Moderately enlarged RV, mild LAE, RA severely dilated, mild to mod MR, severe TR, Slightly elevated RVSP. Mild to mod AR.  - New TTR amyloidosis seen on 8/25 NM PYP scan-- K/L ration WNL, SPEP, UPEP negative  - admit CXR: Severe cardiomegaly with increased moderate-to-large bilateral pleural effusions and associated atelectasis and/or consolidation. Additional interstitial and patchy opacities with basilar predilection suggestive of interstitial and alveolar pulmonary edema  - Admit BNP >5000 (previously 2,626 8/2024)  - discharge wt 9/4/2024 51.5 kg  - admit wt: 70.5 kg (recorded in ED)  - daily wt: 61.3kg (69.1) (68.8kg)  - patient admitted with severe anasarca up to breasts/upper arms  - during last admission, patient successfully diuresed with IV lasix boluses with intermittent diamox  - s/p IV lasix 40mg x1 in ED on admission  - lasix gtt started at 10mg/hr on 12/13  - lasix gtt increased to 20 mg/hour 12/15, with goal net negative 2-3 L-->continue  - Advanced HF team consulted during last admission: Recommended initiating Tafamidis (amyloid coordinator informed at the time)  - patient no showed for Dr. Goss appt 9/17/2024 (transport issues)  - will continue holding BB iso ADHF/ ongoing concern for low output  - hold dapagliflozin iso TERI -- likely cardiorenal due to severe anasarca   - low dose Entresto 12/13 BID held iso aggressive diuresis   - 12/15 spironolactone increased to 25 mg daily to improve diuresis   - home GDMT: Entresto 12/13 BID, metop succ 12.5 mg daily, dapagliflozin 10 mg daily,  and spironolactone 12.5 mg daily  - home diuretic: Lasix 20mg daily  - Daily standing weights, strict I&O's, 2g sodium diet, 2L fluid restriction. Patient has gonzalez in place, when near euvolemia, will need Dc'd.   - may consider facility placement vs palliative care consult for GOC/code status given  readmission for severe anasarca/ADHF; ongoing conversations with patient and family,  PT/OT recs mod intensity. Encouraged mobility. Palliative consult to discuss GOC. Patient wishes to go home, but has limited mobility.   - Repeat CXR to reassess pleural effusions once more euvolemic.   - on 2L NC (not on any home O2)   - Continue prednisone daily (long term use dating back to  for arthritis per patient) and neb PRN   - Advanced HF rec starting tafamadis outpatient after last discharge. Patient missed follow up appt. holding tafamadis initiation, consider starting outpatient.     Unilateral persistent LUE edema- improving   - fluid overloaded on presentation with 2+ swelling BL arms  - IV infusion switched from L arm, L arm elevated  -  LUE duplex given severe unilateral swelling of LUE and coolness (ordered in vasc lab) no DVT    Troponinemia  - HS trop: 418 -> 330. Suspected 2/2 demand from CHF  - CTA coronary : minor CAD, prox LAD has focal calcified plaque with minimal stenosis (<25%), LM, LCx and RCA have no significant atherosclerotic change or stenotic disease. Calcium score 17  - Cont rosuvastatin 20 mg nightly   - daily ASA held due to worsening thrombocytopenia      Hx of SSS s/p PPM , s/p device changeout 2022  - EC% AV pacing  - EP consulted during last admission due to concern for possible pacemaker mediated cardiomyopathy given high pacing burden on device interrogation: AV delayed extended but no intrinsic conduction noted. Recommended optimizing GDMT prior to considering upgrade to CRT since she would be high risk.   - Consider CRT optimization with EP again this admission once patient successfully diuresed/ optimized-->Discussed with EP, interrogation and optimization ordered.  39% RA and 97% RV paced. Will reach out to EP for formal consult once euvolemic and depending on further family discussion on GOC at that time.      HTN  - Admit /76  - last 24 hour SBP range:  105- 129  - Entresto held as above      TERI on CKD IIIb  - Cr baseline 1.4-1.6  - admit Cr 2.12  - daily Cr 2.02 (1.93) (2.09) (1.91)  - likely cardiorenal iso severe anasarca   - diuresis as above  - avoid nephrotoxic agents     Hypokalemia  - admit K 3.1  - daily K 4.2 (4.4) (3.6)(3.6)  - spironolactone increased to 25mg as above  - cont repletion during aggressive diuresis     Concern for congestive hepatopathy   Thrombocytopenia  - baseline 190-220s  - admit Plt: 101  - daily Plt 83 (102)(99) (84)  - on admit: INR 1.3, elevated alk phos 163  - cont to trend CBC  - LFTs back to baseline  - daily ASA held iso worsening thrombocytopenia; Plt already low on admission, low concern for HIT   - diuresis as above     Arthritis  - Pt previously getting steroid injections q3 month then put on systemic steroids  - Cont prednisone 2.5 daily   - cont home PPI while on steroids      Dispo: pending aggressive diuresis, gonzalez  - PT/OT eval mod intensity      DVT ppx: heparin SQ  Code Status:  Full Code    All labs, vital signs, tests & imaging results, and medications were reviewed.  Patient seen and examined with Dr. Omar Jensen, APRN-CNP

## 2024-12-17 NOTE — CONSULTS
Inpatient consult to Palliative Care  Consult performed by: MARTINA Reese  Consult ordered by: MARTINA Nathan          Palliative Medicine Consult  Complex medical decision making, symptom management, patient/family support    History obtained from chart review including ED note, H&P, patient's daily progress notes, review of lab/test results, and discussion with primary team and bedside RN.    Subjective    History of Present Illness  Ms. Erin Lopez is a 89 y.o. female presenting with PMH of NSTEMI (nonobstructive CAD 2017), TTR cardiac amyloidosis, HFrEF (30-35% 8/2-24), LBBB and SSS s/p PPM 2017 s/p device changeout 11/2022, HTN, CKD presented to the ED with severe anasarca and acute decompensated heart failure.       Introduction to Palliative Medicine  Met with pt at bedside.   Patient alert and oriented, has capacity to make their own medical decisions at this time.   Staff present: Precious Mcmahon CNP, Joana FLORES  Palliative Medicine was introduced as a specialty service for patients with serious illness to help with symptom management, improve quality of life, assist with goals of care conversations, navigate complex decision making, and provide support to patients and families. Support and empathy was provided throughout the encounter. Provided reflective listening and presence.     Symptoms  Pain: denies  Dyspnea: yes with any exertion and talking.  Fatigue: a little  Insomnia: only r/t interruptions.  Drowsiness: a little  Constipation: denies  Nausea: denies  Appetite: picking up  Anxiety: denies  Depression: denies    Palliative Medicine Social History:  The patient is , she has 5 children. Dtr Cassidy lives with pt in a house and is pt's 24/7 caregiver with exception of when Cassidy goes to the store or runs errands. Prior to hospitalization, pt was able to ambulate with her walker until she became too SOB and unable to move her legs r/t swelling. Cassidy cleaned, prepped  "meals, and helped pt shower. Pt states she was independent with those activities up until August/September. Pt used to work as a  and for a cleaning company cleaning factory offices. For enjoyment, pt watches soap operas. Pt is mostly home bound. She never smoked. Pt and Cassidy rely on a grandson and granddtr for transportation.     Objective    Last Recorded Vitals  BP 92/57 (BP Location: Left arm, Patient Position: Lying)   Pulse 70   Temp 36.7 °C (98.1 °F) (Temporal)   Resp 16   Ht 1.6 m (5' 3\")   Wt 61.3 kg (135 lb 2.3 oz)   SpO2 97%   BMI 23.94 kg/m²      Physical Exam  Constitutional:       Appearance: She is ill-appearing.   HENT:      Head: Normocephalic and atraumatic.      Mouth/Throat:      Mouth: Mucous membranes are dry.   Cardiovascular:      Rate and Rhythm: Normal rate.      Comments: paced  Pulmonary:      Effort: Tachypnea present.      Breath sounds: Decreased air movement present.   Abdominal:      General: Bowel sounds are normal.      Palpations: Abdomen is soft.   Musculoskeletal:         General: Swelling present.   Skin:     General: Skin is warm.      Comments: BLE dressings D&I.   Neurological:      General: No focal deficit present.      Mental Status: Mental status is at baseline.   Psychiatric:         Attention and Perception: Attention normal.         Mood and Affect: Mood normal.         Speech: Speech normal.         Behavior: Behavior is cooperative.         Thought Content: Thought content normal.         Cognition and Memory: Cognition normal.         Judgment: Judgment normal.          Relevant Results  Results for orders placed or performed during the hospital encounter of 12/13/24 (from the past 24 hours)   CBC   Result Value Ref Range    WBC 5.6 4.4 - 11.3 x10*3/uL    nRBC 0.0 0.0 - 0.0 /100 WBCs    RBC 5.07 4.00 - 5.20 x10*6/uL    Hemoglobin 14.1 12.0 - 16.0 g/dL    Hematocrit 46.5 (H) 36.0 - 46.0 %    MCV 92 80 - 100 fL    MCH 27.8 26.0 - 34.0 pg    " MCHC 30.3 (L) 32.0 - 36.0 g/dL    RDW 19.4 (H) 11.5 - 14.5 %    Platelets 83 (L) 150 - 450 x10*3/uL   Magnesium   Result Value Ref Range    Magnesium 2.74 (H) 1.60 - 2.40 mg/dL   Comprehensive metabolic panel   Result Value Ref Range    Glucose 108 (H) 74 - 99 mg/dL    Sodium 136 136 - 145 mmol/L    Potassium 4.2 3.5 - 5.3 mmol/L    Chloride 98 98 - 107 mmol/L    Bicarbonate 23 21 - 32 mmol/L    Anion Gap 19 10 - 20 mmol/L    Urea Nitrogen 26 (H) 6 - 23 mg/dL    Creatinine 2.02 (H) 0.50 - 1.05 mg/dL    eGFR 23 (L) >60 mL/min/1.73m*2    Calcium 9.5 8.6 - 10.6 mg/dL    Albumin 3.2 (L) 3.4 - 5.0 g/dL    Alkaline Phosphatase 139 (H) 33 - 136 U/L    Total Protein 7.0 6.4 - 8.2 g/dL    AST 21 9 - 39 U/L    Bilirubin, Total 0.9 0.0 - 1.2 mg/dL    ALT 9 7 - 45 U/L      ECG 12 lead  Atrial-paced rhythm  Nonspecific intraventricular block  Lateral infarct , age undetermined  Abnormal ECG  When compared with ECG of 27-AUG-2024 18:32,  Electronic atrial pacemaker has replaced Electronic ventricular pacemaker     Encounter Date: 12/13/24   ECG 12 lead   Result Value    Ventricular Rate 60    Atrial Rate 60    QRS Duration 158    QT Interval 502    QTC Calculation(Bazett) 502    P Axis 38    R Axis 146    T Axis -4    QRS Count 10    Q Onset 193    T Offset 444    QTC Fredericia 502    Narrative    Atrial-paced rhythm  Nonspecific intraventricular block  Lateral infarct , age undetermined  Abnormal ECG  When compared with ECG of 27-AUG-2024 18:32,  Electronic atrial pacemaker has replaced Electronic ventricular pacemaker        Allergies  Patient has no known allergies.    Scheduled medications  [Held by provider] aspirin, 81 mg, oral, Daily  ergocalciferol, 1,250 mcg, oral, Every Sunday  heparin (porcine), 5,000 Units, subcutaneous, q8h  pantoprazole, 40 mg, oral, Daily before breakfast  polyethylene glycol, 17 g, oral, Daily  predniSONE, 2.5 mg, oral, Daily  rosuvastatin, 10 mg, oral, Nightly  [Held by provider]  sacubitriL-valsartan, 0.5 tablet, oral, BID  sennosides-docusate sodium, 1 tablet, oral, Nightly  spironolactone, 25 mg, oral, Daily      Continuous medications  furosemide, 20 mg/hr, Last Rate: 20 mg/hr (12/17/24 1133)      PRN medications  PRN medications: acetaminophen, ipratropium-albuteroL     Assessment/Plan    Ms. Erin Lopez is a 89 y.o. female presenting with PMH of NSTEMI (nonobstructive CAD 2017), TTR cardiac amyloidosis, HFrEF (30-35% 8/2-24), LBBB and SSS s/p PPM 2017 s/p device changeout 11/2022, HTN, CKD presented to the ED with severe anasarca and acute decompensated heart failure.     12/17: Palliative consulted.    Palliative Performance Scale (PPS): 30-40    ----------------------------------------------------------------------------------------------------------------------------------------------------------------------------------------------------------------------------------------------------------------------------------------------------------------------------------------------------------------------  Advanced Care Planning  Patient and/or family consented to a voluntary Advanced Care Planning meeting.   Serious Illness Assessment and Counseling:  Life Limiting Disease: Cardiac amyloidosis with ADHF posing threat to life or function.     Disease Specific Information Provided/Prognosis Discussed: Patient's current clinical condition, including diagnosis, prognosis, and management plan were discussed.   Counseling provided on amyloid/HF, no cure but available treatment.   Counseling provided on guarded prognosis and what to expect with disease progression of HF/amyloid including worsening symptoms.   Counseling provided on the irreversible and progressive nature of patient's heart failure/amyloid.     Understanding/Overall Impression: Patient expressing limited understanding of overall health status and severity of illness.     Goals/Hopes: Discussion ensued about patient's goals for their  medical care going forward. Allowed patient time to talk about his/her current quality of life, disease course/progression, and symptom and treatment burden. Discussed goal is to get stronger to be able to ambulate with walker again.    Fears/Worries/Concerns: not getting stronger    Patient's Perception of Functional Status: weak    Minimal Acceptable Quality of Life/Maximal Gibson Tolerable for the Possibility of More Time: Counseling provided on the concept of MAO/Maximal Gibson. Patient expressing that they would never want to be in a health state where they were bound/totally lacked mobility/independence, or without her mind intact to recognize or meaningfully interact. Patient deems that this would not be an acceptable quality of life for the patient.     Health Preferences and Priorities with Disease Progression:   Family/ Cassidy is aware of pt's wishes to not pursue CPR/intubation as disease progresses to a terminal state.     Resuscitation Assessment: Counseling provided on the benefit versus burden of CPR in the setting of patient's overall health status and pt is agreeable to DNR/DNI. Pt understands if her heart stops, it is a sign that her disease process has come to a terminal state and the likelihood of survival or reviving a very sick heart, is very unlikely. Pt understanding that if cpr was attempted, it would not be beneficial and most likely result in pain and suffering.    Advanced Directives:  Counseling provided on the importance of not crisis planning as disease burden progresses but to establish treatment limitations now so in the future medical team will be clear on what patient feels is an acceptable quality of life for the patient and what treatment limitations' patient would like set into place based on that.       Surrogate Health Care Decision Maker: Cassidy Zepeda (Child)  [phone] and James Lopez [phone]  HPOA: Not on file  Living will: Not on file  Patient does not have a HPOA  or living will. Counseling provided on benefit of completing advanced directives in order to establish person to make one's medical decision if patient were unable to speak for themselves and to make their health care wishes and treatment limitations to both hospital staff and their designated HPOA. Social work to help patient complete prior to discharge.   Pall NP spoke to pt and Cassidy. Pt elects Cassidy as MPOA, however, Cassidy mentions that is previous conversations, that James would be most appropriate as he is already FPOA. Pall NP/SW will follow up with James tomorrow to discuss further and assure he is willing to take on this role.     Code Status: Decision to change code status to DNR/DNI per pt's wishes.       I spent 80 minutes in providing separately identifiable ACP services with the patient and/or surrogate decision maker in a voluntary conversation discussing the patient's wishes and goals as detailed in the above note.   ----------------------------------------------------------------------------------------------------------------------------------------------------------------------------------------------------------------------------------------------------------------------------------------------------------------------------------------------------------------------    #Complex Medical Decision Making  #Goals of Care  #Advanced Care Planning  - Code status: DNR/DNI  - Surrogate decision maker: DuaneCassidy (Child)  [phone] and James Lopez [phone]  - Goals are mix of survival and time and improved quality of life    - 12/17: Palliative consulted, met with pt at bedside. Able to glean more information about pt's life, lifestyle, disease burden, goals, and health preferences. See above for details.    Pt declines SNF despite education to increased PT/OT in a facility along with 24/7 medical attention and medical expertise. Expressed worry about pt's delayed hospitalization and late  "signs (40lb) before seeking care. Pt still declining SNF as she has experienced loved ones in SNF and poor care. She described a family member who developed a bad infection and therefore amputation, and another loved one develop severe bed sores. Pt worried about her care in a SNF and has had home PT/OT and nursing which she actually enjoyed. Pt also expressed that Cassidy is a great caregiver.      Pall NP discussed HF and cardiac amyloid in detail with pt, and later via phone, Cassidy. Aware that time can be short but exact amount is unknown. As a result, discussed DNR/DNI as continuing to follow pt's stated goal of regaining her strength with PT/OT at home, with the understanding of her life limiting disease. Described DNR/DNI as a \"safety blanket\" that if her heart stops, it is a sign that her disease process has come to a terminal state and the likelihood of survival or reviving a very sick heart, is very unlikely. Pt understanding that if cpr was attempted, it would not be beneficial and most likely result in pain and suffering. Pt agrees to continue with the current plan but if her heart stops/stops breathing, cpr/intubation does not align or make sense. Pt and Cassidy are agreeable to DNR/DNI as it would honor pt's last moments best.     Cassidy mentions that at times, caring for pt is overwhelming but she has her coping mechanisms that help her through it. The grandkids are very helpful. Cassidy/pt accepts PT/OT and home nursing again. Cassidy is able to assist pt with ambulation with 1 person assist and is willing and able to assist. Cassidy explained that she did it before and is able to do it again. Explained out patient Palliative in the home and pt/Cassidy are agreeable.        #Dyspnea  #HFrEF  #Cardiac amyloidosis  - Pt on Lasix drip, currently 20mg/hr.  CXR notable for moderate-to-large bilateral pleural effusions and associated atelectasis and/or consolidation. Pt reports improved SOB and edema. Still SOB " "with talking and any exertion.  - Aware of long term poor prognosis and c/f repeat exacerbations and hospitalizations. Agreeable to DNR/DNI.  - Agreeable to Palliative outpt for an addition set of \"medical eyes\" for earlier identification and earlier intervention. Consult to be placed closer to discharge.        #Psychosocial Support  - Ongoing pt and family support and care navigation. Assistance with home resources to support pt's goal. SW to assist with possible transportation support to ensure pt can make it to follow up appointments.   - Spiritual Care Support    Plan of Care discussed with: Updated primary and bedside RN on goals of care decision, medication adjustments, and code status     Medical Decision Making was high level due to high complexity of problems, extensive data review, and high risk of management/treatment.     - Cardiac amyloidosis with ADHF posing threat to life or function.   - Reviewed external notes from   - Reviews results from  which were used in decision making for   - Recommended the following tests:   - Assessment required independent historian: primary, gilberto  - Independent interpretation of test: labs and imaging  - Discussion of management with: primary  - Drug therapy requiring intensive monitoring for toxicity: meds with renal function  - Decision regarding elective major surgery with identified patient or procedure risk factors:   - Decision regarding emergency major surgery:   - Decision regarding hospitalization or escalation of hospital-level care:   - Decision not to resuscitate or to de-escalate care because of poor prognosis: Made dnr/dni 12/17.  - Parenteral controlled substances: lasix    Thank you for allowing us to participate in the care of this patient. Palliative will continue to follow as needed. Palliative medicine is available Monday-Friday, 8a-6p. Please contact team with any questions or concerns.  Team pager [phone] (weekdays)  Xiomara Mcmahon CNP (on EPIC " secure chat)

## 2024-12-17 NOTE — PROGRESS NOTES
[facility] Attending Shared Visit Note    This is a shared visit. Please see Advanced Practice Provider's encounter note for additional details.      Briefly, 89F HFrEF (30-35%, 8/2024), TTR cardiac amyloidosis (confirmed 8/2024), LBBB, SSS, s/p PPM (2017) with 99% V-pacing,  severe TR, mod AR/MR, mild CAD, CKD Stage IIIb, worsening dyspnea, and weight gain of ~40 lbs (since last admission).     Diagnostic Imaging:  - TTE (8/2024):  EF 30-35%. Severe concentric LVH with global hypokinesis. Severe TR, mild/mod MR, mod AR. RA severely dilated. Findings consistent with infiltrative disease.  - Amyloid SPECT (8/2024): Suggestive of TTR amyloidosis.  - CTA Coronary (8/2024): Minor CAD (prox LAD <25% stenosis).    Exam: elderly, warm, hypervolemic. 2+ edema in LE and UEs.    Problems:   Assessment & Plan  Anasarca    Acute on chronic systolic heart failure    Wild-type transthyretin-related (ATTR) amyloidosis (Multi)    Severe tricuspid regurgitation      Plan:   # HFrEF / ATTR amyloidosis:   # SSS with PPM and 100% V-pacing: EP previously deferred CRT upgrade due to frailty  # TERI on CKD: CTM  - Continue aggressive diuresis.   - Home GDMT held (Entresto, BB, SGLT2i) due to ADHF and TERI. Spironolactone increased to 25 mg daily.  - Consider EP consult for Vpacing   # GOC: patient reports that she has reasonable quality of life and would like to be a home over facility. Pall care engagement apprecaited    Dispo: Pending PT/OT evaluation, aggressive diuresis.     Dario Nelson MD    Objective   Admit Date: 12/13/2024  Hospital Length of Stay: 4   Home: [place] [id number]    MEDICATIONS  Infusions:  furosemide, Last Rate: 20 mg/hr (12/16/24 1710)      Scheduled:  [Held by provider] aspirin, 81 mg, Daily  ergocalciferol, 1,250 mcg, Every Sunday  heparin (porcine), 5,000 Units, q8h  pantoprazole, 40 mg, Daily before breakfast  polyethylene glycol, 17 g, Daily  predniSONE, 2.5 mg, Daily  rosuvastatin, 10 mg, Nightly  [Held by  provider] sacubitriL-valsartan, 0.5 tablet, BID  sennosides-docusate sodium, 1 tablet, Nightly  spironolactone, 25 mg, Daily      PRN:  acetaminophen, 650 mg, q6h PRN  ipratropium-albuteroL, 3 mL, q6h PRN      Prior to Admission Meds:  Medications Prior to Admission   Medication Sig Dispense Refill Last Dose/Taking    dapagliflozin propanediol (Farxiga) 10 mg Take 1 tablet (10 mg) by mouth once every 24 hours. 30 tablet 3 12/12/2024    furosemide (Lasix) 20 mg tablet Take 1 tablet (20 mg) by mouth once daily. 30 tablet 3 12/12/2024    metoprolol succinate XL (Toprol-XL) 25 mg 24 hr tablet Take 0.5 tablets (12.5 mg) by mouth once daily. Do not crush or chew. 30 tablet 3 12/12/2024    pantoprazole (ProtoNix) 40 mg EC tablet Take 1 tablet (40 mg) by mouth once daily in the morning. Take before meals. Do not crush, chew, or split. 30 tablet 3 12/13/2024 Morning    rosuvastatin (Crestor) 20 mg tablet Take 0.5 tablets (10 mg) by mouth once daily at bedtime. (Patient taking differently: Take 1 tablet (20 mg) by mouth once daily at bedtime.) 30 tablet 3 12/12/2024    sacubitriL-valsartan (Entresto) 24-26 mg tablet Take 0.5 tablets by mouth 2 times a day. (Patient taking differently: Take 1 tablet by mouth 2 times a day.) 30 tablet 3 12/12/2024    sennosides-docusate sodium (Nelda-Colace) 8.6-50 mg tablet Take 1 tablet by mouth once daily at bedtime. 30 tablet 3 Past Month    spironolactone (Aldactone) 25 mg tablet Take 0.5 tablets (12.5 mg) by mouth once daily. 30 tablet 3 12/12/2024    Vitamin D3 50 mcg (2,000 unit) capsule Take 1 capsule (50 mcg) by mouth early in the morning..   12/13/2024 Morning    aspirin 81 mg EC tablet Take 1 tablet (81 mg) by mouth once daily. (Patient not taking: Reported on 12/13/2024)   Not Taking       Vitals:      12/17/2024     3:24 AM 12/16/2024    11:31 PM 12/16/2024     8:25 PM 12/16/2024     5:00 PM 12/16/2024     2:03 PM 12/16/2024    12:00 PM 12/16/2024    11:15 AM   Vitals   Systolic  118 110 96 122  106    Diastolic 77 75 53 74  68    BP Location Left arm Left arm Left arm Left arm  Left arm    Heart Rate 76 66 61 86 89 60    Temp 36.5 °C (97.7 °F) 36.2 °C (97.2 °F) 36.4 °C (97.5 °F) 36.6 °C (97.9 °F)  36.7 °C (98.1 °F)    Resp 18 18 18 17  16 16   Weight (lb) 135.14         BMI 23.94 kg/m2         BSA (m2) 1.65 m2           Wt Readings from Last 5 Encounters:   12/17/24 61.3 kg (135 lb 2.3 oz)   09/03/24 51.5 kg (113 lb 8 oz)     Weight         12/13/2024  2055 12/14/2024  0520 12/15/2024  0459 12/16/2024  0400 12/17/2024  0324    Weight: 70.4 kg (155 lb 3.3 oz) 68.8 kg (151 lb 10.8 oz) 69.1 kg (152 lb 5.4 oz) 65.6 kg (144 lb 10 oz) 61.3 kg (135 lb 2.3 oz)              Intake/Output Summary (Last 24 hours) at 12/17/2024 0927  Last data filed at 12/17/2024 0612  Gross per 24 hour   Intake 767.7 ml   Output 6425 ml   Net -5657.3 ml       CHEST: Unlabored, Clear, Diminished  CV:  A-V Sequential paced  NEURO:   RASS:    CAM:    LOC: Alert  Cognition: Follows commands  GCS: 15    DATA:  CMP:  Recent Labs     12/16/24  2031 12/15/24  1959 12/14/24  1942 12/13/24  1815 12/13/24  1102 09/03/24  1900 09/02/24  1903 09/01/24 2112    138 144 145 145 136 137 138   K 4.2 4.4 3.6 3.6 3.1* 5.1 4.7 4.1   CL 98 102 105 107 105 103 101 103   CO2 23 24 26 26 26 25 30 27   ANIONGAP 19 16 17 16 17 13 11 12   BUN 26* 26* 27* 25* 23 22 25* 28*   CREATININE 2.02* 1.93* 2.09* 1.91* 2.12* 1.34* 1.47* 1.44*   EGFR 23* 25* 22* 25* 22* 38* 34* 35*   MG 2.74* 2.92* 2.74* 2.68* 2.70* 2.40 2.37 2.13     Recent Labs     12/16/24  2031 12/15/24  1959 12/14/24  1942 12/13/24  1815 12/13/24  1102 09/03/24  1900 09/02/24  1903 09/01/24  2112 08/29/24  1844 08/29/24 0954 08/24/24 2238 08/23/24 2235   ALBUMIN 3.2* 3.1* 3.5 3.3*  3.3* 3.6 2.8* 3.0* 2.6*   < >  --    < > 3.5   ALT 9 9 10 11 15  --   --   --   --   --   --  12   AST 21 17 18 20 19  --   --   --   --   --   --  18   BILITOT 0.9 0.8 0.7 1.0 1.1  --   --   --    "--   --   --  0.7   LIPASE  --   --   --   --   --   --   --   --   --  9  --   --     < > = values in this interval not displayed.     CBC:  Recent Labs     12/16/24  2031 12/15/24  1959 12/14/24  1942 12/13/24  1815 12/13/24  1102 09/03/24  1900 09/02/24  1903 09/01/24  2112   WBC 5.6 5.5 4.3* 3.5* 3.6* 5.1 5.0 5.2   HGB 14.1 13.7 13.8 13.9 14.4 15.8 16.0 14.5   HCT 46.5* 44.3 44.2 42.7 43.2 49.0* 50.0* 43.3   PLT 83* 102* 99* 84* 101* 231 222 208   MCV 92 90 89 83 83 82 84 78*     COAG:   Recent Labs     12/13/24  1218 08/26/24  0940 08/25/24  1932 08/24/24  1305 08/24/24  0645 08/24/24  0001   INR 1.3*  --   --   --   --  1.1   HAUF  --  0.5 0.5 0.6 0.6  --      ABO: No results for input(s): \"ABO\" in the last 19557 hours.  HEME/ENDO: No results for input(s): \"FERRITIN\", \"IRONSAT\", \"TSH\", \"FREET4\", \"HGBA1C\" in the last 71264 hours.  CARDIAC:   Recent Labs     12/13/24  1218 12/13/24  1102 08/23/24  2341 08/23/24  2235   TROPHS 330* 418* 189* 235*   BNP  --  >5,000*  --  2,626*     No results for input(s): \"CHOL\", \"LDLF\", \"LDLCALC\", \"HDL\", \"TRIG\" in the last 29904 hours.  TOX:No results for input(s): \"AMPHETAMINE\", \"BENZO\", \"CANNABINOID\", \"COCAI\", \"FENTANYL\", \"OPIATE\", \"OXYCODONE\", \"PCP\" in the last 45232 hours.    No lab exists for component: \"BARBSCRUR\"  MICRO: No results for input(s): \"ESR\", \"CRP\", \"PROCAL\" in the last 69863 hours.  No results found for the last 90 days.      EKG:   Recent Labs     08/27/24  1833 08/23/24  2210 09/19/23  1013 02/07/17  1602   ATRRATE 80 61 74 60   VENTRATE 80 61 74 60   PRINT 166  --  172 164   QRSDUR 164 164 158 154   QTCFRED 495 494 502 464   QTCCALCB 519 495 519 464     Encounter Date: 08/24/24   ECG 12 lead   Result Value    Ventricular Rate 80    Atrial Rate 80    NE Interval 166    QRS Duration 164    QT Interval 450    QTC Calculation(Bazett) 519    P Axis 32    R Axis 204    T Axis 56    QRS Count 13    Q Onset 187    P Onset 104    P Offset 158    T Offset 412    QTC " Meir Chaudhry    Atrial-sensed ventricular-paced rhythm  Abnormal ECG  When compared with ECG of 23-AUG-2024 22:05,  Vent. rate has increased BY  19 BPM  Confirmed by William Ba ([id number]) on 8/28/2024 8:40:55 PM     Echocardiogram:   Recent Labs     08/24/24  1120   EF 33   LVIDD 3.56   RV 31.2   RVFRWALLPKSP 12.00   TAPSE 2.0   Transthoracic Echo (TTE) Complete 08/24/2024    [facility], [address] [place] [address]  Tel [phone] and Fax [phone]    TRANSTHORACIC ECHOCARDIOGRAM REPORT      Patient Name:      MICHELE JACOB          Pierre Physician:    [id number] Mehrdad Yee MD  Study Date:        8/24/2024            Ordering Provider:    [id number] ALEN SIMON  MRN/PID:           [id number]             Fellow:  Accession#:        [id number]         Nurse:                Abigail Moore RN  Date of Birth/Age: 1935 / 88 years Sonographer:          Concha Pena RD  Gender:            F                    Additional Staff:  Height:            160.02 cm            Admit Date:  Weight:            73.03 kg             Admission Status:     Inpatient -  Routine  BSA / BMI:         1.76 m2 / 28.52      Encounter#:           [id number]  kg/m2  Blood Pressure:    128/70 mmHg          Department Location:  [facility] Non  Invasive    Study Type:    TRANSTHORACIC ECHO (TTE) COMPLETE  Diagnosis/ICD: Chronic systolic (congestive) heart failure (CHF)-I50.22  Indication:    new HFrEF with decompensation  CPT Code:      Echo Complete w Full Doppler-01410    Patient History:  Pertinent History: NSTEMI, HFpEF, SSS s/p PPM 2017, HTN, CKD, CAD.    Study Detail: The following Echo studies were performed: 2D, M-Mode, Doppler and  color flow. Agitated saline used as a contrast agent for  intraseptal flow evaluation.      Critical Event  Critical Event: Test was completed as per department protocol.  Critical Finding: Severe TR.  Time Test was Completed: 11:15:39 AM  Notified:  Dr. Yee.  Attending notification time: 11:25:49 AM    PHYSICIAN INTERPRETATION:  Left Ventricle: Left ventricular ejection fraction is moderately decreased, by visual estimate at 30-35%. There is global hypokinesis of the left ventricle with minor regional variations. The left ventricular cavity size is decreased. The left ventricular septal wall thickness is moderately increased. There is moderately increased left ventricular posterior wall thickness. There is severe concentric left ventricular hypertrophy. Abnormal (paradoxical) septal motion, consistent with RV pacemaker. Spectral Doppler shows an impaired relaxation pattern of left ventricular diastolic filling. There is an elevated left ventricular end diastolic pressure.  Left Atrium: The left atrium is mildly dilated. A bubble study using agitated saline was performed. A small PFO (= 10 bubbles) was demonstrated.  Right Ventricle: The right ventricle is moderately enlarged. There is normal right ventricular global systolic function. A device is visualized in the right ventricle.  Right Atrium: The right atrium is severely dilated.  Aortic Valve: The aortic valve is trileaflet. There is minimal aortic valve cusp calcification. The aortic valve dimensionless index is 0.70. There is mild to moderate aortic valve regurgitation. The peak instantaneous gradient of the aortic valve is 3.0 mmHg. The mean gradient of the aortic valve is 1.8 mmHg.  Mitral Valve: The mitral valve is mildly thickened. There is mild to moderate mitral valve regurgitation.  Tricuspid Valve: The tricuspid valve is structurally normal. There is severe tricuspid regurgitation. The Doppler estimated RVSP is slightly elevated at 31.2 mmHg. The RV systolic pressure is likely to be underestimated.  Pulmonic Valve: The pulmonic valve is structurally normal. There is physiologic pulmonic valve regurgitation.  Pericardium: There is a trivial pericardial effusion.  Pleural: There is bilateral  pleural effusion.  Aorta: The aortic root is normal.  Systemic Veins: The inferior vena cava appears dilated. There is IVC inspiratory collapse greater than 50%. The hepatic vein shows a pattern of systolic flow reversal, suggestive of severe tricuspid regurgitation.  In comparison to the previous echocardiogram(s): Compared with study dated 10/3/2023, the LV EF is decreaaed (was 50-55%) and the TR is wide-open (was moderate).      CONCLUSIONS:  1. Left ventricular ejection fraction is moderately decreased, by visual estimate at 30-35%.  2. There is global hypokinesis of the left ventricle with minor regional variations.  3. Spectral Doppler shows an impaired relaxation pattern of left ventricular diastolic filling.  4. There is an elevated left ventricular end diastolic pressure.  5. Left ventricular cavity size is decreased.  6. Abnormal septal motion consistent with RV pacemaker.  7. Moderately increased left ventricular septal thickness.  8. The left ventricular posterior wall thickness is moderately increased.  9. There is severe concentric left ventricular hypertrophy.  10. There is normal right ventricular global systolic function.  11. Moderately enlarged right ventricle.  12. The left atrium is mildly dilated.  13. The right atrium is severely dilated.  14. Mild to moderate mitral valve regurgitation.  15. Severe tricuspid regurgitation visualized.  16. Slightly elevated RVSP.  17. The RV systolic pressure is likely to be underestimated.  18. Mild to moderate aortic valve regurgitation.  19. Sent a Haiku to the NP service.    QUANTITATIVE DATA SUMMARY:  2D MEASUREMENTS:  Normal Ranges:  Ao Root d:     3.20 cm  (2.0-3.7cm)  LAs:           4.25 cm  (2.7-4.0cm)  IVSd:          1.78 cm  (0.6-1.1cm)  LVPWd:         1.76 cm  (0.6-1.1cm)  LVIDd:         3.56 cm  (3.9-5.9cm)  LVIDs:         3.26 cm  LV Mass Index: 148 g/m2  LVEDV Index:   56 ml/m2  LV % FS        8.3 %    LA VOLUME:  Normal Ranges:  LA Vol A4C:         65.4 ml    (22+/-6mL/m2)  LA Vol A2C:        62.6 ml  LA Vol BP:         67.2 ml  LA Vol Index A4C:  37.1ml/m2  LA Vol Index A2C:  35.5 ml/m2  LA Vol Index BP:   38.1 ml/m2  LA Area A4C:       20.2 cm2  LA Area A2C:       18.8 cm2  LA Major Axis A4C: 5.3 cm  LA Major Axis A2C: 4.8 cm    RA VOLUME BY A/L METHOD:  Normal Ranges:  RA Area A4C: 31.0 cm2    AORTA MEASUREMENTS:  Normal Ranges:  Asc Ao, d: 3.30 cm (2.1-3.4cm)    LV SYSTOLIC FUNCTION BY 2D PLANIMETRY (MOD):  Normal Ranges:  EF-A4C View:    32 % (>=55%)  EF-A2C View:    39 %  EF-Biplane:     35 %  EF-Visual:      33 %  LV EF Reported: 33 %    LV DIASTOLIC FUNCTION:  Normal Ranges:  MV Peak E: 0.44 m/s    (0.7-1.2 m/s)  MV Peak A: 0.64 m/s    (0.42-0.7 m/s)  E/A Ratio: 0.68        (1.0-2.2)  MV A Dur:  124.57 msec  MV DT:     126 msec    (150-240 msec)    MITRAL VALVE:  Normal Ranges:  MV DT: 126 msec (150-240msec)    MITRAL INSUFFICIENCY:  Normal Ranges:  MR VTI:  176.42 cm  MR Vmax: 459.02 cm/s    AORTIC VALVE:  Normal Ranges:  AoV Vmax:                0.87 m/s (<=1.7m/s)  AoV Peak PG:             3.0 mmHg (<20mmHg)  AoV Mean P.8 mmHg (1.7-11.5mmHg)  LVOT Max Alfonso:            0.65 m/s (<=1.1m/s)  AoV VTI:                 16.35 cm (18-25cm)  LVOT VTI:                11.51 cm  LVOT Diameter:           1.98 cm  (1.8-2.4cm)  AoV Area, VTI:           2.17 cm2 (2.5-5.5cm2)  AoV Area,Vmax:           2.30 cm2 (2.5-4.5cm2)  AoV Dimensionless Index: 0.70    AORTIC INSUFFICIENCY:  AI Vmax:       3.51 m/s  AI Half-time:  433 msec  AI Decel Time: 1492 msec  AI Decel Rate: 235.88 cm/s2      RIGHT VENTRICLE:  RV Basal 4.80 cm  RV Mid   3.60 cm  RV Major 8.2 cm  TAPSE:   20.0 mm  RV s'    0.12 m/s    TRICUSPID VALVE/RVSP:  Normal Ranges:  Peak TR Velocity: 2.41 m/s  RV Syst Pressure: 31.2 mmHg (< 30mmHg)  IVC Diam:         2.89 cm    PULMONIC VALVE:  Normal Ranges:  PV Accel Time: 69 msec  (>120ms)  PV Max Alfonso:    0.7 m/s  (0.6-0.9m/s)  PV Max P.0  mmHg    AORTA:  Asc Ao Diam 3.26 cm      [id number] Mehrdad Yee MD  Electronically signed on 8/24/2024 at 12:07:44 PM        ** Final **    Coronary Angiography: No results found for this or any previous visit from the past 1800 days.    Right Heart Cath: No results found for this or any previous visit from the past 1800 days.    Cardiac Scoring:   CT angio coronary art with heartflow if score >30% 08/29/2024    Narrative  Interpreted By:  Adrián Lao  and Eva Bishop  STUDY:  CT ANGIO CORONARY ART WITH HEARTFLOW IF SCORE >30%;  8/29/2024 9:06 am    INDICATION:  Signs/Symptoms:New HFrEF.    COMPARISON:  None.    ACCESSION NUMBER(S):  [id number]    ORDERING CLINICIAN:  MIRNA PIRES    TECHNIQUE:  Using multi-detector CT technology,  axial, sequential imaging with  prospective gating was performed of the chest following the  intravenous administration of contrast material.  A low-osmolar  contrast agent was used (90 ml of Omnipaque 350).    The patient was premedicated with 0.8 mg sublingual nitroglycerin for  coronary dilation.    For optimization of anatomic evaluation, multiplanar reconstruction,  maximum intensity projections, and advanced 3-D off-line  postprocessing were performed on a dedicated stand-alone workstation  under the direct supervision of the interpreting physician.    CT Dose-Length Product (DLP):   749 mGy/cm  CT Dose Reduction Employed: Yes (Prospective triggering, iterative  reconstruction)    FINDINGS:  POTENTIAL STUDY LIMITATIONS:  None.    CORONARY ARTERIES:    CORONARY ANATOMY:  There is normal origin of the coronary arteries.    CORONARY ARTERY CALCIUM SCORE:  LM 0  LAD 16.5  LCX 0  RCA 0  TOTAL 16.5    LEFT MAIN CORONARY ARTERY:  The left main is normal sized vessel that  bifurcates into the LAD  and circumflex. There is no significant atherosclerotic change or  stenotic disease.    LEFT ANTERIOR DESCENDING ARTERY:  The LAD is a normal size vessel that  wraps around the apex.  It  gives rise to  2 acute diagonal branches.  There is a focal area of calcific atherosclerosis in the proximal LAD  resulting in less than 25% luminal stenosis.    LEFT CIRCUMFLEX ARTERY:  The LCX is a normal size vessel, which is  non-dominant.  It gives rise to  1 obtuse marginal branches.  There is no significant atherosclerotic change or stenotic disease.    RIGHT CORONARY ARTERY:  The RCA is a normal size vessel, which is  dominant .  It gives rise to a  conus branch,  dashawn branch, and  1 acute  marginal branches.  In its distal segment it bifurcates into the PDA  and PV branch. There is no significant atherosclerotic change or  stenotic disease.    CARDIAC CHAMBERS:  The cardiac chambers demonstrate normal atrioventricular and  ventriculoarterial concordance, and systemic and pulmonary venous  return.    LEFT ATRIUM:  Mildly dilated Area 26.5 cm2    RIGHT ATRIUM:  Moderately dilated area: 33.3 cm2. A CIED lead noted.    INTERATRIAL SEPTUM:  Intact.    LEFT VENTRICLE:  Normal size 4.5 cm. Concentric left ventricular hypertrophy.    RIGHT VENTRICLE:  Dilated 5.1 cm. A CIED lead is noted.    AORTIC VALVE:  The aortic valve is  trileaflet in morphology.  No calcifications.    MITRAL VALVE:  No thickening/calcification.    THORACIC AORTA:  The visualized thoracic aorta is normal in course, caliber, and  contour. The ascending thoracic aorta is 2.8 cm in diameter. There is  no acute aortic pathology, such as dissection, intramural hematoma,  or contained rupture. The aortic arch is not included on this  examination.    PULMONARY  The main pulmonary artery is 3.4 cm in diameter, RPA 2.2 cm, LPA 1.8  cm.    PERICARDIUM:  There is no pericardial effusion of thickening.    CHEST:  The chest wall is normal.  No significant lymphadenopathy or mass is seen in limited images of  the mediastinum. Limited imaging through the lungs reveals moderate  bilateral pleural effusion with dependent atelectasis. No pleural  effusion or  pneumothorax.    UPPER ABDOMEN:  Limited imaging through the upper abdomen reveals no abnormalities of  the visualized organs.    Impression  1. Minor coronary artery disease.  2. The proximal LAD has focal calcified plaque with minimal stenosis  (<25%).  3. The LM, LCx and RCA have no significant atherosclerotic change or  stenotic disease.  4. Total Agatston calcium score of 17.  5. Right dominant coronary artery system.  6. Right atrial and right ventricular dilatation. Mild left atrial  dilatation.  7. Small bilateral pleural effusions with dependent atelectasis.  8. Dilated main pulmonary artery to 3.4 cm. Correlate/concern for  elevated pulmonary/right-sided filling pressures.      Signed by: Adrián Lao 8/29/2024 11:18 AM  Dictation workstation:   [id number]    Cardiac MRI: No results found for this or any previous visit from the past 1800 days.    Nuclear:  NM PYP Cardiac Amyloidosis with SPECT CT 08/26/2024    Narrative  Interpreted By:  Josh Templeton and Kaur Arashdeep  STUDY:  Amyloid SPECT Heart Study    INDICATION:  PYP scan to rule out ATTR amyloid depostion    COMPARISON:  None    ACCESSION NUMBER(S):  [id number]    ORDERING CLINICIAN:  BRODY HEATH    TECHNIQUE:  Following the intravenous administration 21.6 mCi of Tc-99m PYP,  images localized to the thorax in the anterior projection were  obtained at 1 hour post injection. In addition, SPECT/CT images were  obtained. Semi-quantitation was performed on these images with  regions of interest drawn over the heart and contralateral lung. In  addition, visual quantitative analysis was performed on SPECT images.    FINDINGS:  Analysis of the planar images reveals increased radiotracer  deposition in the region of the heart.    The Heart to Contralateral lung ratio is 1.2. (Normal ratio is less  than < 1.2, equivocal = 1.21-1.4, > 1.5 is positive for TTR  amyloidosis)    SPECT/CT images reveal intense radiotracer deposition in the  myocardium  relative to the ribs.    Semi-quantitative visual analysis reveals a GRADE of 3  Grade values are as follows:  1) Grade 0: No uptake in MYOCARDIUM (not bloodpool) and normal bone  uptake  2) Grade 1: Uptake in MYOCARDIUM (not bloodpool) less than rib uptake  3) Grade 2: Uptake in MYOCARDIUM (not bloodpool) equal to rib uptake  4) Grade 3: Uptake in MYOCARDIUM (not bloodpool) greater than rib  uptake with mild/absent rib uptake    Low dose CT demonstrates moderate bilateral pleural effusion with  associated basal collapse.    Impression  1. Amyloid SPECT heart study is suggestive of TTR amyloidosis.  2. Representative images were saved into the PACS system.    Note: A negative amyloid SPECT heart study does not rule out other  forms of possible cardiac amyloid deposition such as amyloid light  chain deposition.    I personally reviewed the images/study and I agree with the findings  as stated by Liliana Juárez MD.  This study was interpreted at  [facility], [place], [place].    MACRO:  None      Signed by: Josh Templeton 8/26/2024 2:55 PM  Dictation workstation:   [id number]    Metabolic Stress: No results found for this or any previous visit from the past 1800 days.      XR chest 1 view    Result Date: 12/13/2024  Interpreted By:  Adrián Del Toro, STUDY: XR CHEST 1 VIEW;  12/13/2024 12:46 pm   INDICATION: Signs/Symptoms:assess for pulmonary edema, has anasarca.     COMPARISON: Chest radiograph 08/28/2024   ACCESSION NUMBER(S): [id number]   ORDERING CLINICIAN: PATRIA MILLS   FINDINGS: AP radiograph of the chest was provided.   Left chest wall AICD/pacemaker is again seen with leads in place.   CARDIOMEDIASTINAL SILHOUETTE: The cardiomediastinal silhouette is persistently moderate to severely enlarged with partial obscuration of the bilateral heart borders.   LUNGS: There are low lung volumes. There is increased bilateral moderate-to-large pleural effusions with bilateral basilar  predominant patchy and interstitial opacities with perihilar prominence. There is no pneumothorax. Leftward tracheal deviation may be projectional   ABDOMEN: No remarkable upper abdominal findings.   BONES: No acute osseous changes.       1. Severe cardiomegaly with increased moderate-to-large bilateral pleural effusions and associated atelectasis and/or consolidation. Additional interstitial and patchy opacities with basilar predilection suggestive of interstitial and alveolar pulmonary edema. Correlation with fluid volume status is recommended. 2. Medical device as noted above.   MACRO: None   Signed by: Adrián Del Toro 12/13/2024 12:54 PM Dictation workstation:   [id number]       LDA:  Urethral Catheter Straight-tip 18 Fr. (Active)   Placement Date/Time: 12/14/24 1300   Placed by: AMBER Florez RN  Hand Hygiene Completed: Yes  Catheter Type: Straight-tip  Tube Size (Fr.): 18 Fr.  Catheter Balloon Size: 10 mL  Urine Returned: No   Number of days: 2     NUTRITION: Adult diet Cardiac; 70 gm fat; 2 - 3 grams Sodium; 2000 mL fluid  EMERGENCY CONTACT: Extended Emergency Contact Information  Primary Emergency Contact: DuaneCassidy  Home Phone: [phone]  Relation: Child  CODE STATUS: Full Code  DISPO: Discharge Planning  Living Arrangements: Children  Support Systems: Children, Family members  Assistance Needed: assistance with ambulation  Type of Residence: Private residence  Number of Stairs to Enter Residence: 0  Number of Stairs Within Residence: 0  Do you have animals or pets at home?: No  Who is requesting discharge planning?: Provider  Home or Post Acute Services: In home services  Type of Home Care Services: Home OT, Home PT  Expected Discharge Disposition: Home Health Care - Resumed  Does the patient need discharge transport arranged?: Yes  RoundTrip coordination needed?: Yes  Has discharge transport been arranged?: No  AMPAC: Daily Activity - Total Score: 18    FOLLOWUP:   No future appointments.

## 2024-12-18 LAB
ALBUMIN SERPL BCP-MCNC: 3.4 G/DL (ref 3.4–5)
ALP SERPL-CCNC: 147 U/L (ref 33–136)
ALT SERPL W P-5'-P-CCNC: 13 U/L (ref 7–45)
ANION GAP SERPL CALC-SCNC: 25 MMOL/L (ref 10–20)
AST SERPL W P-5'-P-CCNC: 30 U/L (ref 9–39)
BILIRUB SERPL-MCNC: 1.2 MG/DL (ref 0–1.2)
BUN SERPL-MCNC: 32 MG/DL (ref 6–23)
CALCIUM SERPL-MCNC: 9.5 MG/DL (ref 8.6–10.6)
CHLORIDE SERPL-SCNC: 87 MMOL/L (ref 98–107)
CO2 SERPL-SCNC: 24 MMOL/L (ref 21–32)
CREAT SERPL-MCNC: 1.86 MG/DL (ref 0.5–1.05)
EGFRCR SERPLBLD CKD-EPI 2021: 26 ML/MIN/1.73M*2
ERYTHROCYTE [DISTWIDTH] IN BLOOD BY AUTOMATED COUNT: 18.9 % (ref 11.5–14.5)
GLUCOSE SERPL-MCNC: 42 MG/DL (ref 74–99)
HCT VFR BLD AUTO: 52.3 % (ref 36–46)
HGB BLD-MCNC: 16.4 G/DL (ref 12–16)
MAGNESIUM SERPL-MCNC: 3 MG/DL (ref 1.6–2.4)
MCH RBC QN AUTO: 27.9 PG (ref 26–34)
MCHC RBC AUTO-ENTMCNC: 31.4 G/DL (ref 32–36)
MCV RBC AUTO: 89 FL (ref 80–100)
NRBC BLD-RTO: 0 /100 WBCS (ref 0–0)
PLATELET # BLD AUTO: 127 X10*3/UL (ref 150–450)
POTASSIUM SERPL-SCNC: 4.4 MMOL/L (ref 3.5–5.3)
PROT SERPL-MCNC: 7.3 G/DL (ref 6.4–8.2)
RBC # BLD AUTO: 5.88 X10*6/UL (ref 4–5.2)
SODIUM SERPL-SCNC: 132 MMOL/L (ref 136–145)
WBC # BLD AUTO: 4.8 X10*3/UL (ref 4.4–11.3)

## 2024-12-18 PROCEDURE — 1200000002 HC GENERAL ROOM WITH TELEMETRY DAILY

## 2024-12-18 PROCEDURE — 80053 COMPREHEN METABOLIC PANEL: CPT

## 2024-12-18 PROCEDURE — 2500000001 HC RX 250 WO HCPCS SELF ADMINISTERED DRUGS (ALT 637 FOR MEDICARE OP)

## 2024-12-18 PROCEDURE — 2500000004 HC RX 250 GENERAL PHARMACY W/ HCPCS (ALT 636 FOR OP/ED)

## 2024-12-18 PROCEDURE — 99232 SBSQ HOSP IP/OBS MODERATE 35: CPT | Performed by: STUDENT IN AN ORGANIZED HEALTH CARE EDUCATION/TRAINING PROGRAM

## 2024-12-18 PROCEDURE — 97530 THERAPEUTIC ACTIVITIES: CPT | Mod: GO

## 2024-12-18 PROCEDURE — 97116 GAIT TRAINING THERAPY: CPT | Mod: GP,CQ

## 2024-12-18 PROCEDURE — 2500000002 HC RX 250 W HCPCS SELF ADMINISTERED DRUGS (ALT 637 FOR MEDICARE OP, ALT 636 FOR OP/ED)

## 2024-12-18 PROCEDURE — 83735 ASSAY OF MAGNESIUM: CPT

## 2024-12-18 PROCEDURE — 85027 COMPLETE CBC AUTOMATED: CPT

## 2024-12-18 PROCEDURE — 36415 COLL VENOUS BLD VENIPUNCTURE: CPT

## 2024-12-18 RX ORDER — PETROLATUM 420 MG/G
OINTMENT TOPICAL
Status: DISCONTINUED | OUTPATIENT
Start: 2024-12-18 | End: 2024-12-27 | Stop reason: HOSPADM

## 2024-12-18 RX ADMIN — HEPARIN SODIUM 5000 UNITS: 5000 INJECTION, SOLUTION INTRAVENOUS; SUBCUTANEOUS at 20:58

## 2024-12-18 RX ADMIN — FUROSEMIDE 20 MG/HR: 10 INJECTION, SOLUTION INTRAMUSCULAR; INTRAVENOUS at 08:40

## 2024-12-18 RX ADMIN — SPIRONOLACTONE 25 MG: 25 TABLET, FILM COATED ORAL at 08:38

## 2024-12-18 RX ADMIN — PREDNISONE 2.5 MG: 5 TABLET ORAL at 08:38

## 2024-12-18 RX ADMIN — SENNOSIDES AND DOCUSATE SODIUM 1 TABLET: 8.6; 5 TABLET ORAL at 20:58

## 2024-12-18 RX ADMIN — ROSUVASTATIN CALCIUM 10 MG: 20 TABLET, FILM COATED ORAL at 20:58

## 2024-12-18 RX ADMIN — HEPARIN SODIUM 5000 UNITS: 5000 INJECTION, SOLUTION INTRAVENOUS; SUBCUTANEOUS at 13:48

## 2024-12-18 RX ADMIN — HEPARIN SODIUM 5000 UNITS: 5000 INJECTION, SOLUTION INTRAVENOUS; SUBCUTANEOUS at 06:52

## 2024-12-18 RX ADMIN — PANTOPRAZOLE SODIUM 40 MG: 40 TABLET, DELAYED RELEASE ORAL at 06:55

## 2024-12-18 ASSESSMENT — PAIN SCALES - GENERAL
PAINLEVEL_OUTOF10: 0 - NO PAIN

## 2024-12-18 ASSESSMENT — COGNITIVE AND FUNCTIONAL STATUS - GENERAL
DRESSING REGULAR UPPER BODY CLOTHING: A LITTLE
WALKING IN HOSPITAL ROOM: A LITTLE
MOVING FROM LYING ON BACK TO SITTING ON SIDE OF FLAT BED WITH BEDRAILS: A LITTLE
PERSONAL GROOMING: A LITTLE
DAILY ACTIVITIY SCORE: 14
DAILY ACTIVITIY SCORE: 18
MOBILITY SCORE: 16
MOVING FROM LYING ON BACK TO SITTING ON SIDE OF FLAT BED WITH BEDRAILS: A LITTLE
TOILETING: A LOT
TURNING FROM BACK TO SIDE WHILE IN FLAT BAD: A LITTLE
STANDING UP FROM CHAIR USING ARMS: A LITTLE
CLIMB 3 TO 5 STEPS WITH RAILING: A LITTLE
DRESSING REGULAR UPPER BODY CLOTHING: A LOT
DRESSING REGULAR LOWER BODY CLOTHING: A LITTLE
EATING MEALS: A LITTLE
PERSONAL GROOMING: A LITTLE
MOVING TO AND FROM BED TO CHAIR: A LITTLE
HELP NEEDED FOR BATHING: A LOT
TOILETING: A LITTLE
MOVING TO AND FROM BED TO CHAIR: A LITTLE
TURNING FROM BACK TO SIDE WHILE IN FLAT BAD: A LITTLE
HELP NEEDED FOR BATHING: A LITTLE
DRESSING REGULAR LOWER BODY CLOTHING: TOTAL
WALKING IN HOSPITAL ROOM: A LITTLE
CLIMB 3 TO 5 STEPS WITH RAILING: TOTAL
MOBILITY SCORE: 18
STANDING UP FROM CHAIR USING ARMS: A LITTLE

## 2024-12-18 ASSESSMENT — PAIN - FUNCTIONAL ASSESSMENT
PAIN_FUNCTIONAL_ASSESSMENT: 0-10

## 2024-12-18 NOTE — CONSULTS
Heart Failure Nurse Navigator    The role of the HF nurse navigator is to (1) characterize risk profiles of patients with heart failure transitioning from [id number] after hospitalization, (2) recommend interventions to improve care and reduce risks of worse post-hospitalization outcomes. Potential recommendations may touch base on patient/family education, additional consults that may bring value, and appointment scheduling.    History    I met with Erin Jessica at the bedside, and my impressions include: 89 y.o. female presenting with PMH of NSTEMI (nonobstructive CAD 2017), TTR cardiac amyloidosis, HFrEF (30-35% 8/2-24), LBBB and SSS s/p PPM 2017 s/p device changeout 11/2022, HTN, CKD presented to the ED with severe anasarca and acute decompensated heart failure. Admitted to [facility] for further management. I previously met this patient during hospitalization in September. Patient NSH to scheduled OP cardiology appointment 9/17/24 due to lack of transportation. She has family support who drives her places however, her grandchildren are not always available due to work hours. Patient met with Palliative Care while inpatient and code status changed to DNR/DNI.     Patients Cardiologist(s): NONE    1. Medical Domain  What is the patient's most recent LVEF? 30-35%  HFrEF (LVEF <= 40%) Quadruple therapy recommended  HFmrEF (LVEF 41-49%) Quadruple therapy recommended  HFpEF (LVEF >= 50%) Minimum recommendations: SGLT2i and MRA  Is the patient on OP GDMT for their condition?   ARNI/ACEI/ARB: Yes Sacubitril-valsartan 24-26 mg 0.5 tablet BID  BB: Yes Metoprolol succinate 12.5 mg daily  MRA: Yes spironolactone 12.5 mg daily  SGLT2i: Yes dapaglifozin 10 mg daily  Is the patient prescribed a diuretic? Yes  Furosemide 20 mg daily  Does this patient have an implanted device (ie cardioMEMS, ICD, CRT-D)?  Device type: PPM  Could this patient have advanced heart failure (Stage D heart failure)?: No   If yes, the potential  "markers of advanced heart failure include:     REFERENCE: Potential markers of advanced HF   Inotrope (dobutamine or milrinone) used during this admission?   LVEF<=25%?   >=2 hospitalizations for ADHF in the last year?   Severe symptoms of HF (fatigue, dyspnea, confusion, edema) despite medical therapy?   Downtitration of GDMT as compared to home medications?   Discontinuation of GDMT because of hypotension or renal intolerance?   Recurrent arrhythmias (AF, VT with ICD shocks)?   Cardiac cachexia (i.e., unintentional weight loss due to HF)?   High-risk biomarker profile (e.g., hyponatremia [Na<135], very elevated BNP, worsening kidney function)   Escalating doses of diuretics or persistent edema despite escalation     2. Mind and Emotion  Does this patient have possible cognitive impairment?: No (The Mini-Cog score 3/5)  Ask the patient to memorize these 3 words: banana, sunrise, chair  Ask the patient to draw a clock with hands pointing at \"20 minutes after 8\"  Ask the patient to recall the 3 words  Score: Add number of words recalled + clock drawing (0 points for any errors, 2 points if correct)  Interpretation: A score of 0-2 suggests cognitive impairment is present, a score of 3-5 suggests cognitive impairment is absent  Does this patient have major depression?: No (PH-Q2 score 0/6)  Over the last 2 weeks: Little interest or pleasure in doing things? (Not at all +0, Several days +1, More than half the days +2, Nearly every day +3)  Over the last 2 weeks: Feeling down, depressed or hopeless? (Not at all +0, Several days +1, More than half the days +2, Nearly every day +3)  Score: Add points  Interpretation: A score of 3 or more suggests that major depression is likely.     3. Physical Function  Could this patient be frail?: Yes   Defined by presence of all of these: slowness, weakness, shrinking, inactivity, exhaustion  Is this patient at risk for falling?: Yes   Patient requires mobility assistance with " assistive devices. Currently weak.    4. Social Determinants of Health  Transportation deficits?: Yes   Lack of insurance?: No   Poor financial resources?: No   Living conditions (homelessness, unstable home)?: No   Poor family/social support?: No   Poor personal care?: No   Food insecurity?: No   Lack of HCPOA?: No    I provided the following heart failure education:  - Living With Heart Failure book provided.  - HF signs and symptoms, heart failure zones and when to call cardiologist.   - Controlling Heart Failure at Home: medication adherence, following up with cardiologist at least once yearly, staying healthy and active, limiting sodium and fluid intake as directed by cardiologist.  - Daily Weight Education    Assessment  Known patient from previous hospitalization.  Transportation barrier leading to missed OP cardiology appointment.  Weakness and lack of appetite. Encouraged patient to drink protein shakes with small portion size meals.  Scheduled cardiology follow-up.  Patients focus is to return home and receive care services through home care and PT. Consider palliative care navigator program if re-hospitalization continues to meet patients wishes to receive care in home.     IP Medications:  ARNi/ACEi/ARB: Sacubitril-valsartan 24-26 mg 0.5 tablet BID  BB:  None  metoprolol on hold for concern of low output  MRA: spironolactone 25 mg daily  SGLT2i: None dapagliflozin on hold iso TERI  Diuretic:  torsemide 40mg twice daily    Recommendations/ Plan  Recommend scheduling an appointment with a heart failure cardiologist post-discharge   Recommend evaluation by geriatrician. Consult for  OP in home geriatrics with Cassidy BRUNO.  Recommend ongoing evaluation by Palliative Medicine  (Patient may benefit from navigator program through the [facility]. Brochure provided to patient).  Patient has one or more psychosocial barriers, recommend:   Transportation: request for IP social work consult to provide  "resources. If medical insurance does not provide transportation, consider an application for [employer].   Recommend [facility] with skilled nursing, PT Patient previously provided these services and stated that she would like to continue services in home.       *Discharge Appointment Follow-up Plan:  Scheduled first available HF appointment with Dr. Zamudio 2/4/2025 11:20am [facility] [address].    Mika \"Ray\" Vin LAMAN, RN  Heart Failure Clinical Nurse Navigator  [phone]     "

## 2024-12-18 NOTE — PROGRESS NOTES
[facility] Attending Shared Visit Note    This is a shared visit. Please see Advanced Practice Provider's encounter note for additional details.      Briefly, 89F HFrEF (30-35%, 8/2024), TTR cardiac amyloidosis (confirmed 8/2024), LBBB, SSS, s/p PPM (2017) with 99% V-pacing,  severe TR, mod AR/MR, mild CAD, CKD Stage IIIb, worsening dyspnea, and weight gain of ~40 lbs (since last admission).     Diagnostic Imaging:  - TTE (8/2024):  EF 30-35%. Severe concentric LVH with global hypokinesis. Severe TR, mild/mod MR, mod AR. RA severely dilated. Findings consistent with infiltrative disease.  - Amyloid SPECT (8/2024): Suggestive of TTR amyloidosis.  - CTA Coronary (8/2024): Minor CAD (prox LAD <25% stenosis).    Exam: elderly, warm, hypervolemic. 2+ edema in LE and UEs.    Problems:   Assessment & Plan  Anasarca    Acute on chronic systolic heart failure    Wild-type transthyretin-related (ATTR) amyloidosis (Multi)    Severe tricuspid regurgitation      Plan:   # HFrEF / ATTR amyloidosis:   # SSS with PPM and 100% V-pacing: EP previously deferred CRT upgrade due to frailty  # TERI on CKD: CTM  - Continue aggressive diuresis.   - Home GDMT held (Entresto, BB, SGLT2i) due to ADHF and TERI. Spironolactone increased to 25 mg daily.  - - EP engaged with consideration for CRT given c/f pacing induced CM on top of amyloid. Patient interested in therapy if offered  # GOC: patient reports that she has reasonable quality of life and would like to be a home over facility. Pall care engagement apprecaited    Dispo: Pending PT/OT evaluation, aggressive diuresis.     Dario Nelson MD    Objective   Admit Date: 12/13/2024  Hospital Length of Stay: 5   Home: [place] [id number]    MEDICATIONS  Infusions:  furosemide, Last Rate: 20 mg/hr (12/17/24 1133)      Scheduled:  [Held by provider] aspirin, 81 mg, Daily  ergocalciferol, 1,250 mcg, Every Sunday  heparin (porcine), 5,000 Units, q8h  pantoprazole, 40 mg, Daily before  breakfast  polyethylene glycol, 17 g, Daily  predniSONE, 2.5 mg, Daily  rosuvastatin, 10 mg, Nightly  [Held by provider] sacubitriL-valsartan, 0.5 tablet, BID  sennosides-docusate sodium, 1 tablet, Nightly  spironolactone, 25 mg, Daily      PRN:  acetaminophen, 650 mg, q6h PRN  ipratropium-albuteroL, 3 mL, q6h PRN  white petrolatum, , q1h PRN      Prior to Admission Meds:  Medications Prior to Admission   Medication Sig Dispense Refill Last Dose/Taking    dapagliflozin propanediol (Farxiga) 10 mg Take 1 tablet (10 mg) by mouth once every 24 hours. 30 tablet 3 12/12/2024    furosemide (Lasix) 20 mg tablet Take 1 tablet (20 mg) by mouth once daily. 30 tablet 3 12/12/2024    metoprolol succinate XL (Toprol-XL) 25 mg 24 hr tablet Take 0.5 tablets (12.5 mg) by mouth once daily. Do not crush or chew. 30 tablet 3 12/12/2024    pantoprazole (ProtoNix) 40 mg EC tablet Take 1 tablet (40 mg) by mouth once daily in the morning. Take before meals. Do not crush, chew, or split. 30 tablet 3 12/13/2024 Morning    rosuvastatin (Crestor) 20 mg tablet Take 0.5 tablets (10 mg) by mouth once daily at bedtime. (Patient taking differently: Take 1 tablet (20 mg) by mouth once daily at bedtime.) 30 tablet 3 12/12/2024    sacubitriL-valsartan (Entresto) 24-26 mg tablet Take 0.5 tablets by mouth 2 times a day. (Patient taking differently: Take 1 tablet by mouth 2 times a day.) 30 tablet 3 12/12/2024    sennosides-docusate sodium (Nelda-Colace) 8.6-50 mg tablet Take 1 tablet by mouth once daily at bedtime. 30 tablet 3 Past Month    spironolactone (Aldactone) 25 mg tablet Take 0.5 tablets (12.5 mg) by mouth once daily. 30 tablet 3 12/12/2024    Vitamin D3 50 mcg (2,000 unit) capsule Take 1 capsule (50 mcg) by mouth early in the morning..   12/13/2024 Morning    aspirin 81 mg EC tablet Take 1 tablet (81 mg) by mouth once daily. (Patient not taking: Reported on 12/13/2024)   Not Taking       Vitals:      12/18/2024     4:10 AM 12/18/2024    12:14  AM 12/17/2024     8:28 PM 12/17/2024     4:30 PM 12/17/2024    12:30 PM 12/17/2024    10:00 AM 12/17/2024     3:24 AM   Vitals   Systolic 108 108 103 93 92 90 118   Diastolic 65 69 65 52 57 56 77   BP Location Left arm Left arm Left arm Left arm Left arm Left arm Left arm   Heart Rate 62 74 78 62 70 69 76   Temp 36.4 °C (97.5 °F) 36.6 °C (97.9 °F) 36.5 °C (97.7 °F) 37 °C (98.6 °F) 36.7 °C (98.1 °F) 36.7 °C (98.1 °F) 36.5 °C (97.7 °F)   Resp 18 18 18 16 16 16 18   Weight (lb) 127.43      135.14   BMI 22.57 kg/m2      23.94 kg/m2   BSA (m2) 1.6 m2      1.65 m2     Wt Readings from Last 5 Encounters:   12/18/24 57.8 kg (127 lb 6.8 oz)   09/03/24 51.5 kg (113 lb 8 oz)     Weight         12/14/2024  0520 12/15/2024  0459 12/16/2024  0400 12/17/2024  0324 12/18/2024  0410    Weight: 68.8 kg (151 lb 10.8 oz) 69.1 kg (152 lb 5.4 oz) 65.6 kg (144 lb 10 oz) 61.3 kg (135 lb 2.3 oz) 57.8 kg (127 lb 6.8 oz)              Intake/Output Summary (Last 24 hours) at 12/18/2024 0818  Last data filed at 12/18/2024 0710  Gross per 24 hour   Intake 1006.33 ml   Output 7075 ml   Net -6068.67 ml       CHEST: Unlabored, Clear, Diminished  CV:  Ventricular paced  NEURO:   RASS:    CAM:    LOC: Alert  Cognition: Follows commands  GCS: 15    DATA:  CMP:  Recent Labs     12/17/24  1933 12/16/24  2031 12/15/24  1959 12/14/24  1942 12/13/24  1815 12/13/24  1102 09/03/24 1900 09/02/24  1903   * 136 138 144 145 145 136 137   K 4.3 4.2 4.4 3.6 3.6 3.1* 5.1 4.7   CL 93* 98 102 105 107 105 103 101   CO2 32 23 24 26 26 26 25 30   ANIONGAP 14 19 16 17 16 17 13 11   BUN 27* 26* 26* 27* 25* 23 22 25*   CREATININE 1.85* 2.02* 1.93* 2.09* 1.91* 2.12* 1.34* 1.47*   EGFR 26* 23* 25* 22* 25* 22* 38* 34*   MG 2.85* 2.74* 2.92* 2.74* 2.68* 2.70* 2.40 2.37     Recent Labs     12/17/24  1933 12/16/24  2031 12/15/24  1959 12/14/24  1942 12/13/24  1815 12/13/24  1102 09/03/24  1900 09/02/24  1903 08/29/24  1844 08/29/24  0954 08/24/24  2238 08/23/24  2235  "  ALBUMIN 3.2* 3.2* 3.1* 3.5 3.3*  3.3* 3.6 2.8* 3.0*   < >  --    < > 3.5   ALT 11 9 9 10 11 15  --   --   --   --   --  12   AST 26 21 17 18 20 19  --   --   --   --   --  18   BILITOT 0.9 0.9 0.8 0.7 1.0 1.1  --   --   --   --   --  0.7   LIPASE  --   --   --   --   --   --   --   --   --  9  --   --     < > = values in this interval not displayed.     CBC:  Recent Labs     12/16/24  2031 12/15/24  1959 12/14/24  1942 12/13/24  1815 12/13/24 1102 09/03/24  1900 09/02/24  1903 09/01/24  2112   WBC 5.6 5.5 4.3* 3.5* 3.6* 5.1 5.0 5.2   HGB 14.1 13.7 13.8 13.9 14.4 15.8 16.0 14.5   HCT 46.5* 44.3 44.2 42.7 43.2 49.0* 50.0* 43.3   PLT 83* 102* 99* 84* 101* 231 222 208   MCV 92 90 89 83 83 82 84 78*     COAG:   Recent Labs     12/13/24  1218 08/26/24  0940 08/25/24  1932 08/24/24  1305 08/24/24  0645 08/24/24  0001   INR 1.3*  --   --   --   --  1.1   HAUF  --  0.5 0.5 0.6 0.6  --      ABO: No results for input(s): \"ABO\" in the last 04423 hours.  HEME/ENDO: No results for input(s): \"FERRITIN\", \"IRONSAT\", \"TSH\", \"FREET4\", \"HGBA1C\" in the last 67811 hours.  CARDIAC:   Recent Labs     12/13/24  1218 12/13/24  1102 08/23/24  2341 08/23/24  2235   TROPHS 330* 418* 189* 235*   BNP  --  >5,000*  --  2,626*     No results for input(s): \"CHOL\", \"LDLF\", \"LDLCALC\", \"HDL\", \"TRIG\" in the last 34017 hours.  TOX:No results for input(s): \"AMPHETAMINE\", \"BENZO\", \"CANNABINOID\", \"COCAI\", \"FENTANYL\", \"OPIATE\", \"OXYCODONE\", \"PCP\" in the last 93174 hours.    No lab exists for component: \"BARBSCRUR\"  MICRO: No results for input(s): \"ESR\", \"CRP\", \"PROCAL\" in the last 02006 hours.  No results found for the last 90 days.      EKG:   Recent Labs     12/13/24  1103 08/27/24  1833 08/23/24  2210 09/19/23  1013 02/07/17  1602   ATRRATE 60 80 61 74 60   VENTRATE 60 80 61 74 60   PRINT  --  166  --  172 164   QRSDUR 158 164 164 158 154   QTCFRED 502 495 494 502 464   QTCCALCB 502 519 495 519 464     Encounter Date: 12/13/24   ECG 12 lead   Result " Value    Ventricular Rate 60    Atrial Rate 60    QRS Duration 158    QT Interval 502    QTC Calculation(Bazett) 502    P Axis 38    R Axis 146    T Axis -4    QRS Count 10    Q Onset 193    T Offset 444    QTC Fredericia 502    Narrative    Atrial-paced rhythm  Nonspecific intraventricular block  Lateral infarct , age undetermined  Abnormal ECG  When compared with ECG of 27-AUG-2024 18:32,  Electronic atrial pacemaker has replaced Electronic ventricular pacemaker     Echocardiogram:   Recent Labs     08/24/24  1120   EF 33   LVIDD 3.56   RV 31.2   RVFRWALLPKSP 12.00   TAPSE 2.0   Transthoracic Echo (TTE) Complete 08/24/2024    [facility], [address] [place] [address]  Tel [phone] and Fax [phone]    TRANSTHORACIC ECHOCARDIOGRAM REPORT      Patient Name:      MICHELE CONLEYNIKITA Jay Physician:    [id number] Mehrdad Yee MD  Study Date:        8/24/2024            Ordering Provider:    [id number] ALEN SIMON  MRN/PID:           [id number]             Fellow:  Accession#:        [id number]         Nurse:                Abigail Moore RN  Date of Birth/Age: 1935 / 88 years Sonographer:          Concha Pena RDCS  Gender:            F                    Additional Staff:  Height:            160.02 cm            Admit Date:  Weight:            73.03 kg             Admission Status:     Inpatient -  Routine  BSA / BMI:         1.76 m2 / 28.52      Encounter#:           [id number]  kg/m2  Blood Pressure:    128/70 mmHg          Department Location:  [facility] Non  Invasive    Study Type:    TRANSTHORACIC ECHO (TTE) COMPLETE  Diagnosis/ICD: Chronic systolic (congestive) heart failure (CHF)-I50.22  Indication:    new HFrEF with decompensation  CPT Code:      Echo Complete w Full Doppler-[id number]    Patient History:  Pertinent History: NSTEMI, HFpEF, SSS s/p PPM 2017, HTN, CKD, CAD.    Study Detail: The following Echo studies were performed: 2D, M-Mode, Doppler and  color  flow. Agitated saline used as a contrast agent for  intraseptal flow evaluation.      Critical Event  Critical Event: Test was completed as per department protocol.  Critical Finding: Severe TR.  Time Test was Completed: 11:15:39 AM  Notified: Dr. Yee.  Attending notification time: 11:25:49 AM    PHYSICIAN INTERPRETATION:  Left Ventricle: Left ventricular ejection fraction is moderately decreased, by visual estimate at 30-35%. There is global hypokinesis of the left ventricle with minor regional variations. The left ventricular cavity size is decreased. The left ventricular septal wall thickness is moderately increased. There is moderately increased left ventricular posterior wall thickness. There is severe concentric left ventricular hypertrophy. Abnormal (paradoxical) septal motion, consistent with RV pacemaker. Spectral Doppler shows an impaired relaxation pattern of left ventricular diastolic filling. There is an elevated left ventricular end diastolic pressure.  Left Atrium: The left atrium is mildly dilated. A bubble study using agitated saline was performed. A small PFO (= 10 bubbles) was demonstrated.  Right Ventricle: The right ventricle is moderately enlarged. There is normal right ventricular global systolic function. A device is visualized in the right ventricle.  Right Atrium: The right atrium is severely dilated.  Aortic Valve: The aortic valve is trileaflet. There is minimal aortic valve cusp calcification. The aortic valve dimensionless index is 0.70. There is mild to moderate aortic valve regurgitation. The peak instantaneous gradient of the aortic valve is 3.0 mmHg. The mean gradient of the aortic valve is 1.8 mmHg.  Mitral Valve: The mitral valve is mildly thickened. There is mild to moderate mitral valve regurgitation.  Tricuspid Valve: The tricuspid valve is structurally normal. There is severe tricuspid regurgitation. The Doppler estimated RVSP is slightly elevated at 31.2 mmHg. The RV  systolic pressure is likely to be underestimated.  Pulmonic Valve: The pulmonic valve is structurally normal. There is physiologic pulmonic valve regurgitation.  Pericardium: There is a trivial pericardial effusion.  Pleural: There is bilateral pleural effusion.  Aorta: The aortic root is normal.  Systemic Veins: The inferior vena cava appears dilated. There is IVC inspiratory collapse greater than 50%. The hepatic vein shows a pattern of systolic flow reversal, suggestive of severe tricuspid regurgitation.  In comparison to the previous echocardiogram(s): Compared with study dated 10/3/2023, the LV EF is decreaaed (was 50-55%) and the TR is wide-open (was moderate).      CONCLUSIONS:  1. Left ventricular ejection fraction is moderately decreased, by visual estimate at 30-35%.  2. There is global hypokinesis of the left ventricle with minor regional variations.  3. Spectral Doppler shows an impaired relaxation pattern of left ventricular diastolic filling.  4. There is an elevated left ventricular end diastolic pressure.  5. Left ventricular cavity size is decreased.  6. Abnormal septal motion consistent with RV pacemaker.  7. Moderately increased left ventricular septal thickness.  8. The left ventricular posterior wall thickness is moderately increased.  9. There is severe concentric left ventricular hypertrophy.  10. There is normal right ventricular global systolic function.  11. Moderately enlarged right ventricle.  12. The left atrium is mildly dilated.  13. The right atrium is severely dilated.  14. Mild to moderate mitral valve regurgitation.  15. Severe tricuspid regurgitation visualized.  16. Slightly elevated RVSP.  17. The RV systolic pressure is likely to be underestimated.  18. Mild to moderate aortic valve regurgitation.  19. Sent a Haiku to the NP service.    QUANTITATIVE DATA SUMMARY:  2D MEASUREMENTS:  Normal Ranges:  Ao Root d:     3.20 cm  (2.0-3.7cm)  LAs:           4.25 cm  (2.7-4.0cm)  IVSd:           1.78 cm  (0.6-1.1cm)  LVPWd:         1.76 cm  (0.6-1.1cm)  LVIDd:         3.56 cm  (3.9-5.9cm)  LVIDs:         3.26 cm  LV Mass Index: 148 g/m2  LVEDV Index:   56 ml/m2  LV % FS        8.3 %    LA VOLUME:  Normal Ranges:  LA Vol A4C:        65.4 ml    (22+/-6mL/m2)  LA Vol A2C:        62.6 ml  LA Vol BP:         67.2 ml  LA Vol Index A4C:  37.1ml/m2  LA Vol Index A2C:  35.5 ml/m2  LA Vol Index BP:   38.1 ml/m2  LA Area A4C:       20.2 cm2  LA Area A2C:       18.8 cm2  LA Major Axis A4C: 5.3 cm  LA Major Axis A2C: 4.8 cm    RA VOLUME BY A/L METHOD:  Normal Ranges:  RA Area A4C: 31.0 cm2    AORTA MEASUREMENTS:  Normal Ranges:  Asc Ao, d: 3.30 cm (2.1-3.4cm)    LV SYSTOLIC FUNCTION BY 2D PLANIMETRY (MOD):  Normal Ranges:  EF-A4C View:    32 % (>=55%)  EF-A2C View:    39 %  EF-Biplane:     35 %  EF-Visual:      33 %  LV EF Reported: 33 %    LV DIASTOLIC FUNCTION:  Normal Ranges:  MV Peak E: 0.44 m/s    (0.7-1.2 m/s)  MV Peak A: 0.64 m/s    (0.42-0.7 m/s)  E/A Ratio: 0.68        (1.0-2.2)  MV A Dur:  124.57 msec  MV DT:     126 msec    (150-240 msec)    MITRAL VALVE:  Normal Ranges:  MV DT: 126 msec (150-240msec)    MITRAL INSUFFICIENCY:  Normal Ranges:  MR VTI:  176.42 cm  MR Vmax: 459.02 cm/s    AORTIC VALVE:  Normal Ranges:  AoV Vmax:                0.87 m/s (<=1.7m/s)  AoV Peak PG:             3.0 mmHg (<20mmHg)  AoV Mean P.8 mmHg (1.7-11.5mmHg)  LVOT Max Alfonso:            0.65 m/s (<=1.1m/s)  AoV VTI:                 16.35 cm (18-25cm)  LVOT VTI:                11.51 cm  LVOT Diameter:           1.98 cm  (1.8-2.4cm)  AoV Area, VTI:           2.17 cm2 (2.5-5.5cm2)  AoV Area,Vmax:           2.30 cm2 (2.5-4.5cm2)  AoV Dimensionless Index: 0.70    AORTIC INSUFFICIENCY:  AI Vmax:       3.51 m/s  AI Half-time:  433 msec  AI Decel Time: 1492 msec  AI Decel Rate: 235.88 cm/s2      RIGHT VENTRICLE:  RV Basal 4.80 cm  RV Mid   3.60 cm  RV Major 8.2 cm  TAPSE:   20.0 mm  RV s'    0.12 m/s    TRICUSPID  VALVE/RVSP:  Normal Ranges:  Peak TR Velocity: 2.41 m/s  RV Syst Pressure: 31.2 mmHg (< 30mmHg)  IVC Diam:         2.89 cm    PULMONIC VALVE:  Normal Ranges:  PV Accel Time: 69 msec  (>120ms)  PV Max Alfonso:    0.7 m/s  (0.6-0.9m/s)  PV Max P.0 mmHg    AORTA:  Asc Ao Diam 3.26 cm      [id number] Mehrdad Yee MD  Electronically signed on 2024 at 12:07:44 PM        ** Final **    Coronary Angiography: No results found for this or any previous visit from the past 1800 days.    Right Heart Cath: No results found for this or any previous visit from the past 1800 days.    Cardiac Scoring:   CT angio coronary art with heartflow if score >30% 2024    Narrative  Interpreted By:  Adrián Lao and Okyere Robert  STUDY:  CT ANGIO CORONARY ART WITH HEARTFLOW IF SCORE >30%;  2024 9:06 am    INDICATION:  Signs/Symptoms:New HFrEF.    COMPARISON:  None.    ACCESSION NUMBER(S):  [id number]    ORDERING CLINICIAN:  MIRNA PIRES    TECHNIQUE:  Using multi-detector CT technology,  axial, sequential imaging with  prospective gating was performed of the chest following the  intravenous administration of contrast material.  A low-osmolar  contrast agent was used (90 ml of Omnipaque 350).    The patient was premedicated with 0.8 mg sublingual nitroglycerin for  coronary dilation.    For optimization of anatomic evaluation, multiplanar reconstruction,  maximum intensity projections, and advanced 3-D off-line  postprocessing were performed on a dedicated stand-alone workstation  under the direct supervision of the interpreting physician.    CT Dose-Length Product (DLP):   749 mGy/cm  CT Dose Reduction Employed: Yes (Prospective triggering, iterative  reconstruction)    FINDINGS:  POTENTIAL STUDY LIMITATIONS:  None.    CORONARY ARTERIES:    CORONARY ANATOMY:  There is normal origin of the coronary arteries.    CORONARY ARTERY CALCIUM SCORE:  LM 0  LAD 16.5  LCX 0  RCA 0  TOTAL 16.5    LEFT MAIN CORONARY ARTERY:  The left  main is normal sized vessel that  bifurcates into the LAD  and circumflex. There is no significant atherosclerotic change or  stenotic disease.    LEFT ANTERIOR DESCENDING ARTERY:  The LAD is a normal size vessel that  wraps around the apex.  It gives rise to  2 acute diagonal branches.  There is a focal area of calcific atherosclerosis in the proximal LAD  resulting in less than 25% luminal stenosis.    LEFT CIRCUMFLEX ARTERY:  The LCX is a normal size vessel, which is  non-dominant.  It gives rise to  1 obtuse marginal branches.  There is no significant atherosclerotic change or stenotic disease.    RIGHT CORONARY ARTERY:  The RCA is a normal size vessel, which is  dominant .  It gives rise to a  conus branch,  dashawn branch, and  1 acute  marginal branches.  In its distal segment it bifurcates into the PDA  and PV branch. There is no significant atherosclerotic change or  stenotic disease.    CARDIAC CHAMBERS:  The cardiac chambers demonstrate normal atrioventricular and  ventriculoarterial concordance, and systemic and pulmonary venous  return.    LEFT ATRIUM:  Mildly dilated Area 26.5 cm2    RIGHT ATRIUM:  Moderately dilated area: 33.3 cm2. A CIED lead noted.    INTERATRIAL SEPTUM:  Intact.    LEFT VENTRICLE:  Normal size 4.5 cm. Concentric left ventricular hypertrophy.    RIGHT VENTRICLE:  Dilated 5.1 cm. A CIED lead is noted.    AORTIC VALVE:  The aortic valve is  trileaflet in morphology.  No calcifications.    MITRAL VALVE:  No thickening/calcification.    THORACIC AORTA:  The visualized thoracic aorta is normal in course, caliber, and  contour. The ascending thoracic aorta is 2.8 cm in diameter. There is  no acute aortic pathology, such as dissection, intramural hematoma,  or contained rupture. The aortic arch is not included on this  examination.    PULMONARY  The main pulmonary artery is 3.4 cm in diameter, RPA 2.2 cm, LPA 1.8  cm.    PERICARDIUM:  There is no pericardial effusion of  thickening.    CHEST:  The chest wall is normal.  No significant lymphadenopathy or mass is seen in limited images of  the mediastinum. Limited imaging through the lungs reveals moderate  bilateral pleural effusion with dependent atelectasis. No pleural  effusion or pneumothorax.    UPPER ABDOMEN:  Limited imaging through the upper abdomen reveals no abnormalities of  the visualized organs.    Impression  1. Minor coronary artery disease.  2. The proximal LAD has focal calcified plaque with minimal stenosis  (<25%).  3. The LM, LCx and RCA have no significant atherosclerotic change or  stenotic disease.  4. Total Agatston calcium score of 17.  5. Right dominant coronary artery system.  6. Right atrial and right ventricular dilatation. Mild left atrial  dilatation.  7. Small bilateral pleural effusions with dependent atelectasis.  8. Dilated main pulmonary artery to 3.4 cm. Correlate/concern for  elevated pulmonary/right-sided filling pressures.      Signed by: Adrián Lao 8/29/2024 11:18 AM  Dictation workstation:   [id number]    Cardiac MRI: No results found for this or any previous visit from the past 1800 days.    Nuclear:  NM PYP Cardiac Amyloidosis with SPECT CT 08/26/2024    Narrative  Interpreted By:  Josh Templeton and Kaur Arashdeep  STUDY:  Amyloid SPECT Heart Study    INDICATION:  PYP scan to rule out ATTR amyloid depostion    COMPARISON:  None    ACCESSION NUMBER(S):  [id number]    ORDERING CLINICIAN:  BRODY HEATH    TECHNIQUE:  Following the intravenous administration 21.6 mCi of Tc-99m PYP,  images localized to the thorax in the anterior projection were  obtained at 1 hour post injection. In addition, SPECT/CT images were  obtained. Semi-quantitation was performed on these images with  regions of interest drawn over the heart and contralateral lung. In  addition, visual quantitative analysis was performed on SPECT images.    FINDINGS:  Analysis of the planar images reveals increased  radiotracer  deposition in the region of the heart.    The Heart to Contralateral lung ratio is 1.2. (Normal ratio is less  than < 1.2, equivocal = 1.21-1.4, > 1.5 is positive for TTR  amyloidosis)    SPECT/CT images reveal intense radiotracer deposition in the  myocardium relative to the ribs.    Semi-quantitative visual analysis reveals a GRADE of 3  Grade values are as follows:  1) Grade 0: No uptake in MYOCARDIUM (not bloodpool) and normal bone  uptake  2) Grade 1: Uptake in MYOCARDIUM (not bloodpool) less than rib uptake  3) Grade 2: Uptake in MYOCARDIUM (not bloodpool) equal to rib uptake  4) Grade 3: Uptake in MYOCARDIUM (not bloodpool) greater than rib  uptake with mild/absent rib uptake    Low dose CT demonstrates moderate bilateral pleural effusion with  associated basal collapse.    Impression  1. Amyloid SPECT heart study is suggestive of TTR amyloidosis.  2. Representative images were saved into the PACS system.    Note: A negative amyloid SPECT heart study does not rule out other  forms of possible cardiac amyloid deposition such as amyloid light  chain deposition.    I personally reviewed the images/study and I agree with the findings  as stated by Liliana Juárez MD.  This study was interpreted at  [facility], [place], [place].    MACRO:  None      Signed by: Josh Templeton 8/26/2024 2:55 PM  Dictation workstation:   [id number]    Metabolic Stress: No results found for this or any previous visit from the past 1800 days.      XR chest 1 view    Result Date: 12/13/2024  Interpreted By:  Adrián Del Toro, STUDY: XR CHEST 1 VIEW;  12/13/2024 12:46 pm   INDICATION: Signs/Symptoms:assess for pulmonary edema, has anasarca.     COMPARISON: Chest radiograph 08/28/2024   ACCESSION NUMBER(S): [id number]   ORDERING CLINICIAN: PATRIA MILLS   FINDINGS: AP radiograph of the chest was provided.   Left chest wall AICD/pacemaker is again seen with leads in place.   CARDIOMEDIASTINAL  SILHOUETTE: The cardiomediastinal silhouette is persistently moderate to severely enlarged with partial obscuration of the bilateral heart borders.   LUNGS: There are low lung volumes. There is increased bilateral moderate-to-large pleural effusions with bilateral basilar predominant patchy and interstitial opacities with perihilar prominence. There is no pneumothorax. Leftward tracheal deviation may be projectional   ABDOMEN: No remarkable upper abdominal findings.   BONES: No acute osseous changes.       1. Severe cardiomegaly with increased moderate-to-large bilateral pleural effusions and associated atelectasis and/or consolidation. Additional interstitial and patchy opacities with basilar predilection suggestive of interstitial and alveolar pulmonary edema. Correlation with fluid volume status is recommended. 2. Medical device as noted above.   MACRO: None   Signed by: Adrián Del Toro 12/13/2024 12:54 PM Dictation workstation:   [id number]       LDA:       NUTRITION: Adult diet Cardiac; 70 gm fat; 2 - 3 grams Sodium; 2000 mL fluid  EMERGENCY CONTACT: Extended Emergency Contact Information  Primary Emergency Contact: Cassidy Zepeda  Home Phone: [phone]  Mobile Phone: [phone]  Relation: Child  Secondary Emergency Contact: sandra block  Mobile Phone: [phone]  Relation: Child   needed? No  CODE STATUS: DNR and No Intubation  DISPO: Discharge Planning  Living Arrangements: Children  Support Systems: Children, Family members  Assistance Needed: assistance with ambulation  Type of Residence: Private residence  Number of Stairs to Enter Residence: 0  Number of Stairs Within Residence: 0  Do you have animals or pets at home?: No  Who is requesting discharge planning?: Provider  Home or Post Acute Services: In home services  Type of Home Care Services: Home OT, Home PT  Expected Discharge Disposition: Home Health Care - Resumed  Does the patient need discharge transport arranged?: Yes  RoundTrip  coordination needed?: Yes  Has discharge transport been arranged?: No  AMPAC: Daily Activity - Total Score: 18    FOLLOWUP:   No future appointments.

## 2024-12-18 NOTE — PROGRESS NOTES
Physical Therapy    Physical Therapy Treatment    Patient Name: Erin Lopez  MRN: [id number]  Department: [facility] [place] [id number]  Room: [address] [place] [address]  Today's Date: 12/18/2024  Time Calculation  Start Time: 1514  Stop Time: 1529  Time Calculation (min): 15 min         Assessment/Plan   PT Assessment  PT Assessment Results: Decreased strength, Decreased endurance, Impaired balance, Decreased mobility  End of Session Communication: Bedside nurse  Assessment Comment: Pt increased ambulation distance to 15' using FWW with CGA. Pt ambulates with FF posture, pt able to improve trunk alignment with cuing but unable to maintain. Pt completed STS transfer with Min A using FWW. Pt remains appropriate for Mod intensity therapy.  End of Session Patient Position: Up in chair, Alarm off, not on at start of session     PT Plan  Treatment/Interventions: Bed mobility, Transfer training, Gait training, Balance training, Strengthening, Endurance training, Therapeutic exercise, Therapeutic activity, Home exercise program  PT Plan: Ongoing PT  PT Frequency: 3 times per week  PT Discharge Recommendations: Moderate intensity level of continued care  Equipment Recommended upon Discharge:  (none)  PT Recommended Transfer Status: Assist x1  PT - OK to Discharge: Yes      General Visit Information:   PT  Visit  PT Received On: 12/18/24  General  Prior to Session Communication: Bedside nurse  Patient Position Received: Up in chair, Alarm off, not on at start of session  General Comment: Pt seated in chair upon arrival. Pt pleasant and agreeable to therapy.    Subjective   Precautions:  Precautions  Hearing/Visual Limitations: Reading glasses  Medical Precautions: Cardiac precautions, Fall precautions, Oxygen therapy device and L/min (2L)    Vital Signs (Past 2hrs)                 Objective   Pain:     Cognition:  Cognition  Overall Cognitive Status: Within Functional Limits  Coordination:       Activity Tolerance:  Activity Tolerance  Endurance: Tolerates 10 -  20 min exercise with multiple rests  Treatments:  Ambulation/Gait Training  Ambulation/Gait Training Performed: Yes  Ambulation/Gait Training 1  Surface 1: Level tile  Device 1: Rolling walker  Assistance 1: Contact guard  Quality of Gait 1: Forward flexed posture, Decreased step length, Diminished heel strike (decreased felix, unsteady)  Comments/Distance (ft) 1: 15'  Transfers  Transfer: Yes  Transfer 1  Transfer From 1: Chair with arms to, Stand to  Transfer to 1: Stand, Chair with arms  Technique 1: Sit to stand, Stand to sit  Transfer Device 1: Walker  Transfer Level of Assistance 1: Minimum assistance  Trials/Comments 1: Cues for hand placement with focus on avoiding pulling from FWW.    Outcome Measures:  [facility] Basic Mobility  Turning from your back to your side while in a flat bed without using bedrails: A little  Moving from lying on your back to sitting on the side of a flat bed without using bedrails: A little  Moving to and from bed to chair (including a wheelchair): A little  Standing up from a chair using your arms (e.g. wheelchair or bedside chair): A little  To walk in hospital room: A little  Climbing 3-5 steps with railing: Total  Basic Mobility - Total Score: 16    Education Documentation  Body Mechanics, taught by Lisy Garcia PTA at 12/18/2024  3:37 PM.  Learner: Patient  Readiness: Acceptance  Method: Explanation  Response: Verbalizes Understanding  Comment: Educated pt in hand placement and B LE positioning during functional transfers.    Home Exercise Program, taught by Lisy Garcia PTA at 12/18/2024  3:37 PM.  Learner: Patient  Readiness: Acceptance  Method: Explanation  Response: Verbalizes Understanding  Comment: Educated pt in hand placement and B LE positioning during functional transfers.    Precautions, taught by Lisy Garcia PTA at 12/18/2024  3:37 PM.  Learner: Patient  Readiness: Acceptance  Method: Explanation  Response: Verbalizes Understanding  Comment: Educated pt in  hand placement and B LE positioning during functional transfers.    Mobility Training, taught by Lisy Garcia PTA at 12/18/2024  3:37 PM.  Learner: Patient  Readiness: Acceptance  Method: Explanation  Response: Verbalizes Understanding  Comment: Educated pt in hand placement and B LE positioning during functional transfers.    Education Comments  No comments found.        OP EDUCATION:       Encounter Problems       Encounter Problems (Active)       Balance       STG - Maintains supervision assistance dynamic standing balance with upper extremity support using a wheeled walker. (Progressing)       Start:  12/16/24    Expected End:  12/30/24            STG - Maintains supervision assistance static standing balance with upper extremity support using a wheeled walker. (Progressing)       Start:  12/16/24    Expected End:  12/30/24               Mobility       STG - Patient will ambulate 75ft with supervision assistance using a wheeled walker. (Progressing)       Start:  12/16/24    Expected End:  12/30/24               PT Transfers       STG - Transfer from bed to chair with supervision assistance using a wheeled walker. (Progressing)       Start:  12/16/24    Expected End:  12/30/24            STG - Patient to transfer to and from sit to supine with supervision assistance. (Progressing)       Start:  12/16/24    Expected End:  12/30/24            STG - Patient will transfer sit to and from stand with supervision assistance using a wheeled walker. (Progressing)       Start:  12/16/24    Expected End:  12/30/24

## 2024-12-18 NOTE — PROGRESS NOTES
Subjective Data:  Patient reports that she continues to feel better day by day, but still feels there is some room to go. Robust UO of -7L over last 24hr. Cr stable. Since admission lost 12 kg, but weight still up by 6 kg.   Patient continues to endorse wanting to return home with [facility] rather than SNF.  Encouraged OOB, in chair for all meals today. Patient agreeable.    - cont lasix gtt at 20mg/hr  - palliative care: DNR/DNI. Agreeable for outpatient follow up with palliative care (consult to be placed when closer to discharge). Ongoing pt and family support and care navigation. Assistance with home resources to support pt's goal. SW to assist with possible transportation support to ensure pt can make it to follow up appointments.     Overnight Events:    None    Objective Data:  Last Recorded Vitals:  Vitals:    12/17/24 2028 12/18/24 0014 12/18/24 0410 12/18/24 0816   BP: 103/65 108/69 108/65 109/67   BP Location: Left arm Left arm Left arm Left arm   Patient Position: Lying Lying Lying Lying   Pulse: 78 74 62 81   Resp: 18 18 18 17   Temp: 36.5 °C (97.7 °F) 36.6 °C (97.9 °F) 36.4 °C (97.5 °F) 35.9 °C (96.6 °F)   TempSrc: Temporal Temporal Temporal Temporal   SpO2: 97% 100% 98% 100%   Weight:   57.8 kg (127 lb 6.8 oz)    Height:           Last Labs:  CBC - 12/16/2024:  8:31 PM  5.6 14.1 83    46.5      CMP - 12/17/2024:  7:33 PM  9.3 6.9 26 --- 0.9   2.6 3.2 11 137      PTT - 12/13/2024: 12:18 PM  1.3   14.4 30     TROPHS   Date/Time Value Ref Range Status   12/13/2024 12:18  0 - 34 ng/L Final     Comment:     Previous result verified on 12/13/2024 1210 on specimen/case [id number] called with component [facility] for procedure Troponin I, High Sensitivity, Initial with value 418 ng/L.   12/13/2024 11:02  0 - 34 ng/L Final   08/23/2024 11:41  0 - 34 ng/L Final     Comment:     Previous result verified on 8/23/2024 2336 on specimen/case [id number] called with component [facility] for procedure  Troponin I, High Sensitivity, Initial with value 235 ng/L.     BNP   Date/Time Value Ref Range Status   12/13/2024 11:02 AM >5,000 0 - 99 pg/mL Final   08/23/2024 10:35 PM 2,626 0 - 99 pg/mL Final      Last I/O:  I/O last 3 completed shifts:  In: 1371.3 (23.7 mL/kg) [P.O.:1320; I.V.:51.3 (0.9 mL/kg)]  Out: 36555 (186 mL/kg) [Urine:85681 (5.2 mL/kg/hr)]  Weight: 57.8 kg     Past Cardiology Tests (Last 3 Years):  EKG:  ECG 12 lead 12/13/2024  Atrial- paced rhythm, rate 60 BPM    Echo:  Transthoracic Echo (TTE) Complete 08/24/2024   1. Left ventricular ejection fraction is moderately decreased, by visual estimate at 30-35%.   2. There is global hypokinesis of the left ventricle with minor regional variations.   3. Spectral Doppler shows an impaired relaxation pattern of left ventricular diastolic filling.   4. There is an elevated left ventricular end diastolic pressure.   5. Left ventricular cavity size is decreased.   6. Abnormal septal motion consistent with RV pacemaker.   7. Moderately increased left ventricular septal thickness.   8. The left ventricular posterior wall thickness is moderately increased.   9. There is severe concentric left ventricular hypertrophy.  10. There is normal right ventricular global systolic function.  11. Moderately enlarged right ventricle.  12. The left atrium is mildly dilated.  13. The right atrium is severely dilated.  14. Mild to moderate mitral valve regurgitation.  15. Severe tricuspid regurgitation visualized.  16. Slightly elevated RVSP.  17. The RV systolic pressure is likely to be underestimated.  18. Mild to moderate aortic valve regurgitation.     Transthoracic Echo (TTE) Complete 10/03/2023   1. Left ventricular systolic function is mildly decreased with a 50-55% estimated ejection fraction.   2. Moderately increased left ventricular septal thickness.   3. The left ventricular posterior wall thickness is moderately increased.   4. Moderately enlarged right ventricle.    5. Mild to moderate aortic valve regurgitation.   6. Mild to moderate mitral valve regurgitation.   7. Moderate tricuspid regurgitation visualized.   8. The tricuspid valve annulus appears dilated.   9. The left atrium is moderately dilated.  10. Moderately elevated right ventricular systolic pressure.  11. Compared with study from 2/17/2017, there is a significant increase in LV wall thickness. There is evidence of mild thickening of the RV free wall, associated with mild thickening of the AV, MV and tricuspid valve plus moderately elevated RVSP. This findings could be associated to an underlying infiltrative disease like amyloidosis.     Ejection Fractions:  EF   Date/Time Value Ref Range Status   08/24/2024 11:20 AM 33 %      Cardiac Imaging:  CT angio coronary art with heartflow if score >30% 08/29/2024  CT angio coronary art with heartflow if score >30% 08/29/2024  1. Minor coronary artery disease.  2. The proximal LAD has focal calcified plaque with minimal stenosis  (<25%).  3. The LM, LCx and RCA have no significant atherosclerotic change or  stenotic disease.  4. Total Agatston calcium score of 17.  5. Right dominant coronary artery system.  6. Right atrial and right ventricular dilatation. Mild left atrial  dilatation.  7. Small bilateral pleural effusions with dependent atelectasis.  8. Dilated main pulmonary artery to 3.4 cm. Correlate/concern for  elevated pulmonary/right-sided filling pressures.     Amyloid SPECT Heart Study 8/26/2024  1. Amyloid SPECT heart study is suggestive of TTR amyloidosis.     Inpatient Medications:  Scheduled medications   Medication Dose Route Frequency    [Held by provider] aspirin  81 mg oral Daily    ergocalciferol  1,250 mcg oral Every Sunday    heparin (porcine)  5,000 Units subcutaneous q8h    pantoprazole  40 mg oral Daily before breakfast    polyethylene glycol  17 g oral Daily    predniSONE  2.5 mg oral Daily    rosuvastatin  10 mg oral Nightly    [Held by provider]  sacubitriL-valsartan  0.5 tablet oral BID    sennosides-docusate sodium  1 tablet oral Nightly    spironolactone  25 mg oral Daily     PRN medications   Medication    acetaminophen    ipratropium-albuteroL    white petrolatum     Continuous Medications   Medication Dose Last Rate    furosemide  20 mg/hr 20 mg/hr (24 0840)       Physical Exam  Constitutional:       General: She is awake. She is not in acute distress.     Appearance: She is ill-appearing.   HENT:      Mouth/Throat:      Mouth: Mucous membranes are moist.   Neck:      Vascular: JVD present.      Comments: JVD elevated to earlobe at 45 degrees   Cardiovascular:      Rate and Rhythm: Normal rate and regular rhythm.      Comments: AV paced at 60 BPM   Pulmonary:      Breath sounds: Normal breath sounds.      Comments: Decreased bases bilaterally (anterior auscultation)  Abdominal:      General: There is distension.      Tenderness: There is no abdominal tenderness.   Musculoskeletal:      Right upper arm: Swelling present.      Left upper arm: Swelling present.      Right lower le+ Edema present.      Left lower le+ Edema present.      Comments: Right upper arm edema improving  Left upper arm 1+ pitting, cool hand, 2+ radial pulse    Skin:     General: Skin is warm and dry.      Comments: Bilateral blisters LE. Left midshin nickel size, right near ankle nickel size. Wrapped in dressing.    Neurological:      General: No focal deficit present.      Mental Status: She is alert and oriented to person, place, and time.   Psychiatric:         Mood and Affect: Mood normal.         Behavior: Behavior normal.        Assessment/Plan   Erin Lopez is a 89 y.o. female presenting with PMH of NSTEMI (nonobstructive CAD ), TTR cardiac amyloidosis, HFrEF (30-35% -), LBBB and SSS s/p PPM 2017 s/p device changeout 2022, HTN, CKD presented to the ED with severe anasarca and acute decompensated heart failure. Admitted to [facility] for further management.       Acute on chronic systolic and diastolic heart failure (HFrEF 30-35%)  TTR Amyloidosis  Acute respiratory failure  - TTE 10/2023: EF 50-55%, moderately increased LV septal and posterior wall thickness, Moderately enlarged V, mild to mod AR and MR, mod TR, moderate LAE, Compared to 2017 TTE there is possible underlying infiltrative disease  - TTE 8/24/2024: EF 30-35%, gHK of LV,  elevated LVEDP, abnormal septal motion c/w V pacemaker, moderately increased LV septal thickness. LV posterior wall moderately thickened. Severe cLVH. Moderately enlarged RV, mild LAE, RA severely dilated, mild to mod MR, severe TR, Slightly elevated RVSP. Mild to mod AR.  - New TTR amyloidosis seen on 8/25 NM PYP scan-- K/L ration WNL, SPEP, UPEP negative  - admit CXR: Severe cardiomegaly with increased moderate-to-large bilateral pleural effusions and associated atelectasis and/or consolidation. Additional interstitial and patchy opacities with basilar predilection suggestive of interstitial and alveolar pulmonary edema  - repeat CXR once closer to euvolemic  - Admit BNP >5000 (previously 2,626 8/2024)  - discharge wt 9/4/2024 51.5 kg  - admit wt: 70.5 kg (recorded in ED)  - daily wt: 57.8 kg (61.3) (69.1) (68.8kg)  - patient admitted with severe anasarca up to breasts/upper arms  - during last admission, patient successfully diuresed with IV lasix boluses with intermittent diamox  - s/p IV lasix 40mg x1 in ED on admission  - lasix gtt started at 10mg/hr on 12/13  - cont lasix gtt 20 mg/hour, with goal net negative 2-3 L  - Advanced HF team consulted during last admission: Recommended initiating Tafamidis (amyloid coordinator informed at the time)  - patient no showed for Dr. Goss appt 9/17/2024 (transport issues)  - will continue holding BB iso ADHF/ ongoing concern for low output  - hold dapagliflozin iso TERI -- likely cardiorenal due to severe anasarca   - low dose Entresto 12/13 BID held iso aggressive diuresis   - 12/15  spironolactone increased to 25 mg daily to improve diuresis   - home GDMT: Entresto 12/13 BID, metop succ 12.5 mg daily, dapagliflozin 10 mg daily,  and spironolactone 12.5 mg daily  - home diuretic: Lasix 20mg daily  - Daily standing weights, strict I&O's, 2g sodium diet, 2L fluid restriction. Patient has gonzalez in place, when near euvolemia, will need Dc'd.   - may consider facility placement vs palliative care consult for GOC/code status given readmission for severe anasarca/ADHF; ongoing conversations with patient and family,  PT/OT recs mod intensity. Encouraged mobility.   - palliative care: DNR/DNI. Agreeable for outpatient follow up with palliative care (consult to be placed when closer to discharge). Ongoing pt and family support and care navigation. Assistance with home resources to support pt's goal. SW to assist with possible transportation support to ensure pt can make it to follow up appointments.   - on 2L NC (not on any home O2)   - Continue prednisone daily (long term use dating back to  for arthritis per patient) and neb PRN   - Advanced HF rec starting tafamadis outpatient after last discharge. Patient missed follow up appt. holding tafamadis initiation, consider starting outpatient.     Unilateral persistent LUE edema- improving   - fluid overloaded on presentation with 2+ swelling BL arms  - IV infusion switched from L arm, L arm elevated  -  LUE duplex given severe unilateral swelling of LUE and coolness (ordered in vasc lab) no DVT    Troponinemia  - HS trop: 418 -> 330. Suspected 2/2 demand from CHF  - CTA coronary : minor CAD, prox LAD has focal calcified plaque with minimal stenosis (<25%), LM, LCx and RCA have no significant atherosclerotic change or stenotic disease. Calcium score 17  - Cont rosuvastatin 20 mg nightly   - daily ASA held due to worsening thrombocytopenia      Hx of SSS s/p PPM , s/p device changeout 2022  - EC% AV pacing  - EP consulted during last  admission due to concern for possible pacemaker mediated cardiomyopathy given high pacing burden on device interrogation: AV delayed extended but no intrinsic conduction noted. Recommended optimizing GDMT prior to considering upgrade to CRT since she would be high risk.   - Consider CRT optimization with EP again this admission once patient successfully diuresed/ optimized-->Discussed with EP, interrogation and optimization ordered.  39% RA and 97% RV paced. Will reach out to EP for formal consult once euvolemic and depending on further family discussion on GOC at that time.      HTN  - Admit /76  - last 24 hour SBP range: 90-100s  - Entresto held as above      TERI on CKD IIIb  - Cr baseline 1.4-1.6  - admit Cr 2.12  - daily Cr 1.85 (2.02) (1.93) (2.09) (1.91)  - likely cardiorenal iso severe anasarca   - diuresis as above  - avoid nephrotoxic agents     Hypokalemia, improving  - admit K 3.1  - daily K 4.3 (4.2) (4.4) (3.6)(3.6)  - spironolactone increased to 25mg as above  - cont repletion during aggressive diuresis     Concern for congestive hepatopathy   Thrombocytopenia  - baseline 190-220s  - admit Plt: 101  - daily Plt 83 (102) (99) (84)  - on admit: INR 1.3, elevated alk phos 163  - cont to trend CBC  - LFTs back to baseline  - daily ASA held iso worsening thrombocytopenia; Plt already low on admission, low concern for HIT   - diuresis as above     Arthritis  - Pt previously getting steroid injections q3 month then put on systemic steroids  - Cont prednisone 2.5 daily   - cont home PPI while on steroids      Dispo: pending aggressive diuresis, gonzalez  - PT/OT eval mod intensity    - patient continues to endorse wanting to return home with [facility] rather than SNF    DVT ppx: heparin SQ  Code Status:  Full Code    All labs, vital signs, tests & imaging results, and medications were reviewed.    Patient seen and examined with Dr. Omar Woo PA-C

## 2024-12-18 NOTE — PROGRESS NOTES
Occupational Therapy    Occupational Therapy Treatment    Name: Erin Lopez  MRN: [id number]  : 1935  Date: 24  Room: [address] [place] [address]      Time Calculation  Start Time: 1022  Stop Time: 1045  Time Calculation (min): 23 min    Assessment:  End of Session Communication: Bedside nurse  End of Session Patient Position: Up in chair, Alarm off, not on at start of session  Plan:  Treatment Interventions: ADL retraining, Functional transfer training, UE strengthening/ROM, Equipment evaluation/education, Compensatory technique education  OT Frequency: 3 times per week  OT Discharge Recommendations: Moderate intensity level of continued care  Equipment Recommended upon Discharge:  (none)  OT Recommended Transfer Status: Moderate assist, Assist of 1  OT - OK to Discharge: Yes    Subjective   General:  OT Last Visit  OT Received On: 24  Prior to Session Communication: Bedside nurse  Patient Position Received: Bed, 2 rail up, Alarm off, not on at start of session  General Comment: pt in supine on arrival, eager to get OOB   Precautions:  Medical Precautions: Cardiac precautions, Fall precautions, Oxygen therapy device and L/min  Vitals:    Lines/Tubes/Drains:  External Urinary Catheter Female (Active)   Number of days: 0       Cognition:  Orientation Level: Oriented X4  Following Commands: Follows one step commands with repetition    Pain Assessment:  Pain Assessment  Pain Assessment: 0-10  0-10 (Numeric) Pain Score: 0 - No pain     Objective        Bed Mobility/Transfers:   Bed Mobility  Bed Mobility: Yes  Bed Mobility 1  Bed Mobility 1: Supine to sitting  Level of Assistance 1: Minimum assistance, Moderate verbal cues  Bed Mobility Comments 1: completed with increased time, HOB elevated, used bedrails  Transfers  Transfer: Yes  Transfer 1  Transfer From 1: Sit to  Transfer to 1: Stand  Technique 1: Sit to stand  Transfer Device 1: Walker  Transfer Level of Assistance 1: Moderate assistance, Moderate verbal  cues  Transfers 2  Transfer From 2: Stand to  Transfer to 2: Sit  Technique 2: Stand to sit  Transfer Device 2: Walker  Transfer Level of Assistance 2: Minimum assistance  Transfers 3  Transfer From 3: Bed to  Transfer to 3: Chair with arms  Technique 3: Sit to stand, Stand to sit, Lateral  Transfer Device 3: Walker  Transfer Level of Assistance 3: Minimum assistance, Moderate verbal cues  Trials/Comments 3: provided safety cues with WW and sequenxing steps        Therapy/Activity:   Therapeutic Exercise  Therapeutic Exercise Performed: Yes  Therapeutic Exercise Activity 1: seated in chair pt completed B scapular retraction, shoulder shrugs and shld elevation, 5-10 reps each         Outcome Measures:  [facility] Daily Activity  Putting on and taking off regular lower body clothing: Total  Bathing (including washing, rinsing, drying): A lot  Putting on and taking off regular upper body clothing: A lot  Toileting, which includes using toilet, bedpan or urinal: A lot  Taking care of personal grooming such as brushing teeth: A little  Eating Meals: None  Daily Activity - Total Score: 14     Education Documentation  No documentation found.  Education Comments  No comments found.        Goals:  Encounter Problems       Encounter Problems (Active)       ADLs       Patient with complete upper body dressing with contact guard assist level of assistance donning and doffing all UE clothes with no adaptive equipment while edge of bed  (Progressing)       Start:  12/16/24    Expected End:  12/30/24            Patient with complete lower body dressing with contact guard assist level of assistance donning and doffing all LE clothes  with PRN adaptive equipment while edge of bed  (Progressing)       Start:  12/16/24    Expected End:  12/30/24            Patient will complete daily grooming tasks brushing teeth and washing face/hair with independent level of assistance and PRN adaptive equipment while edge of bed . (Progressing)        Start:  12/16/24    Expected End:  12/30/24            Patient will complete toileting including hygiene clothing management/hygiene with contact guard assist level of assistance and bedside commode. (Progressing)       Start:  12/16/24    Expected End:  12/30/24               MOBILITY       Patient will perform Functional mobility min Household distances/Community Distances with contact guard assist level of assistance and front wheeled walker in order to improve safety and functional mobility. (Progressing)       Start:  12/16/24    Expected End:  12/30/24               TRANSFERS       Patient will perform bed mobility contact guard assist level of assistance and bed rails in order to improve safety and independence with mobility (Progressing)       Start:  12/16/24    Expected End:  12/30/24            Patient will complete sit to stand transfer with contact guard assist level of assistance and front wheeled walker in order to improve safety and prepare for out of bed mobility. (Progressing)       Start:  12/16/24    Expected End:  12/30/24 12/18/24 at 1:23 PM   Orquidea Mcgarry, OT   [phone]

## 2024-12-19 LAB
ALBUMIN SERPL BCP-MCNC: 3.1 G/DL (ref 3.4–5)
ALP SERPL-CCNC: 142 U/L (ref 33–136)
ALT SERPL W P-5'-P-CCNC: 19 U/L (ref 7–45)
ANION GAP SERPL CALC-SCNC: 12 MMOL/L (ref 10–20)
AST SERPL W P-5'-P-CCNC: 31 U/L (ref 9–39)
ATRIAL RATE: 60 BPM
BILIRUB SERPL-MCNC: 1.1 MG/DL (ref 0–1.2)
BUN SERPL-MCNC: 35 MG/DL (ref 6–23)
CALCIUM SERPL-MCNC: 9.1 MG/DL (ref 8.6–10.6)
CHLORIDE SERPL-SCNC: 82 MMOL/L (ref 98–107)
CO2 SERPL-SCNC: 37 MMOL/L (ref 21–32)
CREAT SERPL-MCNC: 1.72 MG/DL (ref 0.5–1.05)
EGFRCR SERPLBLD CKD-EPI 2021: 28 ML/MIN/1.73M*2
ERYTHROCYTE [DISTWIDTH] IN BLOOD BY AUTOMATED COUNT: 16.6 % (ref 11.5–14.5)
GLUCOSE BLD MANUAL STRIP-MCNC: 114 MG/DL (ref 74–99)
GLUCOSE BLD MANUAL STRIP-MCNC: 117 MG/DL (ref 74–99)
GLUCOSE SERPL-MCNC: 59 MG/DL (ref 74–99)
HCT VFR BLD AUTO: 46.6 % (ref 36–46)
HGB BLD-MCNC: 16.1 G/DL (ref 12–16)
MAGNESIUM SERPL-MCNC: 2.6 MG/DL (ref 1.6–2.4)
MCH RBC QN AUTO: 28 PG (ref 26–34)
MCHC RBC AUTO-ENTMCNC: 34.5 G/DL (ref 32–36)
MCV RBC AUTO: 81 FL (ref 80–100)
NRBC BLD-RTO: 0 /100 WBCS (ref 0–0)
P AXIS: 38 DEGREES
PLATELET # BLD AUTO: 155 X10*3/UL (ref 150–450)
POTASSIUM SERPL-SCNC: 3.4 MMOL/L (ref 3.5–5.3)
PROT SERPL-MCNC: 6.7 G/DL (ref 6.4–8.2)
Q ONSET: 193 MS
QRS COUNT: 10 BEATS
QRS DURATION: 158 MS
QT INTERVAL: 502 MS
QTC CALCULATION(BAZETT): 502 MS
QTC FREDERICIA: 502 MS
R AXIS: 146 DEGREES
RBC # BLD AUTO: 5.76 X10*6/UL (ref 4–5.2)
SODIUM SERPL-SCNC: 128 MMOL/L (ref 136–145)
T AXIS: -4 DEGREES
T OFFSET: 444 MS
VENTRICULAR RATE: 60 BPM
WBC # BLD AUTO: 4.9 X10*3/UL (ref 4.4–11.3)

## 2024-12-19 PROCEDURE — 2500000002 HC RX 250 W HCPCS SELF ADMINISTERED DRUGS (ALT 637 FOR MEDICARE OP, ALT 636 FOR OP/ED): Performed by: STUDENT IN AN ORGANIZED HEALTH CARE EDUCATION/TRAINING PROGRAM

## 2024-12-19 PROCEDURE — 1200000002 HC GENERAL ROOM WITH TELEMETRY DAILY

## 2024-12-19 PROCEDURE — 99223 1ST HOSP IP/OBS HIGH 75: CPT | Performed by: INTERNAL MEDICINE

## 2024-12-19 PROCEDURE — 83735 ASSAY OF MAGNESIUM: CPT

## 2024-12-19 PROCEDURE — 36415 COLL VENOUS BLD VENIPUNCTURE: CPT

## 2024-12-19 PROCEDURE — 2500000004 HC RX 250 GENERAL PHARMACY W/ HCPCS (ALT 636 FOR OP/ED)

## 2024-12-19 PROCEDURE — 85027 COMPLETE CBC AUTOMATED: CPT

## 2024-12-19 PROCEDURE — 99232 SBSQ HOSP IP/OBS MODERATE 35: CPT | Performed by: STUDENT IN AN ORGANIZED HEALTH CARE EDUCATION/TRAINING PROGRAM

## 2024-12-19 PROCEDURE — 83930 ASSAY OF BLOOD OSMOLALITY: CPT

## 2024-12-19 PROCEDURE — 82947 ASSAY GLUCOSE BLOOD QUANT: CPT

## 2024-12-19 PROCEDURE — 2500000002 HC RX 250 W HCPCS SELF ADMINISTERED DRUGS (ALT 637 FOR MEDICARE OP, ALT 636 FOR OP/ED)

## 2024-12-19 PROCEDURE — 80053 COMPREHEN METABOLIC PANEL: CPT

## 2024-12-19 PROCEDURE — 83880 ASSAY OF NATRIURETIC PEPTIDE: CPT | Performed by: STUDENT IN AN ORGANIZED HEALTH CARE EDUCATION/TRAINING PROGRAM

## 2024-12-19 PROCEDURE — 2500000001 HC RX 250 WO HCPCS SELF ADMINISTERED DRUGS (ALT 637 FOR MEDICARE OP)

## 2024-12-19 PROCEDURE — 99233 SBSQ HOSP IP/OBS HIGH 50: CPT

## 2024-12-19 RX ORDER — POTASSIUM CHLORIDE 20 MEQ/1
40 TABLET, EXTENDED RELEASE ORAL ONCE
Status: COMPLETED | OUTPATIENT
Start: 2024-12-19 | End: 2024-12-19

## 2024-12-19 RX ORDER — TORSEMIDE 20 MG/1
40 TABLET ORAL
Status: DISCONTINUED | OUTPATIENT
Start: 2024-12-19 | End: 2024-12-22

## 2024-12-19 RX ADMIN — TORSEMIDE 40 MG: 20 TABLET ORAL at 16:47

## 2024-12-19 RX ADMIN — SPIRONOLACTONE 25 MG: 25 TABLET, FILM COATED ORAL at 08:29

## 2024-12-19 RX ADMIN — HEPARIN SODIUM 5000 UNITS: 5000 INJECTION, SOLUTION INTRAVENOUS; SUBCUTANEOUS at 22:36

## 2024-12-19 RX ADMIN — PANTOPRAZOLE SODIUM 40 MG: 40 TABLET, DELAYED RELEASE ORAL at 06:27

## 2024-12-19 RX ADMIN — PREDNISONE 2.5 MG: 5 TABLET ORAL at 08:29

## 2024-12-19 RX ADMIN — ROSUVASTATIN CALCIUM 10 MG: 20 TABLET, FILM COATED ORAL at 22:36

## 2024-12-19 RX ADMIN — SENNOSIDES AND DOCUSATE SODIUM 1 TABLET: 8.6; 5 TABLET ORAL at 22:36

## 2024-12-19 RX ADMIN — HEPARIN SODIUM 5000 UNITS: 5000 INJECTION, SOLUTION INTRAVENOUS; SUBCUTANEOUS at 06:17

## 2024-12-19 RX ADMIN — HEPARIN SODIUM 5000 UNITS: 5000 INJECTION, SOLUTION INTRAVENOUS; SUBCUTANEOUS at 13:19

## 2024-12-19 RX ADMIN — POTASSIUM CHLORIDE 40 MEQ: 1500 TABLET, EXTENDED RELEASE ORAL at 22:36

## 2024-12-19 RX ADMIN — FUROSEMIDE 20 MG/HR: 10 INJECTION, SOLUTION INTRAMUSCULAR; INTRAVENOUS at 08:29

## 2024-12-19 ASSESSMENT — COGNITIVE AND FUNCTIONAL STATUS - GENERAL
WALKING IN HOSPITAL ROOM: A LITTLE
STANDING UP FROM CHAIR USING ARMS: A LITTLE
DRESSING REGULAR UPPER BODY CLOTHING: A LITTLE
DRESSING REGULAR UPPER BODY CLOTHING: A LITTLE
TOILETING: A LITTLE
TURNING FROM BACK TO SIDE WHILE IN FLAT BAD: A LITTLE
MOVING FROM LYING ON BACK TO SITTING ON SIDE OF FLAT BED WITH BEDRAILS: A LITTLE
PERSONAL GROOMING: A LITTLE
DRESSING REGULAR LOWER BODY CLOTHING: A LITTLE
MOVING TO AND FROM BED TO CHAIR: A LITTLE
MOVING FROM LYING ON BACK TO SITTING ON SIDE OF FLAT BED WITH BEDRAILS: A LITTLE
CLIMB 3 TO 5 STEPS WITH RAILING: A LITTLE
EATING MEALS: A LITTLE
DAILY ACTIVITIY SCORE: 18
CLIMB 3 TO 5 STEPS WITH RAILING: A LITTLE
MOVING TO AND FROM BED TO CHAIR: A LITTLE
TURNING FROM BACK TO SIDE WHILE IN FLAT BAD: A LITTLE
DAILY ACTIVITIY SCORE: 18
EATING MEALS: A LITTLE
PERSONAL GROOMING: A LITTLE
WALKING IN HOSPITAL ROOM: A LITTLE
MOBILITY SCORE: 18
TOILETING: A LITTLE
MOBILITY SCORE: 18
DRESSING REGULAR LOWER BODY CLOTHING: A LITTLE
HELP NEEDED FOR BATHING: A LITTLE
HELP NEEDED FOR BATHING: A LITTLE
STANDING UP FROM CHAIR USING ARMS: A LITTLE

## 2024-12-19 ASSESSMENT — PAIN SCALES - GENERAL
PAINLEVEL_OUTOF10: 0 - NO PAIN
PAINLEVEL_OUTOF10: 0 - NO PAIN

## 2024-12-19 ASSESSMENT — PAIN - FUNCTIONAL ASSESSMENT: PAIN_FUNCTIONAL_ASSESSMENT: 0-10

## 2024-12-19 NOTE — PROGRESS NOTES
"Erin Lopez is a 89 y.o. female on day 6 of admission presenting with Anasarca.      Palliative Medicine following for:  Complex medical decision making, symptom management, patient/family support    History obtained from chart review including ED note, H&P, patient's daily progress notes, review of lab/test results, and discussion with primary team and bedside RN.    Subjective    History of Present Illness  Ms. Erin Lopez is a 89 y.o. female presenting with PMH of NSTEMI (nonobstructive CAD 2017), TTR cardiac amyloidosis, HFrEF (30-35% 8/2-24), LBBB and SSS s/p PPM 2017 s/p device changeout 11/2022, HTN, CKD presented to the ED with severe anasarca and acute decompensated heart failure.       Symptoms  Pain: denies  Dyspnea: improved. Not at rest, less with exertion.  Fatigue: a little  Insomnia: only r/t interruptions.  Drowsiness: a little  Constipation: denies  Nausea: denies  Appetite: ok  Anxiety: denies  Depression: denies    Objective    Last Recorded Vitals  BP 92/62   Pulse 70   Temp 36.2 °C (97.2 °F)   Resp 18   Ht 1.6 m (5' 3\")   Wt 49.9 kg (110 lb 0.2 oz)   SpO2 93%   BMI 19.49 kg/m²      Physical Exam   Constitutional:       Appearance: She is ill-appearing, frail.   HENT:      Head: Normocephalic and atraumatic.      Mouth/Throat:      Mouth: Mucous membranes are dry.   Cardiovascular:      Rate and Rhythm: Normal rate.      Comments: paced  Pulmonary:      Effort: Tachypnea present.      Breath sounds: Decreased air movement present.   Abdominal:      General: Bowel sounds are normal.      Palpations: Abdomen is soft but still distended.   Musculoskeletal:         General: Swelling present.   Skin:     General: Skin is warm.      Comments: BLE dressings D&I.   Neurological:      General: No focal deficit present.      Mental Status: Mental status is at baseline.   Psychiatric:         Attention and Perception: Attention normal.         Mood and Affect: Mood normal.         Speech: Speech " normal.         Behavior: Behavior is cooperative.         Thought Content: Thought content normal.         Cognition and Memory: Cognition normal.         Judgment: Judgment normal.       Relevant Results   Results for orders placed or performed during the hospital encounter of 12/13/24 (from the past 24 hours)   Comprehensive metabolic panel   Result Value Ref Range    Glucose 42 (LL) 74 - 99 mg/dL    Sodium 132 (L) 136 - 145 mmol/L    Potassium 4.4 3.5 - 5.3 mmol/L    Chloride 87 (L) 98 - 107 mmol/L    Bicarbonate 24 21 - 32 mmol/L    Anion Gap 25 (H) 10 - 20 mmol/L    Urea Nitrogen 32 (H) 6 - 23 mg/dL    Creatinine 1.86 (H) 0.50 - 1.05 mg/dL    eGFR 26 (L) >60 mL/min/1.73m*2    Calcium 9.5 8.6 - 10.6 mg/dL    Albumin 3.4 3.4 - 5.0 g/dL    Alkaline Phosphatase 147 (H) 33 - 136 U/L    Total Protein 7.3 6.4 - 8.2 g/dL    AST 30 9 - 39 U/L    Bilirubin, Total 1.2 0.0 - 1.2 mg/dL    ALT 13 7 - 45 U/L   CBC   Result Value Ref Range    WBC 4.8 4.4 - 11.3 x10*3/uL    nRBC 0.0 0.0 - 0.0 /100 WBCs    RBC 5.88 (H) 4.00 - 5.20 x10*6/uL    Hemoglobin 16.4 (H) 12.0 - 16.0 g/dL    Hematocrit 52.3 (H) 36.0 - 46.0 %    MCV 89 80 - 100 fL    MCH 27.9 26.0 - 34.0 pg    MCHC 31.4 (L) 32.0 - 36.0 g/dL    RDW 18.9 (H) 11.5 - 14.5 %    Platelets 127 (L) 150 - 450 x10*3/uL   Magnesium   Result Value Ref Range    Magnesium 3.00 (H) 1.60 - 2.40 mg/dL   POCT GLUCOSE   Result Value Ref Range    POCT Glucose 114 (H) 74 - 99 mg/dL        Allergies  Patient has no known allergies.  Medications  Scheduled medications  [Held by provider] aspirin, 81 mg, oral, Daily  ergocalciferol, 1,250 mcg, oral, Every Sunday  heparin (porcine), 5,000 Units, subcutaneous, q8h  pantoprazole, 40 mg, oral, Daily before breakfast  polyethylene glycol, 17 g, oral, Daily  predniSONE, 2.5 mg, oral, Daily  rosuvastatin, 10 mg, oral, Nightly  [Held by provider] sacubitriL-valsartan, 0.5 tablet, oral, BID  sennosides-docusate sodium, 1 tablet, oral,  Nightly  spironolactone, 25 mg, oral, Daily  torsemide, 40 mg, oral, BID      Continuous medications     PRN medications  PRN medications: acetaminophen, ipratropium-albuteroL, white petrolatum     Assessment/Plan    Ms. Erin Lopez is a 89 y.o. female presenting with PMH of NSTEMI (nonobstructive CAD 2017), TTR cardiac amyloidosis, HFrEF (30-35% 8/2-24), LBBB and SSS s/p PPM 2017 s/p device changeout 11/2022, HTN, CKD presented to the ED with severe anasarca and acute decompensated heart failure.      12/17: Palliative consulted. Pt agreeable that DNR/DNI aligns with her wishes r/t her dx.    12/19: Follow up with pt, is up in chair. States she is feeling good, less RANGEL/SOB, edema improved. EP consulted about possible PPM upgrade.    Palliative Performance Scale (PPS): 50    ----------------------------------------------------------------------------------------------------------------------------------------------------------------------------------------------------------------------------------------------------------------------------------------------------------------------------------------------------------------------      I spent  minutes in providing separately identifiable ACP services with the patient and/or surrogate decision maker in a voluntary conversation discussing the patient's wishes and goals as detailed in the above note.   ----------------------------------------------------------------------------------------------------------------------------------------------------------------------------------------------------------------------------------------------------------------------------------------------------------------------------------------------------------------------    #Complex Medical Decision Making  #Goals of Care  #Advanced Care Planning  - Code status: DNR/DNI  - Surrogate decision maker: Cassidy Zepeda (Child)  [phone] and James Lopez [phone]  - Goals are mix of  "survival and time and improved quality of life     - 12/17: Palliative consulted, met with pt at bedside. Able to glean more information about pt's life, lifestyle, disease burden, goals, and health preferences. See above for details.     Pt declines SNF despite education to increased PT/OT in a facility along with 24/7 medical attention and medical expertise. Expressed worry about pt's delayed hospitalization and late signs (40lb) before seeking care. Pt still declining SNF as she has experienced loved ones in SNF and poor care. She described a family member who developed a bad infection and therefore amputation, and another loved one develop severe bed sores. Pt worried about her care in a SNF and has had home PT/OT and nursing which she actually enjoyed. Pt also expressed that Cassidy is a great caregiver.        Pall NP discussed HF and cardiac amyloid in detail with pt, and later via phone, Cassidy. Aware that time can be short but exact amount is unknown. As a result, discussed DNR/DNI as continuing to follow pt's stated goal of regaining her strength with PT/OT at home, with the understanding of her life limiting disease. Described DNR/DNI as a \"safety blanket\" that if her heart stops, it is a sign that her disease process has come to a terminal state and the likelihood of survival or reviving a very sick heart, is very unlikely. Pt understanding that if cpr was attempted, it would not be beneficial and most likely result in pain and suffering. Pt agrees to continue with the current plan but if her heart stops/stops breathing, cpr/intubation does not align or make sense. Pt and Cassidy are agreeable to DNR/DNI as it would honor pt's last moments best.      Cassidy mentions that at times, caring for pt is overwhelming but she has her coping mechanisms that help her through it. The grandkids are very helpful. Cassidy/pt accepts PT/OT and home nursing again. Cassidy is able to assist pt with ambulation with 1 person " "assist and is willing and able to assist. Cassidy explained that she did it before and is able to do it again. Explained out patient Palliative in the home and pt/Cassidy are agreeable.           #Dyspnea  #HFrEF  #Cardiac amyloidosis  - 12/19, taken off of Lasix drip. 12/13 CXR notable for moderate-to-large bilateral pleural effusions and associated atelectasis and/or consolidation. Recommend repeat CXR.   - Pt reports improved SOB/RANGEL and edema. On RA.   - Agreeable to Palliative outpt for an addition set of \"medical eyes\" for earlier identification and earlier intervention. Consult placed 12/19. [facility]CC looking in to [facility] at Home if pt qualifies for additional support and monitoring.  - Elects DNR/DNI on 12/17.         #Psychosocial Support  - Ongoing pt and family support and care navigation. Assistance with home resources, such as Palliative outpt/Navigator, Healthy at home if pt qualifies, to support pt's goal. SW gave pt/Cassidy contact for transportation that is associated with pt's insurance to ensure pt can make it to follow up appointments when grandkids are unavailable to transport.   - Spiritual Care Support     Plan of Care discussed with: Updated primary and bedside RN on goals of care decision, medication adjustments, and code status      Medical Decision Making was high level due to high complexity of problems, extensive data review, and high risk of management/treatment.     - Cardiac amyloidosis with ADHF posing threat to life or function.   - Reviewed external notes from   - Reviews results from  which were used in decision making for   - Recommended the following tests: follow up cxr  - Assessment required independent historian: primary, sw  - Independent interpretation of test: labs   - Discussion of management with: primary  - Drug therapy requiring intensive monitoring for toxicity: meds with renal function  - Decision regarding elective major surgery with identified patient or procedure risk " factors:   - Decision regarding emergency major surgery:   - Decision regarding hospitalization or escalation of hospital-level care:   - Decision not to resuscitate or to de-escalate care because of poor prognosis:   - Parenteral controlled substances:     Thank you for allowing us to care for this patient. Palliative Team will continue to follow as needed. Please contact team with any questions or concerns.   Team pager [phone] (weekdays)    DAMION Reese-CNP

## 2024-12-19 NOTE — PROGRESS NOTES
[facility] Attending Shared Visit Note    This is a shared visit. Please see Advanced Practice Provider's encounter note for additional details.      Briefly, 89F HFrEF (30-35%, 8/2024), TTR cardiac amyloidosis (confirmed 8/2024), LBBB, SSS, s/p PPM (2017) with 99% V-pacing,  severe TR, mod AR/MR, mild CAD, CKD Stage IIIb, worsening dyspnea, and weight gain of ~40 lbs (since last admission).     Diagnostic Imaging:  - TTE (8/2024):  EF 30-35%. Severe concentric LVH with global hypokinesis. Severe TR, mild/mod MR, mod AR. RA severely dilated. Findings consistent with infiltrative disease.  - Amyloid SPECT (8/2024): Suggestive of TTR amyloidosis.  - CTA Coronary (8/2024): Minor CAD (prox LAD <25% stenosis).    Exam: elderly, warm, hypervolemic. 1+ edema in LE and UEs.    Problems:   Assessment & Plan  Anasarca    Acute on chronic systolic heart failure    Wild-type transthyretin-related (ATTR) amyloidosis (Multi)    Severe tricuspid regurgitation      Plan:   # HFrEF / ATTR amyloidosis:   # SSS with PPM and 100% V-pacing: EP previously deferred CRT upgrade due to frailty  # TERI on CKD: CTM  - Continue aggressive diuresis.   - Home GDMT held (Entresto, BB, SGLT2i) due to ADHF and TERI. Spironolactone increased to 25 mg daily.  - - EP engaged with consideration for CRT given c/f pacing induced CM on top of amyloid. Patient interested in therapy if offered  # GOC: patient reports that she has reasonable quality of life and would like to be a home over facility. Pall care engagement apprecaited    Dispo: Pending PT/OT evaluation, aggressive diuresis.     Dario Nelson MD    Objective   Admit Date: 12/13/2024  Hospital Length of Stay: 6   Home: [place] [id number]    MEDICATIONS  Infusions:  furosemide, Last Rate: 20 mg/hr (12/18/24 0840)      Scheduled:  [Held by provider] aspirin, 81 mg, Daily  ergocalciferol, 1,250 mcg, Every Sunday  heparin (porcine), 5,000 Units, q8h  pantoprazole, 40 mg, Daily before  breakfast  polyethylene glycol, 17 g, Daily  predniSONE, 2.5 mg, Daily  rosuvastatin, 10 mg, Nightly  [Held by provider] sacubitriL-valsartan, 0.5 tablet, BID  sennosides-docusate sodium, 1 tablet, Nightly  spironolactone, 25 mg, Daily      PRN:  acetaminophen, 650 mg, q6h PRN  ipratropium-albuteroL, 3 mL, q6h PRN  white petrolatum, , q1h PRN      Prior to Admission Meds:  Medications Prior to Admission   Medication Sig Dispense Refill Last Dose/Taking    dapagliflozin propanediol (Farxiga) 10 mg Take 1 tablet (10 mg) by mouth once every 24 hours. 30 tablet 3 12/12/2024    furosemide (Lasix) 20 mg tablet Take 1 tablet (20 mg) by mouth once daily. 30 tablet 3 12/12/2024    metoprolol succinate XL (Toprol-XL) 25 mg 24 hr tablet Take 0.5 tablets (12.5 mg) by mouth once daily. Do not crush or chew. 30 tablet 3 12/12/2024    pantoprazole (ProtoNix) 40 mg EC tablet Take 1 tablet (40 mg) by mouth once daily in the morning. Take before meals. Do not crush, chew, or split. 30 tablet 3 12/13/2024 Morning    rosuvastatin (Crestor) 20 mg tablet Take 0.5 tablets (10 mg) by mouth once daily at bedtime. (Patient taking differently: Take 1 tablet (20 mg) by mouth once daily at bedtime.) 30 tablet 3 12/12/2024    sacubitriL-valsartan (Entresto) 24-26 mg tablet Take 0.5 tablets by mouth 2 times a day. (Patient taking differently: Take 1 tablet by mouth 2 times a day.) 30 tablet 3 12/12/2024    sennosides-docusate sodium (Nelda-Colace) 8.6-50 mg tablet Take 1 tablet by mouth once daily at bedtime. 30 tablet 3 Past Month    spironolactone (Aldactone) 25 mg tablet Take 0.5 tablets (12.5 mg) by mouth once daily. 30 tablet 3 12/12/2024    Vitamin D3 50 mcg (2,000 unit) capsule Take 1 capsule (50 mcg) by mouth early in the morning..   12/13/2024 Morning    aspirin 81 mg EC tablet Take 1 tablet (81 mg) by mouth once daily. (Patient not taking: Reported on 12/13/2024)   Not Taking       Vitals:      12/19/2024     3:33 AM 12/18/2024    11:57  PM 12/18/2024     8:11 PM 12/18/2024     4:06 PM 12/18/2024    11:00 AM 12/18/2024     8:16 AM 12/18/2024     4:10 AM   Vitals   Systolic 118 111 123 114 103 109 108   Diastolic 74 71 74 57 62 67 65   BP Location Right arm Right arm Right arm Left arm Left arm Left arm Left arm   Heart Rate 67 79 81 62 73 81 62   Temp 36.3 °C (97.3 °F) 36.8 °C (98.2 °F) 36.7 °C (98.1 °F) 37.1 °C (98.8 °F) 36.2 °C (97.2 °F) 35.9 °C (96.6 °F) 36.4 °C (97.5 °F)   Resp 20 19 20 18 18 17 18   Weight (lb) 132.72      127.43   BMI 23.51 kg/m2      22.57 kg/m2   BSA (m2) 1.64 m2      1.6 m2     Wt Readings from Last 5 Encounters:   12/19/24 60.2 kg (132 lb 11.5 oz)   09/03/24 51.5 kg (113 lb 8 oz)     Weight         12/15/2024  0459 12/16/2024  0400 12/17/2024  0324 12/18/2024  0410 12/19/2024  0333    Weight: 69.1 kg (152 lb 5.4 oz) 65.6 kg (144 lb 10 oz) 61.3 kg (135 lb 2.3 oz) 57.8 kg (127 lb 6.8 oz) 60.2 kg (132 lb 11.5 oz)              Intake/Output Summary (Last 24 hours) at 12/19/2024 0821  Last data filed at 12/19/2024 0631  Gross per 24 hour   Intake 1162.8 ml   Output 5901 ml   Net -4738.2 ml       CHEST: Unlabored, Clear, Diminished  CV:  Ventricular paced  NEURO:   RASS:    CAM:    LOC: Alert  Cognition: Follows commands  GCS: 15    DATA:  CMP:  Recent Labs     12/18/24  2033 12/17/24  1933 12/16/24  2031 12/15/24  1959 12/14/24  1942 12/13/24  1815 12/13/24  1102 09/03/24  1900   * 135* 136 138 144 145 145 136   K 4.4 4.3 4.2 4.4 3.6 3.6 3.1* 5.1   CL 87* 93* 98 102 105 107 105 103   CO2 24 32 23 24 26 26 26 25   ANIONGAP 25* 14 19 16 17 16 17 13   BUN 32* 27* 26* 26* 27* 25* 23 22   CREATININE 1.86* 1.85* 2.02* 1.93* 2.09* 1.91* 2.12* 1.34*   EGFR 26* 26* 23* 25* 22* 25* 22* 38*   MG 3.00* 2.85* 2.74* 2.92* 2.74* 2.68* 2.70* 2.40     Recent Labs     12/18/24 2033 12/17/24  1933 12/16/24  2031 12/15/24  1959 12/14/24  1942 12/13/24  1815 12/13/24  1102 09/03/24  1900 08/29/24  1844 08/29/24  0954 08/24/24  2238  "08/23/24 2235   ALBUMIN 3.4 3.2* 3.2* 3.1* 3.5 3.3*  3.3* 3.6 2.8*   < >  --    < > 3.5   ALT 13 11 9 9 10 11 15  --   --   --   --  12   AST 30 26 21 17 18 20 19  --   --   --   --  18   BILITOT 1.2 0.9 0.9 0.8 0.7 1.0 1.1  --   --   --   --  0.7   LIPASE  --   --   --   --   --   --   --   --   --  9  --   --     < > = values in this interval not displayed.     CBC:  Recent Labs     12/18/24  2033 12/16/24  2031 12/15/24  1959 12/14/24  1942 12/13/24  1815 12/13/24  1102 09/03/24  1900 09/02/24  1903   WBC 4.8 5.6 5.5 4.3* 3.5* 3.6* 5.1 5.0   HGB 16.4* 14.1 13.7 13.8 13.9 14.4 15.8 16.0   HCT 52.3* 46.5* 44.3 44.2 42.7 43.2 49.0* 50.0*   * 83* 102* 99* 84* 101* 231 222   MCV 89 92 90 89 83 83 82 84     COAG:   Recent Labs     12/13/24  1218 08/26/24  0940 08/25/24  1932 08/24/24  1305 08/24/24  0645 08/24/24  0001   INR 1.3*  --   --   --   --  1.1   HAUF  --  0.5 0.5 0.6 0.6  --      ABO: No results for input(s): \"ABO\" in the last 33785 hours.  HEME/ENDO: No results for input(s): \"FERRITIN\", \"IRONSAT\", \"TSH\", \"FREET4\", \"HGBA1C\" in the last 83166 hours.  CARDIAC:   Recent Labs     12/13/24  1218 12/13/24  1102 08/23/24  2341 08/23/24  2235   TROPHS 330* 418* 189* 235*   BNP  --  >5,000*  --  2,626*     No results for input(s): \"CHOL\", \"LDLF\", \"LDLCALC\", \"HDL\", \"TRIG\" in the last 52595 hours.  TOX:No results for input(s): \"AMPHETAMINE\", \"BENZO\", \"CANNABINOID\", \"COCAI\", \"FENTANYL\", \"OPIATE\", \"OXYCODONE\", \"PCP\" in the last 94609 hours.    No lab exists for component: \"BARBSCRUR\"  MICRO: No results for input(s): \"ESR\", \"CRP\", \"PROCAL\" in the last 60943 hours.  No results found for the last 90 days.      EKG:   Recent Labs     12/13/24  1103 08/27/24  1833 08/23/24  2210 09/19/23  1013 02/07/17  1602   ATRRATE 60 80 61 74 60   VENTRATE 60 80 61 74 60   PRINT  --  166  --  172 164   QRSDUR 158 164 164 158 154   QTCFRED 502 495 494 502 464   QTCCALCB 502 519 495 519 464     Encounter Date: 12/13/24   ECG 12 lead "   Result Value    Ventricular Rate 60    Atrial Rate 60    QRS Duration 158    QT Interval 502    QTC Calculation(Bazett) 502    P Axis 38    R Axis 146    T Axis -4    QRS Count 10    Q Onset 193    T Offset 444    QTC Fredericia 502    Narrative    Atrial-paced rhythm  Nonspecific intraventricular block  Lateral infarct , age undetermined  Abnormal ECG  When compared with ECG of 27-AUG-2024 18:32,  Electronic atrial pacemaker has replaced Electronic ventricular pacemaker     Echocardiogram:   Recent Labs     08/24/24  1120   EF 33   LVIDD 3.56   RV 31.2   RVFRWALLPKSP 12.00   TAPSE 2.0   Transthoracic Echo (TTE) Complete 08/24/2024    Narrative  [facility], [address] [place] [address]  Tel [phone] and Fax [phone]    TRANSTHORACIC ECHOCARDIOGRAM REPORT      Patient Name:      MICHELE CONLEYNIKITA Jay Physician:    [id number] Mehrdad Yee MD  Study Date:        8/24/2024            Ordering Provider:    [id number] ALEN SIMON  MRN/PID:           [id number]             Fellow:  Accession#:        [id number]         Nurse:                Abigail Moore RN  Date of Birth/Age: 1935 / 88 years Sonographer:          Concha Pena RDCS  Gender:            F                    Additional Staff:  Height:            160.02 cm            Admit Date:  Weight:            73.03 kg             Admission Status:     Inpatient -  Routine  BSA / BMI:         1.76 m2 / 28.52      Encounter#:           [id number]  kg/m2  Blood Pressure:    128/70 mmHg          Department Location:  [facility]    Study Type:    TRANSTHORACIC ECHO (TTE) COMPLETE  Diagnosis/ICD: Chronic systolic (congestive) heart failure (CHF)-I50.22  Indication:    new HFrEF with decompensation  CPT Code:      Echo Complete w Full Doppler-[id number]    Patient History:  Pertinent History: NSTEMI, HFpEF, SSS s/p PPM 2017, HTN, CKD, CAD.    Study Detail: The following Echo studies were performed: 2D, M-Mode, Doppler  and  color flow. Agitated saline used as a contrast agent for  intraseptal flow evaluation.      Critical Event  Critical Event: Test was completed as per department protocol.  Critical Finding: Severe TR.  Time Test was Completed: 11:15:39 AM  Notified: Dr. Yee.  Attending notification time: 11:25:49 AM    PHYSICIAN INTERPRETATION:  Left Ventricle: Left ventricular ejection fraction is moderately decreased, by visual estimate at 30-35%. There is global hypokinesis of the left ventricle with minor regional variations. The left ventricular cavity size is decreased. The left ventricular septal wall thickness is moderately increased. There is moderately increased left ventricular posterior wall thickness. There is severe concentric left ventricular hypertrophy. Abnormal (paradoxical) septal motion, consistent with RV pacemaker. Spectral Doppler shows an impaired relaxation pattern of left ventricular diastolic filling. There is an elevated left ventricular end diastolic pressure.  Left Atrium: The left atrium is mildly dilated. A bubble study using agitated saline was performed. A small PFO (= 10 bubbles) was demonstrated.  Right Ventricle: The right ventricle is moderately enlarged. There is normal right ventricular global systolic function. A device is visualized in the right ventricle.  Right Atrium: The right atrium is severely dilated.  Aortic Valve: The aortic valve is trileaflet. There is minimal aortic valve cusp calcification. The aortic valve dimensionless index is 0.70. There is mild to moderate aortic valve regurgitation. The peak instantaneous gradient of the aortic valve is 3.0 mmHg. The mean gradient of the aortic valve is 1.8 mmHg.  Mitral Valve: The mitral valve is mildly thickened. There is mild to moderate mitral valve regurgitation.  Tricuspid Valve: The tricuspid valve is structurally normal. There is severe tricuspid regurgitation. The Doppler estimated RVSP is slightly elevated at 31.2 mmHg. The  RV systolic pressure is likely to be underestimated.  Pulmonic Valve: The pulmonic valve is structurally normal. There is physiologic pulmonic valve regurgitation.  Pericardium: There is a trivial pericardial effusion.  Pleural: There is bilateral pleural effusion.  Aorta: The aortic root is normal.  Systemic Veins: The inferior vena cava appears dilated. There is IVC inspiratory collapse greater than 50%. The hepatic vein shows a pattern of systolic flow reversal, suggestive of severe tricuspid regurgitation.  In comparison to the previous echocardiogram(s): Compared with study dated 10/3/2023, the LV EF is decreaaed (was 50-55%) and the TR is wide-open (was moderate).      CONCLUSIONS:  1. Left ventricular ejection fraction is moderately decreased, by visual estimate at 30-35%.  2. There is global hypokinesis of the left ventricle with minor regional variations.  3. Spectral Doppler shows an impaired relaxation pattern of left ventricular diastolic filling.  4. There is an elevated left ventricular end diastolic pressure.  5. Left ventricular cavity size is decreased.  6. Abnormal septal motion consistent with RV pacemaker.  7. Moderately increased left ventricular septal thickness.  8. The left ventricular posterior wall thickness is moderately increased.  9. There is severe concentric left ventricular hypertrophy.  10. There is normal right ventricular global systolic function.  11. Moderately enlarged right ventricle.  12. The left atrium is mildly dilated.  13. The right atrium is severely dilated.  14. Mild to moderate mitral valve regurgitation.  15. Severe tricuspid regurgitation visualized.  16. Slightly elevated RVSP.  17. The RV systolic pressure is likely to be underestimated.  18. Mild to moderate aortic valve regurgitation.  19. Sent a Haiku to the NP service.    QUANTITATIVE DATA SUMMARY:  2D MEASUREMENTS:  Normal Ranges:  Ao Root d:     3.20 cm  (2.0-3.7cm)  LAs:           4.25 cm   (2.7-4.0cm)  IVSd:          1.78 cm  (0.6-1.1cm)  LVPWd:         1.76 cm  (0.6-1.1cm)  LVIDd:         3.56 cm  (3.9-5.9cm)  LVIDs:         3.26 cm  LV Mass Index: 148 g/m2  LVEDV Index:   56 ml/m2  LV % FS        8.3 %    LA VOLUME:  Normal Ranges:  LA Vol A4C:        65.4 ml    (22+/-6mL/m2)  LA Vol A2C:        62.6 ml  LA Vol BP:         67.2 ml  LA Vol Index A4C:  37.1ml/m2  LA Vol Index A2C:  35.5 ml/m2  LA Vol Index BP:   38.1 ml/m2  LA Area A4C:       20.2 cm2  LA Area A2C:       18.8 cm2  LA Major Axis A4C: 5.3 cm  LA Major Axis A2C: 4.8 cm    RA VOLUME BY A/L METHOD:  Normal Ranges:  RA Area A4C: 31.0 cm2    AORTA MEASUREMENTS:  Normal Ranges:  Asc Ao, d: 3.30 cm (2.1-3.4cm)    LV SYSTOLIC FUNCTION BY 2D PLANIMETRY (MOD):  Normal Ranges:  EF-A4C View:    32 % (>=55%)  EF-A2C View:    39 %  EF-Biplane:     35 %  EF-Visual:      33 %  LV EF Reported: 33 %    LV DIASTOLIC FUNCTION:  Normal Ranges:  MV Peak E: 0.44 m/s    (0.7-1.2 m/s)  MV Peak A: 0.64 m/s    (0.42-0.7 m/s)  E/A Ratio: 0.68        (1.0-2.2)  MV A Dur:  124.57 msec  MV DT:     126 msec    (150-240 msec)    MITRAL VALVE:  Normal Ranges:  MV DT: 126 msec (150-240msec)    MITRAL INSUFFICIENCY:  Normal Ranges:  MR VTI:  176.42 cm  MR Vmax: 459.02 cm/s    AORTIC VALVE:  Normal Ranges:  AoV Vmax:                0.87 m/s (<=1.7m/s)  AoV Peak PG:             3.0 mmHg (<20mmHg)  AoV Mean P.8 mmHg (1.7-11.5mmHg)  LVOT Max Alfonso:            0.65 m/s (<=1.1m/s)  AoV VTI:                 16.35 cm (18-25cm)  LVOT VTI:                11.51 cm  LVOT Diameter:           1.98 cm  (1.8-2.4cm)  AoV Area, VTI:           2.17 cm2 (2.5-5.5cm2)  AoV Area,Vmax:           2.30 cm2 (2.5-4.5cm2)  AoV Dimensionless Index: 0.70    AORTIC INSUFFICIENCY:  AI Vmax:       3.51 m/s  AI Half-time:  433 msec  AI Decel Time: 1492 msec  AI Decel Rate: 235.88 cm/s2      RIGHT VENTRICLE:  RV Basal 4.80 cm  RV Mid   3.60 cm  RV Major 8.2 cm  TAPSE:   20.0 mm  RV s'    0.12  m/s    TRICUSPID VALVE/RVSP:  Normal Ranges:  Peak TR Velocity: 2.41 m/s  RV Syst Pressure: 31.2 mmHg (< 30mmHg)  IVC Diam:         2.89 cm    PULMONIC VALVE:  Normal Ranges:  PV Accel Time: 69 msec  (>120ms)  PV Max Alfonso:    0.7 m/s  (0.6-0.9m/s)  PV Max P.0 mmHg    AORTA:  Asc Ao Diam 3.26 cm      [id number] Mehrdad Yee MD  Electronically signed on 2024 at 12:07:44 PM        ** Final **    Coronary Angiography: No results found for this or any previous visit from the past 1800 days.    Right Heart Cath: No results found for this or any previous visit from the past 1800 days.    Cardiac Scoring:   CT angio coronary art with heartflow if score >30% 2024    Narrative  Interpreted By:  Adrián Lao and Okyere Robert  STUDY:  CT ANGIO CORONARY ART WITH HEARTFLOW IF SCORE >30%;  2024 9:06 am    INDICATION:  Signs/Symptoms:New HFrEF.    COMPARISON:  None.    ACCESSION NUMBER(S):  [id number]    ORDERING CLINICIAN:  MIRNA PIRES    TECHNIQUE:  Using multi-detector CT technology,  axial, sequential imaging with  prospective gating was performed of the chest following the  intravenous administration of contrast material.  A low-osmolar  contrast agent was used (90 ml of Omnipaque 350).    The patient was premedicated with 0.8 mg sublingual nitroglycerin for  coronary dilation.    For optimization of anatomic evaluation, multiplanar reconstruction,  maximum intensity projections, and advanced 3-D off-line  postprocessing were performed on a dedicated stand-alone workstation  under the direct supervision of the interpreting physician.    CT Dose-Length Product (DLP):   749 mGy/cm  CT Dose Reduction Employed: Yes (Prospective triggering, iterative  reconstruction)    FINDINGS:  POTENTIAL STUDY LIMITATIONS:  None.    CORONARY ARTERIES:    CORONARY ANATOMY:  There is normal origin of the coronary arteries.    CORONARY ARTERY CALCIUM SCORE:  LM 0  LAD 16.5  LCX 0  RCA 0  TOTAL 16.5    LEFT MAIN CORONARY  ARTERY:  The left main is normal sized vessel that  bifurcates into the LAD  and circumflex. There is no significant atherosclerotic change or  stenotic disease.    LEFT ANTERIOR DESCENDING ARTERY:  The LAD is a normal size vessel that  wraps around the apex.  It gives rise to  2 acute diagonal branches.  There is a focal area of calcific atherosclerosis in the proximal LAD  resulting in less than 25% luminal stenosis.    LEFT CIRCUMFLEX ARTERY:  The LCX is a normal size vessel, which is  non-dominant.  It gives rise to  1 obtuse marginal branches.  There is no significant atherosclerotic change or stenotic disease.    RIGHT CORONARY ARTERY:  The RCA is a normal size vessel, which is  dominant .  It gives rise to a  conus branch,  dashawn branch, and  1 acute  marginal branches.  In its distal segment it bifurcates into the PDA  and PV branch. There is no significant atherosclerotic change or  stenotic disease.    CARDIAC CHAMBERS:  The cardiac chambers demonstrate normal atrioventricular and  ventriculoarterial concordance, and systemic and pulmonary venous  return.    LEFT ATRIUM:  Mildly dilated Area 26.5 cm2    RIGHT ATRIUM:  Moderately dilated area: 33.3 cm2. A CIED lead noted.    INTERATRIAL SEPTUM:  Intact.    LEFT VENTRICLE:  Normal size 4.5 cm. Concentric left ventricular hypertrophy.    RIGHT VENTRICLE:  Dilated 5.1 cm. A CIED lead is noted.    AORTIC VALVE:  The aortic valve is  trileaflet in morphology.  No calcifications.    MITRAL VALVE:  No thickening/calcification.    THORACIC AORTA:  The visualized thoracic aorta is normal in course, caliber, and  contour. The ascending thoracic aorta is 2.8 cm in diameter. There is  no acute aortic pathology, such as dissection, intramural hematoma,  or contained rupture. The aortic arch is not included on this  examination.    PULMONARY  The main pulmonary artery is 3.4 cm in diameter, RPA 2.2 cm, LPA 1.8  cm.    PERICARDIUM:  There is no pericardial effusion of  thickening.    CHEST:  The chest wall is normal.  No significant lymphadenopathy or mass is seen in limited images of  the mediastinum. Limited imaging through the lungs reveals moderate  bilateral pleural effusion with dependent atelectasis. No pleural  effusion or pneumothorax.    UPPER ABDOMEN:  Limited imaging through the upper abdomen reveals no abnormalities of  the visualized organs.    Impression  1. Minor coronary artery disease.  2. The proximal LAD has focal calcified plaque with minimal stenosis  (<25%).  3. The LM, LCx and RCA have no significant atherosclerotic change or  stenotic disease.  4. Total Agatston calcium score of 17.  5. Right dominant coronary artery system.  6. Right atrial and right ventricular dilatation. Mild left atrial  dilatation.  7. Small bilateral pleural effusions with dependent atelectasis.  8. Dilated main pulmonary artery to 3.4 cm. Correlate/concern for  elevated pulmonary/right-sided filling pressures.      Signed by: Adrián Lao 8/29/2024 11:18 AM  Dictation workstation:   [id number]    Cardiac MRI: No results found for this or any previous visit from the past 1800 days.    Nuclear:  NM PYP Cardiac Amyloidosis with SPECT CT 08/26/2024    Narrative  Interpreted By:  Josh Templeton and Kaur Arashdeep  STUDY:  Amyloid SPECT Heart Study    INDICATION:  PYP scan to rule out ATTR amyloid depostion    COMPARISON:  None    ACCESSION NUMBER(S):  [id number]    ORDERING CLINICIAN:  BRODY HEATH    TECHNIQUE:  Following the intravenous administration 21.6 mCi of Tc-99m PYP,  images localized to the thorax in the anterior projection were  obtained at 1 hour post injection. In addition, SPECT/CT images were  obtained. Semi-quantitation was performed on these images with  regions of interest drawn over the heart and contralateral lung. In  addition, visual quantitative analysis was performed on SPECT images.    FINDINGS:  Analysis of the planar images reveals increased  radiotracer  deposition in the region of the heart.    The Heart to Contralateral lung ratio is 1.2. (Normal ratio is less  than < 1.2, equivocal = 1.21-1.4, > 1.5 is positive for TTR  amyloidosis)    SPECT/CT images reveal intense radiotracer deposition in the  myocardium relative to the ribs.    Semi-quantitative visual analysis reveals a GRADE of 3  Grade values are as follows:  1) Grade 0: No uptake in MYOCARDIUM (not bloodpool) and normal bone  uptake  2) Grade 1: Uptake in MYOCARDIUM (not bloodpool) less than rib uptake  3) Grade 2: Uptake in MYOCARDIUM (not bloodpool) equal to rib uptake  4) Grade 3: Uptake in MYOCARDIUM (not bloodpool) greater than rib  uptake with mild/absent rib uptake    Low dose CT demonstrates moderate bilateral pleural effusion with  associated basal collapse.    Impression  1. Amyloid SPECT heart study is suggestive of TTR amyloidosis.  2. Representative images were saved into the PACS system.    Note: A negative amyloid SPECT heart study does not rule out other  forms of possible cardiac amyloid deposition such as amyloid light  chain deposition.    I personally reviewed the images/study and I agree with the findings  as stated by Liliana Juárez MD.  This study was interpreted at  [facility], [place], [place].    MACRO:  None      Signed by: Josh Templeton 8/26/2024 2:55 PM  Dictation workstation:   [id number]    Metabolic Stress: No results found for this or any previous visit from the past 1800 days.      XR chest 1 view    Result Date: 12/13/2024  Interpreted By:  Adrián Del Toro, STUDY: XR CHEST 1 VIEW;  12/13/2024 12:46 pm   INDICATION: Signs/Symptoms:assess for pulmonary edema, has anasarca.     COMPARISON: Chest radiograph 08/28/2024   ACCESSION NUMBER(S): [id number]   ORDERING CLINICIAN: PATRIA MILLS   FINDINGS: AP radiograph of the chest was provided.   Left chest wall AICD/pacemaker is again seen with leads in place.   CARDIOMEDIASTINAL  SILHOUETTE: The cardiomediastinal silhouette is persistently moderate to severely enlarged with partial obscuration of the bilateral heart borders.   LUNGS: There are low lung volumes. There is increased bilateral moderate-to-large pleural effusions with bilateral basilar predominant patchy and interstitial opacities with perihilar prominence. There is no pneumothorax. Leftward tracheal deviation may be projectional   ABDOMEN: No remarkable upper abdominal findings.   BONES: No acute osseous changes.       1. Severe cardiomegaly with increased moderate-to-large bilateral pleural effusions and associated atelectasis and/or consolidation. Additional interstitial and patchy opacities with basilar predilection suggestive of interstitial and alveolar pulmonary edema. Correlation with fluid volume status is recommended. 2. Medical device as noted above.   MACRO: None   Signed by: Adrián Del Toro 12/13/2024 12:54 PM Dictation workstation:   [id number]       LDA:  External Urinary Catheter Female (Active)   Placement Date/Time: 12/18/24 0700   External Catheter Type: Female   Number of days: 1       NUTRITION: Adult diet Cardiac; 70 gm fat; 2 - 3 grams Sodium; 2000 mL fluid  EMERGENCY CONTACT: Extended Emergency Contact Information  Primary Emergency Contact: DuaneCassidy  Home Phone: [phone]  Mobile Phone: [phone]  Relation: Child  Secondary Emergency Contact: sandra block  Mobile Phone: [phone]  Relation: Child   needed? No  CODE STATUS: DNR and No Intubation  DISPO: Discharge Planning  Living Arrangements: Children  Support Systems: Children, Family members  Assistance Needed: assistance with ambulation  Type of Residence: Private residence  Number of Stairs to Enter Residence: 0  Number of Stairs Within Residence: 0  Do you have animals or pets at home?: No  Who is requesting discharge planning?: Provider  Home or Post Acute Services: In home services  Type of Home Care Services: Home OT,  Home PT  Expected Discharge Disposition: Home Health Care - Resumed  Does the patient need discharge transport arranged?: Yes  RoundTrip coordination needed?: Yes  Has discharge transport been arranged?: No  AMPAC: Daily Activity - Total Score: 18    FOLLOWUP:   Future Appointments   Date Time Provider Department Center   2/4/2025 11:20 AM Holli Cornejo MD [id number]

## 2024-12-19 NOTE — PROGRESS NOTES
Met with pt today to complete advance directives.  Pt has chosen her son James as the primary (as he also holds her financial DPOA), and her daughter Cassidy who is her primary caregiver.  Originals and copies provided to pt, copy scanned into EPIC.  SW had spoken to pt's daughter Cassidy yesterday who does not want pt to go to a SNF facility, and she feels that she is able to care for pt at home with the skilled RN PT OT that she has had in the past.  Encouraged primary team to reorder this.  Daughter also agreeable to having the palliative Navigator team meet with them at home for extra layer of support.  Suggested Healthy at home as well for symptom management if pt qualifies.  SW also provided daughter with the transportation phone number provided for pt's with [employer] insurance for those times when pt's grandchildren can not drive pt to her doctor appointments.  Sw can continue to follow as needed.      MARJORIE Varma

## 2024-12-19 NOTE — PROGRESS NOTES
Subjective Data:  Patient reports improvement in dyspnea and LE edema. States she sat up in the chair frequently yesterday.     Overnight Events:    None    Today in brief:  - 4.7 L net negative yesterday with significant improvement in edema-> lasix gtt discontinued, switched to torsemide 40 mg BID  - successfully weaned to RA today  - EP formally consulted for consideration of device upgrade, appreciate input  - outpatient palliative referral placed  - healthy at home referral placed    Objective Data:  Last Recorded Vitals:  Vitals:    12/18/24 2011 12/18/24 2357 12/19/24 0333 12/19/24 0822   BP: 123/74 111/71 118/74 107/62   BP Location: Right arm Right arm Right arm    Patient Position: Lying Lying Lying    Pulse: 81 79 67 67   Resp: 20 19 20 20   Temp: 36.7 °C (98.1 °F) 36.8 °C (98.2 °F) 36.3 °C (97.3 °F) 36.4 °C (97.5 °F)   TempSrc: Temporal Temporal Temporal    SpO2: 95% 99% 97% 96%   Weight:   60.2 kg (132 lb 11.5 oz)    Height:           Last Labs:  CBC - 12/18/2024:  8:33 PM  4.8 16.4 127    52.3      CMP - 12/18/2024:  8:33 PM  9.5 7.3 30 --- 1.2   2.6 3.4 13 147      PTT - 12/13/2024: 12:18 PM  1.3   14.4 30     TROPHS   Date/Time Value Ref Range Status   12/13/2024 12:18  0 - 34 ng/L Final     Comment:     Previous result verified on 12/13/2024 1210 on specimen/case [id number] called with component TRPHS for procedure Troponin I, High Sensitivity, Initial with value 418 ng/L.   12/13/2024 11:02  0 - 34 ng/L Final   08/23/2024 11:41  0 - 34 ng/L Final     Comment:     Previous result verified on 8/23/2024 2336 on specimen/case [id number] called with component TRPHS for procedure Troponin I, High Sensitivity, Initial with value 235 ng/L.     BNP   Date/Time Value Ref Range Status   12/13/2024 11:02 AM >5,000 0 - 99 pg/mL Final   08/23/2024 10:35 PM 2,626 0 - 99 pg/mL Final      Last I/O:  I/O last 3 completed shifts:  In: 1187.5 (19.7 mL/kg) [P.O.:1140; I.V.:47.5 (0.8  mL/kg)]  Out: 44692 (177.8 mL/kg) [Urine:73052 (4.9 mL/kg/hr); Stool:1]  Weight: 60.2 kg     Past Cardiology Tests (Last 3 Years):  EKG:  ECG 12 lead 12/13/2024  Atrial- paced rhythm, rate 60 BPM    Echo:  Transthoracic Echo (TTE) Complete 08/24/2024   1. Left ventricular ejection fraction is moderately decreased, by visual estimate at 30-35%.   2. There is global hypokinesis of the left ventricle with minor regional variations.   3. Spectral Doppler shows an impaired relaxation pattern of left ventricular diastolic filling.   4. There is an elevated left ventricular end diastolic pressure.   5. Left ventricular cavity size is decreased.   6. Abnormal septal motion consistent with RV pacemaker.   7. Moderately increased left ventricular septal thickness.   8. The left ventricular posterior wall thickness is moderately increased.   9. There is severe concentric left ventricular hypertrophy.  10. There is normal right ventricular global systolic function.  11. Moderately enlarged right ventricle.  12. The left atrium is mildly dilated.  13. The right atrium is severely dilated.  14. Mild to moderate mitral valve regurgitation.  15. Severe tricuspid regurgitation visualized.  16. Slightly elevated RVSP.  17. The RV systolic pressure is likely to be underestimated.  18. Mild to moderate aortic valve regurgitation.     Transthoracic Echo (TTE) Complete 10/03/2023   1. Left ventricular systolic function is mildly decreased with a 50-55% estimated ejection fraction.   2. Moderately increased left ventricular septal thickness.   3. The left ventricular posterior wall thickness is moderately increased.   4. Moderately enlarged right ventricle.   5. Mild to moderate aortic valve regurgitation.   6. Mild to moderate mitral valve regurgitation.   7. Moderate tricuspid regurgitation visualized.   8. The tricuspid valve annulus appears dilated.   9. The left atrium is moderately dilated.  10. Moderately elevated right ventricular  systolic pressure.  11. Compared with study from 2/17/2017, there is a significant increase in LV wall thickness. There is evidence of mild thickening of the RV free wall, associated with mild thickening of the AV, MV and tricuspid valve plus moderately elevated RVSP. This findings could be associated to an underlying infiltrative disease like amyloidosis.     Ejection Fractions:  EF   Date/Time Value Ref Range Status   08/24/2024 11:20 AM 33 %      Cardiac Imaging:  CT angio coronary art with heartflow if score >30% 08/29/2024  CT angio coronary art with heartflow if score >30% 08/29/2024  1. Minor coronary artery disease.  2. The proximal LAD has focal calcified plaque with minimal stenosis  (<25%).  3. The LM, LCx and RCA have no significant atherosclerotic change or  stenotic disease.  4. Total Agatston calcium score of 17.  5. Right dominant coronary artery system.  6. Right atrial and right ventricular dilatation. Mild left atrial  dilatation.  7. Small bilateral pleural effusions with dependent atelectasis.  8. Dilated main pulmonary artery to 3.4 cm. Correlate/concern for  elevated pulmonary/right-sided filling pressures.     Amyloid SPECT Heart Study 8/26/2024  1. Amyloid SPECT heart study is suggestive of TTR amyloidosis.     Inpatient Medications:  Scheduled medications   Medication Dose Route Frequency    [Held by provider] aspirin  81 mg oral Daily    ergocalciferol  1,250 mcg oral Every Sunday    heparin (porcine)  5,000 Units subcutaneous q8h    pantoprazole  40 mg oral Daily before breakfast    polyethylene glycol  17 g oral Daily    predniSONE  2.5 mg oral Daily    rosuvastatin  10 mg oral Nightly    [Held by provider] sacubitriL-valsartan  0.5 tablet oral BID    sennosides-docusate sodium  1 tablet oral Nightly    spironolactone  25 mg oral Daily     PRN medications   Medication    acetaminophen    ipratropium-albuteroL    white petrolatum     Continuous Medications   Medication Dose Last Rate     furosemide  20 mg/hr 20 mg/hr (24 0829)       Physical Exam  Constitutional:       General: She is awake. She is not in acute distress.  HENT:      Mouth/Throat:      Mouth: Mucous membranes are moist.   Eyes:      Extraocular Movements: Extraocular movements intact.   Neck:      Vascular: JVD present.      Comments: JVD elevated above clavicle   Cardiovascular:      Rate and Rhythm: Normal rate and regular rhythm.      Comments: AV paced at 60 BPM   Pulmonary:      Breath sounds: Normal breath sounds.      Comments: Decreased bases bilaterally (anterior auscultation)  Abdominal:      General: There is distension.      Tenderness: There is no abdominal tenderness.   Musculoskeletal:      Right upper arm: Swelling present.      Left upper arm: Swelling present.      Right lower le+ Edema present.      Left lower le+ Edema present.      Comments: LE edema improved, wrinkled LE with trace edema    Skin:     General: Skin is warm and dry.      Comments: Bilateral blisters LE. Left midshin nickel size, right near ankle nickel size. ELISEO   Neurological:      General: No focal deficit present.      Mental Status: She is alert and oriented to person, place, and time.   Psychiatric:         Mood and Affect: Mood normal.         Behavior: Behavior normal.        Assessment/Plan   Erin Lpoez is a 89 y.o. female presenting with PMH of NSTEMI (nonobstructive CAD 2017), TTR cardiac amyloidosis, HFrEF (30-35% -), LBBB and SSS s/p PPM 2017 s/p device changeout 2022, HTN, CKD presented to the ED with severe anasarca and acute decompensated heart failure. Admitted to [facility] for further management.      Acute on chronic systolic and diastolic heart failure (HFrEF 30-35%)  TTR Amyloidosis  Acute respiratory failure, improved   - TTE 10/2023: EF 50-55%, moderately increased LV septal and posterior wall thickness, Moderately enlarged V, mild to mod AR and MR, mod TR, moderate LAE, Compared to 2017 TTE there is possible  underlying infiltrative disease  - TTE 8/24/2024: EF 30-35%, gHK of LV,  elevated LVEDP, abnormal septal motion c/w V pacemaker, moderately increased LV septal thickness. LV posterior wall moderately thickened. Severe cLVH. Moderately enlarged RV, mild LAE, RA severely dilated, mild to mod MR, severe TR, Slightly elevated RVSP. Mild to mod AR.  - New TTR amyloidosis seen on 8/25 NM PYP scan-- K/L ration WNL, SPEP, UPEP negative  - admit CXR: Severe cardiomegaly with increased moderate-to-large bilateral pleural effusions and associated atelectasis and/or consolidation. Additional interstitial and patchy opacities with basilar predilection suggestive of interstitial and alveolar pulmonary edema  - weaned to RA today  - Admit BNP >5000 (previously 2,626 8/2024)  - discharge wt 9/4/2024 51.5 kg  - admit wt: 70.5 kg (recorded in ED)  - daily wt: 49.9 (standing wt) (57.8 kg) (61.3) (69.1) (68.8kg) likely bed weights   - patient admitted with severe anasarca up to breasts/upper arms  - during last admission, patient successfully diuresed with IV lasix boluses with intermittent diamox  - s/p IV lasix 40mg x1 in ED on admission  - lasix gtt started at 10mg/hr on 12/13  - s/p lasix gtt at 20 mg/hour  - 4.7 L net negative yesterday with significant improvement in edema-> lasix gtt discontinued, switched to torsemide 40 mg BID  - successfully weaned to RA today  - Advanced HF team consulted during last admission: Recommended initiating Tafamidis (amyloid coordinator informed at the time)  - patient no showed for Dr. Goss appt 9/17/2024 (transport issues)  - will continue holding BB iso ADHF/ ongoing concern for low output  - considered resuming dapagliflozin but has had borderline BG this admission, will defer at this time   - low dose Entresto 12/13 BID held iso aggressive diuresis   - 12/15 spironolactone increased to 25 mg daily to improve diuresis   - home GDMT: Entresto 12/13 BID, metop succ 12.5 mg daily,  dapagliflozin 10 mg daily,  and spironolactone 12.5 mg daily  - home diuretic: Lasix 20mg daily  - Daily standing weights, strict I&O's, 2g sodium diet, 2L fluid restriction. Patient has gonzalez in place, when near euvolemia, will need Dc'd.   - palliative care: DNR/DNI. Agreeable for outpatient follow up with palliative care (consult to be placed when closer to discharge). Ongoing pt and family support and care navigation. Assistance with home resources to support pt's goal. SW to assist with possible transportation support to ensure pt can make it to follow up appointments.   - Continue prednisone daily (long term use dating back to  for arthritis per patient) and neb PRN   - Advanced HF rec starting tafamadis outpatient after last discharge. Patient missed follow up appt. holding tafamadis initiation, consider starting outpatient.     Unilateral persistent LUE edema- improving   - fluid overloaded on presentation with 2+ swelling BL arms  - IV infusion switched from L arm, L arm elevated  -  LUE duplex given severe unilateral swelling of LUE and coolness (ordered in vasc lab) no DVT    Troponinemia  - HS trop: 418 -> 330. Suspected 2/2 demand from CHF  - CTA coronary : minor CAD, prox LAD has focal calcified plaque with minimal stenosis (<25%), LM, LCx and RCA have no significant atherosclerotic change or stenotic disease. Calcium score 17  - Cont rosuvastatin 20 mg nightly   - daily ASA held due to worsening thrombocytopenia      Hx of SSS s/p PPM , s/p device changeout 2022  - EC% AV pacing  - EP consulted during last admission due to concern for possible pacemaker mediated cardiomyopathy given high pacing burden on device interrogation: AV delayed extended but no intrinsic conduction noted. Recommended optimizing GDMT prior to considering upgrade to CRT since she would be high risk.   - Discussed with device upgrade with EP (formally consulted today now that patient is euvolemic),  interrogation and optimization ordered.  39% RA and 97% RV paced. Appreciate formal EP recommendations.      HTN  - Admit /76  - last 24 hour SBP range: 92- 123  - Entresto held as above      TERI on CKD IIIb  - Cr baseline 1.4-1.6  - admit Cr 2.12  - daily Cr 1.86 (1.85, 2.02) (1.93) (2.09) (1.91)  - likely cardiorenal iso severe anasarca   - diuresis as above  - avoid nephrotoxic agents     Hypokalemia, resolved  - admit K 3.1  - daily K 4.4  - continue spironolactone 25mg as above  - cont repletion during aggressive diuresis     Concern for congestive hepatopathy   Thrombocytopenia  - baseline 190-220s  - admit Plt: 101  - daily Plt 127 (83) (102) (99) (84)  - on admit: INR 1.3, elevated alk phos 163  - cont to trend CBC  - LFTs back to baseline  - daily ASA held iso worsening thrombocytopenia; Plt already low on admission, low concern for HIT   - diuresis as above     Arthritis  - Pt previously getting steroid injections q3 month then put on systemic steroids  - Cont prednisone 2.5 daily   - cont home PPI while on steroids      Dispo: pending EP recommendations for device upgrade   - PT/OT eval mod intensity    - patient continues to endorse wanting to return home with [facility] rather than SNF  - will plan for home with home care, healthy at home, and palliative navigator program upon discharge     DVT ppx: heparin SQ  Code Status:  Full Code    All labs, vital signs, tests & imaging results, and medications were reviewed.    Patient seen and examined with Dr. Omar Skinner, APRN-CNP

## 2024-12-20 ENCOUNTER — HOME HEALTH ADMISSION (OUTPATIENT)
Dept: HOME HEALTH SERVICES | Facility: HOME HEALTH | Age: 89
End: 2024-12-20
Payer: COMMERCIAL

## 2024-12-20 PROBLEM — E43 SEVERE PROTEIN-CALORIE MALNUTRITION (MULTI): Status: ACTIVE | Noted: 2024-12-20

## 2024-12-20 LAB
ALBUMIN SERPL BCP-MCNC: 3 G/DL (ref 3.4–5)
ALBUMIN SERPL BCP-MCNC: 3.2 G/DL (ref 3.4–5)
ALP SERPL-CCNC: 159 U/L (ref 33–136)
ALT SERPL W P-5'-P-CCNC: 21 U/L (ref 7–45)
ANION GAP SERPL CALC-SCNC: 15 MMOL/L (ref 10–20)
ANION GAP SERPL CALC-SCNC: 16 MMOL/L (ref 10–20)
AST SERPL W P-5'-P-CCNC: 31 U/L (ref 9–39)
BILIRUB SERPL-MCNC: 1.2 MG/DL (ref 0–1.2)
BNP SERPL-MCNC: 1184 PG/ML (ref 0–99)
BUN SERPL-MCNC: 33 MG/DL (ref 6–23)
BUN SERPL-MCNC: 36 MG/DL (ref 6–23)
CALCIUM SERPL-MCNC: 9.1 MG/DL (ref 8.6–10.6)
CALCIUM SERPL-MCNC: 9.2 MG/DL (ref 8.6–10.6)
CHLORIDE SERPL-SCNC: 83 MMOL/L (ref 98–107)
CHLORIDE SERPL-SCNC: 83 MMOL/L (ref 98–107)
CO2 SERPL-SCNC: 32 MMOL/L (ref 21–32)
CO2 SERPL-SCNC: 33 MMOL/L (ref 21–32)
CREAT SERPL-MCNC: 1.63 MG/DL (ref 0.5–1.05)
CREAT SERPL-MCNC: 1.74 MG/DL (ref 0.5–1.05)
CREAT UR-MCNC: 15.1 MG/DL (ref 20–320)
EGFRCR SERPLBLD CKD-EPI 2021: 28 ML/MIN/1.73M*2
EGFRCR SERPLBLD CKD-EPI 2021: 30 ML/MIN/1.73M*2
ERYTHROCYTE [DISTWIDTH] IN BLOOD BY AUTOMATED COUNT: 17.6 % (ref 11.5–14.5)
GLUCOSE BLD MANUAL STRIP-MCNC: 106 MG/DL (ref 74–99)
GLUCOSE SERPL-MCNC: 140 MG/DL (ref 74–99)
GLUCOSE SERPL-MCNC: 72 MG/DL (ref 74–99)
HCT VFR BLD AUTO: 49 % (ref 36–46)
HGB BLD-MCNC: 15.7 G/DL (ref 12–16)
MAGNESIUM SERPL-MCNC: 2.55 MG/DL (ref 1.6–2.4)
MCH RBC QN AUTO: 27 PG (ref 26–34)
MCHC RBC AUTO-ENTMCNC: 32 G/DL (ref 32–36)
MCV RBC AUTO: 84 FL (ref 80–100)
NRBC BLD-RTO: 0 /100 WBCS (ref 0–0)
OSMOLALITY SERPL: 267 MOSM/KG (ref 280–300)
OSMOLALITY UR: 298 MOSM/KG (ref 200–1200)
PHOSPHATE SERPL-MCNC: 2.1 MG/DL (ref 2.5–4.9)
PLATELET # BLD AUTO: 168 X10*3/UL (ref 150–450)
POTASSIUM SERPL-SCNC: 4 MMOL/L (ref 3.5–5.3)
POTASSIUM SERPL-SCNC: 4.1 MMOL/L (ref 3.5–5.3)
PROT SERPL-MCNC: 6.8 G/DL (ref 6.4–8.2)
RBC # BLD AUTO: 5.81 X10*6/UL (ref 4–5.2)
SODIUM SERPL-SCNC: 127 MMOL/L (ref 136–145)
SODIUM SERPL-SCNC: 127 MMOL/L (ref 136–145)
SODIUM UR-SCNC: 116 MMOL/L
SODIUM/CREAT UR-RTO: 768 MMOL/G CREAT
WBC # BLD AUTO: 3.8 X10*3/UL (ref 4.4–11.3)

## 2024-12-20 PROCEDURE — 36415 COLL VENOUS BLD VENIPUNCTURE: CPT

## 2024-12-20 PROCEDURE — 85027 COMPLETE CBC AUTOMATED: CPT

## 2024-12-20 PROCEDURE — 1200000002 HC GENERAL ROOM WITH TELEMETRY DAILY

## 2024-12-20 PROCEDURE — 97110 THERAPEUTIC EXERCISES: CPT | Mod: GP,CQ

## 2024-12-20 PROCEDURE — 80069 RENAL FUNCTION PANEL: CPT | Mod: CCI

## 2024-12-20 PROCEDURE — 99232 SBSQ HOSP IP/OBS MODERATE 35: CPT | Performed by: STUDENT IN AN ORGANIZED HEALTH CARE EDUCATION/TRAINING PROGRAM

## 2024-12-20 PROCEDURE — 2500000002 HC RX 250 W HCPCS SELF ADMINISTERED DRUGS (ALT 637 FOR MEDICARE OP, ALT 636 FOR OP/ED)

## 2024-12-20 PROCEDURE — 82947 ASSAY GLUCOSE BLOOD QUANT: CPT

## 2024-12-20 PROCEDURE — 97116 GAIT TRAINING THERAPY: CPT | Mod: GP,CQ

## 2024-12-20 PROCEDURE — 2500000004 HC RX 250 GENERAL PHARMACY W/ HCPCS (ALT 636 FOR OP/ED)

## 2024-12-20 PROCEDURE — 82570 ASSAY OF URINE CREATININE: CPT

## 2024-12-20 PROCEDURE — 83935 ASSAY OF URINE OSMOLALITY: CPT

## 2024-12-20 PROCEDURE — 2500000001 HC RX 250 WO HCPCS SELF ADMINISTERED DRUGS (ALT 637 FOR MEDICARE OP)

## 2024-12-20 PROCEDURE — 83735 ASSAY OF MAGNESIUM: CPT

## 2024-12-20 RX ORDER — METOPROLOL SUCCINATE 25 MG/1
12.5 TABLET, EXTENDED RELEASE ORAL DAILY
Status: DISCONTINUED | OUTPATIENT
Start: 2024-12-20 | End: 2024-12-27 | Stop reason: HOSPADM

## 2024-12-20 RX ADMIN — HEPARIN SODIUM 5000 UNITS: 5000 INJECTION, SOLUTION INTRAVENOUS; SUBCUTANEOUS at 06:00

## 2024-12-20 RX ADMIN — HEPARIN SODIUM 5000 UNITS: 5000 INJECTION, SOLUTION INTRAVENOUS; SUBCUTANEOUS at 13:08

## 2024-12-20 RX ADMIN — PREDNISONE 2.5 MG: 5 TABLET ORAL at 08:28

## 2024-12-20 RX ADMIN — ROSUVASTATIN CALCIUM 10 MG: 20 TABLET, FILM COATED ORAL at 20:35

## 2024-12-20 RX ADMIN — METOPROLOL SUCCINATE 12.5 MG: 25 TABLET, EXTENDED RELEASE ORAL at 10:43

## 2024-12-20 RX ADMIN — SPIRONOLACTONE 25 MG: 25 TABLET, FILM COATED ORAL at 08:28

## 2024-12-20 RX ADMIN — PANTOPRAZOLE SODIUM 40 MG: 40 TABLET, DELAYED RELEASE ORAL at 06:00

## 2024-12-20 RX ADMIN — HEPARIN SODIUM 5000 UNITS: 5000 INJECTION, SOLUTION INTRAVENOUS; SUBCUTANEOUS at 20:35

## 2024-12-20 RX ADMIN — SODIUM CHLORIDE 250 ML: 9 INJECTION, SOLUTION INTRAVENOUS at 17:07

## 2024-12-20 ASSESSMENT — COGNITIVE AND FUNCTIONAL STATUS - GENERAL
DAILY ACTIVITIY SCORE: 18
WALKING IN HOSPITAL ROOM: A LITTLE
PERSONAL GROOMING: A LITTLE
MOVING TO AND FROM BED TO CHAIR: A LITTLE
MOVING FROM LYING ON BACK TO SITTING ON SIDE OF FLAT BED WITH BEDRAILS: A LITTLE
TURNING FROM BACK TO SIDE WHILE IN FLAT BAD: A LITTLE
TURNING FROM BACK TO SIDE WHILE IN FLAT BAD: A LITTLE
HELP NEEDED FOR BATHING: A LITTLE
HELP NEEDED FOR BATHING: A LITTLE
DRESSING REGULAR UPPER BODY CLOTHING: A LITTLE
WALKING IN HOSPITAL ROOM: A LITTLE
MOVING TO AND FROM BED TO CHAIR: A LITTLE
DRESSING REGULAR UPPER BODY CLOTHING: A LITTLE
MOBILITY SCORE: 18
STANDING UP FROM CHAIR USING ARMS: A LITTLE
TOILETING: A LITTLE
TURNING FROM BACK TO SIDE WHILE IN FLAT BAD: A LITTLE
STANDING UP FROM CHAIR USING ARMS: A LITTLE
DAILY ACTIVITIY SCORE: 18
TOILETING: A LITTLE
CLIMB 3 TO 5 STEPS WITH RAILING: A LITTLE
PERSONAL GROOMING: A LITTLE
EATING MEALS: A LITTLE
MOBILITY SCORE: 16
MOVING FROM LYING ON BACK TO SITTING ON SIDE OF FLAT BED WITH BEDRAILS: A LITTLE
STANDING UP FROM CHAIR USING ARMS: A LITTLE
MOBILITY SCORE: 18
MOVING TO AND FROM BED TO CHAIR: A LITTLE
DRESSING REGULAR LOWER BODY CLOTHING: A LITTLE
DRESSING REGULAR LOWER BODY CLOTHING: A LITTLE
CLIMB 3 TO 5 STEPS WITH RAILING: A LITTLE
WALKING IN HOSPITAL ROOM: A LITTLE
MOVING FROM LYING ON BACK TO SITTING ON SIDE OF FLAT BED WITH BEDRAILS: A LITTLE
EATING MEALS: A LITTLE
CLIMB 3 TO 5 STEPS WITH RAILING: TOTAL

## 2024-12-20 ASSESSMENT — PAIN SCALES - GENERAL
PAINLEVEL_OUTOF10: 0 - NO PAIN
PAINLEVEL_OUTOF10: 0 - NO PAIN

## 2024-12-20 ASSESSMENT — PAIN - FUNCTIONAL ASSESSMENT
PAIN_FUNCTIONAL_ASSESSMENT: 0-10
PAIN_FUNCTIONAL_ASSESSMENT: 0-10

## 2024-12-20 NOTE — CONSULTS
"Nutrition Initial Assessment:   Nutrition Assessment    Reason for Assessment: Admission nursing screening    Patient is a 89 y.o. 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    Palliative team following. Pt made DNR and no intubation on 12/17.    Nutrition History:  Energy Intake: Fair 50-75 %  Food and Nutrient History: Met with pt and pts family. Pt reports appetite is fair. Pt states \"Some days I have a better appetite than others\". States consumes 1-2 meals per day. Reports she has Ensure supplements at home that she drinks. Pt agreeable to having Ensure/Boost supplements during hospital admission and prefers vanilla or strawberry flavor. Pt denies difficulty chewing/swallowing. Denies N/V/D, abdominal pain. Endorses some constipation.  Food Allergies/Intolerances:  None  GI Symptoms: Constipation  Oral Problems: None       Anthropometrics:  Height: 160 cm (5' 3\")   Weight: 48.7 kg (107 lb 5.8 oz)   BMI (Calculated): 19.02  IBW/kg (Dietitian Calculated): 52.3 kg  Percent of IBW: 93 %       Weight History:   Wt Readings from Last 10 Encounters:   12/20/24 48.7 kg (107 lb 5.8 oz)   09/03/24 51.5 kg (113 lb 8 oz)     Per chart review:  08/23/24 64.9 kg (143 lb)     Weight Change %:  Weight History / % Weight Change: Per wt hx has 25.0% wt loss in 4 months. Suspect some wt loss from fluids.  Significant Weight Loss: Yes  Interpretation of Weight Loss: >10% in 6 months    Nutrition Focused Physical Exam Findings:    Subcutaneous Fat Loss:   Orbital Fat Pads: Severe (dark circles, hollowing and loose skin)  Buccal Fat Pads: Severe (hollow, sunken and narrow face)  Muscle Wasting:  Temporalis: Severe (hollowed scooping depression)  Pectoralis (Clavicular Region): Severe (protruding prominent clavicle)  Deltoid/Trapezius: Severe " (squared shoulders, acromion process prominent)  Quadriceps: Mild-Moderate (mild depression on inner and outer thigh)  Gastrocnemius: Mild-Moderate (not well developed muscle)  Edema:  Edema: +1 trace  Edema Location: RUE, LUE, RLE, LLE  Physical Findings:  Hair: Negative  Eyes: Negative  Mouth: Negative  Skin: Positive (BLE blisters)    Nutrition Significant Labs:  CBC Trend:   Results from last 7 days   Lab Units 12/19/24  1928 12/18/24  2033 12/16/24  2031 12/15/24  1959   WBC AUTO x10*3/uL 4.9 4.8 5.6 5.5   RBC AUTO x10*6/uL 5.76* 5.88* 5.07 4.91   HEMOGLOBIN g/dL 16.1* 16.4* 14.1 13.7   HEMATOCRIT % 46.6* 52.3* 46.5* 44.3   MCV fL 81 89 92 90   PLATELETS AUTO x10*3/uL 155 127* 83* 102*    , BMP Trend:   Results from last 7 days   Lab Units 12/20/24  1109 12/19/24 1928 12/18/24 2033 12/17/24 1933   GLUCOSE mg/dL 72* 59* 42* 86   CALCIUM mg/dL 9.1 9.1 9.5 9.3   SODIUM mmol/L 127* 128* 132* 135*   POTASSIUM mmol/L 4.1 3.4* 4.4 4.3   CO2 mmol/L 33* 37* 24 32   CHLORIDE mmol/L 83* 82* 87* 93*   BUN mg/dL 33* 35* 32* 27*   CREATININE mg/dL 1.63* 1.72* 1.86* 1.85*    , BG POCT trend:   Results from last 7 days   Lab Units 12/20/24  1320 12/19/24 2236 12/19/24  0009   POCT GLUCOSE mg/dL 106* 117* 114*    , Renal Lab Trend:   Results from last 7 days   Lab Units 12/20/24  1109 12/19/24 1928 12/18/24 2033 12/17/24 1933   POTASSIUM mmol/L 4.1 3.4* 4.4 4.3   PHOSPHORUS mg/dL 2.1*  --   --   --    SODIUM mmol/L 127* 128* 132* 135*   MAGNESIUM mg/dL  --  2.60* 3.00* 2.85*   EGFR mL/min/1.73m*2 30* 28* 26* 26*   BUN mg/dL 33* 35* 32* 27*   CREATININE mg/dL 1.63* 1.72* 1.86* 1.85*        Nutrition Specific Medications:  Scheduled medications  [Held by provider] aspirin, 81 mg, oral, Daily  ergocalciferol, 1,250 mcg, oral, Every Sunday  heparin (porcine), 5,000 Units, subcutaneous, q8h  metoprolol succinate XL, 12.5 mg, oral, Daily  pantoprazole, 40 mg, oral, Daily before breakfast  polyethylene glycol, 17 g, oral,  Daily  predniSONE, 2.5 mg, oral, Daily  rosuvastatin, 10 mg, oral, Nightly  [Held by provider] sacubitriL-valsartan, 0.5 tablet, oral, BID  sennosides-docusate sodium, 1 tablet, oral, Nightly  [Held by provider] spironolactone, 25 mg, oral, Daily  [Held by provider] torsemide, 40 mg, oral, BID      Continuous medications     PRN medications  PRN medications: acetaminophen, ipratropium-albuteroL, white petrolatum     I/O:   Last BM Date:  (patient does not remember); Stool Appearance: Loose (12/18/24 2003)    Dietary Orders (From admission, onward)       Start     Ordered    12/20/24 1408  Oral nutritional supplements  Until discontinued        Question Answer Comment   Deliver with Breakfast    Deliver with Dinner    Select supplement: Boost [facility]        12/20/24 1408    12/15/24 1046  Adult diet Cardiac; 70 gm fat; 2 - 3 grams Sodium; 2000 mL fluid  Diet effective now        Question Answer Comment   Diet type Cardiac    Fat restriction: 70 gm fat    Sodium restriction: 2 - 3 grams Sodium    Dietary fluid restriction / 24h: 2000 mL fluid        12/15/24 1045    12/13/24 2155  May Participate in Room Service  ( ROOM SERVICE MAY PARTICIPATE)  Once        Question:  .  Answer:  Yes    12/13/24 2154                     Estimated Needs:   Total Energy Estimated Needs (kCal):  (4224-5669)  Method for Estimating Needs: 28 kcal/kg CBW  Total Protein Estimated Needs (g): 65 g  Method for Estimating Needs: 1.3 g/kg IBW  Total Fluid Estimated Needs (mL):  (per team)           Nutrition Diagnosis   Malnutrition Diagnosis  Patient has Malnutrition Diagnosis: Yes  Diagnosis Status: New  Malnutrition Diagnosis: Severe malnutrition related to chronic disease or condition  As Evidenced by: patient meeting <75% of estimated energy needs for >1 month; moderate-severe muscle and subcutaneous fat loss; 25.0% significant weight loss in 4 months.            Nutrition Interventions/Recommendations         Nutrition Prescription:  Boost  VHC BID (530 kcal, 22 g protein each).  Magic Cup once daily (290 kcal, 9 g protein).  Recommend liberalize diet to regular due to malnutrition.  Diet texture and consistency per SLP.        Nutrition Interventions:   Interventions: Meals and snacks, Medical food supplement  Goal: consume >50% of meals  Medical Food Supplement: Commercial beverage  Goal: consume >75% of ONS       Nutrition Education:   Encouraged PO intake of oral nutrition supplements for healing. Encouraged pt to consume protein containing foods due to decreased PO intake and fluid retention. Provided examples of protein rich foods.       Nutrition Monitoring and Evaluation   Food/Nutrient Related History Monitoring  Monitoring and Evaluation Plan: Energy intake, Amount of food  Criteria: meet >75% of estimated energy needs  Amount of Food: Medical food intake  Criteria: consume >75% of ONS    Body Composition/Growth/Weight History  Monitoring and Evaluation Plan: Weight  Criteria: stable weight    Biochemical Data, Medical Tests and Procedures  Monitoring and Evaluation Plan: Electrolyte/renal panel, Glucose/endocrine profile  Criteria: WNL              Time Spent (min): 60 minutes

## 2024-12-20 NOTE — CARE PLAN
The clinical goals for the shift include Pt to remain free from SOB this shift.    Over the shift, the patient remained stable; pt had no reports of SOB or pain; pt got up x2 assist for daily weight and tolerated it fairy well

## 2024-12-20 NOTE — PROGRESS NOTES
[facility] Attending Shared Visit Note    This is a shared visit. Please see Advanced Practice Provider's encounter note for additional details.      Briefly, 89F w/ HFrEF (30-35%, 8/2024), TTR cardiac amyloidosis (confirmed 8/2024), LBBB, SSS, s/p PPM (2017) with 99% V-pacing,  severe TR, mod AR/MR, mild CAD, CKD Stage IIIb, worsening dyspnea, and weight gain of ~40 lbs (since last admission) now s/p aggressive diuresis. EP evaluation for possible CRT upgrade as outpatient. Started oral diuretic and arranging rehab.     Diagnostic Imaging:  - TTE (8/2024):  EF 30-35%. Severe concentric LVH with global hypokinesis. Severe TR, mild/mod MR, mod AR. RA severely dilated. Findings consistent with infiltrative disease.  - Amyloid SPECT (8/2024): Suggestive of TTR amyloidosis.  - CTA Coronary (8/2024): Minor CAD (prox LAD <25% stenosis).    Exam: elderly, warm, hypervolemic. 1+ edema in LE and UEs.    Problems:   Assessment & Plan  Anasarca    Acute on chronic systolic heart failure    Wild-type transthyretin-related (ATTR) amyloidosis (Multi)    Severe tricuspid regurgitation    Severe protein-calorie malnutrition (Multi)    Plan:   # HFrEF / ATTR amyloidosis:   # SSS with PPM and 100% V-pacing: EP previously deferred CRT upgrade due to frailty  # TERI on CKD: CTM  - Continue aggressive diuresis.   - Home GDMT held (Entresto, BB, SGLT2i) due to ADHF and TERI. Spironolactone increased to 25 mg daily. On oral diuretic. SGLT2i reinitiation if stable blood sugars  - - EP engaged with consideration for CRT given c/f pacing induced CM on top of amyloid. Patient interested in therapy if offered  # GOC: patient reports that she has reasonable quality of life and would like to be a home over facility. Pall care engagement apprecaited    Dispo: Pending PT/OT evaluation, antipicipate rahab (mod intensity)    Dario Nelson MD    Objective   Admit Date: 12/13/2024  Hospital Length of Stay: 7   Home: [facility]  [id number]    MEDICATIONS  Infusions:       Scheduled:  [Held by provider] aspirin, 81 mg, Daily  ergocalciferol, 1,250 mcg, Every Sunday  heparin (porcine), 5,000 Units, q8h  pantoprazole, 40 mg, Daily before breakfast  polyethylene glycol, 17 g, Daily  predniSONE, 2.5 mg, Daily  rosuvastatin, 10 mg, Nightly  [Held by provider] sacubitriL-valsartan, 0.5 tablet, BID  sennosides-docusate sodium, 1 tablet, Nightly  spironolactone, 25 mg, Daily  [Held by provider] torsemide, 40 mg, BID      PRN:  acetaminophen, 650 mg, q6h PRN  ipratropium-albuteroL, 3 mL, q6h PRN  white petrolatum, , q1h PRN      Prior to Admission Meds:  Medications Prior to Admission   Medication Sig Dispense Refill Last Dose/Taking    dapagliflozin propanediol (Farxiga) 10 mg Take 1 tablet (10 mg) by mouth once every 24 hours. 30 tablet 3 12/12/2024    furosemide (Lasix) 20 mg tablet Take 1 tablet (20 mg) by mouth once daily. 30 tablet 3 12/12/2024    metoprolol succinate XL (Toprol-XL) 25 mg 24 hr tablet Take 0.5 tablets (12.5 mg) by mouth once daily. Do not crush or chew. 30 tablet 3 12/12/2024    pantoprazole (ProtoNix) 40 mg EC tablet Take 1 tablet (40 mg) by mouth once daily in the morning. Take before meals. Do not crush, chew, or split. 30 tablet 3 12/13/2024 Morning    rosuvastatin (Crestor) 20 mg tablet Take 0.5 tablets (10 mg) by mouth once daily at bedtime. (Patient taking differently: Take 1 tablet (20 mg) by mouth once daily at bedtime.) 30 tablet 3 12/12/2024    sacubitriL-valsartan (Entresto) 24-26 mg tablet Take 0.5 tablets by mouth 2 times a day. (Patient taking differently: Take 1 tablet by mouth 2 times a day.) 30 tablet 3 12/12/2024    sennosides-docusate sodium (Nelda-Colace) 8.6-50 mg tablet Take 1 tablet by mouth once daily at bedtime. 30 tablet 3 Past Month    spironolactone (Aldactone) 25 mg tablet Take 0.5 tablets (12.5 mg) by mouth once daily. 30 tablet 3 12/12/2024    Vitamin D3 50 mcg (2,000 unit) capsule Take 1 capsule (50  mcg) by mouth early in the morning..   12/13/2024 Morning    aspirin 81 mg EC tablet Take 1 tablet (81 mg) by mouth once daily. (Patient not taking: Reported on 12/13/2024)   Not Taking       Vitals:      12/20/2024     7:17 AM 12/20/2024     6:10 AM 12/20/2024     5:03 AM 12/19/2024    11:42 PM 12/19/2024     8:58 PM 12/19/2024     3:15 PM 12/19/2024    11:20 AM   Vitals   Systolic 103  99 103 97 94 92   Diastolic 63  61 66 60 60 62   BP Location Left arm  Left arm Left arm Left arm     Heart Rate 68  67 69 73 74 70   Temp 36.3 °C (97.3 °F)  36.2 °C (97.2 °F) 36 °C (96.8 °F) 36.4 °C (97.5 °F) 36 °C (96.8 °F) 36.2 °C (97.2 °F)   Resp 18  18 18 18 18 18   Weight (lb)  107.36        BMI  19.02 kg/m2        BSA (m2)  1.47 m2          Wt Readings from Last 5 Encounters:   12/20/24 48.7 kg (107 lb 5.8 oz)   09/03/24 51.5 kg (113 lb 8 oz)     Weight         12/17/2024  0324 12/18/2024  0410 12/19/2024  0333 12/19/2024  1003 12/20/2024  0610    Weight: 61.3 kg (135 lb 2.3 oz) 57.8 kg (127 lb 6.8 oz) 60.2 kg (132 lb 11.5 oz) 49.9 kg (110 lb 0.2 oz) 48.7 kg (107 lb 5.8 oz)              Intake/Output Summary (Last 24 hours) at 12/20/2024 0919  Last data filed at 12/20/2024 0717  Gross per 24 hour   Intake 604.2 ml   Output 2200 ml   Net -1595.8 ml       CHEST: Unlabored, Clear, Diminished  CV:  Ventricular paced  NEURO:   RASS:    CAM:    LOC: Alert  Cognition: Follows commands  GCS: 15    DATA:  CMP:  Recent Labs     12/19/24 1928 12/18/24  2033 12/17/24  1933 12/16/24  2031 12/15/24  1959 12/14/24  1942 12/13/24  1815 12/13/24  1102   * 132* 135* 136 138 144 145 145   K 3.4* 4.4 4.3 4.2 4.4 3.6 3.6 3.1*   CL 82* 87* 93* 98 102 105 107 105   CO2 37* 24 32 23 24 26 26 26   ANIONGAP 12 25* 14 19 16 17 16 17   BUN 35* 32* 27* 26* 26* 27* 25* 23   CREATININE 1.72* 1.86* 1.85* 2.02* 1.93* 2.09* 1.91* 2.12*   EGFR 28* 26* 26* 23* 25* 22* 25* 22*   MG 2.60* 3.00* 2.85* 2.74* 2.92* 2.74* 2.68* 2.70*     Recent Labs      "12/19/24  1928 12/18/24  2033 12/17/24  1933 12/16/24  2031 12/15/24  1959 12/14/24  1942 12/13/24  1815 12/13/24  1102 08/29/24  1844 08/29/24  0954   ALBUMIN 3.1* 3.4 3.2* 3.2* 3.1* 3.5 3.3*  3.3* 3.6   < >  --    ALT 19 13 11 9 9 10 11 15  --   --    AST 31 30 26 21 17 18 20 19  --   --    BILITOT 1.1 1.2 0.9 0.9 0.8 0.7 1.0 1.1  --   --    LIPASE  --   --   --   --   --   --   --   --   --  9    < > = values in this interval not displayed.     CBC:  Recent Labs     12/19/24  1928 12/18/24  2033 12/16/24  2031 12/15/24  1959 12/14/24  1942 12/13/24  1815 12/13/24  1102 09/03/24  1900   WBC 4.9 4.8 5.6 5.5 4.3* 3.5* 3.6* 5.1   HGB 16.1* 16.4* 14.1 13.7 13.8 13.9 14.4 15.8   HCT 46.6* 52.3* 46.5* 44.3 44.2 42.7 43.2 49.0*    127* 83* 102* 99* 84* 101* 231   MCV 81 89 92 90 89 83 83 82     COAG:   Recent Labs     12/13/24  1218 08/26/24  0940 08/25/24  1932 08/24/24  1305 08/24/24  0645 08/24/24  0001   INR 1.3*  --   --   --   --  1.1   HAUF  --  0.5 0.5 0.6 0.6  --      ABO: No results for input(s): \"ABO\" in the last 99216 hours.  HEME/ENDO: No results for input(s): \"FERRITIN\", \"IRONSAT\", \"TSH\", \"FREET4\", \"HGBA1C\" in the last 70698 hours.  CARDIAC:   Recent Labs     12/13/24  1218 12/13/24  1102 08/23/24  2341 08/23/24  2235   TROPHS 330* 418* 189* 235*   BNP  --  >5,000*  --  2,626*     No results for input(s): \"CHOL\", \"LDLF\", \"LDLCALC\", \"HDL\", \"TRIG\" in the last 81860 hours.  TOX:No results for input(s): \"AMPHETAMINE\", \"BENZO\", \"CANNABINOID\", \"COCAI\", \"FENTANYL\", \"OPIATE\", \"OXYCODONE\", \"PCP\" in the last 42406 hours.    No lab exists for component: \"BARBSCRUR\"  MICRO: No results for input(s): \"ESR\", \"CRP\", \"PROCAL\" in the last 18842 hours.  No results found for the last 90 days.      EKG:   Recent Labs     12/13/24  1103 08/27/24  1833 08/23/24  2210 09/19/23  1013 02/07/17  1602   ATRRATE 60 80 61 74 60   VENTRATE 60 80 61 74 60   PRINT  --  166  --  172 164   QRSDUR 158 164 164 158 154   QTCFRED 502 495 " 494 502 464   QTCCALCB 502 519 495 519 464     Encounter Date: 12/13/24   ECG 12 lead   Result Value    Ventricular Rate 60    Atrial Rate 60    QRS Duration 158    QT Interval 502    QTC Calculation(Bazett) 502    P Axis 38    R Axis 146    T Axis -4    QRS Count 10    Q Onset 193    T Offset 444    QTC Fredericia 502    Narrative    AV sequential or dual chamber electronic pacemaker  Abnormal ECG  Confirmed by Malvin Ba ([id number]) on 12/19/2024 1:55:47 PM     Echocardiogram:   Recent Labs     08/24/24  1120   EF 33   LVIDD 3.56   RV 31.2   RVFRWALLPKSP 12.00   TAPSE 2.0   Transthoracic Echo (TTE) Complete 08/24/2024    [facility], [address] [place] [address]  Tel [phone] and Fax [phone]    TRANSTHORACIC ECHOCARDIOGRAM REPORT      Patient Name:      MICHELE JACOB          Pierre Physician:    [id number] Mehrdad Yee MD  Study Date:        8/24/2024            Ordering Provider:    [id number] ALEN SIMON  MRN/PID:           [id number]             Fellow:  Accession#:        [id number]         Nurse:                Abigail Moore RN  Date of Birth/Age: 1935 / 88 years Sonographer:          Concha Pena RDCS  Gender:            F                    Additional Staff:  Height:            160.02 cm            Admit Date:  Weight:            73.03 kg             Admission Status:     Inpatient -  Routine  BSA / BMI:         1.76 m2 / 28.52      Encounter#:           [id number]  kg/m2  Blood Pressure:    128/70 mmHg          Department Location:  [facility] Non  Invasive    Study Type:    TRANSTHORACIC ECHO (TTE) COMPLETE  Diagnosis/ICD: Chronic systolic (congestive) heart failure (CHF)-I50.22  Indication:    new HFrEF with decompensation  CPT Code:      Echo Complete w Full Doppler-[id number]    Patient History:  Pertinent History: NSTEMI, HFpEF, SSS s/p PPM 2017, HTN, CKD, CAD.    Study Detail: The following Echo studies were performed: 2D, M-Mode, Doppler and  color flow.  Agitated saline used as a contrast agent for  intraseptal flow evaluation.      Critical Event  Critical Event: Test was completed as per department protocol.  Critical Finding: Severe TR.  Time Test was Completed: 11:15:39 AM  Notified: Dr. Yee.  Attending notification time: 11:25:49 AM    PHYSICIAN INTERPRETATION:  Left Ventricle: Left ventricular ejection fraction is moderately decreased, by visual estimate at 30-35%. There is global hypokinesis of the left ventricle with minor regional variations. The left ventricular cavity size is decreased. The left ventricular septal wall thickness is moderately increased. There is moderately increased left ventricular posterior wall thickness. There is severe concentric left ventricular hypertrophy. Abnormal (paradoxical) septal motion, consistent with RV pacemaker. Spectral Doppler shows an impaired relaxation pattern of left ventricular diastolic filling. There is an elevated left ventricular end diastolic pressure.  Left Atrium: The left atrium is mildly dilated. A bubble study using agitated saline was performed. A small PFO (= 10 bubbles) was demonstrated.  Right Ventricle: The right ventricle is moderately enlarged. There is normal right ventricular global systolic function. A device is visualized in the right ventricle.  Right Atrium: The right atrium is severely dilated.  Aortic Valve: The aortic valve is trileaflet. There is minimal aortic valve cusp calcification. The aortic valve dimensionless index is 0.70. There is mild to moderate aortic valve regurgitation. The peak instantaneous gradient of the aortic valve is 3.0 mmHg. The mean gradient of the aortic valve is 1.8 mmHg.  Mitral Valve: The mitral valve is mildly thickened. There is mild to moderate mitral valve regurgitation.  Tricuspid Valve: The tricuspid valve is structurally normal. There is severe tricuspid regurgitation. The Doppler estimated RVSP is slightly elevated at 31.2 mmHg. The RV systolic  pressure is likely to be underestimated.  Pulmonic Valve: The pulmonic valve is structurally normal. There is physiologic pulmonic valve regurgitation.  Pericardium: There is a trivial pericardial effusion.  Pleural: There is bilateral pleural effusion.  Aorta: The aortic root is normal.  Systemic Veins: The inferior vena cava appears dilated. There is IVC inspiratory collapse greater than 50%. The hepatic vein shows a pattern of systolic flow reversal, suggestive of severe tricuspid regurgitation.  In comparison to the previous echocardiogram(s): Compared with study dated 10/3/2023, the LV EF is decreaaed (was 50-55%) and the TR is wide-open (was moderate).      CONCLUSIONS:  1. Left ventricular ejection fraction is moderately decreased, by visual estimate at 30-35%.  2. There is global hypokinesis of the left ventricle with minor regional variations.  3. Spectral Doppler shows an impaired relaxation pattern of left ventricular diastolic filling.  4. There is an elevated left ventricular end diastolic pressure.  5. Left ventricular cavity size is decreased.  6. Abnormal septal motion consistent with RV pacemaker.  7. Moderately increased left ventricular septal thickness.  8. The left ventricular posterior wall thickness is moderately increased.  9. There is severe concentric left ventricular hypertrophy.  10. There is normal right ventricular global systolic function.  11. Moderately enlarged right ventricle.  12. The left atrium is mildly dilated.  13. The right atrium is severely dilated.  14. Mild to moderate mitral valve regurgitation.  15. Severe tricuspid regurgitation visualized.  16. Slightly elevated RVSP.  17. The RV systolic pressure is likely to be underestimated.  18. Mild to moderate aortic valve regurgitation.  19. Sent a Haiku to the NP service.    QUANTITATIVE DATA SUMMARY:  2D MEASUREMENTS:  Normal Ranges:  Ao Root d:     3.20 cm  (2.0-3.7cm)  LAs:           4.25 cm  (2.7-4.0cm)  IVSd:           1.78 cm  (0.6-1.1cm)  LVPWd:         1.76 cm  (0.6-1.1cm)  LVIDd:         3.56 cm  (3.9-5.9cm)  LVIDs:         3.26 cm  LV Mass Index: 148 g/m2  LVEDV Index:   56 ml/m2  LV % FS        8.3 %    LA VOLUME:  Normal Ranges:  LA Vol A4C:        65.4 ml    (22+/-6mL/m2)  LA Vol A2C:        62.6 ml  LA Vol BP:         67.2 ml  LA Vol Index A4C:  37.1ml/m2  LA Vol Index A2C:  35.5 ml/m2  LA Vol Index BP:   38.1 ml/m2  LA Area A4C:       20.2 cm2  LA Area A2C:       18.8 cm2  LA Major Axis A4C: 5.3 cm  LA Major Axis A2C: 4.8 cm    RA VOLUME BY A/L METHOD:  Normal Ranges:  RA Area A4C: 31.0 cm2    AORTA MEASUREMENTS:  Normal Ranges:  Asc Ao, d: 3.30 cm (2.1-3.4cm)    LV SYSTOLIC FUNCTION BY 2D PLANIMETRY (MOD):  Normal Ranges:  EF-A4C View:    32 % (>=55%)  EF-A2C View:    39 %  EF-Biplane:     35 %  EF-Visual:      33 %  LV EF Reported: 33 %    LV DIASTOLIC FUNCTION:  Normal Ranges:  MV Peak E: 0.44 m/s    (0.7-1.2 m/s)  MV Peak A: 0.64 m/s    (0.42-0.7 m/s)  E/A Ratio: 0.68        (1.0-2.2)  MV A Dur:  124.57 msec  MV DT:     126 msec    (150-240 msec)    MITRAL VALVE:  Normal Ranges:  MV DT: 126 msec (150-240msec)    MITRAL INSUFFICIENCY:  Normal Ranges:  MR VTI:  176.42 cm  MR Vmax: 459.02 cm/s    AORTIC VALVE:  Normal Ranges:  AoV Vmax:                0.87 m/s (<=1.7m/s)  AoV Peak PG:             3.0 mmHg (<20mmHg)  AoV Mean P.8 mmHg (1.7-11.5mmHg)  LVOT Max Alfonso:            0.65 m/s (<=1.1m/s)  AoV VTI:                 16.35 cm (18-25cm)  LVOT VTI:                11.51 cm  LVOT Diameter:           1.98 cm  (1.8-2.4cm)  AoV Area, VTI:           2.17 cm2 (2.5-5.5cm2)  AoV Area,Vmax:           2.30 cm2 (2.5-4.5cm2)  AoV Dimensionless Index: 0.70    AORTIC INSUFFICIENCY:  AI Vmax:       3.51 m/s  AI Half-time:  433 msec  AI Decel Time: 1492 msec  AI Decel Rate: 235.88 cm/s2      RIGHT VENTRICLE:  RV Basal 4.80 cm  RV Mid   3.60 cm  RV Major 8.2 cm  TAPSE:   20.0 mm  RV s'    0.12 m/s    TRICUSPID  VALVE/RVSP:  Normal Ranges:  Peak TR Velocity: 2.41 m/s  RV Syst Pressure: 31.2 mmHg (< 30mmHg)  IVC Diam:         2.89 cm    PULMONIC VALVE:  Normal Ranges:  PV Accel Time: 69 msec  (>120ms)  PV Max Alfonso:    0.7 m/s  (0.6-0.9m/s)  PV Max P.0 mmHg    AORTA:  Asc Ao Diam 3.26 cm      [id number] Mehrdad Yee MD  Electronically signed on 2024 at 12:07:44 PM        ** Final **    Coronary Angiography: No results found for this or any previous visit from the past 1800 days.    Right Heart Cath: No results found for this or any previous visit from the past 1800 days.    Cardiac Scoring:   CT angio coronary art with heartflow if score >30% 2024    Narrative  Interpreted By:  Adrián Lao and Okyere Robert  STUDY:  CT ANGIO CORONARY ART WITH HEARTFLOW IF SCORE >30%;  2024 9:06 am    INDICATION:  Signs/Symptoms:New HFrEF.    COMPARISON:  None.    ACCESSION NUMBER(S):  [id number]    ORDERING CLINICIAN:  MIRNA PIRES    TECHNIQUE:  Using multi-detector CT technology,  axial, sequential imaging with  prospective gating was performed of the chest following the  intravenous administration of contrast material.  A low-osmolar  contrast agent was used (90 ml of Omnipaque 350).    The patient was premedicated with 0.8 mg sublingual nitroglycerin for  coronary dilation.    For optimization of anatomic evaluation, multiplanar reconstruction,  maximum intensity projections, and advanced 3-D off-line  postprocessing were performed on a dedicated stand-alone workstation  under the direct supervision of the interpreting physician.    CT Dose-Length Product (DLP):   749 mGy/cm  CT Dose Reduction Employed: Yes (Prospective triggering, iterative  reconstruction)    FINDINGS:  POTENTIAL STUDY LIMITATIONS:  None.    CORONARY ARTERIES:    CORONARY ANATOMY:  There is normal origin of the coronary arteries.    CORONARY ARTERY CALCIUM SCORE:  LM 0  LAD 16.5  LCX 0  RCA 0  TOTAL 16.5    LEFT MAIN CORONARY ARTERY:  The left  main is normal sized vessel that  bifurcates into the LAD  and circumflex. There is no significant atherosclerotic change or  stenotic disease.    LEFT ANTERIOR DESCENDING ARTERY:  The LAD is a normal size vessel that  wraps around the apex.  It gives rise to  2 acute diagonal branches.  There is a focal area of calcific atherosclerosis in the proximal LAD  resulting in less than 25% luminal stenosis.    LEFT CIRCUMFLEX ARTERY:  The LCX is a normal size vessel, which is  non-dominant.  It gives rise to  1 obtuse marginal branches.  There is no significant atherosclerotic change or stenotic disease.    RIGHT CORONARY ARTERY:  The RCA is a normal size vessel, which is  dominant .  It gives rise to a  conus branch,  dashawn branch, and  1 acute  marginal branches.  In its distal segment it bifurcates into the PDA  and PV branch. There is no significant atherosclerotic change or  stenotic disease.    CARDIAC CHAMBERS:  The cardiac chambers demonstrate normal atrioventricular and  ventriculoarterial concordance, and systemic and pulmonary venous  return.    LEFT ATRIUM:  Mildly dilated Area 26.5 cm2    RIGHT ATRIUM:  Moderately dilated area: 33.3 cm2. A CIED lead noted.    INTERATRIAL SEPTUM:  Intact.    LEFT VENTRICLE:  Normal size 4.5 cm. Concentric left ventricular hypertrophy.    RIGHT VENTRICLE:  Dilated 5.1 cm. A CIED lead is noted.    AORTIC VALVE:  The aortic valve is  trileaflet in morphology.  No calcifications.    MITRAL VALVE:  No thickening/calcification.    THORACIC AORTA:  The visualized thoracic aorta is normal in course, caliber, and  contour. The ascending thoracic aorta is 2.8 cm in diameter. There is  no acute aortic pathology, such as dissection, intramural hematoma,  or contained rupture. The aortic arch is not included on this  examination.    PULMONARY  The main pulmonary artery is 3.4 cm in diameter, RPA 2.2 cm, LPA 1.8  cm.    PERICARDIUM:  There is no pericardial effusion of  thickening.    CHEST:  The chest wall is normal.  No significant lymphadenopathy or mass is seen in limited images of  the mediastinum. Limited imaging through the lungs reveals moderate  bilateral pleural effusion with dependent atelectasis. No pleural  effusion or pneumothorax.    UPPER ABDOMEN:  Limited imaging through the upper abdomen reveals no abnormalities of  the visualized organs.    Impression  1. Minor coronary artery disease.  2. The proximal LAD has focal calcified plaque with minimal stenosis  (<25%).  3. The LM, LCx and RCA have no significant atherosclerotic change or  stenotic disease.  4. Total Agatston calcium score of 17.  5. Right dominant coronary artery system.  6. Right atrial and right ventricular dilatation. Mild left atrial  dilatation.  7. Small bilateral pleural effusions with dependent atelectasis.  8. Dilated main pulmonary artery to 3.4 cm. Correlate/concern for  elevated pulmonary/right-sided filling pressures.      Signed by: Adrián Lao 8/29/2024 11:18 AM  Dictation workstation:   [id number]    Cardiac MRI: No results found for this or any previous visit from the past 1800 days.    Nuclear:  NM PYP Cardiac Amyloidosis with SPECT CT 08/26/2024    Narrative  Interpreted By:  Josh Templeton and Kaur Arashdeep  STUDY:  Amyloid SPECT Heart Study    INDICATION:  PYP scan to rule out ATTR amyloid depostion    COMPARISON:  None    ACCESSION NUMBER(S):  [id number]    ORDERING CLINICIAN:  BRODY HEATH    TECHNIQUE:  Following the intravenous administration 21.6 mCi of Tc-99m PYP,  images localized to the thorax in the anterior projection were  obtained at 1 hour post injection. In addition, SPECT/CT images were  obtained. Semi-quantitation was performed on these images with  regions of interest drawn over the heart and contralateral lung. In  addition, visual quantitative analysis was performed on SPECT images.    FINDINGS:  Analysis of the planar images reveals increased  radiotracer  deposition in the region of the heart.    The Heart to Contralateral lung ratio is 1.2. (Normal ratio is less  than < 1.2, equivocal = 1.21-1.4, > 1.5 is positive for TTR  amyloidosis)    SPECT/CT images reveal intense radiotracer deposition in the  myocardium relative to the ribs.    Semi-quantitative visual analysis reveals a GRADE of 3  Grade values are as follows:  1) Grade 0: No uptake in MYOCARDIUM (not bloodpool) and normal bone  uptake  2) Grade 1: Uptake in MYOCARDIUM (not bloodpool) less than rib uptake  3) Grade 2: Uptake in MYOCARDIUM (not bloodpool) equal to rib uptake  4) Grade 3: Uptake in MYOCARDIUM (not bloodpool) greater than rib  uptake with mild/absent rib uptake    Low dose CT demonstrates moderate bilateral pleural effusion with  associated basal collapse.    Impression  1. Amyloid SPECT heart study is suggestive of TTR amyloidosis.  2. Representative images were saved into the PACS system.    Note: A negative amyloid SPECT heart study does not rule out other  forms of possible cardiac amyloid deposition such as amyloid light  chain deposition.    I personally reviewed the images/study and I agree with the findings  as stated by Liliana Juárez MD.  This study was interpreted at  [facility], [place], [place].    MACRO:  None      Signed by: Josh Templeton 8/26/2024 2:55 PM  Dictation workstation:   [id number]    Metabolic Stress: No results found for this or any previous visit from the past 1800 days.      XR chest 1 view    Result Date: 12/13/2024  Interpreted By:  Adrián Del Toro, STUDY: XR CHEST 1 VIEW;  12/13/2024 12:46 pm   INDICATION: Signs/Symptoms:assess for pulmonary edema, has anasarca.     COMPARISON: Chest radiograph 08/28/2024   ACCESSION NUMBER(S): [id number]   ORDERING CLINICIAN: PATRIA MILLS   FINDINGS: AP radiograph of the chest was provided.   Left chest wall AICD/pacemaker is again seen with leads in place.   CARDIOMEDIASTINAL  SILHOUETTE: The cardiomediastinal silhouette is persistently moderate to severely enlarged with partial obscuration of the bilateral heart borders.   LUNGS: There are low lung volumes. There is increased bilateral moderate-to-large pleural effusions with bilateral basilar predominant patchy and interstitial opacities with perihilar prominence. There is no pneumothorax. Leftward tracheal deviation may be projectional   ABDOMEN: No remarkable upper abdominal findings.   BONES: No acute osseous changes.       1. Severe cardiomegaly with increased moderate-to-large bilateral pleural effusions and associated atelectasis and/or consolidation. Additional interstitial and patchy opacities with basilar predilection suggestive of interstitial and alveolar pulmonary edema. Correlation with fluid volume status is recommended. 2. Medical device as noted above.   MACRO: None   Signed by: Adrián Del Toro 12/13/2024 12:54 PM Dictation workstation:   [id number]       LDA:  External Urinary Catheter Female (Active)   Placement Date/Time: 12/18/24 0700   External Catheter Type: Female   Number of days: 2       NUTRITION: Adult diet Cardiac; 70 gm fat; 2 - 3 grams Sodium; 2000 mL fluid  EMERGENCY CONTACT: Extended Emergency Contact Information  Primary Emergency Contact: DuaneCassidy  Home Phone: [phone]  Mobile Phone: [phone]  Relation: Child  Secondary Emergency Contact: sandra block  Mobile Phone: [phone]  Relation: Child   needed? No  CODE STATUS: DNR and No Intubation  DISPO: Discharge Planning  Living Arrangements: Children  Support Systems: Children, Family members  Assistance Needed: assistance with ambulation  Type of Residence: Private residence  Number of Stairs to Enter Residence: 0  Number of Stairs Within Residence: 0  Do you have animals or pets at home?: No  Who is requesting discharge planning?: Provider  Home or Post Acute Services: In home services  Type of Home Care Services: Home OT,  Home PT  Expected Discharge Disposition: Home Health Care - Resumed  Does the patient need discharge transport arranged?: Yes  RoundTrip coordination needed?: Yes  Has discharge transport been arranged?: No  AMPAC: Daily Activity - Total Score: 18    FOLLOWUP:   Future Appointments   Date Time Provider Department Center   2/4/2025 11:20 AM Holli Cornejo MD [id number]

## 2024-12-20 NOTE — PROGRESS NOTES
Subjective Data:  Tele showing mostly ventricular-paced rhythm. Pt reports some SOB with exertion, but denies any at rest. She denies any CP or abdominal pain. States she is eating, drinking, and going to the bathroom without issue.     Overnight Events:    Hyponatremic on overnight labs.     Today in brief:  - Follow up with EP regarding risk/benefit of CRT-D and if indicated, whether it would be on an inpatient or outpatient basis.   - Restarting home metoprolol 12.5mg daily for GDMT optimization.   - Holding torsemide and spironolactone as patient appears to be well-diuresed and borderline hypovolemic.   - Continue holding SGLT2 iso intermittent low blood glucose.   - Repeat BNP today 1,184 (previous > 5,000).  - Sending urine/serum studies ISO of hyponatremia, will repeat late AM RFP.    - Involving dietician today for nutrition optimization and education.   - Continue discussing dispo planning with [facility] vs [facility].     Objective Data:  Last Recorded Vitals:  Vitals:    12/19/24 2058 12/19/24 2342 12/20/24 0503 12/20/24 0610   BP: 97/60 103/66 99/61    BP Location: Left arm Left arm Left arm    Patient Position: Lying Lying Lying    Pulse: 73 69 67    Resp: 18 18 18    Temp: 36.4 °C (97.5 °F) 36 °C (96.8 °F) 36.2 °C (97.2 °F)    TempSrc: Temporal Temporal Temporal    SpO2: 96% 95% 98%    Weight:    48.7 kg (107 lb 5.8 oz)   Height:           Last Labs:  CBC - 12/19/2024:  7:28 PM  4.9 16.1 155    46.6      CMP - 12/19/2024:  7:28 PM  9.1 6.7 31 --- 1.1   2.6 3.1 19 142      PTT - 12/13/2024: 12:18 PM  1.3   14.4 30     TROPHS   Date/Time Value Ref Range Status   12/13/2024 12:18  0 - 34 ng/L Final     Comment:     Previous result verified on 12/13/2024 1210 on specimen/case [id number] called with component [facility] for procedure Troponin I, High Sensitivity, Initial with value 418 ng/L.   12/13/2024 11:02  0 - 34 ng/L Final   08/23/2024 11:41  0 - 34 ng/L Final     Comment:      Previous result verified on 8/23/2024 2336 on specimen/case [id number] called with component [facility] for procedure Troponin I, High Sensitivity, Initial with value 235 ng/L.     BNP   Date/Time Value Ref Range Status   12/13/2024 11:02 AM >5,000 0 - 99 pg/mL Final   08/23/2024 10:35 PM 2,626 0 - 99 pg/mL Final      Last I/O:  I/O last 3 completed shifts:  In: 1112 (22.8 mL/kg) [P.O.:1080; I.V.:32 (0.7 mL/kg)]  Out: 5451 (111.9 mL/kg) [Urine:5450 (3.1 mL/kg/hr); Stool:1]  Weight: 48.7 kg     Past Cardiology Tests (Last 3 Years):  EKG:  ECG 12 lead 12/13/2024  Atrial- paced rhythm, rate 60 BPM    Echo:  Transthoracic Echo (TTE) Complete 08/24/2024   1. Left ventricular ejection fraction is moderately decreased, by visual estimate at 30-35%.   2. There is global hypokinesis of the left ventricle with minor regional variations.   3. Spectral Doppler shows an impaired relaxation pattern of left ventricular diastolic filling.   4. There is an elevated left ventricular end diastolic pressure.   5. Left ventricular cavity size is decreased.   6. Abnormal septal motion consistent with RV pacemaker.   7. Moderately increased left ventricular septal thickness.   8. The left ventricular posterior wall thickness is moderately increased.   9. There is severe concentric left ventricular hypertrophy.  10. There is normal right ventricular global systolic function.  11. Moderately enlarged right ventricle.  12. The left atrium is mildly dilated.  13. The right atrium is severely dilated.  14. Mild to moderate mitral valve regurgitation.  15. Severe tricuspid regurgitation visualized.  16. Slightly elevated RVSP.  17. The RV systolic pressure is likely to be underestimated.  18. Mild to moderate aortic valve regurgitation.     Transthoracic Echo (TTE) Complete 10/03/2023   1. Left ventricular systolic function is mildly decreased with a 50-55% estimated ejection fraction.   2. Moderately increased left ventricular septal  thickness.   3. The left ventricular posterior wall thickness is moderately increased.   4. Moderately enlarged right ventricle.   5. Mild to moderate aortic valve regurgitation.   6. Mild to moderate mitral valve regurgitation.   7. Moderate tricuspid regurgitation visualized.   8. The tricuspid valve annulus appears dilated.   9. The left atrium is moderately dilated.  10. Moderately elevated right ventricular systolic pressure.  11. Compared with study from 2/17/2017, there is a significant increase in LV wall thickness. There is evidence of mild thickening of the RV free wall, associated with mild thickening of the AV, MV and tricuspid valve plus moderately elevated RVSP. This findings could be associated to an underlying infiltrative disease like amyloidosis.     Ejection Fractions:  EF   Date/Time Value Ref Range Status   08/24/2024 11:20 AM 33 %      Cardiac Imaging:  CT angio coronary art with heartflow if score >30% 08/29/2024  CT angio coronary art with heartflow if score >30% 08/29/2024  1. Minor coronary artery disease.  2. The proximal LAD has focal calcified plaque with minimal stenosis  (<25%).  3. The LM, LCx and RCA have no significant atherosclerotic change or  stenotic disease.  4. Total Agatston calcium score of 17.  5. Right dominant coronary artery system.  6. Right atrial and right ventricular dilatation. Mild left atrial  dilatation.  7. Small bilateral pleural effusions with dependent atelectasis.  8. Dilated main pulmonary artery to 3.4 cm. Correlate/concern for  elevated pulmonary/right-sided filling pressures.     Amyloid SPECT Heart Study 8/26/2024  1. Amyloid SPECT heart study is suggestive of TTR amyloidosis.     Inpatient Medications:  Scheduled medications   Medication Dose Route Frequency    [Held by provider] aspirin  81 mg oral Daily    ergocalciferol  1,250 mcg oral Every Sunday    heparin (porcine)  5,000 Units subcutaneous q8h    pantoprazole  40 mg oral Daily before breakfast     polyethylene glycol  17 g oral Daily    predniSONE  2.5 mg oral Daily    rosuvastatin  10 mg oral Nightly    [Held by provider] sacubitriL-valsartan  0.5 tablet oral BID    sennosides-docusate sodium  1 tablet oral Nightly    spironolactone  25 mg oral Daily    [Held by provider] torsemide  40 mg oral BID     PRN medications   Medication    acetaminophen    ipratropium-albuteroL    white petrolatum     Continuous Medications   Medication Dose Last Rate       Physical Exam  Constitutional:       General: She is awake. She is not in acute distress.  HENT:      Head: Normocephalic.   Eyes:      Extraocular Movements: Extraocular movements intact.   Cardiovascular:      Rate and Rhythm: Normal rate and regular rhythm.      Pulses: Normal pulses.      Comments: Mostly V-paced   Pulmonary:      Effort: Pulmonary effort is normal. No respiratory distress.      Breath sounds: Normal breath sounds.   Abdominal:      General: Abdomen is flat. There is no distension.      Palpations: Abdomen is soft.      Tenderness: There is no abdominal tenderness.   Musculoskeletal:      Comments: Trace LE edema. B/l UE edema, although patient reports is improving.    Skin:     General: Skin is warm and dry.      Comments: healing bilateral LE blisters. Open to air.    Neurological:      General: No focal deficit present.      Mental Status: She is alert.   Psychiatric:         Mood and Affect: Mood normal.         Behavior: Behavior normal.        Assessment/Plan   Erin Lopez is a 89 y.o. female presenting with PMH of NSTEMI (nonobstructive CAD 2017), TTR cardiac amyloidosis, HFrEF (30-35% 8/2-24), LBBB and SSS s/p PPM 2017 s/p device changeout 11/2022, HTN, CKD presented to the ED with severe anasarca and acute decompensated heart failure. Admitted to [facility] for further management.      Acute on chronic systolic and diastolic heart failure (HFrEF 30-35%)  TTR Amyloidosis  Acute respiratory failure, improved   - TTE 10/2023: EF 50-55%,  moderately increased LV septal and posterior wall thickness, Moderately enlarged V, mild to mod AR and MR, mod TR, moderate LAE, Compared to 2017 TTE there is possible underlying infiltrative disease  - TTE 8/24/2024: EF 30-35%, gHK of LV,  elevated LVEDP, abnormal septal motion c/w V pacemaker, moderately increased LV septal thickness. LV posterior wall moderately thickened. Severe cLVH. Moderately enlarged RV, mild LAE, RA severely dilated, mild to mod MR, severe TR, Slightly elevated RVSP. Mild to mod AR.  - New TTR amyloidosis seen on 8/25 NM PYP scan-- K/L ration WNL, SPEP, UPEP negative  - admit CXR: Severe cardiomegaly with increased moderate-to-large bilateral pleural effusions and associated atelectasis and/or consolidation. Additional interstitial and patchy opacities with basilar predilection suggestive of interstitial and alveolar pulmonary edema  - S/p wean to RA.   - Admit BNP >5000 (previously 2,626 8/2024). Repeat today 1,184.   - discharge wt 9/4/2024 51.5 kg  - admit wt: 70.5 kg (recorded in ED)  - daily wt: 48.7 (standing wt) Previous weights: 49.9 kg, 57.8 kg, 61.3 kg.   - patient admitted with severe anasarca up to breasts/upper arms  - during last admission, patient successfully diuresed with IV lasix boluses with intermittent diamox  - s/p IV lasix 40mg x1 in ED on admission  - lasix gtt started at 10mg/hr on 12/13  - s/p lasix gtt at 20 mg/hour  - 1.7 L net negative over last 24 hours with continued improvement in edema  - Transitioned from lasix infusion to torsemide 40mg BID yesterday.  - Will hold torsemide/spironolactone today iso of improved edema and hyponatremia.   - Continues to be stable on RA with appropriate saturations.   - Advanced HF team consulted during last admission: Recommended initiating Tafamidis (amyloid coordinator informed at the time)  - patient no showed for Dr. Goss appt 9/17/2024 (transport issues)  - Restart home metoprolol succinate 12.5mg daily starting  today.   - Continue holding home dapagliflozin iso intermittent low blood glucose readings.   - low dose Entresto 12/13 BID held iso aggressive diuresis and low-normal blood pressures. Consider restarting prior to discharge vs outpatient if blood pressures are appropriate.   - 12/15 spironolactone increased to 25 mg daily to improve diuresis.  - home GDMT: Entresto 12/13 BID, metop succ 12.5 mg daily, dapagliflozin 10 mg daily,  and spironolactone 12.5 mg daily  - home diuretic: Lasix 20mg daily  - Daily standing weights, strict I&O's, 2g sodium diet, 2L fluid restriction.   - Palliative care: DNR/DNI. Agreeable for outpatient follow up with palliative care (consult to be placed when closer to discharge). Ongoing pt and family support and care navigation. Assistance with home resources to support pt's goal. SW to assist with possible transportation support to ensure pt can make it to follow up appointments.   - Continue prednisone daily (long term use dating back to  for arthritis per patient) and neb PRN   - Advanced HF rec starting tafamadis outpatient after last discharge. Patient missed follow up appt. holding tafamadis initiation, consider starting outpatient.     Unilateral persistent LUE edema- improving   - fluid overloaded on presentation with 2+ swelling BL arms  - IV infusion switched from L arm, L arm elevated  -  LUE duplex given severe unilateral swelling of LUE and coolness (ordered in vasc lab) no DVT    Troponinemia  - HS trop: 418 -> 330. Suspected 2/2 demand from CHF  - CTA coronary : minor CAD, prox LAD has focal calcified plaque with minimal stenosis (<25%), LM, LCx and RCA have no significant atherosclerotic change or stenotic disease. Calcium score 17  - Cont rosuvastatin 20 mg nightly   - daily ASA held due to worsening thrombocytopenia, consider restarting with continued normalization.      Hx of SSS s/p PPM , s/p device changeout 2022  - EC% AV pacing  - EP  consulted during last admission due to concern for possible pacemaker mediated cardiomyopathy given high pacing burden on device interrogation: AV delayed extended but no intrinsic conduction noted. Recommended optimizing GDMT prior to considering upgrade to CRT since she would be high risk.   - Discussed device upgrade with EP (formally consulted yesterday now that patient is euvolemic), interrogation and optimization ordered.  39% RA and 97% RV paced.   - Plan to reach out to EP today for formal recommendation regarding risk/benefit of CRT-D and if recommended, whether this should be done on an inpatient vs. outpatient basis.      HTN  - Admit /76  - last 24 hour SBP range: 92- 107  - Entresto held as above   - Restarting metoprolol succinate as above.      TERI on CKD IIIb  - Cr baseline 1.4-1.6  - admit Cr 2.12  - daily Cr 1.72 (1.86, 1.85, 2.02) (1.93) (2.09) (1.91)  - likely cardiorenal iso severe anasarca   - Holding diuresis as above  - avoid nephrotoxic agents    Hypovolemic Hyponatremia, Asymptomatic  - Sodium overnight of 128 (Previous of 132, 135, 136, 138).   - Repeat this morning of 127.   - Patient is neurologically intact on today's encounter, however, will monitor for any acute changes.   - 12/19 Urine/serum osmolality and urine sodium sent suggestive of hypovolemic hyponatremia 2/2 aggressive diuresis.   - Will hold Torsemide and Spironolactone to limit electrolyte disturbance.       Hypokalemia, resolved  - admit K 3.1  - daily K 4.1 (s/p K repletion overnight)  - Hold spironolactone 25mg as above  - cont repletion as needed.      Concern for congestive hepatopathy   Thrombocytopenia  - baseline 190-220s  - admit Plt: 101  - daily Plt 155 (127, 83, 102, 99, 84)  - on admit: INR 1.3, elevated alk phos 163  - cont to trend CBC  - LFTs back to baseline and reassuring on today's CMP.   - daily ASA held iso worsening thrombocytopenia; Plt already low on admission, low concern for HIT.       Arthritis  - Pt previously getting steroid injections q3 month then put on systemic steroids  - Cont prednisone 2.5 daily   - cont home PPI while on steroids      Dispo: pending EP recommendations for device upgrade   - PT/OT eval mod intensity    - Benefits of SNF compared to C relayed to patient this AM. Awaiting final decision.   - If patient chooses home, will plan for home with home care, healthy at home, and palliative navigator program upon discharge.     DVT ppx: heparin SQ  Code Status:  Full Code    All labs, vital signs, tests & imaging results, and medications were reviewed.    Patient seen and examined with Dr. Omar Jameson PA-C

## 2024-12-20 NOTE — PROGRESS NOTES
TCC spoke with patient's son (Pradeep Lopez, [phone]) regarding SNF choices (instead of HHC). List of SNF's sent to son for review. TCC will follow up with discharge planning.      YURIDIA Samuels, RN  [facility], [facility] 5&9  Transitional Care Coordinator, Mon-Fri  Cell: [phone], Office: [phone]  Email: [email or web address]

## 2024-12-20 NOTE — CARE PLAN
Problem: Skin  Goal: Decreased wound size/increased tissue granulation at next dressing change  Outcome: Progressing  Flowsheets (Taken 12/20/2024 1850)  Decreased wound size/increased tissue granulation at next dressing change:   Protective dressings over bony prominences   Utilize specialty bed per algorithm  Goal: Participates in plan/prevention/treatment measures  Outcome: Progressing  Flowsheets (Taken 12/20/2024 1850)  Participates in plan/prevention/treatment measures:   Discuss with provider PT/OT consult   Increase activity/out of bed for meals  Goal: Prevent/manage excess moisture  Outcome: Progressing  Flowsheets (Taken 12/20/2024 1850)  Prevent/manage excess moisture:   Monitor for/manage infection if present   Moisturize dry skin  Goal: Prevent/minimize sheer/friction injuries  Outcome: Progressing  Flowsheets (Taken 12/20/2024 1850)  Prevent/minimize sheer/friction injuries:   Complete micro-shifts as needed if patient unable. Adjust patient position to relieve pressure points, not a full turn   HOB 30 degrees or less   Turn/reposition every 2 hours/use positioning/transfer devices  Goal: Promote/optimize nutrition  Outcome: Progressing  Flowsheets (Taken 12/20/2024 1850)  Promote/optimize nutrition:   Consume > 50% meals/supplements   Offer water/supplements/favorite foods  Goal: Promote skin healing  Outcome: Progressing  Flowsheets (Taken 12/20/2024 1850)  Promote skin healing:   Assess skin/pad under line(s)/device(s)   Protective dressings over bony prominences   Turn/reposition every 2 hours/use positioning/transfer devices

## 2024-12-20 NOTE — PROGRESS NOTES
Physical Therapy    Physical Therapy Treatment    Patient Name: Erin Lopez  MRN: [id number]  Department: [place] [address]  Room: [address] [place] [address]  Today's Date: 12/20/2024  Time Calculation  Start Time: 1224  Stop Time: 1255  Time Calculation (min): 31 min         Assessment/Plan   PT Assessment  PT Assessment Results: Decreased strength, Decreased endurance, Impaired balance  End of Session Communication: Bedside nurse  Assessment Comment: Pt requires Min A to complete functional transfers with cues to inhibit pulling from FWW. Pt ambulated 15' using FWW with CGA and cues for FWW management. Pt ambulates with FF posture.  End of Session Patient Position: Up in chair, Alarm off, not on at start of session     PT Plan  Treatment/Interventions: Bed mobility, Transfer training, Gait training, Balance training, Strengthening, Endurance training, Therapeutic exercise, Therapeutic activity, Home exercise program  PT Plan: Ongoing PT  PT Frequency: 3 times per week  PT Discharge Recommendations: Moderate intensity level of continued care  Equipment Recommended upon Discharge:  (none)  PT Recommended Transfer Status: Assist x1  PT - OK to Discharge: Yes      General Visit Information:   PT  Visit  PT Received On: 12/20/24  General  Prior to Session Communication: Bedside nurse  Patient Position Received: Bed, 3 rail up, Alarm off, not on at start of session  General Comment: Pt supine in bed upon arrival. Pt pleasant and agreeable to therapy.    Subjective   Precautions:  Precautions  Hearing/Visual Limitations: Reading glasses  Medical Precautions: Cardiac precautions, Fall precautions  Precautions Comment: on RA    Vital Signs (Past 2hrs)        Date/Time Vitals Session Patient Position Pulse Resp SpO2 BP MAP (mmHg)    12/20/24 1153 --  --  70  18  97 %  108/65  80     12/20/24 1224 Post PT  Sitting  79  --  --  --  --                         Objective   Pain:     Cognition:  Cognition  Overall Cognitive Status: Within Functional  Limits  Coordination:       Activity Tolerance:  Activity Tolerance  Endurance: Tolerates 10 - 20 min exercise with multiple rests  Treatments:  Therapeutic Exercise  Therapeutic Exercise Performed: Yes  Therapeutic Exercise Activity 1: Seated B LE AROM LAQ, hip flexion/adduction, DF, heel raises x10 with increased time to complete    Bed Mobility  Bed Mobility: Yes  Bed Mobility 1  Bed Mobility 1: Supine to sitting  Level of Assistance 1: Contact guard  Bed Mobility Comments 1: increased time to complete, HOB elevated, use of bed rails    Ambulation/Gait Training  Ambulation/Gait Training Performed: Yes  Ambulation/Gait Training 1  Surface 1: Level tile  Device 1: Rolling walker  Assistance 1: Contact guard  Quality of Gait 1: Forward flexed posture, Decreased step length, Soft knee(s)  Comments/Distance (ft) 1: 15' Cues for FWW management.  Transfers  Transfer: Yes  Transfer 1  Transfer From 1: Bed to  Transfer to 1: Stand  Technique 1: Sit to stand  Transfer Device 1: Walker  Transfer Level of Assistance 1: Minimum assistance  Trials/Comments 1: Cues for hand placement, pt attempts to pull from FWW.  Transfers 2  Transfer From 2: Stand to  Transfer to 2: Chair with arms  Technique 2: Stand to sit  Transfer Device 2: Walker  Transfer Level of Assistance 2: Minimum assistance  Trials/Comments 2: Assit to control descent.    Outcome Measures:  [facility] Basic Mobility  Turning from your back to your side while in a flat bed without using bedrails: A little  Moving from lying on your back to sitting on the side of a flat bed without using bedrails: A little  Moving to and from bed to chair (including a wheelchair): A little  Standing up from a chair using your arms (e.g. wheelchair or bedside chair): A little  To walk in hospital room: A little  Climbing 3-5 steps with railing: Total  Basic Mobility - Total Score: 16    Education Documentation  Body Mechanics, taught by Lisy Garcia PTA at 12/20/2024  1:03  PM.  Learner: Patient  Readiness: Acceptance  Method: Explanation  Response: Verbalizes Understanding  Comment: Educated pt in HEP.    Home Exercise Program, taught by Lisy Garcia PTA at 12/20/2024  1:03 PM.  Learner: Patient  Readiness: Acceptance  Method: Explanation  Response: Verbalizes Understanding  Comment: Educated pt in HEP.    Precautions, taught by Lisy Garcia PTA at 12/20/2024  1:03 PM.  Learner: Patient  Readiness: Acceptance  Method: Explanation  Response: Verbalizes Understanding  Comment: Educated pt in HEP.    Mobility Training, taught by Lisy Garcia PTA at 12/20/2024  1:03 PM.  Learner: Patient  Readiness: Acceptance  Method: Explanation  Response: Verbalizes Understanding  Comment: Educated pt in HEP.    Education Comments  No comments found.        OP EDUCATION:       Encounter Problems       Encounter Problems (Active)       Balance       STG - Maintains supervision assistance dynamic standing balance with upper extremity support using a wheeled walker. (Progressing)       Start:  12/16/24    Expected End:  12/30/24            STG - Maintains supervision assistance static standing balance with upper extremity support using a wheeled walker. (Progressing)       Start:  12/16/24    Expected End:  12/30/24               Mobility       STG - Patient will ambulate 75ft with supervision assistance using a wheeled walker. (Progressing)       Start:  12/16/24    Expected End:  12/30/24               PT Transfers       STG - Transfer from bed to chair with supervision assistance using a wheeled walker. (Progressing)       Start:  12/16/24    Expected End:  12/30/24            STG - Patient to transfer to and from sit to supine with supervision assistance. (Progressing)       Start:  12/16/24    Expected End:  12/30/24            STG - Patient will transfer sit to and from stand with supervision assistance using a wheeled walker. (Progressing)       Start:  12/16/24    Expected End:  12/30/24

## 2024-12-21 LAB
ALBUMIN SERPL BCP-MCNC: 3 G/DL (ref 3.4–5)
ALBUMIN SERPL BCP-MCNC: 3.1 G/DL (ref 3.4–5)
ALP SERPL-CCNC: 175 U/L (ref 33–136)
ALT SERPL W P-5'-P-CCNC: 41 U/L (ref 7–45)
ANION GAP SERPL CALC-SCNC: 11 MMOL/L (ref 10–20)
ANION GAP SERPL CALC-SCNC: 14 MMOL/L (ref 10–20)
AST SERPL W P-5'-P-CCNC: 56 U/L (ref 9–39)
BILIRUB SERPL-MCNC: 0.9 MG/DL (ref 0–1.2)
BUN SERPL-MCNC: 36 MG/DL (ref 6–23)
BUN SERPL-MCNC: 38 MG/DL (ref 6–23)
CALCIUM SERPL-MCNC: 8.9 MG/DL (ref 8.6–10.6)
CALCIUM SERPL-MCNC: 9 MG/DL (ref 8.6–10.6)
CHLORIDE SERPL-SCNC: 85 MMOL/L (ref 98–107)
CHLORIDE SERPL-SCNC: 86 MMOL/L (ref 98–107)
CO2 SERPL-SCNC: 34 MMOL/L (ref 21–32)
CO2 SERPL-SCNC: 36 MMOL/L (ref 21–32)
CREAT SERPL-MCNC: 1.64 MG/DL (ref 0.5–1.05)
CREAT SERPL-MCNC: 1.74 MG/DL (ref 0.5–1.05)
EGFRCR SERPLBLD CKD-EPI 2021: 28 ML/MIN/1.73M*2
EGFRCR SERPLBLD CKD-EPI 2021: 30 ML/MIN/1.73M*2
GLUCOSE BLD MANUAL STRIP-MCNC: 113 MG/DL (ref 74–99)
GLUCOSE SERPL-MCNC: 69 MG/DL (ref 74–99)
GLUCOSE SERPL-MCNC: 81 MG/DL (ref 74–99)
MAGNESIUM SERPL-MCNC: 2.59 MG/DL (ref 1.6–2.4)
PHOSPHATE SERPL-MCNC: 2.2 MG/DL (ref 2.5–4.9)
POTASSIUM SERPL-SCNC: 3.9 MMOL/L (ref 3.5–5.3)
POTASSIUM SERPL-SCNC: 4 MMOL/L (ref 3.5–5.3)
PROT SERPL-MCNC: 6.5 G/DL (ref 6.4–8.2)
SODIUM SERPL-SCNC: 129 MMOL/L (ref 136–145)
SODIUM SERPL-SCNC: 129 MMOL/L (ref 136–145)

## 2024-12-21 PROCEDURE — 2500000001 HC RX 250 WO HCPCS SELF ADMINISTERED DRUGS (ALT 637 FOR MEDICARE OP)

## 2024-12-21 PROCEDURE — 85027 COMPLETE CBC AUTOMATED: CPT

## 2024-12-21 PROCEDURE — 36415 COLL VENOUS BLD VENIPUNCTURE: CPT

## 2024-12-21 PROCEDURE — 99232 SBSQ HOSP IP/OBS MODERATE 35: CPT | Performed by: STUDENT IN AN ORGANIZED HEALTH CARE EDUCATION/TRAINING PROGRAM

## 2024-12-21 PROCEDURE — 80053 COMPREHEN METABOLIC PANEL: CPT

## 2024-12-21 PROCEDURE — 83735 ASSAY OF MAGNESIUM: CPT

## 2024-12-21 PROCEDURE — 2500000004 HC RX 250 GENERAL PHARMACY W/ HCPCS (ALT 636 FOR OP/ED)

## 2024-12-21 PROCEDURE — 82947 ASSAY GLUCOSE BLOOD QUANT: CPT

## 2024-12-21 PROCEDURE — 1200000002 HC GENERAL ROOM WITH TELEMETRY DAILY

## 2024-12-21 PROCEDURE — 2500000002 HC RX 250 W HCPCS SELF ADMINISTERED DRUGS (ALT 637 FOR MEDICARE OP, ALT 636 FOR OP/ED)

## 2024-12-21 RX ADMIN — PANTOPRAZOLE SODIUM 40 MG: 40 TABLET, DELAYED RELEASE ORAL at 06:12

## 2024-12-21 RX ADMIN — HEPARIN SODIUM 5000 UNITS: 5000 INJECTION, SOLUTION INTRAVENOUS; SUBCUTANEOUS at 15:03

## 2024-12-21 RX ADMIN — HEPARIN SODIUM 5000 UNITS: 5000 INJECTION, SOLUTION INTRAVENOUS; SUBCUTANEOUS at 20:25

## 2024-12-21 RX ADMIN — PREDNISONE 2.5 MG: 5 TABLET ORAL at 09:33

## 2024-12-21 RX ADMIN — HEPARIN SODIUM 5000 UNITS: 5000 INJECTION, SOLUTION INTRAVENOUS; SUBCUTANEOUS at 06:11

## 2024-12-21 RX ADMIN — SENNOSIDES AND DOCUSATE SODIUM 1 TABLET: 8.6; 5 TABLET ORAL at 20:25

## 2024-12-21 RX ADMIN — ROSUVASTATIN CALCIUM 10 MG: 20 TABLET, FILM COATED ORAL at 20:25

## 2024-12-21 RX ADMIN — POLYETHYLENE GLYCOL 3350 17 G: 17 POWDER, FOR SOLUTION ORAL at 09:33

## 2024-12-21 RX ADMIN — METOPROLOL SUCCINATE 12.5 MG: 25 TABLET, EXTENDED RELEASE ORAL at 09:33

## 2024-12-21 ASSESSMENT — COGNITIVE AND FUNCTIONAL STATUS - GENERAL
DAILY ACTIVITIY SCORE: 18
PERSONAL GROOMING: A LITTLE
HELP NEEDED FOR BATHING: A LITTLE
TOILETING: A LITTLE
STANDING UP FROM CHAIR USING ARMS: A LITTLE
MOVING FROM LYING ON BACK TO SITTING ON SIDE OF FLAT BED WITH BEDRAILS: A LITTLE
TOILETING: A LITTLE
MOBILITY SCORE: 18
CLIMB 3 TO 5 STEPS WITH RAILING: A LITTLE
MOVING TO AND FROM BED TO CHAIR: A LITTLE
DAILY ACTIVITIY SCORE: 20
MOVING FROM LYING ON BACK TO SITTING ON SIDE OF FLAT BED WITH BEDRAILS: A LITTLE
TURNING FROM BACK TO SIDE WHILE IN FLAT BAD: A LITTLE
DRESSING REGULAR LOWER BODY CLOTHING: A LITTLE
EATING MEALS: A LITTLE
CLIMB 3 TO 5 STEPS WITH RAILING: A LOT
WALKING IN HOSPITAL ROOM: A LITTLE
DRESSING REGULAR LOWER BODY CLOTHING: A LITTLE
MOVING TO AND FROM BED TO CHAIR: A LITTLE
HELP NEEDED FOR BATHING: A LITTLE
WALKING IN HOSPITAL ROOM: A LITTLE
STANDING UP FROM CHAIR USING ARMS: A LITTLE
DRESSING REGULAR UPPER BODY CLOTHING: A LITTLE
MOBILITY SCORE: 17
TURNING FROM BACK TO SIDE WHILE IN FLAT BAD: A LITTLE
DRESSING REGULAR UPPER BODY CLOTHING: A LITTLE

## 2024-12-21 ASSESSMENT — PAIN SCALES - GENERAL
PAINLEVEL_OUTOF10: 0 - NO PAIN
PAINLEVEL_OUTOF10: 0 - NO PAIN

## 2024-12-21 ASSESSMENT — PAIN - FUNCTIONAL ASSESSMENT
PAIN_FUNCTIONAL_ASSESSMENT: 0-10
PAIN_FUNCTIONAL_ASSESSMENT: 0-10

## 2024-12-21 NOTE — PROGRESS NOTES
Subjective Data:  Tele showing mostly ventricular-paced rhythm. Pt again endorses some SOB with exertion but relieved with rest. She denies CP, dizziness, HA.     Overnight Events:    Hyponatremic on overnight labs.     Today in brief:  - Follow up with EP regarding risk/benefit of CRT-D: Per EP will consider CRT-D on an outpatient basis.   - Urine/serum studies suggestive of hypovolemic hyponatremia 2/2 diuresis.   - Continue holding torsemide and spironolactone iso hyponatremia.  - Repeat Na this morning with slight improvement from 127 to 129.   - Continue holding SGLT2 iso intermittent low blood glucose.   - Dietician recommending PO intake of oral nutritional supplements with Boost and Magic Cups.   - List of SNFs sent to patient's grandson, awaiting choice.       Objective Data:  Last Recorded Vitals:  Vitals:    12/20/24 1958 12/20/24 2353 12/21/24 0457 12/21/24 0709   BP: 91/54 105/62 105/65 101/58   BP Location: Left arm Left arm Left arm Left arm   Patient Position: Lying Lying Lying Lying   Pulse: 67 70 67 64   Resp: 20 19 20 20   Temp: 36.1 °C (97 °F) 36.8 °C (98.2 °F) 36.1 °C (97 °F) 36.8 °C (98.2 °F)   TempSrc: Temporal Temporal Temporal Temporal   SpO2: 96% 95% 95% 96%   Weight:   49.2 kg (108 lb 7.5 oz)    Height:           Last Labs:  CBC - 12/20/2024:  6:44 PM  3.8 15.7 168    49.0      CMP - 12/20/2024:  6:44 PM  9.2 6.8 31 --- 1.2   2.1 3.2 21 159      PTT - 12/13/2024: 12:18 PM  1.3   14.4 30     TROPHS   Date/Time Value Ref Range Status   12/13/2024 12:18  0 - 34 ng/L Final     Comment:     Previous result verified on 12/13/2024 1210 on specimen/case [id number] called with component [facility] for procedure Troponin I, High Sensitivity, Initial with value 418 ng/L.   12/13/2024 11:02  0 - 34 ng/L Final   08/23/2024 11:41  0 - 34 ng/L Final     Comment:     Previous result verified on 8/23/2024 2336 on specimen/case [id number] called with component [facility] for procedure  Troponin I, High Sensitivity, Initial with value 235 ng/L.     BNP   Date/Time Value Ref Range Status   12/19/2024 07:28 PM 1,184 0 - 99 pg/mL Final   12/13/2024 11:02 AM >5,000 0 - 99 pg/mL Final      Last I/O:  I/O last 3 completed shifts:  In: 730 (14.8 mL/kg) [P.O.:480; IV Piggyback:250]  Out: 2950 (60 mL/kg) [Urine:2950 (1.7 mL/kg/hr)]  Weight: 49.2 kg     Past Cardiology Tests (Last 3 Years):  EKG:  ECG 12 lead 12/13/2024  Atrial- paced rhythm, rate 60 BPM    Echo:  Transthoracic Echo (TTE) Complete 08/24/2024   1. Left ventricular ejection fraction is moderately decreased, by visual estimate at 30-35%.   2. There is global hypokinesis of the left ventricle with minor regional variations.   3. Spectral Doppler shows an impaired relaxation pattern of left ventricular diastolic filling.   4. There is an elevated left ventricular end diastolic pressure.   5. Left ventricular cavity size is decreased.   6. Abnormal septal motion consistent with RV pacemaker.   7. Moderately increased left ventricular septal thickness.   8. The left ventricular posterior wall thickness is moderately increased.   9. There is severe concentric left ventricular hypertrophy.  10. There is normal right ventricular global systolic function.  11. Moderately enlarged right ventricle.  12. The left atrium is mildly dilated.  13. The right atrium is severely dilated.  14. Mild to moderate mitral valve regurgitation.  15. Severe tricuspid regurgitation visualized.  16. Slightly elevated RVSP.  17. The RV systolic pressure is likely to be underestimated.  18. Mild to moderate aortic valve regurgitation.     Transthoracic Echo (TTE) Complete 10/03/2023   1. Left ventricular systolic function is mildly decreased with a 50-55% estimated ejection fraction.   2. Moderately increased left ventricular septal thickness.   3. The left ventricular posterior wall thickness is moderately increased.   4. Moderately enlarged right ventricle.   5. Mild to  moderate aortic valve regurgitation.   6. Mild to moderate mitral valve regurgitation.   7. Moderate tricuspid regurgitation visualized.   8. The tricuspid valve annulus appears dilated.   9. The left atrium is moderately dilated.  10. Moderately elevated right ventricular systolic pressure.  11. Compared with study from 2/17/2017, there is a significant increase in LV wall thickness. There is evidence of mild thickening of the RV free wall, associated with mild thickening of the AV, MV and tricuspid valve plus moderately elevated RVSP. This findings could be associated to an underlying infiltrative disease like amyloidosis.     Ejection Fractions:  EF   Date/Time Value Ref Range Status   08/24/2024 11:20 AM 33 %      Cardiac Imaging:  CT angio coronary art with heartflow if score >30% 08/29/2024  CT angio coronary art with heartflow if score >30% 08/29/2024  1. Minor coronary artery disease.  2. The proximal LAD has focal calcified plaque with minimal stenosis  (<25%).  3. The LM, LCx and RCA have no significant atherosclerotic change or  stenotic disease.  4. Total Agatston calcium score of 17.  5. Right dominant coronary artery system.  6. Right atrial and right ventricular dilatation. Mild left atrial  dilatation.  7. Small bilateral pleural effusions with dependent atelectasis.  8. Dilated main pulmonary artery to 3.4 cm. Correlate/concern for  elevated pulmonary/right-sided filling pressures.     Amyloid SPECT Heart Study 8/26/2024  1. Amyloid SPECT heart study is suggestive of TTR amyloidosis.     Inpatient Medications:  Scheduled medications   Medication Dose Route Frequency    [Held by provider] aspirin  81 mg oral Daily    ergocalciferol  1,250 mcg oral Every Sunday    heparin (porcine)  5,000 Units subcutaneous q8h    metoprolol succinate XL  12.5 mg oral Daily    pantoprazole  40 mg oral Daily before breakfast    polyethylene glycol  17 g oral Daily    predniSONE  2.5 mg oral Daily    rosuvastatin  10 mg  oral Nightly    [Held by provider] sacubitriL-valsartan  0.5 tablet oral BID    sennosides-docusate sodium  1 tablet oral Nightly    [Held by provider] spironolactone  25 mg oral Daily    [Held by provider] torsemide  40 mg oral BID     PRN medications   Medication    acetaminophen    ipratropium-albuteroL    white petrolatum     Continuous Medications   Medication Dose Last Rate       Physical Exam  Constitutional:       General: She is awake. She is not in acute distress.     Comments: Lying in bed   HENT:      Head: Normocephalic.   Eyes:      Extraocular Movements: Extraocular movements intact.      Pupils: Pupils are equal, round, and reactive to light.   Cardiovascular:      Rate and Rhythm: Normal rate and regular rhythm.      Pulses: Normal pulses.      Comments: Mostly V-paced with rate in 60s.   Pulmonary:      Effort: Pulmonary effort is normal. No respiratory distress.      Breath sounds: Normal breath sounds.   Abdominal:      General: Abdomen is flat. There is no distension.      Palpations: Abdomen is soft.      Tenderness: There is no abdominal tenderness.   Musculoskeletal:      Comments: Trace LE edema. Mild B/l UE edema   Skin:     General: Skin is warm and dry.      Comments: healing bilateral LE blisters. Open to air.    Neurological:      General: No focal deficit present.      Mental Status: She is alert.   Psychiatric:         Mood and Affect: Mood normal.         Behavior: Behavior normal.        Assessment/Plan   Erin Lopez is a 89 y.o. female presenting with PMH of NSTEMI (nonobstructive CAD 2017), TTR cardiac amyloidosis, HFrEF (30-35% 8/2-24), LBBB and SSS s/p PPM 2017 s/p device changeout 11/2022, HTN, CKD presented to the ED with severe anasarca and acute decompensated heart failure. Admitted to [facility] for further management.      Acute on chronic systolic and diastolic heart failure (HFrEF 30-35%)  TTR Amyloidosis  Acute respiratory failure, improved   - TTE 10/2023: EF 50-55%,  moderately increased LV septal and posterior wall thickness, Moderately enlarged V, mild to mod AR and MR, mod TR, moderate LAE, Compared to 2017 TTE there is possible underlying infiltrative disease  - TTE 8/24/2024: EF 30-35%, gHK of LV,  elevated LVEDP, abnormal septal motion c/w V pacemaker, moderately increased LV septal thickness. LV posterior wall moderately thickened. Severe cLVH. Moderately enlarged RV, mild LAE, RA severely dilated, mild to mod MR, severe TR, Slightly elevated RVSP. Mild to mod AR.  - New TTR amyloidosis seen on 8/25 NM PYP scan-- K/L ration WNL, SPEP, UPEP negative  - admit CXR: Severe cardiomegaly with increased moderate-to-large bilateral pleural effusions and associated atelectasis and/or consolidation. Additional interstitial and patchy opacities with basilar predilection suggestive of interstitial and alveolar pulmonary edema  - S/p wean to RA.   - Admit BNP >5000 (previously 2,626 8/2024). Repeat today 1,184.   - discharge wt 9/4/2024 51.5 kg  - admit wt: 70.5 kg (recorded in ED)  - daily wt: 49.2 kg Previous weights: 48.7 kg, 49.9 kg, 57.8 kg, 61.3 kg.   - patient admitted with severe anasarca up to breasts/upper arms  - during last admission, patient successfully diuresed with IV lasix boluses with intermittent diamox  - s/p IV lasix 40mg x1 in ED on admission  - lasix gtt started at 10mg/hr on 12/13  - s/p lasix gtt at 20 mg/hour  - 1.1 L net negative over last 24 hours with continued improvement in edema  - Transitioned from lasix infusion to torsemide 40mg BID on 12/19 then held on 12/20.   - Will continue holding torsemide/spironolactone today iso of improved edema and hyponatremia.   - Continues to be stable on RA with appropriate saturations.   - Advanced HF team consulted during last admission: Recommended initiating Tafamidis (amyloid coordinator informed at the time)  - patient no showed for Dr. Goss appt 9/17/2024 (transport issues)  - Restarted home metoprolol  succinate 12.5mg yesterday.   - Continue holding home dapagliflozin iso intermittent low blood glucose readings.   - low dose Entresto 12/13 BID held iso aggressive diuresis and low-normal blood pressures. Consider restarting prior to discharge vs outpatient if blood pressures are appropriate.   - 12/15 spironolactone increased to 25 mg daily to improve diuresis.  - home GDMT: Entresto 12/13 BID, metop succ 12.5 mg daily, dapagliflozin 10 mg daily,  and spironolactone 12.5 mg daily  - home diuretic: Lasix 20mg daily  - Daily standing weights, strict I&O's, 2g sodium diet, 2L fluid restriction.   - Palliative care: DNR/DNI. Agreeable for outpatient follow up with palliative care (consult to be placed when closer to discharge). Ongoing pt and family support and care navigation. Assistance with home resources to support pt's goal. SW to assist with possible transportation support to ensure pt can make it to follow up appointments. Update at this time is for patient/family to select a SNF, however if they should choose home, will plan to provide support with palliative/home healthcare involvement.   - Continue prednisone daily (long term use dating back to 2023 for arthritis per patient) and neb PRN   - Advanced HF rec starting tafamadis outpatient after last discharge. Patient missed follow up appt. holding tafamadis initiation, consider starting outpatient.     Upper extremity edema - improving   - fluid overloaded on presentation with 2+ swelling BL arms  - IV infusion switched from L arm, L arm elevated  - 12/16 LUE duplex given severe unilateral swelling of LUE and coolness (ordered in vasc lab) no DVT  - On exam, upper extremity edema is improving.     Troponinemia  - HS trop: 418 -> 330. Suspected 2/2 demand from CHF  - CTA coronary 8/29: minor CAD, prox LAD has focal calcified plaque with minimal stenosis (<25%), LM, LCx and RCA have no significant atherosclerotic change or stenotic disease. Calcium score 17  -  Cont rosuvastatin 20 mg nightly   - daily ASA held due to worsening thrombocytopenia, consider restarting with stable  normalization.      Hx of SSS s/p PPM , s/p device changeout 2022  - EC% AV pacing  - EP consulted during last admission due to concern for possible pacemaker mediated cardiomyopathy given high pacing burden on device interrogation: AV delayed extended but no intrinsic conduction noted. Recommended optimizing GDMT prior to considering upgrade to CRT since she would be high risk.   - Discussed device upgrade with EP (formally consulted yesterday now that patient is euvolemic), interrogation and optimization ordered.  39% RA and 97% RV paced.   - Per EP, there appears to be dyssynchrony on echo and patient may benefit from device upgrade. EP stating this can be pursued on an outpatient basis and requested an EP consult be placed prior to discharge.      HTN  - Admit /76  - last 24 hour SBP range: 91- 108  - Entresto held as above   - Cont metoprolol succinate as above.      TERI on CKD IIIb  - Cr baseline 1.4-1.6  - admit Cr 2.12  - daily Cr 1.74 (1.63, 1.72, 1.86, 1.85, 2.02)   - likely cardiorenal iso severe anasarca   - Holding diuresis as above  - avoid nephrotoxic agents    Hypovolemic Hyponatremia, Asymptomatic  - Sodium overnight of 127 (Previous of 127, 128, 132, 135, 136, 138).   - Repeat this morning of 129.   - Patient is neurologically intact on today's encounter, however, will monitor for any acute changes.   -  Urine/serum osmolality and urine sodium sent suggestive of hypovolemic hyponatremia 2/2 aggressive diuresis.   - Will continue holding Torsemide and Spironolactone to limit electrolyte disturbance.       Hypokalemia, resolved  - admit K 3.1  - daily K 3.9   - Hold spironolactone 25mg as above  - cont repletion as needed.      Concern for congestive hepatopathy   Thrombocytopenia  - baseline 190-220s  - admit Plt: 101  - daily Plt 168 (155, 127, 83, 102, 99,  84)  - on admit: INR 1.3, elevated alk phos 163  - cont to trend CBC  - Persistent increased Alk Phos, however no elevation in AST/ALT.   - daily ASA held iso worsening thrombocytopenia; Plt already low on admission, low concern for HIT.      Arthritis  - Pt previously getting steroid injections q3 month then put on systemic steroids  - Cont prednisone 2.5 daily   - cont home PPI while on steroids      Dispo: pending EP recommendations for device upgrade   - PT/OT eval mod intensity    - Awaiting family's choice on SNF.   - In the event the patient should choose discharge to home, will plan for home with home care, healthy at home, and palliative navigator program upon discharge.     DVT ppx: heparin SQ  Code Status:  Full Code    All labs, vital signs, tests & imaging results, and medications were reviewed.    Patient seen and examined with Dr. Radha Jameson PA-C

## 2024-12-21 NOTE — CARE PLAN
Patient was able to sit in the chair and transfer back to the bed with no issues. Had no SOB or complaints of pain throughout the night

## 2024-12-21 NOTE — PROGRESS NOTES
Attempted to reach out to patient son and daughter for snf choices Daughter Cassidy  [phone]  stated her grandson James  [phone]  was handling the process called and left voicemail for James to call   me back will continue to follow.

## 2024-12-22 LAB
ALBUMIN SERPL BCP-MCNC: 3.6 G/DL (ref 3.4–5)
ALP SERPL-CCNC: 188 U/L (ref 33–136)
ALT SERPL W P-5'-P-CCNC: 75 U/L (ref 7–45)
ANION GAP SERPL CALC-SCNC: 12 MMOL/L (ref 10–20)
AST SERPL W P-5'-P-CCNC: 95 U/L (ref 9–39)
BILIRUB SERPL-MCNC: 1.2 MG/DL (ref 0–1.2)
BUN SERPL-MCNC: 43 MG/DL (ref 6–23)
CALCIUM SERPL-MCNC: 9.5 MG/DL (ref 8.6–10.6)
CHLORIDE SERPL-SCNC: 87 MMOL/L (ref 98–107)
CO2 SERPL-SCNC: 36 MMOL/L (ref 21–32)
CREAT SERPL-MCNC: 1.65 MG/DL (ref 0.5–1.05)
EGFRCR SERPLBLD CKD-EPI 2021: 30 ML/MIN/1.73M*2
ERYTHROCYTE [DISTWIDTH] IN BLOOD BY AUTOMATED COUNT: 16.3 % (ref 11.5–14.5)
ERYTHROCYTE [DISTWIDTH] IN BLOOD BY AUTOMATED COUNT: 17 % (ref 11.5–14.5)
GLUCOSE SERPL-MCNC: 66 MG/DL (ref 74–99)
HCT VFR BLD AUTO: 44.5 % (ref 36–46)
HCT VFR BLD AUTO: 44.5 % (ref 36–46)
HGB BLD-MCNC: 14.3 G/DL (ref 12–16)
HGB BLD-MCNC: 15 G/DL (ref 12–16)
MAGNESIUM SERPL-MCNC: 2.83 MG/DL (ref 1.6–2.4)
MCH RBC QN AUTO: 27.3 PG (ref 26–34)
MCH RBC QN AUTO: 27.9 PG (ref 26–34)
MCHC RBC AUTO-ENTMCNC: 32.1 G/DL (ref 32–36)
MCHC RBC AUTO-ENTMCNC: 33.7 G/DL (ref 32–36)
MCV RBC AUTO: 83 FL (ref 80–100)
MCV RBC AUTO: 85 FL (ref 80–100)
NRBC BLD-RTO: 0 /100 WBCS (ref 0–0)
NRBC BLD-RTO: 0 /100 WBCS (ref 0–0)
PLATELET # BLD AUTO: 175 X10*3/UL (ref 150–450)
PLATELET # BLD AUTO: 184 X10*3/UL (ref 150–450)
POTASSIUM SERPL-SCNC: 4.2 MMOL/L (ref 3.5–5.3)
PROT SERPL-MCNC: 7.4 G/DL (ref 6.4–8.2)
RBC # BLD AUTO: 5.24 X10*6/UL (ref 4–5.2)
RBC # BLD AUTO: 5.37 X10*6/UL (ref 4–5.2)
SODIUM SERPL-SCNC: 131 MMOL/L (ref 136–145)
WBC # BLD AUTO: 4 X10*3/UL (ref 4.4–11.3)
WBC # BLD AUTO: 4.3 X10*3/UL (ref 4.4–11.3)

## 2024-12-22 PROCEDURE — 83735 ASSAY OF MAGNESIUM: CPT

## 2024-12-22 PROCEDURE — 36415 COLL VENOUS BLD VENIPUNCTURE: CPT

## 2024-12-22 PROCEDURE — 2500000002 HC RX 250 W HCPCS SELF ADMINISTERED DRUGS (ALT 637 FOR MEDICARE OP, ALT 636 FOR OP/ED)

## 2024-12-22 PROCEDURE — 2500000001 HC RX 250 WO HCPCS SELF ADMINISTERED DRUGS (ALT 637 FOR MEDICARE OP)

## 2024-12-22 PROCEDURE — 2500000004 HC RX 250 GENERAL PHARMACY W/ HCPCS (ALT 636 FOR OP/ED)

## 2024-12-22 PROCEDURE — 85027 COMPLETE CBC AUTOMATED: CPT

## 2024-12-22 PROCEDURE — 99232 SBSQ HOSP IP/OBS MODERATE 35: CPT | Performed by: STUDENT IN AN ORGANIZED HEALTH CARE EDUCATION/TRAINING PROGRAM

## 2024-12-22 PROCEDURE — 80053 COMPREHEN METABOLIC PANEL: CPT

## 2024-12-22 PROCEDURE — 1200000002 HC GENERAL ROOM WITH TELEMETRY DAILY

## 2024-12-22 RX ORDER — TORSEMIDE 20 MG/1
40 TABLET ORAL DAILY
Status: DISCONTINUED | OUTPATIENT
Start: 2024-12-22 | End: 2024-12-23

## 2024-12-22 RX ADMIN — HEPARIN SODIUM 5000 UNITS: 5000 INJECTION, SOLUTION INTRAVENOUS; SUBCUTANEOUS at 06:02

## 2024-12-22 RX ADMIN — TORSEMIDE 40 MG: 20 TABLET ORAL at 14:01

## 2024-12-22 RX ADMIN — HEPARIN SODIUM 5000 UNITS: 5000 INJECTION, SOLUTION INTRAVENOUS; SUBCUTANEOUS at 12:30

## 2024-12-22 RX ADMIN — METOPROLOL SUCCINATE 12.5 MG: 25 TABLET, EXTENDED RELEASE ORAL at 08:06

## 2024-12-22 RX ADMIN — SENNOSIDES AND DOCUSATE SODIUM 1 TABLET: 8.6; 5 TABLET ORAL at 20:07

## 2024-12-22 RX ADMIN — ROSUVASTATIN CALCIUM 10 MG: 20 TABLET, FILM COATED ORAL at 20:07

## 2024-12-22 RX ADMIN — HEPARIN SODIUM 5000 UNITS: 5000 INJECTION, SOLUTION INTRAVENOUS; SUBCUTANEOUS at 20:10

## 2024-12-22 RX ADMIN — ERGOCALCIFEROL 1250 MCG: 1.25 CAPSULE ORAL at 08:06

## 2024-12-22 RX ADMIN — PANTOPRAZOLE SODIUM 40 MG: 40 TABLET, DELAYED RELEASE ORAL at 06:02

## 2024-12-22 RX ADMIN — PREDNISONE 2.5 MG: 5 TABLET ORAL at 08:06

## 2024-12-22 ASSESSMENT — COGNITIVE AND FUNCTIONAL STATUS - GENERAL
MOVING FROM LYING ON BACK TO SITTING ON SIDE OF FLAT BED WITH BEDRAILS: A LITTLE
TOILETING: A LITTLE
CLIMB 3 TO 5 STEPS WITH RAILING: A LOT
WALKING IN HOSPITAL ROOM: A LITTLE
DRESSING REGULAR LOWER BODY CLOTHING: A LITTLE
HELP NEEDED FOR BATHING: A LITTLE
DAILY ACTIVITIY SCORE: 20
TURNING FROM BACK TO SIDE WHILE IN FLAT BAD: A LITTLE
STANDING UP FROM CHAIR USING ARMS: A LITTLE
MOVING TO AND FROM BED TO CHAIR: A LITTLE
MOBILITY SCORE: 17
DRESSING REGULAR UPPER BODY CLOTHING: A LITTLE

## 2024-12-22 ASSESSMENT — PAIN - FUNCTIONAL ASSESSMENT: PAIN_FUNCTIONAL_ASSESSMENT: 0-10

## 2024-12-22 ASSESSMENT — PAIN SCALES - GENERAL: PAINLEVEL_OUTOF10: 0 - NO PAIN

## 2024-12-22 NOTE — PROGRESS NOTES
[facility] Attending Shared Visit Note     This is a shared visit. Please see Advanced Practice Provider's encounter note for additional details.       Briefly, 89F w/ HFrEF (30-35%, 8/2024), TTR cardiac amyloidosis (confirmed 8/2024), LBBB, SSS, s/p PPM (2017) with 99% V-pacing,  severe TR, mod AR/MR, mild CAD, CKD Stage IIIb, worsening dyspnea, and weight gain of ~40 lbs (since last admission) now s/p aggressive diuresis. Patient looks very frail and catechetic on exam. I do not believe she is a a CRT-D upgrade candidate. She awaiting for SNF.      Exam: elderly, warm, euvolemic . 1+ edema in LE and UEs.    # Acute on chronic systolic and diastolic heart failure (HFrEF 30-35%)  # TTR Amyloidosis  # Acute respiratory failure, improved   - Holding diuresis in the setting of hyponatremia

## 2024-12-22 NOTE — PROGRESS NOTES
Overnight Events:    No acute events overnight    Subjective Data:  Patient sitting up in bed following breakfast. She reports her appetite comes and goes, ate a majority of her small breakfast this morning. She is eager to be discharged to rehab.    Today in brief:  - remains euvolemic on exam with stable Cr. Weight slightly up from yesterday  - will restart maintenance diuretic -- torsemide 40mg daily  - TCC inquired to family about SNF choices-- grandson still reviewing list    Objective Data:  Last Recorded Vitals:  Vitals:    12/21/24 2308 12/22/24 0543 12/22/24 0830 12/22/24 1116   BP: 102/62 94/57 90/55 94/57   BP Location: Left arm Left arm Right arm Right arm   Patient Position: Lying Lying Sitting Sitting   Pulse: 73 70 64 64   Resp: 18 18 18 16   Temp: 36.6 °C (97.9 °F) 36.3 °C (97.3 °F) 36.7 °C (98.1 °F) 36.7 °C (98.1 °F)   TempSrc: Temporal Temporal Temporal Temporal   SpO2: 94% 95% 97% 95%   Weight:  50.2 kg (110 lb 10.7 oz)     Height:           Last Labs:  CBC - 12/21/2024:  7:35 PM  4.0 14.3 175    44.5      CMP - 12/21/2024:  7:35 PM  9.0 6.5 56 --- 0.9   2.2 3.0 41 175      PTT - 12/13/2024: 12:18 PM  1.3   14.4 30     TROPHS   Date/Time Value Ref Range Status   12/13/2024 12:18  0 - 34 ng/L Final     Comment:     Previous result verified on 12/13/2024 1210 on specimen/case [id number] called with component TRPHS for procedure Troponin I, High Sensitivity, Initial with value 418 ng/L.   12/13/2024 11:02  0 - 34 ng/L Final   08/23/2024 11:41  0 - 34 ng/L Final     Comment:     Previous result verified on 8/23/2024 2336 on specimen/case [id number] called with component TRPHS for procedure Troponin I, High Sensitivity, Initial with value 235 ng/L.     BNP   Date/Time Value Ref Range Status   12/19/2024 07:28 PM 1,184 0 - 99 pg/mL Final   12/13/2024 11:02 AM >5,000 0 - 99 pg/mL Final      Last I/O:  I/O last 3 completed shifts:  In: - (0 mL/kg)   Out: 2000 (39.8 mL/kg)  [Urine:2000 (1.1 mL/kg/hr)]  Weight: 50.2 kg     Past Cardiology Tests (Last 3 Years):  EKG:  ECG 12 lead 12/13/2024  Atrial- paced rhythm, rate 60 BPM    Echo:  Transthoracic Echo (TTE) Complete 08/24/2024   1. Left ventricular ejection fraction is moderately decreased, by visual estimate at 30-35%.   2. There is global hypokinesis of the left ventricle with minor regional variations.   3. Spectral Doppler shows an impaired relaxation pattern of left ventricular diastolic filling.   4. There is an elevated left ventricular end diastolic pressure.   5. Left ventricular cavity size is decreased.   6. Abnormal septal motion consistent with RV pacemaker.   7. Moderately increased left ventricular septal thickness.   8. The left ventricular posterior wall thickness is moderately increased.   9. There is severe concentric left ventricular hypertrophy.  10. There is normal right ventricular global systolic function.  11. Moderately enlarged right ventricle.  12. The left atrium is mildly dilated.  13. The right atrium is severely dilated.  14. Mild to moderate mitral valve regurgitation.  15. Severe tricuspid regurgitation visualized.  16. Slightly elevated RVSP.  17. The RV systolic pressure is likely to be underestimated.  18. Mild to moderate aortic valve regurgitation.     Transthoracic Echo (TTE) Complete 10/03/2023   1. Left ventricular systolic function is mildly decreased with a 50-55% estimated ejection fraction.   2. Moderately increased left ventricular septal thickness.   3. The left ventricular posterior wall thickness is moderately increased.   4. Moderately enlarged right ventricle.   5. Mild to moderate aortic valve regurgitation.   6. Mild to moderate mitral valve regurgitation.   7. Moderate tricuspid regurgitation visualized.   8. The tricuspid valve annulus appears dilated.   9. The left atrium is moderately dilated.  10. Moderately elevated right ventricular systolic pressure.  11. Compared with study  from 2/17/2017, there is a significant increase in LV wall thickness. There is evidence of mild thickening of the RV free wall, associated with mild thickening of the AV, MV and tricuspid valve plus moderately elevated RVSP. This findings could be associated to an underlying infiltrative disease like amyloidosis.     Ejection Fractions:  EF   Date/Time Value Ref Range Status   08/24/2024 11:20 AM 33 %      Cardiac Imaging:  CT angio coronary art with heartflow if score >30% 08/29/2024  CT angio coronary art with heartflow if score >30% 08/29/2024  1. Minor coronary artery disease.  2. The proximal LAD has focal calcified plaque with minimal stenosis  (<25%).  3. The LM, LCx and RCA have no significant atherosclerotic change or  stenotic disease.  4. Total Agatston calcium score of 17.  5. Right dominant coronary artery system.  6. Right atrial and right ventricular dilatation. Mild left atrial  dilatation.  7. Small bilateral pleural effusions with dependent atelectasis.  8. Dilated main pulmonary artery to 3.4 cm. Correlate/concern for  elevated pulmonary/right-sided filling pressures.     Amyloid SPECT Heart Study 8/26/2024  1. Amyloid SPECT heart study is suggestive of TTR amyloidosis.     Inpatient Medications:  Scheduled medications   Medication Dose Route Frequency    [Held by provider] aspirin  81 mg oral Daily    ergocalciferol  1,250 mcg oral Every Sunday    heparin (porcine)  5,000 Units subcutaneous q8h    metoprolol succinate XL  12.5 mg oral Daily    pantoprazole  40 mg oral Daily before breakfast    polyethylene glycol  17 g oral Daily    predniSONE  2.5 mg oral Daily    rosuvastatin  10 mg oral Nightly    [Held by provider] sacubitriL-valsartan  0.5 tablet oral BID    sennosides-docusate sodium  1 tablet oral Nightly    [Held by provider] spironolactone  25 mg oral Daily    [Held by provider] torsemide  40 mg oral BID     PRN medications   Medication    acetaminophen    ipratropium-albuteroL    white  petrolatum     Continuous Medications   Medication Dose Last Rate       Physical Exam  Constitutional:       General: She is awake. She is not in acute distress.     Comments: Lying in bed   HENT:      Head: Normocephalic.   Eyes:      Extraocular Movements: Extraocular movements intact.      Pupils: Pupils are equal, round, and reactive to light.   Cardiovascular:      Rate and Rhythm: Normal rate and regular rhythm.      Pulses: Normal pulses.      Comments: Mostly V-paced with rate in 60s.   Pulmonary:      Effort: Pulmonary effort is normal. No respiratory distress.      Breath sounds: Normal breath sounds.   Abdominal:      General: Abdomen is flat. There is no distension.      Palpations: Abdomen is soft.      Tenderness: There is no abdominal tenderness.   Musculoskeletal:      Comments: No upper or low extremity edema   Skin:     General: Skin is warm and dry.      Comments: healing bilateral LE blisters. Open to air.    Neurological:      General: No focal deficit present.      Mental Status: She is alert.   Psychiatric:         Mood and Affect: Mood normal.         Behavior: Behavior normal.        Assessment/Plan   Erin Lopez is a 89 y.o. female presenting with PMH of NSTEMI (nonobstructive CAD 2017), TTR cardiac amyloidosis, HFrEF (30-35% 8/2-24), LBBB and SSS s/p PPM 2017 s/p device changeout 11/2022, HTN, CKD presented to the ED with severe anasarca and acute decompensated heart failure. Admitted to hospitals for further management.      Acute on chronic systolic and diastolic heart failure (HFrEF 30-35%)  TTR Amyloidosis  Acute respiratory failure, improved   - TTE 10/2023: EF 50-55%, moderately increased LV septal and posterior wall thickness, Moderately enlarged V, mild to mod AR and MR, mod TR, moderate LAE, Compared to 2017 TTE there is possible underlying infiltrative disease  - TTE 8/24/2024: EF 30-35%, gHK of LV,  elevated LVEDP, abnormal septal motion c/w V pacemaker, moderately increased LV septal  thickness. LV posterior wall moderately thickened. Severe cLVH. Moderately enlarged RV, mild LAE, RA severely dilated, mild to mod MR, severe TR, Slightly elevated RVSP. Mild to mod AR.  - New TTR amyloidosis seen on 8/25 NM PYP scan-- K/L ration WNL, SPEP, UPEP negative  - admit CXR: Severe cardiomegaly with increased moderate-to-large bilateral pleural effusions and associated atelectasis and/or consolidation. Additional interstitial and patchy opacities with basilar predilection suggestive of interstitial and alveolar pulmonary edema  - S/p wean to RA.   - Admit BNP >5000 (previously 2,626 8/2024). Repeat 12/19 1,184.   - discharge wt 9/4/2024 51.5 kg  - admit wt: 70.5 kg (recorded in ED)  - daily wt: 50.2  - patient admitted with severe anasarca up to breasts/upper arms  - s/p lasix gtt at 20 mg/hour  - Transitioned from lasix infusion to torsemide 40mg BID on 12/19 then held on 12/20.   - remains euvolemic on exam with stable Cr. Weight slightly up from yesterday  - will restart maintenance diuretic -- torsemide 40mg daily  - Advanced HF team consulted during last admission: Recommended initiating Tafamidis (amyloid coordinator informed at the time)-- patient no showed for Dr. Goss appt 9/17/2024 (transport issues)  - cont metop succ 12.5mg daily  - Continue holding home dapagliflozin iso intermittent low blood glucose readings.   - low dose Entresto 12/13 BID held iso aggressive diuresis and low-normal blood pressures. Consider restarting prior to discharge vs outpatient if blood pressures are appropriate.   - 12/15 spironolactone increased to 25 mg daily to improve diuresis --> held spironolactone 12/20 along with diuresis for hyponatremia. Consider restarting slowly tomorrow. Restarted diuresis today  - home GDMT: Entresto 12/13 BID, metop succ 12.5 mg daily, dapagliflozin 10 mg daily,  and spironolactone 12.5 mg daily  - home diuretic: Lasix 20mg daily  - Daily standing weights, strict I&O's, 2g sodium  diet, 2L fluid restriction.   - Palliative care: DNR/DNI. Agreeable for outpatient follow up with palliative care (consult to be placed when closer to discharge). Ongoing pt and family support and care navigation. Assistance with home resources to support pt's goal. SW to assist with possible transportation support to ensure pt can make it to follow up appointments. Update at this time is for patient/family to select a SNF, however if they should choose home, will plan to provide support with palliative/home healthcare involvement.   - Continue prednisone daily (long term use dating back to  for arthritis per patient) and neb PRN   - Advanced HF rec starting tafamadis outpatient after last discharge. Patient missed follow up appt. holding tafamadis initiation, consider starting outpatient.     Upper extremity edema - improving   - fluid overloaded on presentation with 2+ swelling BL arms  - IV infusion switched from L arm, L arm elevated  -  LUE duplex given severe unilateral swelling of LUE and coolness (ordered in vasc lab) no DVT  - On exam, upper extremity edema is improving.     Troponinemia  - HS trop: 418 -> 330. Suspected 2/2 demand from CHF  - CTA coronary : minor CAD, prox LAD has focal calcified plaque with minimal stenosis (<25%), LM, LCx and RCA have no significant atherosclerotic change or stenotic disease. Calcium score 17  - Cont rosuvastatin 20 mg nightly   - daily ASA held due to worsening thrombocytopenia, consider restarting with stable normalization.      Hx of SSS s/p PPM , s/p device changeout 2022  - EC% AV pacing  - EP consulted during last admission due to concern for possible pacemaker mediated cardiomyopathy given high pacing burden on device interrogation: AV delayed extended but no intrinsic conduction noted. Recommended optimizing GDMT prior to considering upgrade to CRT since she would be high risk.   - Discussed device upgrade with EP (formally consulted  yesterday now that patient is euvolemic), interrogation and optimization ordered.  39% RA and 97% RV paced.   - Per EP, there appears to be dyssynchrony on echo and patient may benefit from device upgrade. EP stating this can be pursued on an outpatient basis and requested an EP consult be placed prior to discharge.      HTN  - Admit /76  - last 24 hour SBP range:   - Entresto held as above   - Cont metoprolol succinate as above.      TERI on CKD IIIb, stable  - Cr baseline 1.4-1.6  - admit Cr 2.12  - daily Cr 1.74 (1.64, 1.74, 1.63, 1.72, 1.86, 1.85, 2.02)   - likely cardiorenal iso severe anasarca   - Holding diuresis as above  - avoid nephrotoxic agents    Hypovolemic Hyponatremia, Asymptomatic  - Sodium trend: 129 (Previous of 127, 128, 132, 135, 136, 138).   - Patient is neurologically intact on today's encounter, however, will monitor for any acute changes.   - 12/19 Urine/serum osmolality and urine sodium sent suggestive of hypovolemic hyponatremia 2/2 aggressive diuresis.   - restart diuresis as above     Hypokalemia, resolved  - admit K 3.1  - daily K 4.0  - Hold spironolactone 25mg as above  - cont repletion as needed.      Concern for congestive hepatopathy   Thrombocytopenia, improving  - baseline 190-220s  - admit Plt: 101  - daily Plt 175 (168, 155, 127, 83, 102, 99, 84)  - on admit: INR 1.3, elevated alk phos 163  - cont to trend CBC  - Persistent increased Alk Phos, however no elevation in AST/ALT.   - daily ASA held iso worsening thrombocytopenia; Plt already low on admission, low concern for HIT.      Arthritis  - Pt previously getting steroid injections q3 month then put on systemic steroids  - Cont prednisone 2.5 daily   - cont home PPI while on steroids      Dispo: pending EP recommendations for device upgrade   - PT/OT eval mod intensity    - Awaiting family's choice on SNF.   - In the event the patient should choose discharge to home, will plan for home with home care, healthy at  home, and palliative navigator program upon discharge.     DVT ppx: heparin SQ  Code Status:  Full Code    All labs, vital signs, tests & imaging results, and medications were reviewed.    Patient seen and examined with Dr. Radha Woo PA-C

## 2024-12-22 NOTE — CARE PLAN
Pt remained HDS and free of injury this shift. Pt OOB in chair through most of shift. Pt awaiting SNF placement. Pt's torsemide unheld this shift.    Problem: Safety - Adult  Goal: Free from fall injury  Outcome: Progressing     Problem: Discharge Planning  Goal: Discharge to home or other facility with appropriate resources  Outcome: Progressing     Problem: Chronic Conditions and Co-morbidities  Goal: Patient's chronic conditions and co-morbidity symptoms are monitored and maintained or improved  Outcome: Progressing     Problem: Fall/Injury  Goal: Not fall by end of shift  Outcome: Progressing  Goal: Be free from injury by end of the shift  Outcome: Progressing  Goal: Verbalize understanding of personal risk factors for fall in the hospital  Outcome: Progressing  Goal: Verbalize understanding of risk factor reduction measures to prevent injury from fall in the home  Outcome: Progressing  Goal: Use assistive devices by end of the shift  Outcome: Progressing  Goal: Pace activities to prevent fatigue by end of the shift  Outcome: Progressing     Problem: Heart Failure  Goal: Improved gas exchange this shift  Outcome: Progressing  Goal: Improved urinary output this shift  Outcome: Progressing  Goal: Reduction in peripheral edema within 24 hours  Outcome: Progressing  Goal: Report improvement of dyspnea/breathlessness this shift  Outcome: Progressing  Goal: Weight from fluid excess reduced over 2-3 days, then stabilize  Outcome: Progressing  Goal: Increase self care and/or family involvement in 24 hours  Outcome: Progressing     Problem: Skin  Goal: Decreased wound size/increased tissue granulation at next dressing change  Outcome: Progressing  Goal: Participates in plan/prevention/treatment measures  Outcome: Progressing  Goal: Prevent/manage excess moisture  Outcome: Progressing  Goal: Prevent/minimize sheer/friction injuries  Outcome: Progressing  Goal: Promote/optimize nutrition  Outcome: Progressing  Goal:  Promote skin healing  Outcome: Progressing

## 2024-12-22 NOTE — CARE PLAN
Problem: Safety - Adult  Goal: Free from fall injury  Outcome: Progressing     Problem: Discharge Planning  Goal: Discharge to home or other facility with appropriate resources  Outcome: Progressing     Problem: Chronic Conditions and Co-morbidities  Goal: Patient's chronic conditions and co-morbidity symptoms are monitored and maintained or improved  Outcome: Progressing     Problem: Fall/Injury  Goal: Not fall by end of shift  Outcome: Progressing  Goal: Be free from injury by end of the shift  Outcome: Progressing  Goal: Verbalize understanding of personal risk factors for fall in the hospital  Outcome: Progressing  Goal: Verbalize understanding of risk factor reduction measures to prevent injury from fall in the home  Outcome: Progressing  Goal: Use assistive devices by end of the shift  Outcome: Progressing  Goal: Pace activities to prevent fatigue by end of the shift  Outcome: Progressing     Problem: Heart Failure  Goal: Improved gas exchange this shift  Outcome: Progressing  Goal: Improved urinary output this shift  Outcome: Progressing  Goal: Reduction in peripheral edema within 24 hours  Outcome: Progressing  Goal: Report improvement of dyspnea/breathlessness this shift  Outcome: Progressing  Goal: Weight from fluid excess reduced over 2-3 days, then stabilize  Outcome: Progressing  Goal: Increase self care and/or family involvement in 24 hours  Outcome: Progressing     Problem: Skin  Goal: Decreased wound size/increased tissue granulation at next dressing change  Outcome: Progressing  Goal: Prevent/manage excess moisture  Outcome: Progressing  Goal: Prevent/minimize sheer/friction injuries  Outcome: Progressing  Goal: Promote/optimize nutrition  Outcome: Progressing  Goal: Promote skin healing  Outcome: Progressing       The patient's goals for the shift include to get extra fluid off    The clinical goals for the shift include pt will remain HDS throughout shift.      12/21/24 at 10:01 PM Mario LOWRY  KI PERKINS, RN

## 2024-12-22 NOTE — PROGRESS NOTES
Discharge Planning Note:      Erin Lopez is a 89 y.o. female on day 9 of admission presenting with Anasarca.    Called and spoke with the patients daughter Cassidy reports her grandson is reviewing the list of SNF choices. Asked her to reach out to him and see if any choices interest him so the referrals can be placed. Will call this TCC back after conversation with the grandson.       Kasie Grullon RN

## 2024-12-23 VITALS
DIASTOLIC BLOOD PRESSURE: 56 MMHG | WEIGHT: 110.67 LBS | HEART RATE: 70 BPM | BODY MASS INDEX: 19.61 KG/M2 | HEIGHT: 63 IN | OXYGEN SATURATION: 94 % | RESPIRATION RATE: 19 BRPM | TEMPERATURE: 98.4 F | SYSTOLIC BLOOD PRESSURE: 97 MMHG

## 2024-12-23 LAB
ALBUMIN SERPL BCP-MCNC: 3.3 G/DL (ref 3.4–5)
ALP SERPL-CCNC: 198 U/L (ref 33–136)
ALT SERPL W P-5'-P-CCNC: 60 U/L (ref 7–45)
ANION GAP SERPL CALC-SCNC: 13 MMOL/L (ref 10–20)
AST SERPL W P-5'-P-CCNC: 60 U/L (ref 9–39)
BILIRUB SERPL-MCNC: 1 MG/DL (ref 0–1.2)
BUN SERPL-MCNC: 45 MG/DL (ref 6–23)
CALCIUM SERPL-MCNC: 9.1 MG/DL (ref 8.6–10.6)
CHLORIDE SERPL-SCNC: 92 MMOL/L (ref 98–107)
CO2 SERPL-SCNC: 31 MMOL/L (ref 21–32)
CREAT SERPL-MCNC: 1.44 MG/DL (ref 0.5–1.05)
EGFRCR SERPLBLD CKD-EPI 2021: 35 ML/MIN/1.73M*2
ERYTHROCYTE [DISTWIDTH] IN BLOOD BY AUTOMATED COUNT: 17.5 % (ref 11.5–14.5)
GLUCOSE SERPL-MCNC: 63 MG/DL (ref 74–99)
HCT VFR BLD AUTO: 45 % (ref 36–46)
HGB BLD-MCNC: 14.3 G/DL (ref 12–16)
MAGNESIUM SERPL-MCNC: 2.54 MG/DL (ref 1.6–2.4)
MCH RBC QN AUTO: 28.2 PG (ref 26–34)
MCHC RBC AUTO-ENTMCNC: 31.8 G/DL (ref 32–36)
MCV RBC AUTO: 89 FL (ref 80–100)
NRBC BLD-RTO: 0 /100 WBCS (ref 0–0)
PLATELET # BLD AUTO: 203 X10*3/UL (ref 150–450)
POTASSIUM SERPL-SCNC: 3.9 MMOL/L (ref 3.5–5.3)
PROT SERPL-MCNC: 6.9 G/DL (ref 6.4–8.2)
RBC # BLD AUTO: 5.07 X10*6/UL (ref 4–5.2)
SODIUM SERPL-SCNC: 132 MMOL/L (ref 136–145)
WBC # BLD AUTO: 4.1 X10*3/UL (ref 4.4–11.3)

## 2024-12-23 PROCEDURE — 83735 ASSAY OF MAGNESIUM: CPT

## 2024-12-23 PROCEDURE — 2500000001 HC RX 250 WO HCPCS SELF ADMINISTERED DRUGS (ALT 637 FOR MEDICARE OP)

## 2024-12-23 PROCEDURE — 97530 THERAPEUTIC ACTIVITIES: CPT | Mod: GO

## 2024-12-23 PROCEDURE — 99232 SBSQ HOSP IP/OBS MODERATE 35: CPT | Performed by: INTERNAL MEDICINE

## 2024-12-23 PROCEDURE — 99233 SBSQ HOSP IP/OBS HIGH 50: CPT | Performed by: NURSE PRACTITIONER

## 2024-12-23 PROCEDURE — 97535 SELF CARE MNGMENT TRAINING: CPT | Mod: GO

## 2024-12-23 PROCEDURE — 1200000002 HC GENERAL ROOM WITH TELEMETRY DAILY

## 2024-12-23 PROCEDURE — 36415 COLL VENOUS BLD VENIPUNCTURE: CPT

## 2024-12-23 PROCEDURE — 80053 COMPREHEN METABOLIC PANEL: CPT

## 2024-12-23 PROCEDURE — 2500000004 HC RX 250 GENERAL PHARMACY W/ HCPCS (ALT 636 FOR OP/ED)

## 2024-12-23 PROCEDURE — 85027 COMPLETE CBC AUTOMATED: CPT

## 2024-12-23 RX ORDER — TORSEMIDE 20 MG/1
20 TABLET ORAL DAILY
Status: DISCONTINUED | OUTPATIENT
Start: 2024-12-24 | End: 2024-12-27 | Stop reason: HOSPADM

## 2024-12-23 RX ADMIN — PREDNISONE 2.5 MG: 5 TABLET ORAL at 08:17

## 2024-12-23 RX ADMIN — HEPARIN SODIUM 5000 UNITS: 5000 INJECTION, SOLUTION INTRAVENOUS; SUBCUTANEOUS at 15:07

## 2024-12-23 RX ADMIN — METOPROLOL SUCCINATE 12.5 MG: 25 TABLET, EXTENDED RELEASE ORAL at 08:17

## 2024-12-23 RX ADMIN — TORSEMIDE 40 MG: 20 TABLET ORAL at 08:17

## 2024-12-23 RX ADMIN — PANTOPRAZOLE SODIUM 40 MG: 40 TABLET, DELAYED RELEASE ORAL at 06:51

## 2024-12-23 RX ADMIN — HEPARIN SODIUM 5000 UNITS: 5000 INJECTION, SOLUTION INTRAVENOUS; SUBCUTANEOUS at 06:51

## 2024-12-23 RX ADMIN — HEPARIN SODIUM 5000 UNITS: 5000 INJECTION, SOLUTION INTRAVENOUS; SUBCUTANEOUS at 23:17

## 2024-12-23 ASSESSMENT — COGNITIVE AND FUNCTIONAL STATUS - GENERAL
MOBILITY SCORE: 17
DAILY ACTIVITIY SCORE: 20
HELP NEEDED FOR BATHING: A LITTLE
TOILETING: A LOT
WALKING IN HOSPITAL ROOM: A LITTLE
MOVING FROM LYING ON BACK TO SITTING ON SIDE OF FLAT BED WITH BEDRAILS: A LITTLE
DRESSING REGULAR UPPER BODY CLOTHING: A LITTLE
PERSONAL GROOMING: A LITTLE
DAILY ACTIVITIY SCORE: 16
CLIMB 3 TO 5 STEPS WITH RAILING: A LOT
DRESSING REGULAR UPPER BODY CLOTHING: A LITTLE
HELP NEEDED FOR BATHING: A LOT
TURNING FROM BACK TO SIDE WHILE IN FLAT BAD: A LITTLE
DRESSING REGULAR LOWER BODY CLOTHING: A LITTLE
STANDING UP FROM CHAIR USING ARMS: A LITTLE
MOVING TO AND FROM BED TO CHAIR: A LITTLE
TOILETING: A LITTLE
DRESSING REGULAR LOWER BODY CLOTHING: A LOT

## 2024-12-23 ASSESSMENT — PAIN - FUNCTIONAL ASSESSMENT
PAIN_FUNCTIONAL_ASSESSMENT: 0-10
PAIN_FUNCTIONAL_ASSESSMENT: 0-10

## 2024-12-23 ASSESSMENT — PAIN SCALES - GENERAL
PAINLEVEL_OUTOF10: 0 - NO PAIN
PAINLEVEL_OUTOF10: 0 - NO PAIN

## 2024-12-23 ASSESSMENT — ACTIVITIES OF DAILY LIVING (ADL): HOME_MANAGEMENT_TIME_ENTRY: 13

## 2024-12-23 NOTE — PROGRESS NOTES
Occupational Therapy    Occupational Therapy Treatment    Name: Erin Lopez  MRN: [id number]  : 1935  Date: 24  Room: [address] [place], [place] [address]      Time Calculation  Start Time: 1004  Stop Time: 1027  Time Calculation (min): 23 min    Assessment:     Plan:  Treatment Interventions: ADL retraining, Functional transfer training, UE strengthening/ROM, Equipment evaluation/education, Compensatory technique education  OT Frequency: 3 times per week  OT Discharge Recommendations: Moderate intensity level of continued care  Equipment Recommended upon Discharge:  (none)  OT Recommended Transfer Status: Moderate assist, Assist of 1  OT - OK to Discharge: Yes    Subjective   General:  OT Last Visit  OT Received On: 24  Reason for Referral: Anasarca  Past Medical History Relevant to Rehab: NSTEMI (nonobstructive CAD ), TTR cardiac amyloidosis confirmed on [facility] PYP 2024, HFrEF (30-35% ), LBBB and SSS s/p PPM 2017 s/p device changeout 2022, HTN, CKD  Prior to Session Communication: Bedside nurse  Patient Position Received: Bed, 3 rail up, Alarm off, not on at start of session  General Comment: pt in supine, willing to participat   Precautions:  Medical Precautions: Cardiac precautions, Fall precautions  Vitals:  Vital Signs (Past 2hrs)        Date/Time Vitals Session Patient Position Pulse Resp SpO2 BP MAP (mmHg)    24 1004 --  --  64  --  96 %  --  --                   Lines/Tubes/Drains:  External Urinary Catheter Female (Active)   Number of days: 5       Cognition:  Overall Cognitive Status: Within Functional Limits  Orientation Level: Oriented X4  Following Commands: Follows one step commands with repetition    Pain Assessment:  Pain Assessment  Pain Assessment: 0-10  0-10 (Numeric) Pain Score: 0 - No pain     Objective   Activities of Daily Living:      Grooming  Grooming Level of Assistance: Contact guard, Minimum assistance  Grooming Where Assessed: Chair  Grooming Comments: pt compltd oral care  and face hygiene, required assistance combing hair and pulling it back in a bun, pt initiated combing hair however became fatigued and SOB           LE Dressing  LE Dressing: Yes  Sock Level of Assistance: Maximum assistance          Bed Mobility/Transfers:   Bed Mobility  Bed Mobility: Yes  Bed Mobility 1  Bed Mobility 1: Supine to sitting  Level of Assistance 1: Minimum assistance  Transfers  Transfer: Yes  Transfer 1  Transfer From 1: Sit to  Transfer to 1: Stand  Technique 1: Sit to stand  Transfer Device 1: Walker  Transfer Level of Assistance 1: Minimum assistance  Transfers 2  Transfer From 2: Stand to  Transfer to 2: Chair with arms  Technique 2: Stand to sit  Transfer Device 2: Walker  Transfer Level of Assistance 2: Minimum assistance  Transfers 3  Transfer From 3: Bed to  Transfer to 3: Chair with arms  Technique 3: Sit to stand, Stand to sit, Lateral  Transfer Device 3: Walker  Transfer Level of Assistance 3: Minimum assistance, Moderate verbal cues          Outcome Measures:  [facility] Daily Activity  Putting on and taking off regular lower body clothing: A lot  Bathing (including washing, rinsing, drying): A lot  Putting on and taking off regular upper body clothing: A little  Toileting, which includes using toilet, bedpan or urinal: A lot  Taking care of personal grooming such as brushing teeth: A little  Eating Meals: None  Daily Activity - Total Score: 16     Education Documentation  No documentation found.  Education Comments  No comments found.        Goals:  Encounter Problems       Encounter Problems (Active)       ADLs       Patient with complete upper body dressing with contact guard assist level of assistance donning and doffing all UE clothes with no adaptive equipment while edge of bed  (Progressing)       Start:  12/16/24    Expected End:  12/30/24            Patient with complete lower body dressing with contact guard assist level of assistance donning and doffing all LE clothes  with PRN  adaptive equipment while edge of bed  (Progressing)       Start:  12/16/24    Expected End:  12/30/24            Patient will complete daily grooming tasks brushing teeth and washing face/hair with independent level of assistance and PRN adaptive equipment while edge of bed . (Progressing)       Start:  12/16/24    Expected End:  12/30/24            Patient will complete toileting including hygiene clothing management/hygiene with contact guard assist level of assistance and bedside commode. (Progressing)       Start:  12/16/24    Expected End:  12/30/24               MOBILITY       Patient will perform Functional mobility min Household distances/Community Distances with contact guard assist level of assistance and front wheeled walker in order to improve safety and functional mobility. (Progressing)       Start:  12/16/24    Expected End:  12/30/24               TRANSFERS       Patient will perform bed mobility contact guard assist level of assistance and bed rails in order to improve safety and independence with mobility (Progressing)       Start:  12/16/24    Expected End:  12/30/24            Patient will complete sit to stand transfer with contact guard assist level of assistance and front wheeled walker in order to improve safety and prepare for out of bed mobility. (Progressing)       Start:  12/16/24    Expected End:  12/30/24 12/23/24 at 11:52 AM   Orquidea Mcgarry, OT   [phone]

## 2024-12-23 NOTE — PROGRESS NOTES
Participated in a family meeting with pt, her daughter Cassidy and her grandson James with Radha Skinner CNP from primary team, and Caitlyn Jackson CNP and Rev. Itzel Martinez from palliative care.  Spoke to family about pt's wishes as how she would like to be cared for moving forward, knowing that her heart failure isn't something that we can cure, that it will eventually worsen, and there will be limited treatments that can be offered as it worsens.  Pt states that at this time she is willing to go to SNF for a short time in hopes of getting stronger, but ultimately once she is home, she does not want to keep coming back to the hospital.  We explained how we have made referrals to the palliative Navigator program, and that they will follow pt at the SNF and then at home to make sure her symptoms are being well managed, and if she worsens, to then transition pt to hospice when needed.  Family seemed to be happy with these supports in place.  Care Transitions team working on finding a SNF to accept.  Will follow and support pt and family.      MARJORIE Varma

## 2024-12-23 NOTE — PROGRESS NOTES
Overnight Events:    No acute events overnight    Subjective Data:  Patient sitting up in bed, about to eat breakfast. Currently denies any pain. Slightly tachypneic while mobilizing in bed.     Today in brief:  - remains euvolemic on exam with stable Cr.  - decreased PO torsemide to 20 mg daily   - family meeting held today with palliative medicine-> patient and family agreeable for patient to pursue SNF with palliative to follow at facility  - nasreen Ramirez provided TCC with 3 SNF choices today     Objective Data:  Last Recorded Vitals:  Vitals:    12/22/24 2010 12/23/24 0000 12/23/24 0419 12/23/24 0759   BP: 108/61 97/56 93/53 92/53   BP Location: Right arm Right arm Right arm Left arm   Patient Position: Sitting Lying Lying Lying   Pulse: 71 70 69 65   Resp: 20 19 20 18   Temp: 36.4 °C (97.5 °F) 36.9 °C (98.4 °F) 36.2 °C (97.2 °F) 36.8 °C (98.2 °F)   TempSrc: Temporal Temporal Temporal Temporal   SpO2: 94% 95% 96% 97%   Weight:   49.7 kg (109 lb 9.1 oz)    Height:           Last Labs:  CBC - 12/22/2024:  6:46 PM  4.3 15.0 184    44.5      CMP - 12/22/2024:  6:46 PM  9.5 7.4 95 --- 1.2   2.2 3.6 75 188      PTT - 12/13/2024: 12:18 PM  1.3   14.4 30     TROPHS   Date/Time Value Ref Range Status   12/13/2024 12:18  0 - 34 ng/L Final     Comment:     Previous result verified on 12/13/2024 1210 on specimen/case [id number] called with component TRPHS for procedure Troponin I, High Sensitivity, Initial with value 418 ng/L.   12/13/2024 11:02  0 - 34 ng/L Final   08/23/2024 11:41  0 - 34 ng/L Final     Comment:     Previous result verified on 8/23/2024 2336 on specimen/case [id number] called with component TRPHS for procedure Troponin I, High Sensitivity, Initial with value 235 ng/L.     BNP   Date/Time Value Ref Range Status   12/19/2024 07:28 PM 1,184 0 - 99 pg/mL Final   12/13/2024 11:02 AM >5,000 0 - 99 pg/mL Final      Last I/O:  I/O last 3 completed shifts:  In: - (0 mL/kg)   Out:  2976 (59.9 mL/kg) [Urine:2975 (1.7 mL/kg/hr); Stool:1]  Weight: 49.7 kg     Past Cardiology Tests (Last 3 Years):  EKG:  ECG 12 lead 12/13/2024  Atrial- paced rhythm, rate 60 BPM    Echo:  Transthoracic Echo (TTE) Complete 08/24/2024   1. Left ventricular ejection fraction is moderately decreased, by visual estimate at 30-35%.   2. There is global hypokinesis of the left ventricle with minor regional variations.   3. Spectral Doppler shows an impaired relaxation pattern of left ventricular diastolic filling.   4. There is an elevated left ventricular end diastolic pressure.   5. Left ventricular cavity size is decreased.   6. Abnormal septal motion consistent with RV pacemaker.   7. Moderately increased left ventricular septal thickness.   8. The left ventricular posterior wall thickness is moderately increased.   9. There is severe concentric left ventricular hypertrophy.  10. There is normal right ventricular global systolic function.  11. Moderately enlarged right ventricle.  12. The left atrium is mildly dilated.  13. The right atrium is severely dilated.  14. Mild to moderate mitral valve regurgitation.  15. Severe tricuspid regurgitation visualized.  16. Slightly elevated RVSP.  17. The RV systolic pressure is likely to be underestimated.  18. Mild to moderate aortic valve regurgitation.     Transthoracic Echo (TTE) Complete 10/03/2023   1. Left ventricular systolic function is mildly decreased with a 50-55% estimated ejection fraction.   2. Moderately increased left ventricular septal thickness.   3. The left ventricular posterior wall thickness is moderately increased.   4. Moderately enlarged right ventricle.   5. Mild to moderate aortic valve regurgitation.   6. Mild to moderate mitral valve regurgitation.   7. Moderate tricuspid regurgitation visualized.   8. The tricuspid valve annulus appears dilated.   9. The left atrium is moderately dilated.  10. Moderately elevated right ventricular systolic  pressure.  11. Compared with study from 2/17/2017, there is a significant increase in LV wall thickness. There is evidence of mild thickening of the RV free wall, associated with mild thickening of the AV, MV and tricuspid valve plus moderately elevated RVSP. This findings could be associated to an underlying infiltrative disease like amyloidosis.     Ejection Fractions:  EF   Date/Time Value Ref Range Status   08/24/2024 11:20 AM 33 %      Cardiac Imaging:  CT angio coronary art with heartflow if score >30% 08/29/2024  CT angio coronary art with heartflow if score >30% 08/29/2024  1. Minor coronary artery disease.  2. The proximal LAD has focal calcified plaque with minimal stenosis  (<25%).  3. The LM, LCx and RCA have no significant atherosclerotic change or  stenotic disease.  4. Total Agatston calcium score of 17.  5. Right dominant coronary artery system.  6. Right atrial and right ventricular dilatation. Mild left atrial  dilatation.  7. Small bilateral pleural effusions with dependent atelectasis.  8. Dilated main pulmonary artery to 3.4 cm. Correlate/concern for  elevated pulmonary/right-sided filling pressures.     Amyloid SPECT Heart Study 8/26/2024  1. Amyloid SPECT heart study is suggestive of TTR amyloidosis.     Inpatient Medications:  Scheduled medications   Medication Dose Route Frequency    [Held by provider] aspirin  81 mg oral Daily    ergocalciferol  1,250 mcg oral Every Sunday    heparin (porcine)  5,000 Units subcutaneous q8h    metoprolol succinate XL  12.5 mg oral Daily    pantoprazole  40 mg oral Daily before breakfast    polyethylene glycol  17 g oral Daily    predniSONE  2.5 mg oral Daily    rosuvastatin  10 mg oral Nightly    [Held by provider] sacubitriL-valsartan  0.5 tablet oral BID    sennosides-docusate sodium  1 tablet oral Nightly    [Held by provider] spironolactone  25 mg oral Daily    torsemide  40 mg oral Daily     PRN medications   Medication    acetaminophen     ipratropium-albuteroL    white petrolatum     Continuous Medications   Medication Dose Last Rate       Physical Exam  Constitutional:       General: She is awake. She is not in acute distress.     Comments: Lying in bed   HENT:      Head: Normocephalic.   Eyes:      Extraocular Movements: Extraocular movements intact.      Pupils: Pupils are equal, round, and reactive to light.   Cardiovascular:      Rate and Rhythm: Normal rate and regular rhythm.      Pulses: Normal pulses.      Comments: Mostly AV-paced with rate in 60s.   Pulmonary:      Effort: Pulmonary effort is normal. No respiratory distress.      Breath sounds: Normal breath sounds.   Abdominal:      General: Abdomen is flat. There is no distension.      Palpations: Abdomen is soft.      Tenderness: There is no abdominal tenderness.   Musculoskeletal:         General: No swelling.      Comments: No upper or low extremity edema   Skin:     General: Skin is warm and dry.      Comments: healing bilateral LE blisters. Open to air.    Neurological:      General: No focal deficit present.      Mental Status: She is alert.   Psychiatric:         Mood and Affect: Mood normal.         Behavior: Behavior normal.        Assessment/Plan   Erin Lopez is a 89 y.o. female presenting with PMH of NSTEMI (nonobstructive CAD 2017), TTR cardiac amyloidosis, HFrEF (30-35% 8/2-24), LBBB and SSS s/p PPM 2017 s/p device changeout 11/2022, HTN, CKD presented to the ED with severe anasarca and acute decompensated heart failure. Admitted to [facility] for further management.      Acute on chronic systolic and diastolic heart failure (HFrEF 30-35%)  TTR Amyloidosis  Acute respiratory failure, improved   - TTE 10/2023: EF 50-55%, moderately increased LV septal and posterior wall thickness, Moderately enlarged V, mild to mod AR and MR, mod TR, moderate LAE, Compared to 2017 TTE there is possible underlying infiltrative disease  - TTE 8/24/2024: EF 30-35%, gHK of LV,  elevated LVEDP, abnormal  septal motion c/w V pacemaker, moderately increased LV septal thickness. LV posterior wall moderately thickened. Severe cLVH. Moderately enlarged RV, mild LAE, RA severely dilated, mild to mod MR, severe TR, Slightly elevated RVSP. Mild to mod AR.  - New TTR amyloidosis seen on 8/25 NM PYP scan-- K/L ration WNL, SPEP, UPEP negative  - admit CXR: Severe cardiomegaly with increased moderate-to-large bilateral pleural effusions and associated atelectasis and/or consolidation. Additional interstitial and patchy opacities with basilar predilection suggestive of interstitial and alveolar pulmonary edema  - S/p wean to RA.   - Admit BNP >5000 (previously 2,626 8/2024). Repeat 12/19 1,184.   - discharge wt 9/4/2024 51.5 kg  - admit wt: 70.5 kg (recorded in ED)  - daily wt: 59.7  - patient admitted with severe anasarca up to breasts/upper arms  - s/p lasix gtt at 20 mg/hour  - Transitioned from lasix infusion to torsemide 40mg BID on 12/19 then held on 12/20.   - remains euvolemic on exam with stable Cr  -maintenance diuretic -- torsemide 40mg daily decreased to 20 mg daily today  - Advanced HF team consulted during last admission: Recommended initiating Tafamidis (amyloid coordinator informed at the time)-- patient no showed for Dr. Goss appt 9/17/2024 (transport issues)  - cont metop succ 12.5mg daily  - Continue holding home dapagliflozin iso intermittent low blood glucose readings.   - low dose Entresto 12/13 BID held iso aggressive diuresis and low-normal blood pressures. Can consider restarting outpatient if blood pressures are appropriate.   - 12/15 spironolactone increased to 25 mg daily to improve diuresis --> held spironolactone 12/20 along with diuresis for hyponatremia. Can consider resuming prior to DC, still appears euvolemic today and don't want to overdiurese   - home GDMT: Entresto 12/13 BID, metop succ 12.5 mg daily, dapagliflozin 10 mg daily,  and spironolactone 12.5 mg daily  - home diuretic: Lasix  20mg daily  - Daily standing weights, strict I&O's, 2g sodium diet, 2L fluid restriction.   - Palliative care: DNR/DNI. Agreeable for outpatient follow up with palliative care (consult to be placed when closer to discharge). Ongoing pt and family support and care navigation. Assistance with home resources to support pt's goal. SW to assist with possible transportation support to ensure pt can make it to follow up appointments.   - family meeting held today with palliative medicine-> patient and family agreeable for patient to pursue SNF with palliative to follow at facility  - nasreen Ramirez provided TCC with 3 SNF choices today   - Continue prednisone daily (long term use dating back to  for arthritis per patient) and neb PRN   - Advanced HF rec starting tafamadis outpatient after last discharge. Patient missed follow up appt. holding tafamadis initiation, consider starting outpatient.     Upper extremity edema - improving   - fluid overloaded on presentation with 2+ swelling BL arms  - IV infusion switched from L arm, L arm elevated  -  LUE duplex given severe unilateral swelling of LUE and coolness (ordered in vasc lab) no DVT  - On exam, upper extremity edema is resolved     Troponinemia  - HS trop: 418 -> 330. Suspected 2/2 demand from CHF  - CTA coronary : minor CAD, prox LAD has focal calcified plaque with minimal stenosis (<25%), LM, LCx and RCA have no significant atherosclerotic change or stenotic disease. Calcium score 17  - Cont rosuvastatin 20 mg nightly   - daily ASA held due to worsening thrombocytopenia, can consider restarting with stable normalization as an outpatient     Hx of SSS s/p PPM , s/p device changeout 2022  - EC% AV pacing  - EP consulted during last admission due to concern for possible pacemaker mediated cardiomyopathy given high pacing burden on device interrogation: AV delayed extended but no intrinsic conduction noted. Recommended optimizing GDMT prior to  considering upgrade to CRT since she would be high risk.   - Discussed device upgrade with EP (formally consulted yesterday now that patient is euvolemic), interrogation and optimization ordered.  39% RA and 97% RV paced.   - Per EP, there appears to be dyssynchrony on echo and patient may benefit from device upgrade. EP stating this can be pursued on an outpatient basis and requested an EP referral be placed prior to discharge.      HTN  - Admit /76  - last 24 hour SBP range:   - Entresto held as above   - Cont metoprolol succinate as above.      TERI on CKD IIIb, stable  - Cr baseline 1.4-1.6  - admit Cr 2.12  - daily Cr 1.65 (1.74, 1.64, 1.74, 1.63, 1.72, 1.86, 1.85, 2.02)   - likely cardiorenal iso severe anasarca   - Duresis as above  - avoid nephrotoxic agents    Hypovolemic Hyponatremia, Asymptomatic- improving   - Sodium trend: 131 (129, 127, 128, 132, 135, 136, 138).   - Patient is neurologically intact on today's encounter, however, will monitor for any acute changes.   - 12/19 Urine/serum osmolality and urine sodium sent suggestive of hypovolemic hyponatremia 2/2 aggressive diuresis.   - Diuresis as above     Hypokalemia, resolved  - admit K 3.1  - daily K 4.2  - Hold spironolactone 25mg as above  - cont repletion as needed.      Concern for congestive hepatopathy   Thrombocytopenia, improving  - baseline 190-220s  - admit Plt: 101  - daily Plt 184 (175, 168, 155, 127, 83, 102, 99, 84)  - on admit: INR 1.3, elevated alk phos 163  - cont to trend CBC  - Persistent increased Alk Phos, however no elevation in AST/ALT.   - daily ASA held iso worsening thrombocytopenia; Plt already low on admission, low concern for HIT.      Arthritis  - Pt previously getting steroid injections q3 month then put on systemic steroids  - Cont prednisone 2.5 daily   - cont home PPI while on steroids      Dispo: medically ready for discharge, pending acceptance to SNF  - PT/OT eval mod intensity      DVT ppx: heparin  SQ  Code Status:  Full Code    All labs, vital signs, tests & imaging results, and medications were reviewed.    Patient seen and examined with Dr. Nakia Skinner, APRN-CNP

## 2024-12-23 NOTE — PROGRESS NOTES
Follow-Up Palliative Medicine Progress Visit Note   Palliative Medicine following for:  Complex medical decision making, symptom management, patient/family support    History obtained from chart review including ED note, H&P, patient's daily progress notes, review of lab/test results, and discussion with primary team and bedside RN.    Subjective    History of Present Illness  Erin Lpoez is a 89 y.o. female on day 10 of admission presenting with Anasarca. PMHx of NSTEMI (nonobstructive CAD 2017), TTR cardiac amyloidosis, HFrEF (30-35% 8/2-24), LBBB and SSS s/p PPM 2017 s/p device changeout 11/2022, HTN, CKD presented to the ED with severe anasarca and acute decompensated heart failure. Admitted to [facility] for further management. Patient is being followed by Palliative Medicine and family meeting held today with primary team Radha Skinner CNP, Palliative Medicine team - Palliative CNP Caitlyn Jackson, Palliative raúl Martinez, and Joana Oakley Palliative SW, patient, pt's family- pt's daughter Cassidy and pt's grandson James. Discussed end stage heart failure trajectory, goals of care, pt's wishes, and discharge options. Pt wishes to go to SNF and her goal is to get stronger before returning home. Pt reports her goal is to avoid future hospitalizations and discussed what transitioning to hospice care and services would look like after discharge from SNF rehabilitation stay. Pt and family agreeable to outpatient Palliative Care following after discharge from hospital. James provided 3 SNF options. Pt and family appreciative of family meeting and discussion. Support and empathy was provided throughout the encounter. Provided reflective listening and presence.     Symptoms  [facility] Symptom Assessment System  0-10 (Numeric) Pain Score: 0 - No pain  Pain: Pt denies.   Dyspnea: Pt reports at rest at times and with exertion.   Fatigue: Pt reports.   Insomnia: Pt denies.  Drowsiness: Pt denies.  "  Constipation: Pt denies.   Nausea: Pt denies.   Appetite: Pt reports decreased.   Anxiety: Pt denies.   Depression: Pt denies.     Objective    Last Recorded Vitals  /59 (BP Location: Right arm, Patient Position: Sitting)   Pulse 64   Temp 36.6 °C (97.9 °F) (Temporal)   Resp 16   Ht 1.6 m (5' 3\")   Wt 49.7 kg (109 lb 9.1 oz)   SpO2 97%   BMI 19.41 kg/m²    Intake/Output last 3 Shifts:  I/O last 3 completed shifts:  In: - (0 mL/kg)   Out: 2976 (59.9 mL/kg) [Urine:2975 (1.7 mL/kg/hr); Stool:1]  Weight: 49.7 kg     Physical Exam  Vitals and nursing note reviewed.   Constitutional:       General: She is not in acute distress.     Appearance: She is cachectic. She is ill-appearing. She is not toxic-appearing.      Interventions: Nasal cannula in place.      Comments: Chronically ill-appearing. No acute distress noted.   HENT:      Head: Normocephalic and atraumatic.      Nose: Nose normal.      Mouth/Throat:      Mouth: Mucous membranes are moist.   Eyes:      General: No scleral icterus.     Conjunctiva/sclera: Conjunctivae normal.      Pupils: Pupils are equal, round, and reactive to light.   Cardiovascular:      Rate and Rhythm: Normal rate and regular rhythm.   Pulmonary:      Effort: No respiratory distress.      Breath sounds: Decreased breath sounds present.   Musculoskeletal:      Right lower leg: No edema.      Left lower leg: No edema.   Skin:     General: Skin is warm and dry.   Neurological:      Mental Status: She is alert and oriented to person, place, and time.      Motor: Weakness present.   Psychiatric:         Mood and Affect: Mood normal.         Behavior: Behavior normal. Behavior is cooperative.         Thought Content: Thought content normal.         Judgment: Judgment normal.     Relevant Results   Results for orders placed or performed during the hospital encounter of 12/13/24 (from the past 24 hours)   CBC   Result Value Ref Range    WBC 4.3 (L) 4.4 - 11.3 x10*3/uL    nRBC 0.0 0.0 " - 0.0 /100 WBCs    RBC 5.37 (H) 4.00 - 5.20 x10*6/uL    Hemoglobin 15.0 12.0 - 16.0 g/dL    Hematocrit 44.5 36.0 - 46.0 %    MCV 83 80 - 100 fL    MCH 27.9 26.0 - 34.0 pg    MCHC 33.7 32.0 - 36.0 g/dL    RDW 16.3 (H) 11.5 - 14.5 %    Platelets 184 150 - 450 x10*3/uL   Magnesium   Result Value Ref Range    Magnesium 2.83 (H) 1.60 - 2.40 mg/dL   Comprehensive metabolic panel   Result Value Ref Range    Glucose 66 (L) 74 - 99 mg/dL    Sodium 131 (L) 136 - 145 mmol/L    Potassium 4.2 3.5 - 5.3 mmol/L    Chloride 87 (L) 98 - 107 mmol/L    Bicarbonate 36 (H) 21 - 32 mmol/L    Anion Gap 12 10 - 20 mmol/L    Urea Nitrogen 43 (H) 6 - 23 mg/dL    Creatinine 1.65 (H) 0.50 - 1.05 mg/dL    eGFR 30 (L) >60 mL/min/1.73m*2    Calcium 9.5 8.6 - 10.6 mg/dL    Albumin 3.6 3.4 - 5.0 g/dL    Alkaline Phosphatase 188 (H) 33 - 136 U/L    Total Protein 7.4 6.4 - 8.2 g/dL    AST 95 (H) 9 - 39 U/L    Bilirubin, Total 1.2 0.0 - 1.2 mg/dL    ALT 75 (H) 7 - 45 U/L      Allergies  Patient has no known allergies.    Medications  Scheduled medications  [Held by provider] aspirin, 81 mg, oral, Daily  ergocalciferol, 1,250 mcg, oral, Every Sunday  heparin (porcine), 5,000 Units, subcutaneous, q8h  metoprolol succinate XL, 12.5 mg, oral, Daily  pantoprazole, 40 mg, oral, Daily before breakfast  polyethylene glycol, 17 g, oral, Daily  predniSONE, 2.5 mg, oral, Daily  [Held by provider] rosuvastatin, 10 mg, oral, Nightly  [Held by provider] sacubitriL-valsartan, 0.5 tablet, oral, BID  sennosides-docusate sodium, 1 tablet, oral, Nightly  [Held by provider] spironolactone, 25 mg, oral, Daily  [START ON 12/24/2024] torsemide, 20 mg, oral, Daily    PRN medications  PRN medications: acetaminophen, ipratropium-albuteroL, white petrolatum     Assessment/Plan    Erin Lopez is a 89 y.o. female on day 10 of admission presenting with Anasarca. PMHx of NSTEMI (nonobstructive CAD 2017), TTR cardiac amyloidosis, HFrEF (30-35% 8/2-24), LBBB and SSS s/p PPM 2017 s/p  device changeout 11/2022, HTN, CKD presented to the ED with severe anasarca and acute decompensated heart failure. Admitted to [facility] for further management. Patient is being followed by Palliative Medicine and family meeting held today with primary team Radha Skinner CNP, Palliative Medicine team - Palliative CNP Caitlyn Jackson, Palliative  Reverend Jessie Martinez, and Joana Oakley Palliative SW, patient, pt's family- pt's daughter Cassidy and pt's grandson James. Discussed end stage heart failure trajectory, goals of care, pt's wishes, and discharge options. Pt wishes to go to SNF and her goal is to get stronger before returning home. Pt reports her goal is to avoid future hospitalizations and discussed what transitioning to hospice care and services would look like after discharge from SNF rehabilitation stay. Pt and family agreeable to outpatient Palliative Care following after discharge from hospital. James provided 3 SNF options. Pt and family appreciative of family meeting and discussion. Support and empathy was provided throughout the encounter. Provided reflective listening and presence.     Palliative Performance Scale (PPS): 50%    #Complex Medical Decision Making   #Goals of Care  #Advance Care Planning   - Code status: DNR/DNI   - Surrogate decision maker: Cassidy Zepeda (Child)  [phone] and James Lopez [phone] (grandson and HCPOA)  - Goals are mix of survival and time and improved quality of life  - Advanced Directives on file     - 12/17/2024: Palliative consulted, met with pt at bedside. Able to glean more information about pt's life, lifestyle, disease burden, goals, and health preferences. See above for details.     Pt declines SNF despite education to increased PT/OT in a facility along with 24/7 medical attention and medical expertise. Expressed worry about pt's delayed hospitalization and late signs (40lb) before seeking care. Pt still declining SNF as she has experienced  "loved ones in SNF and poor care. She described a family member who developed a bad infection and therefore amputation, and another loved one develop severe bed sores. Pt worried about her care in a SNF and has had home PT/OT and nursing which she actually enjoyed. Pt also expressed that Cassidy is a great caregiver.        Pall NP discussed HF and cardiac amyloid in detail with pt, and later via phone, Cassidy. Aware that time can be short but exact amount is unknown. As a result, discussed DNR/DNI as continuing to follow pt's stated goal of regaining her strength with PT/OT at home, with the understanding of her life limiting disease. Described DNR/DNI as a \"safety blanket\" that if her heart stops, it is a sign that her disease process has come to a terminal state and the likelihood of survival or reviving a very sick heart, is very unlikely. Pt understanding that if cpr was attempted, it would not be beneficial and most likely result in pain and suffering. Pt agrees to continue with the current plan but if her heart stops/stops breathing, cpr/intubation does not align or make sense. Pt and Cassidy are agreeable to DNR/DNI as it would honor pt's last moments best.      Cassidy mentions that at times, caring for pt is overwhelming but she has her coping mechanisms that help her through it. The grandkids are very helpful. Cassidy/pt accepts PT/OT and home nursing again. Cassidy is able to assist pt with ambulation with 1 person assist and is willing and able to assist. Cassidy explained that she did it before and is able to do it again. Explained out patient Palliative in the home and pt/Cassidy are agreeable.     12/23/2024: Family meeting today with pt, family, Primary team and Palliative team as discussed above. Prognosis, goals of care, discharge planning, and pain and symptom management discussed at length. Pt qualifies for hospice care and services but wishes to go to SNF prior to going home to see if she can get " "stronger. Pt and family agreeable to outpatient Palliative Services following at SNF. Family appreciative of meeting and discussion today. Pt's goal is to stay out of the hospital in the future and have best quality of life with symptom management.     #Dyspnea  #HFrEF  #Cardiac amyloidosis  - 12/19, taken off of Lasix drip. 12/13 CXR notable for moderate-to-large bilateral pleural effusions and associated atelectasis and/or consolidation. Recommend repeat CXR.   - Pt reports improved SOB/RANGEL and edema. On RA.   - Agreeable to Palliative outpt for an addition set of \"medical eyes\" for earlier identification and earlier intervention. Consult placed 12/19. TCC looking in to [facility] at Home if pt qualifies for additional support and monitoring.  - Elects DNR/DNI on 12/17.  - Ongoing pt and family support and care navigation. Assistance with home resources, such as Palliative outpt/Navigator, Healthy at home if pt qualifies, to support pt's goal. SW gave pt/Cassidy contact for transportation that is associated with pt's insurance to ensure pt can make it to follow up appointments when grandkids are unavailable to transport.     #Psychosocial Support  - Music Therapy  - Spiritual Care Support  - Art Therapy  - Palliative SW Support    Plan of Care discussed with: Updated MD Primary Team and Radha Skinner CNP and bedside RN on goals of care decision, medication adjustments, and code status.      Medical Decision Making was high level due to high complexity of problems, extensive data review, and high risk of management/treatment.     - Advanced End Stage Heart Failure and Cardiac Amyloidosis posing threat to life and function  - Reviews results from labs, imaging, progress notes, consult notes, which were used in decision making for GOC conversation and discharge planning next steps   - Assessment required independent historian: Pt's daughter, pt's grandson, pt's nurse, primary team Radha Skinner  - Independent interpretation of " test: Labs  - Discussion of management with primary team   - Drug therapy requiring intensive monitoring for toxicity: Torsemide    Thank you for allowing us to participate in the care of this patient. Palliative will continue to follow as needed. Palliative Medicine is available Monday-Friday, 8a-6p. Please contact team with any questions or concerns.  Team pager [phone]  Caitlyn Jackson CNP (on EPIC secure chat)  Palliative Medicine Nurse Practitioner   [email or web address]     DAMION Angeles-CNP

## 2024-12-23 NOTE — CARE PLAN
Problem: Skin  Goal: Decreased wound size/increased tissue granulation at next dressing change  Outcome: Progressing  Flowsheets (Taken 12/23/2024 1727)  Decreased wound size/increased tissue granulation at next dressing change:   Protective dressings over bony prominences   Utilize specialty bed per algorithm  Goal: Participates in plan/prevention/treatment measures  Outcome: Progressing  Flowsheets (Taken 12/23/2024 1727)  Participates in plan/prevention/treatment measures:   Elevate heels   Increase activity/out of bed for meals  Goal: Prevent/manage excess moisture  Outcome: Progressing  Flowsheets (Taken 12/23/2024 1727)  Prevent/manage excess moisture:   Cleanse incontinence/protect with barrier cream   Moisturize dry skin   Monitor for/manage infection if present  Goal: Prevent/minimize sheer/friction injuries  Outcome: Progressing  Flowsheets (Taken 12/23/2024 1727)  Prevent/minimize sheer/friction injuries:   Increase activity/out of bed for meals   Use pull sheet   Turn/reposition every 2 hours/use positioning/transfer devices  Goal: Promote/optimize nutrition  Outcome: Progressing  Flowsheets (Taken 12/23/2024 1727)  Promote/optimize nutrition:   Monitor/record intake including meals   Consume > 50% meals/supplements   Offer water/supplements/favorite foods  Goal: Promote skin healing  Outcome: Progressing  Flowsheets (Taken 12/23/2024 1727)  Promote skin healing:   Assess skin/pad under line(s)/device(s)   Turn/reposition every 2 hours/use positioning/transfer devices   Protective dressings over bony prominences

## 2024-12-23 NOTE — PROGRESS NOTES
[facility] Attending Shared Visit Note     This is a shared visit. Please see Advanced Practice Provider's encounter note for additional details.       Briefly, 89F w/ HFrEF (30-35%, 8/2024), TTR cardiac amyloidosis (confirmed 8/2024), LBBB, SSS, s/p PPM (2017) with 99% V-pacing,  severe TR, mod AR/MR, mild CAD, CKD Stage IIIb, worsening dyspnea, and weight gain of ~40 lbs (since last admission) now s/p aggressive diuresis. Patient looks very frail and catechetic on exam. I do not believe she is a a CRT-D upgrade candidate. She awaiting for SNF.      Exam: elderly, warm, euvolemic . 1+ edema in LE and UEs.     # Acute on chronic systolic and diastolic heart failure (HFrEF 30-35%)  - Started maintenance diruesis - Torsemide 40mg daily   # TTR Amyloidosis  # Acute respiratory failure, improved

## 2024-12-23 NOTE — PROGRESS NOTES
Spiritual Care Visit  Spiritual Care Request    Reason for Visit:  Routine Visit: Introduction  Continue Visiting: Yes       Focus of Care:  Visited With: Patient and family together       Met with patient, patient's daughter, Cassidy and grandson to discuss patient's likely medical trajectory and discharge planning. Provided non-anxious, supportive presence and reflective listening. Will continue to follow.

## 2024-12-24 LAB
ALBUMIN SERPL BCP-MCNC: 3.2 G/DL (ref 3.4–5)
ALP SERPL-CCNC: 179 U/L (ref 33–136)
ALT SERPL W P-5'-P-CCNC: 55 U/L (ref 7–45)
ANION GAP SERPL CALC-SCNC: 10 MMOL/L (ref 10–20)
AST SERPL W P-5'-P-CCNC: 46 U/L (ref 9–39)
BILIRUB SERPL-MCNC: 0.8 MG/DL (ref 0–1.2)
BUN SERPL-MCNC: 45 MG/DL (ref 6–23)
CALCIUM SERPL-MCNC: 9.2 MG/DL (ref 8.6–10.6)
CHLORIDE SERPL-SCNC: 90 MMOL/L (ref 98–107)
CO2 SERPL-SCNC: 32 MMOL/L (ref 21–32)
CREAT SERPL-MCNC: 1.42 MG/DL (ref 0.5–1.05)
EGFRCR SERPLBLD CKD-EPI 2021: 35 ML/MIN/1.73M*2
ERYTHROCYTE [DISTWIDTH] IN BLOOD BY AUTOMATED COUNT: 17.1 % (ref 11.5–14.5)
GLUCOSE BLD MANUAL STRIP-MCNC: 113 MG/DL (ref 74–99)
GLUCOSE SERPL-MCNC: 114 MG/DL (ref 74–99)
HCT VFR BLD AUTO: 39.5 % (ref 36–46)
HGB BLD-MCNC: 12.4 G/DL (ref 12–16)
MAGNESIUM SERPL-MCNC: 2.42 MG/DL (ref 1.6–2.4)
MCH RBC QN AUTO: 27.3 PG (ref 26–34)
MCHC RBC AUTO-ENTMCNC: 31.4 G/DL (ref 32–36)
MCV RBC AUTO: 87 FL (ref 80–100)
NRBC BLD-RTO: 0 /100 WBCS (ref 0–0)
PHOSPHATE SERPL-MCNC: 1.5 MG/DL (ref 2.5–4.9)
PLATELET # BLD AUTO: 207 X10*3/UL (ref 150–450)
POTASSIUM SERPL-SCNC: 4.2 MMOL/L (ref 3.5–5.3)
PROT SERPL-MCNC: 6.6 G/DL (ref 6.4–8.2)
RBC # BLD AUTO: 4.54 X10*6/UL (ref 4–5.2)
SODIUM SERPL-SCNC: 128 MMOL/L (ref 136–145)
WBC # BLD AUTO: 4.6 X10*3/UL (ref 4.4–11.3)

## 2024-12-24 PROCEDURE — 85027 COMPLETE CBC AUTOMATED: CPT

## 2024-12-24 PROCEDURE — 99232 SBSQ HOSP IP/OBS MODERATE 35: CPT | Performed by: INTERNAL MEDICINE

## 2024-12-24 PROCEDURE — 80053 COMPREHEN METABOLIC PANEL: CPT

## 2024-12-24 PROCEDURE — 84100 ASSAY OF PHOSPHORUS: CPT | Performed by: NURSE PRACTITIONER

## 2024-12-24 PROCEDURE — 82947 ASSAY GLUCOSE BLOOD QUANT: CPT

## 2024-12-24 PROCEDURE — 1200000002 HC GENERAL ROOM WITH TELEMETRY DAILY

## 2024-12-24 PROCEDURE — 2500000001 HC RX 250 WO HCPCS SELF ADMINISTERED DRUGS (ALT 637 FOR MEDICARE OP)

## 2024-12-24 PROCEDURE — 97530 THERAPEUTIC ACTIVITIES: CPT | Mod: GP,CQ

## 2024-12-24 PROCEDURE — 36415 COLL VENOUS BLD VENIPUNCTURE: CPT

## 2024-12-24 PROCEDURE — 83735 ASSAY OF MAGNESIUM: CPT

## 2024-12-24 PROCEDURE — 2500000004 HC RX 250 GENERAL PHARMACY W/ HCPCS (ALT 636 FOR OP/ED)

## 2024-12-24 RX ADMIN — METOPROLOL SUCCINATE 12.5 MG: 25 TABLET, EXTENDED RELEASE ORAL at 08:06

## 2024-12-24 RX ADMIN — HEPARIN SODIUM 5000 UNITS: 5000 INJECTION, SOLUTION INTRAVENOUS; SUBCUTANEOUS at 20:57

## 2024-12-24 RX ADMIN — ACETAMINOPHEN 650 MG: 325 TABLET ORAL at 18:54

## 2024-12-24 RX ADMIN — TORSEMIDE 20 MG: 20 TABLET ORAL at 08:06

## 2024-12-24 RX ADMIN — HEPARIN SODIUM 5000 UNITS: 5000 INJECTION, SOLUTION INTRAVENOUS; SUBCUTANEOUS at 12:40

## 2024-12-24 RX ADMIN — PREDNISONE 2.5 MG: 5 TABLET ORAL at 08:06

## 2024-12-24 RX ADMIN — HEPARIN SODIUM 5000 UNITS: 5000 INJECTION, SOLUTION INTRAVENOUS; SUBCUTANEOUS at 06:03

## 2024-12-24 RX ADMIN — PANTOPRAZOLE SODIUM 40 MG: 40 TABLET, DELAYED RELEASE ORAL at 06:03

## 2024-12-24 ASSESSMENT — COGNITIVE AND FUNCTIONAL STATUS - GENERAL
DAILY ACTIVITIY SCORE: 20
TOILETING: A LITTLE
MOVING TO AND FROM BED TO CHAIR: A LITTLE
HELP NEEDED FOR BATHING: A LITTLE
MOVING TO AND FROM BED TO CHAIR: A LITTLE
MOVING FROM LYING ON BACK TO SITTING ON SIDE OF FLAT BED WITH BEDRAILS: A LITTLE
CLIMB 3 TO 5 STEPS WITH RAILING: A LOT
STANDING UP FROM CHAIR USING ARMS: A LITTLE
MOBILITY SCORE: 17
WALKING IN HOSPITAL ROOM: A LITTLE
MOVING FROM LYING ON BACK TO SITTING ON SIDE OF FLAT BED WITH BEDRAILS: A LITTLE
STANDING UP FROM CHAIR USING ARMS: A LITTLE
TURNING FROM BACK TO SIDE WHILE IN FLAT BAD: A LITTLE
TURNING FROM BACK TO SIDE WHILE IN FLAT BAD: A LITTLE
DRESSING REGULAR UPPER BODY CLOTHING: A LITTLE
DRESSING REGULAR LOWER BODY CLOTHING: A LITTLE
CLIMB 3 TO 5 STEPS WITH RAILING: TOTAL
WALKING IN HOSPITAL ROOM: A LITTLE
MOBILITY SCORE: 16

## 2024-12-24 ASSESSMENT — PAIN SCALES - GENERAL
PAINLEVEL_OUTOF10: 10 - WORST POSSIBLE PAIN
PAINLEVEL_OUTOF10: 0 - NO PAIN

## 2024-12-24 ASSESSMENT — PAIN - FUNCTIONAL ASSESSMENT
PAIN_FUNCTIONAL_ASSESSMENT: 0-10

## 2024-12-24 NOTE — PROGRESS NOTES
TCC sent updated PT notes to [facility]. TCC will continue to follow for discharge planning.    YURIDIA Samuels, RN  [facility], [facility]  Transitional Care Coordinator, Mon-Fri  Cell: [phone], Office: [phone]  Email: [email or web address]

## 2024-12-24 NOTE — PROGRESS NOTES
Overnight Events:    - feeling well this am   - remains euvolemic on exam with stable Cr.  - c/w  PO torsemide to 20 mg daily   - 12/23 family meeting -> patient and family agreeable for patient to pursue SNF with palliative to follow at facility, nasreen Ramirez provided TCC with 3 SNF choices , pending choice and acceptance     Objective Data:  Last Recorded Vitals:  Vitals:    12/23/24 2021 12/24/24 0055 12/24/24 0547 12/24/24 0742   BP: 108/62 105/66 99/59 99/55   BP Location: Left arm Left arm Left arm Right arm   Patient Position: Lying Lying Lying Lying   Pulse: 73 71 69 72   Resp: 18 18 18 16   Temp: 36.5 °C (97.7 °F) 36.6 °C (97.9 °F) 36.4 °C (97.5 °F) 37.2 °C (99 °F)   TempSrc: Temporal Temporal Temporal Temporal   SpO2: 96% 96% 97% 96%   Weight:   50.1 kg (110 lb 7.2 oz)    Height:           Last Labs:  CBC - 12/23/2024:  7:26 PM  4.1 14.3 203    45.0      CMP - 12/23/2024:  7:26 PM  9.1 6.9 60 --- 1.0   2.2 3.3 60 198      PTT - 12/13/2024: 12:18 PM  1.3   14.4 30     TROPHS   Date/Time Value Ref Range Status   12/13/2024 12:18  0 - 34 ng/L Final     Comment:     Previous result verified on 12/13/2024 1210 on specimen/case [id number] called with component TRPHS for procedure Troponin I, High Sensitivity, Initial with value 418 ng/L.   12/13/2024 11:02  0 - 34 ng/L Final   08/23/2024 11:41  0 - 34 ng/L Final     Comment:     Previous result verified on 8/23/2024 2336 on specimen/case [id number] called with component TRPHS for procedure Troponin I, High Sensitivity, Initial with value 235 ng/L.     BNP   Date/Time Value Ref Range Status   12/19/2024 07:28 PM 1,184 0 - 99 pg/mL Final   12/13/2024 11:02 AM >5,000 0 - 99 pg/mL Final      Last I/O:  I/O last 3 completed shifts:  In: 480 (9.6 mL/kg) [P.O.:480]  Out: 2201 (43.9 mL/kg) [Urine:2200 (1.2 mL/kg/hr); Stool:1]  Weight: 50.1 kg     Past Cardiology Tests (Last 3 Years):  EKG:  ECG 12 lead 12/13/2024  Atrial- paced rhythm,  rate 60 BPM    Echo:  Transthoracic Echo (TTE) Complete 08/24/2024   1. Left ventricular ejection fraction is moderately decreased, by visual estimate at 30-35%.   2. There is global hypokinesis of the left ventricle with minor regional variations.   3. Spectral Doppler shows an impaired relaxation pattern of left ventricular diastolic filling.   4. There is an elevated left ventricular end diastolic pressure.   5. Left ventricular cavity size is decreased.   6. Abnormal septal motion consistent with RV pacemaker.   7. Moderately increased left ventricular septal thickness.   8. The left ventricular posterior wall thickness is moderately increased.   9. There is severe concentric left ventricular hypertrophy.  10. There is normal right ventricular global systolic function.  11. Moderately enlarged right ventricle.  12. The left atrium is mildly dilated.  13. The right atrium is severely dilated.  14. Mild to moderate mitral valve regurgitation.  15. Severe tricuspid regurgitation visualized.  16. Slightly elevated RVSP.  17. The RV systolic pressure is likely to be underestimated.  18. Mild to moderate aortic valve regurgitation.     Transthoracic Echo (TTE) Complete 10/03/2023   1. Left ventricular systolic function is mildly decreased with a 50-55% estimated ejection fraction.   2. Moderately increased left ventricular septal thickness.   3. The left ventricular posterior wall thickness is moderately increased.   4. Moderately enlarged right ventricle.   5. Mild to moderate aortic valve regurgitation.   6. Mild to moderate mitral valve regurgitation.   7. Moderate tricuspid regurgitation visualized.   8. The tricuspid valve annulus appears dilated.   9. The left atrium is moderately dilated.  10. Moderately elevated right ventricular systolic pressure.  11. Compared with study from 2/17/2017, there is a significant increase in LV wall thickness. There is evidence of mild thickening of the RV free wall, associated  with mild thickening of the AV, MV and tricuspid valve plus moderately elevated RVSP. This findings could be associated to an underlying infiltrative disease like amyloidosis.     Ejection Fractions:  EF   Date/Time Value Ref Range Status   08/24/2024 11:20 AM 33 %      Cardiac Imaging:  CT angio coronary art with heartflow if score >30% 08/29/2024  CT angio coronary art with heartflow if score >30% 08/29/2024  1. Minor coronary artery disease.  2. The proximal LAD has focal calcified plaque with minimal stenosis  (<25%).  3. The LM, LCx and RCA have no significant atherosclerotic change or  stenotic disease.  4. Total Agatston calcium score of 17.  5. Right dominant coronary artery system.  6. Right atrial and right ventricular dilatation. Mild left atrial  dilatation.  7. Small bilateral pleural effusions with dependent atelectasis.  8. Dilated main pulmonary artery to 3.4 cm. Correlate/concern for  elevated pulmonary/right-sided filling pressures.     Amyloid SPECT Heart Study 8/26/2024  1. Amyloid SPECT heart study is suggestive of TTR amyloidosis.     Inpatient Medications:  Scheduled medications   Medication Dose Route Frequency    [Held by provider] aspirin  81 mg oral Daily    ergocalciferol  1,250 mcg oral Every Sunday    heparin (porcine)  5,000 Units subcutaneous q8h    metoprolol succinate XL  12.5 mg oral Daily    pantoprazole  40 mg oral Daily before breakfast    polyethylene glycol  17 g oral Daily    predniSONE  2.5 mg oral Daily    [Held by provider] rosuvastatin  10 mg oral Nightly    [Held by provider] sacubitriL-valsartan  0.5 tablet oral BID    sennosides-docusate sodium  1 tablet oral Nightly    [Held by provider] spironolactone  25 mg oral Daily    torsemide  20 mg oral Daily     PRN medications   Medication    acetaminophen    ipratropium-albuteroL    white petrolatum     Continuous Medications   Medication Dose Last Rate       Physical Exam  Constitutional:       General: She is awake. She  is not in acute distress.     Comments: Lying in bed   HENT:      Head: Normocephalic.   Eyes:      Extraocular Movements: Extraocular movements intact.      Pupils: Pupils are equal, round, and reactive to light.   Cardiovascular:      Rate and Rhythm: Normal rate and regular rhythm.      Pulses: Normal pulses.      Comments: Mostly AV-paced with rate in 60s.   Pulmonary:      Effort: Pulmonary effort is normal. No respiratory distress.      Breath sounds: Normal breath sounds.   Abdominal:      General: Abdomen is flat. There is no distension.      Palpations: Abdomen is soft.      Tenderness: There is no abdominal tenderness.   Musculoskeletal:         General: No swelling.      Comments: No upper or low extremity edema   Skin:     General: Skin is warm and dry.      Comments: healing bilateral LE blisters. Open to air.    Neurological:      General: No focal deficit present.      Mental Status: She is alert.   Psychiatric:         Mood and Affect: Mood normal.         Behavior: Behavior normal.        Assessment/Plan   Erin Lopez is a 89 y.o. female presenting with PMH of NSTEMI (nonobstructive CAD 2017), TTR cardiac amyloidosis, HFrEF (30-35% 8/2-24), LBBB and SSS s/p PPM 2017 s/p device changeout 11/2022, HTN, CKD presented to the ED with severe anasarca and acute decompensated heart failure. Admitted to [facility] for further management.      Acute on chronic systolic and diastolic heart failure (HFrEF 30-35%)  TTR Amyloidosis  Acute respiratory failure, improved   - TTE 10/2023: EF 50-55%, moderately increased LV septal and posterior wall thickness, Moderately enlarged V, mild to mod AR and MR, mod TR, moderate LAE, Compared to 2017 TTE there is possible underlying infiltrative disease  - TTE 8/24/2024: EF 30-35%, gHK of LV,  elevated LVEDP, abnormal septal motion c/w V pacemaker, moderately increased LV septal thickness. LV posterior wall moderately thickened. Severe cLVH. Moderately enlarged RV, mild LAE, RA  severely dilated, mild to mod MR, severe TR, Slightly elevated RVSP. Mild to mod AR.  - New TTR amyloidosis seen on 8/25 NM PYP scan-- K/L ration WNL, SPEP, UPEP negative  - admit CXR: Severe cardiomegaly with increased moderate-to-large bilateral pleural effusions and associated atelectasis and/or consolidation. Additional interstitial and patchy opacities with basilar predilection suggestive of interstitial and alveolar pulmonary edema  - S/p wean to RA.   - Admit BNP >5000 (previously 2,626 8/2024). Repeat 12/19 1,184.   - discharge wt 9/4/2024 51.5 kg  - admit wt: 70.5 kg (recorded in ED)  - daily wt: 59.7  - patient admitted with severe anasarca up to breasts/upper arms  - s/p lasix gtt at 20 mg/hour  - Transitioned from lasix infusion to torsemide 40mg BID on 12/19 then held on 12/20.   - remains euvolemic on exam with stable Cr  - 12/23 torsemide 40mg daily decreased to 20 mg daily today  - c/w torsemide 20mg daily   - Advanced HF team consulted during last admission: Recommended initiating Tafamidis (amyloid coordinator informed at the time)-- patient no showed for Dr. Goss appt 9/17/2024 (transport issues)  - cont metop succ 12.5mg daily  - Continue holding home dapagliflozin iso intermittent low blood glucose readings.   - low dose Entresto 12/13 BID held iso aggressive diuresis and low-normal blood pressures. Can consider restarting outpatient if blood pressures are appropriate.   - 12/15 spironolactone increased to 25 mg daily to improve diuresis --> held spironolactone 12/20 along with diuresis for hyponatremia. Can consider resuming prior to DC, still appears euvolemic today and don't want to overdiurese   - home GDMT: Entresto 12/13 BID, metop succ 12.5 mg daily, dapagliflozin 10 mg daily,  and spironolactone 12.5 mg daily  - home diuretic: Lasix 20mg daily  - Daily standing weights, strict I&O's, 2g sodium diet, 2L fluid restriction.   - Palliative care: DNR/DNI. Agreeable for outpatient follow up  with palliative care (consult to be placed when closer to discharge). Ongoing pt and family support and care navigation. Assistance with home resources to support pt's goal. SW to assist with possible transportation support to ensure pt can make it to follow up appointments.   -  family meeting held today with palliative medicine-> patient and family agreeable for patient to pursue SNF with palliative to follow at facility  - nasreen Ramirez provided TCC with 3 SNF choices    - Continue prednisone daily (long term use dating back to  for arthritis per patient) and neb PRN   - Advanced HF rec starting tafamadis outpatient after last discharge. Patient missed follow up appt. holding tafamadis initiation, consider starting outpatient.     Upper extremity edema - improving   - fluid overloaded on presentation with 2+ swelling BL arms  - IV infusion switched from L arm, L arm elevated  -  LUE duplex given severe unilateral swelling of LUE and coolness (ordered in vasc lab) no DVT  - On exam, upper extremity edema is resolved     Troponinemia  - HS trop: 418 -> 330. Suspected 2/2 demand from CHF  - CTA coronary : minor CAD, prox LAD has focal calcified plaque with minimal stenosis (<25%), LM, LCx and RCA have no significant atherosclerotic change or stenotic disease. Calcium score 17  - Cont rosuvastatin 20 mg nightly   - daily ASA held due to worsening thrombocytopenia, can consider restarting with stable normalization as an outpatient     Hx of SSS s/p PPM , s/p device changeout 2022  - EC% AV pacing  - EP consulted during last admission due to concern for possible pacemaker mediated cardiomyopathy given high pacing burden on device interrogation: AV delayed extended but no intrinsic conduction noted. Recommended optimizing GDMT prior to considering upgrade to CRT since she would be high risk.   - Discussed device upgrade with EP (formally consulted  now that patient is  euvolemic), interrogation and optimization ordered.  39% RA and 97% RV paced.   - Per EP, there appears to be dyssynchrony on echo and patient may benefit from device upgrade. EP stating this can be pursued on an outpatient basis and requested an EP referral be placed prior to discharge.      HTN  - Admit /76  - last 24 hour SBP range:   - Entresto held as above   - Cont metoprolol succinate as above.      TERI on CKD IIIb, stable  - Cr baseline 1.4-1.6  - admit Cr 2.12  - daily Cr 1.44 (1.65,1.74, 1.64, 1.74, 1.63, 1.72, 1.86, 1.85, 2.02)   - likely cardiorenal iso severe anasarca   - Duresis as above  - avoid nephrotoxic agents    Hypovolemic Hyponatremia, Asymptomatic- improving   - Sodium trend: 132 (131,129, 127, 128, 132, 135, 136, 138).   - Patient is neurologically intact on today's encounter, however, will monitor for any acute changes.   - 12/19 Urine/serum osmolality and urine sodium sent suggestive of hypovolemic hyponatremia 2/2 aggressive diuresis.   - Diuresis as above     Hypokalemia, resolved  - admit K 3.1  - daily K 3.9 (4.2)  - Hold spironolactone 25mg as above  - cont repletion as needed.      Concern for congestive hepatopathy   Thrombocytopenia, improving  - baseline 190-220s  - admit Plt: 101  - daily Plt 203 (184,175, 168, 155, 127, 83, 102, 99, 84)  - on admit: INR 1.3, elevated alk phos 163  - cont to trend CBC  - Persistent increased Alk Phos, however no elevation in AST/ALT.   - daily ASA held iso worsening thrombocytopenia; Plt already low on admission, low concern for HIT.      Arthritis  - Pt previously getting steroid injections q3 month then put on systemic steroids  - Cont prednisone 2.5 daily   - cont home PPI while on steroids      Dispo: medically ready for discharge, pending acceptance to SNF  - PT/OT eval mod intensity      DVT ppx: heparin SQ  Code Status:  Full Code    All labs, vital signs, tests & imaging results, and medications were reviewed.    Patient seen  and examined with Dr. Nakia Anthony, APRN-CNP, DNP

## 2024-12-24 NOTE — PROGRESS NOTES
Physical Therapy Treatment    Patient Name: Erin Lopez  MRN: [id number]  Today's Date: 12/24/2024  Room: [address] [place] [address]  Time Calculation  Start Time: 1405  Stop Time: 1428  Time Calculation (min): 23 min       Assessment/Plan   PT Assessment  PT Assessment Results: Decreased strength, Decreased endurance, Impaired balance, Decreased range of motion, Decreased mobility  Rehab Prognosis: Good  Barriers to Discharge Home: Physical needs  Physical Needs: Ambulating household distances limited by function/safety, High falls risk due to function or environment, Intermittent ADL assistance needed, 24hr mobility assistance needed  Evaluation/Treatment Tolerance: Patient limited by fatigue  Strengths: Attitude of self  Barriers to Participation: Comorbidities  End of Session Patient Position: Up in chair, Alarm off, not on at start of session     PT Plan  Treatment/Interventions: Bed mobility, Transfer training, Gait training, Balance training, Strengthening, Endurance training, Therapeutic exercise, Therapeutic activity, Home exercise program  PT Plan: Ongoing PT  PT Frequency: 3 times per week  PT Discharge Recommendations: Moderate intensity level of continued care  Equipment Recommended upon Discharge:  (none)  PT Recommended Transfer Status: Assist x1  PT - OK to Discharge: Yes  Assessment: Patient is progressing Fairly with therapy this date. Limited endurance, Angy to stand to fww. Would continue to benefit from continued skilled PT to address all mobility deficits; Patient remains appropriate for MOD intensity therapy when medically appropriate for discharge from acute stay.  Will continue to follow.     General Visit Information:   PT  Visit  PT Received On: 12/24/24  Prior to Session Communication: Bedside nurse  Patient Position Received: Up in chair     Subjective   Subjective: Agreeable with education on POC and encouragement  Precautions:  Precautions  Hearing/Visual Limitations: Reading glasses  Medical  Precautions: Cardiac precautions, Fall precautions    Objective   Pain:  Pain Assessment  Pain Assessment: 0-10  0-10 (Numeric) Pain Score: 0 - No pain  Cognition:  Cognition  Overall Cognitive Status: Within Functional Limits  Orientation Level: Oriented X4  Following Commands: Follows one step commands consistently  Insight: Mild  Impulsive: Within functional limits     Lines/Tubes/Drains:  External Urinary Catheter Female (Active)   Number of days: 6     PT Treatments:     Ambulation/Gait Training 1  Surface 1: Level tile  Device 1: Rolling walker  Assistance 1: Contact guard  Quality of Gait 1: Forward flexed posture, Decreased step length, Soft knee(s), Shuffling gait  Comments/Distance (ft) 1: 30', very slow felix, very fatigued post ambulation bout.  Transfer 1  Transfer From 1: Sit to  Transfer to 1: Stand  Technique 1: Sit to stand, Stand to sit  Transfer Device 1: Walker  Transfer Level of Assistance 1: Minimum assistance  Trials/Comments 1: decreased B knee/hip flexion for anterior lean, pt reports stiffness from previous fluid buildup.  Transfers 2  Transfer From 2: Stand to  Transfer to 2: Chair with arms  Technique 2: Stand pivot  Transfer Device 2: Walker  Transfer Level of Assistance 2: Contact guard  Trials/Comments 2: cues for safety backing up to chair             Activity tolerance:  Activity Tolerance  Endurance: Decreased tolerance for upright activites    Outcome Measures:  [facility] Basic Mobility  Turning from your back to your side while in a flat bed without using bedrails: A little  Moving from lying on your back to sitting on the side of a flat bed without using bedrails: A little  Moving to and from bed to chair (including a wheelchair): A little  Standing up from a chair using your arms (e.g. wheelchair or bedside chair): A little  To walk in hospital room: A little  Climbing 3-5 steps with railing: Total  Basic Mobility - Total Score: 16       Education Documentation  Body Mechanics,  taught by Suzanna Horne PTA at 12/24/2024  2:43 PM.  Learner: Patient  Readiness: Acceptance  Method: Explanation  Response: Verbalizes Understanding  Comment: therapy POC    Home Exercise Program, taught by Suzanna Horne PTA at 12/24/2024  2:43 PM.  Learner: Patient  Readiness: Acceptance  Method: Explanation  Response: Verbalizes Understanding  Comment: therapy POC    Precautions, taught by Suzanna Horne PTA at 12/24/2024  2:43 PM.  Learner: Patient  Readiness: Acceptance  Method: Explanation  Response: Verbalizes Understanding  Comment: therapy POC    Mobility Training, taught by Suzanna Horne PTA at 12/24/2024  2:43 PM.  Learner: Patient  Readiness: Acceptance  Method: Explanation  Response: Verbalizes Understanding  Comment: therapy POC    Body Mechanics, taught by Suzanna Horne PTA at 12/24/2024  2:43 PM.  Learner: Patient  Readiness: Acceptance  Method: Explanation  Response: Verbalizes Understanding  Comment: therapy POC    Precautions, taught by Suzanna Horne PTA at 12/24/2024  2:43 PM.  Learner: Patient  Readiness: Acceptance  Method: Explanation  Response: Verbalizes Understanding  Comment: therapy POC    ADL Training, taught by Suzanna Horne PTA at 12/24/2024  2:43 PM.  Learner: Patient  Readiness: Acceptance  Method: Explanation  Response: Verbalizes Understanding  Comment: therapy POC    Education Comments  No comments found.          OP EDUCATION:       Encounter Problems       Encounter Problems (Active)       Balance       STG - Maintains supervision assistance dynamic standing balance with upper extremity support using a wheeled walker. (Progressing)       Start:  12/16/24    Expected End:  12/30/24            STG - Maintains supervision assistance static standing balance with upper extremity support using a wheeled walker. (Progressing)       Start:  12/16/24    Expected End:  12/30/24               Mobility       STG - Patient will ambulate 75ft with supervision assistance using a wheeled walker.  (Progressing)       Start:  12/16/24    Expected End:  12/30/24               PT Transfers       STG - Transfer from bed to chair with supervision assistance using a wheeled walker. (Progressing)       Start:  12/16/24    Expected End:  12/30/24            STG - Patient to transfer to and from sit to supine with supervision assistance. (Progressing)       Start:  12/16/24    Expected End:  12/30/24            STG - Patient will transfer sit to and from stand with supervision assistance using a wheeled walker. (Progressing)       Start:  12/16/24    Expected End:  12/30/24

## 2024-12-25 LAB
ALBUMIN SERPL BCP-MCNC: 3.5 G/DL (ref 3.4–5)
ALP SERPL-CCNC: 170 U/L (ref 33–136)
ALT SERPL W P-5'-P-CCNC: 49 U/L (ref 7–45)
ANION GAP SERPL CALC-SCNC: 12 MMOL/L (ref 10–20)
AST SERPL W P-5'-P-CCNC: 46 U/L (ref 9–39)
BILIRUB SERPL-MCNC: 0.9 MG/DL (ref 0–1.2)
BUN SERPL-MCNC: 48 MG/DL (ref 6–23)
CALCIUM SERPL-MCNC: 9.5 MG/DL (ref 8.6–10.6)
CHLORIDE SERPL-SCNC: 94 MMOL/L (ref 98–107)
CO2 SERPL-SCNC: 31 MMOL/L (ref 21–32)
CREAT SERPL-MCNC: 1.46 MG/DL (ref 0.5–1.05)
EGFRCR SERPLBLD CKD-EPI 2021: 34 ML/MIN/1.73M*2
ERYTHROCYTE [DISTWIDTH] IN BLOOD BY AUTOMATED COUNT: 17.2 % (ref 11.5–14.5)
GLUCOSE SERPL-MCNC: 87 MG/DL (ref 74–99)
HCT VFR BLD AUTO: 39.6 % (ref 36–46)
HGB BLD-MCNC: 12.7 G/DL (ref 12–16)
MAGNESIUM SERPL-MCNC: 2.48 MG/DL (ref 1.6–2.4)
MCH RBC QN AUTO: 27.9 PG (ref 26–34)
MCHC RBC AUTO-ENTMCNC: 32.1 G/DL (ref 32–36)
MCV RBC AUTO: 87 FL (ref 80–100)
NRBC BLD-RTO: 0 /100 WBCS (ref 0–0)
PHOSPHATE SERPL-MCNC: 1.5 MG/DL (ref 2.5–4.9)
PLATELET # BLD AUTO: 232 X10*3/UL (ref 150–450)
POTASSIUM SERPL-SCNC: 4.5 MMOL/L (ref 3.5–5.3)
PROT SERPL-MCNC: 7.2 G/DL (ref 6.4–8.2)
RBC # BLD AUTO: 4.56 X10*6/UL (ref 4–5.2)
SODIUM SERPL-SCNC: 132 MMOL/L (ref 136–145)
WBC # BLD AUTO: 4.2 X10*3/UL (ref 4.4–11.3)

## 2024-12-25 PROCEDURE — 84100 ASSAY OF PHOSPHORUS: CPT | Performed by: NURSE PRACTITIONER

## 2024-12-25 PROCEDURE — 80053 COMPREHEN METABOLIC PANEL: CPT | Performed by: PHYSICIAN ASSISTANT

## 2024-12-25 PROCEDURE — 2500000001 HC RX 250 WO HCPCS SELF ADMINISTERED DRUGS (ALT 637 FOR MEDICARE OP)

## 2024-12-25 PROCEDURE — 36415 COLL VENOUS BLD VENIPUNCTURE: CPT

## 2024-12-25 PROCEDURE — 83735 ASSAY OF MAGNESIUM: CPT

## 2024-12-25 PROCEDURE — 99232 SBSQ HOSP IP/OBS MODERATE 35: CPT | Performed by: INTERNAL MEDICINE

## 2024-12-25 PROCEDURE — 2500000004 HC RX 250 GENERAL PHARMACY W/ HCPCS (ALT 636 FOR OP/ED)

## 2024-12-25 PROCEDURE — 2500000002 HC RX 250 W HCPCS SELF ADMINISTERED DRUGS (ALT 637 FOR MEDICARE OP, ALT 636 FOR OP/ED): Performed by: PHYSICIAN ASSISTANT

## 2024-12-25 PROCEDURE — 1200000002 HC GENERAL ROOM WITH TELEMETRY DAILY

## 2024-12-25 PROCEDURE — 85027 COMPLETE CBC AUTOMATED: CPT

## 2024-12-25 RX ADMIN — PANTOPRAZOLE SODIUM 40 MG: 40 TABLET, DELAYED RELEASE ORAL at 06:01

## 2024-12-25 RX ADMIN — ROSUVASTATIN CALCIUM 10 MG: 20 TABLET, FILM COATED ORAL at 21:45

## 2024-12-25 RX ADMIN — ACETAMINOPHEN 650 MG: 325 TABLET ORAL at 22:02

## 2024-12-25 RX ADMIN — HEPARIN SODIUM 5000 UNITS: 5000 INJECTION, SOLUTION INTRAVENOUS; SUBCUTANEOUS at 13:17

## 2024-12-25 RX ADMIN — PREDNISONE 2.5 MG: 5 TABLET ORAL at 08:29

## 2024-12-25 RX ADMIN — HEPARIN SODIUM 5000 UNITS: 5000 INJECTION, SOLUTION INTRAVENOUS; SUBCUTANEOUS at 05:55

## 2024-12-25 RX ADMIN — TORSEMIDE 20 MG: 20 TABLET ORAL at 08:29

## 2024-12-25 RX ADMIN — METOPROLOL SUCCINATE 12.5 MG: 25 TABLET, EXTENDED RELEASE ORAL at 08:29

## 2024-12-25 RX ADMIN — HEPARIN SODIUM 5000 UNITS: 5000 INJECTION, SOLUTION INTRAVENOUS; SUBCUTANEOUS at 21:45

## 2024-12-25 ASSESSMENT — COGNITIVE AND FUNCTIONAL STATUS - GENERAL
PERSONAL GROOMING: A LITTLE
TURNING FROM BACK TO SIDE WHILE IN FLAT BAD: A LITTLE
MOVING TO AND FROM BED TO CHAIR: A LITTLE
CLIMB 3 TO 5 STEPS WITH RAILING: A LITTLE
EATING MEALS: A LITTLE
MOBILITY SCORE: 18
MOVING FROM LYING ON BACK TO SITTING ON SIDE OF FLAT BED WITH BEDRAILS: A LITTLE
DRESSING REGULAR LOWER BODY CLOTHING: A LITTLE
DRESSING REGULAR UPPER BODY CLOTHING: A LITTLE
STANDING UP FROM CHAIR USING ARMS: A LITTLE
TOILETING: A LITTLE
WALKING IN HOSPITAL ROOM: A LITTLE
DAILY ACTIVITIY SCORE: 18
HELP NEEDED FOR BATHING: A LITTLE

## 2024-12-25 ASSESSMENT — PAIN SCALES - GENERAL: PAINLEVEL_OUTOF10: 0 - NO PAIN

## 2024-12-25 ASSESSMENT — PAIN - FUNCTIONAL ASSESSMENT: PAIN_FUNCTIONAL_ASSESSMENT: 0-10

## 2024-12-25 NOTE — PROGRESS NOTES
"Subjective:  \"Feeling better and better each day.\"    Today in brief:  - SNF planning underway  - Resume low dose aspirin. Monitor platelets    Objective:  Vitals:    12/25/24 0456   BP: 93/55   Pulse: 68   Resp: 17   Temp: 36.5 °C (97.7 °F)   SpO2: 97%     Weight         12/21/2024  0457 12/22/2024  0543 12/23/2024  0419 12/24/2024  0547 12/25/2024  0456    Weight: 49.2 kg (108 lb 7.5 oz) 50.2 kg (110 lb 10.7 oz) 49.7 kg (109 lb 9.1 oz) 50.1 kg (110 lb 7.2 oz) 50 kg (110 lb 3.7 oz)            Intake/Output Summary (Last 24 hours) at 12/25/2024 0753  Last data filed at 12/25/2024 0456  Gross per 24 hour   Intake 1150 ml   Output 1575 ml   Net -425 ml     Recent Results (from the past 24 hours)   Comprehensive metabolic panel    Collection Time: 12/24/24  7:40 PM   Result Value Ref Range    Glucose 114 (H) 74 - 99 mg/dL    Sodium 128 (L) 136 - 145 mmol/L    Potassium 4.2 3.5 - 5.3 mmol/L    Chloride 90 (L) 98 - 107 mmol/L    Bicarbonate 32 21 - 32 mmol/L    Anion Gap 10 10 - 20 mmol/L    Urea Nitrogen 45 (H) 6 - 23 mg/dL    Creatinine 1.42 (H) 0.50 - 1.05 mg/dL    eGFR 35 (L) >60 mL/min/1.73m*2    Calcium 9.2 8.6 - 10.6 mg/dL    Albumin 3.2 (L) 3.4 - 5.0 g/dL    Alkaline Phosphatase 179 (H) 33 - 136 U/L    Total Protein 6.6 6.4 - 8.2 g/dL    AST 46 (H) 9 - 39 U/L    Bilirubin, Total 0.8 0.0 - 1.2 mg/dL    ALT 55 (H) 7 - 45 U/L   CBC    Collection Time: 12/24/24  7:40 PM   Result Value Ref Range    WBC 4.6 4.4 - 11.3 x10*3/uL    nRBC 0.0 0.0 - 0.0 /100 WBCs    RBC 4.54 4.00 - 5.20 x10*6/uL    Hemoglobin 12.4 12.0 - 16.0 g/dL    Hematocrit 39.5 36.0 - 46.0 %    MCV 87 80 - 100 fL    MCH 27.3 26.0 - 34.0 pg    MCHC 31.4 (L) 32.0 - 36.0 g/dL    RDW 17.1 (H) 11.5 - 14.5 %    Platelets 207 150 - 450 x10*3/uL   Magnesium    Collection Time: 12/24/24  7:40 PM   Result Value Ref Range    Magnesium 2.42 (H) 1.60 - 2.40 mg/dL   Phosphorus    Collection Time: 12/24/24  7:40 PM   Result Value Ref Range    Phosphorus 1.5 (L) 2.5 - " 4.9 mg/dL     Inpatient Medications:  Scheduled medications   Medication Dose Route Frequency    [Held by provider] aspirin  81 mg oral Daily    ergocalciferol  1,250 mcg oral Every Sunday    heparin (porcine)  5,000 Units subcutaneous q8h    metoprolol succinate XL  12.5 mg oral Daily    pantoprazole  40 mg oral Daily before breakfast    polyethylene glycol  17 g oral Daily    predniSONE  2.5 mg oral Daily    [Held by provider] rosuvastatin  10 mg oral Nightly    [Held by provider] sacubitriL-valsartan  0.5 tablet oral BID    sennosides-docusate sodium  1 tablet oral Nightly    [Held by provider] spironolactone  25 mg oral Daily    torsemide  20 mg oral Daily     PRN medications   Medication    acetaminophen    ipratropium-albuteroL    white petrolatum     Continuous Medications   Medication Dose Last Rate       Telemetry 12/25/2024 (personally reviewed): AsVp 60-70s    Physical exam:  General: NAD, thin female  Head/ neck: +TR pulsation at R neck  Cardiac: RRR, regular S1 S2 , no murmur, no rub, no gallop  Pulm: CTA bilaterally, no wheezes, rales or rhonchi.    Vascular: Radial 2+ bilaterally  GI: Non distended  Extremities: no LE edema   Neuro: no focal neuro deficits   Psych: appropriate mood and behavior   Skin: warm and dry     Assessment/Plan   Erin Lopez is a 89 y.o. female presenting with PMH of NSTEMI (nonobstructive CAD 2017), TTR cardiac amyloidosis, HFrEF (30-35% 8/2-24), LBBB and SSS s/p PPM 2017 s/p device changeout 11/2022, HTN, CKD presented to the ED with severe anasarca and acute decompensated heart failure. Admitted to [facility] for further management.      Acute on chronic systolic and diastolic heart failure (HFrEF 30-35%)  TTR Amyloidosis  Acute respiratory failure, improved   - TTE 10/2023: EF 50-55%, mod increased LV septal and posterior wall thickness, Moderately enlarged V, mild to mod AR and MR, mod TR, mod LAE, Compared to 2017 TTE there is possible underlying infiltrative disease  - TTE  8/24/24: EF 30-35%, gHK of LV,  elevated LVEDP, abnormal septal motion c/w V pacemaker, moderately increased LV septal thickness. LV posterior wall mod thickened. Severe cLVH. Moderately enlarged RV, mild LAE, RA severely dilated, mild to mod MR, severe TR, Slightly elevated RVSP. Mild to mod AR.  - New TTR amyloidosis seen on 8/25 NM PYP scan-- K/L ration WNL, SPEP, UPEP negative  - patient admitted with severe anasarca up to breasts/upper arms  - admit CXR: Severe cardiomegaly with increased mod-to-large bilateral pleural effusions and associated atelectasis and/or consolidation. Additional interstitial and patchy opacities with basilar predilection suggestive of interstitial and alveolar pulmonary edema  - Admit BNP >5000 (previously 2,626 8/2024). Repeat 12/19 1,184.   - discharge wt 9/4/2024 51.5 kg  - admit wt: 70.5 kg (recorded in ED)  - daily wt: 50kg  - s/p lasix gtt at 20 mg/hour  - Transitioned from lasix infusion to torsemide 40mg BID on 12/19 then held on 12/20.   - 12/23 torsemide 40mg daily decreased to 20 mg daily   - Daily standing weights, strict I&O's, 2g sodium diet, 2L fluid restriction.   - Overall volume improved  - c/w torsemide 20mg PO daily   - remains euvolemic on exam with stable Cr and stable on room air  - prior home diuretic: Lasix 20mg daily  - Advanced HF team consulted during last admission:   -- Recommended initiating Tafamidis (amyloid coordinator informed at the time)- patient no showed for Dr. Goss appt 9/17/24 (transport issues). Consider starting outpatient.   - GDMT  - Toprol 12.5mg daily  -  Holding home dapagliflozin iso intermittent low blood glucose readings.   -  Entresto 12/13 BID held iso aggressive diuresis and low-normal blood pressures.   - 12/15 spironolactone increased to 25 mg daily to improve diuresis> held 12/20 along with diuresis for hyponatremia. Can consider resuming prior to DC, still appears euvolemic today and do not want to overdiurese   - home GDMT:  Entresto  BID, metop succ 12.5 mg daily, dapagliflozin 10 mg daily,  and spironolactone 12.5 mg daily  - Palliative care:   -- DNR/DNI. OUtpt follow up with palliative care (consult to be placed when closer to discharge). Ongoing pt and family support and care navigation. Assistance with home resources to support pt's goal. SW to assist with possible transportation support to ensure pt can make it to follow up appointments.   --  family meeting with palliative medicine> patient and family agreeable for patient to pursue SNF with palliative to follow at facility  -- nasreen Ramirez provided TCC with 3 SNF choices      Upper extremity edema, resolved  - fluid overloaded on presentation with 2+ swelling BL arms  - IV infusion switched from L arm, L arm elevated  -  LUE duplex given severe unilateral swelling of LUE and coolness (ordered in vasc lab) no DVT     Troponinemia  - HS trop: mildly elevated yet flat 418 -> 330. Suspected 2/2 demand from CHF  - CTA coronary : minor CAD, prox LAD has focal calcified plaque with minimal stenosis (<25%), LM, LCx and RCA have no significant atherosclerotic change or stenotic disease. Calcium score 17  - Cont rosuvastatin 20 mg nightly   - Daily ASA held due to worsening thrombocytopenia ~. Platelets since normalized. Resume low dose and continue to monitor CBC. If recurrent downtrend, then hold.     Hx of SSS s/p PPM , s/p device changeout 2022  - EC% AV pacing  - EP consulted during last admission due to concern for possible pacemaker mediated cardiomyopathy given high pacing burden on device interrogation: AV delayed extended but no intrinsic conduction noted. Recommended optimizing GDMT prior to considering upgrade to CRT since she would be high risk.   -EP recommends possible device upgrade:   39% RA and 97% RV paced. Appears to be dyssynchrony on echo Can be pursued on an outpatient       HTN  - Admit /76  - last 24 hour SBP  range: 90-100s  - Entresto held as above   - Cont metoprolol succinate as above.      TERI on CKD IIIb, stable  - Cr baseline 1.4-1.6  - Admit Cr 2.12  - Daily Cr 1.42 ( 1.44, 1.65,1.74, 1.64, 1.74, 1.63, 1.72, 1.86, 1.85, 2.02)   - initial elevation was likely cardiorenal iso severe anasarca   - Diuresis as above  - avoid nephrotoxic agents     Hypovolemic Hyponatremia, Asymptomatic  - Sodium trend:  128 L stable. (132,131,129, 127, 128, 132, 135, 136, 138).   - Patient neurologically intact.  - 12/19 Urine/serum osmolality and urine sodium sent suggestive of hypovolemic hyponatremia 2/2 aggressive diuresis.   - Low dose PO diuretic as above.     Hypokalemia, resolved  - Admit K 3.1  - Daily K 4.2 (3.9, 4.2)  - Holding spironolactone as above  - cont repletion as needed.      Concern for congestive hepatopathy   Thrombocytopenia, resolved  - Baseline 190-220s  - Admit Plt: 101  - Daily Plt 207 (203, 184,175, 168, 155, 127, 83, 102, 99, 84)  - On admit: INR 1.3, elevated alk phos 163  - Cont to trend CBC  - Persistent increased Alk Phos, however no elevation in AST/ALT.   - Daily ASA held iso worsening thrombocytopenia; Plt already low on admission. Resume aspirin as above    Arthritis  - Pt previously getting steroid injections q3 month then put on systemic steroids  - Cont prednisone 2.5 daily  (long term use dating back to 2023 for arthritis per patient)   - cont home PPI while on steroids      DVT ppx: subcutaneous heparin    DISPO:   - Medically ready for discharge.   - SNF planning per TCC underway    Code status: DNR and No Intubation    Patient seen and discussed with Dr. Mehrdad Yee.  Evelina Zuñiga PA-C

## 2024-12-25 NOTE — CARE PLAN
"  Problem: Safety - Adult  Goal: Free from fall injury  Outcome: Progressing        The patient's goals for the shift include to get extra fluid off    The clinical goals for the shift include pt will remain HDS throughout the shift    Over the shift, the patient did make progress toward the following goals. Pt declined pain, sat in the chair for meals, and stated that she was feeling \" better and better everyday.\"    "

## 2024-12-26 LAB
ALBUMIN SERPL BCP-MCNC: 3.5 G/DL (ref 3.4–5)
ALP SERPL-CCNC: 181 U/L (ref 33–136)
ALT SERPL W P-5'-P-CCNC: 62 U/L (ref 7–45)
ANION GAP SERPL CALC-SCNC: 11 MMOL/L (ref 10–20)
AST SERPL W P-5'-P-CCNC: 63 U/L (ref 9–39)
BILIRUB SERPL-MCNC: 0.8 MG/DL (ref 0–1.2)
BUN SERPL-MCNC: 56 MG/DL (ref 6–23)
CALCIUM SERPL-MCNC: 9.4 MG/DL (ref 8.6–10.6)
CHLORIDE SERPL-SCNC: 94 MMOL/L (ref 98–107)
CO2 SERPL-SCNC: 31 MMOL/L (ref 21–32)
CREAT SERPL-MCNC: 1.48 MG/DL (ref 0.5–1.05)
EGFRCR SERPLBLD CKD-EPI 2021: 34 ML/MIN/1.73M*2
ERYTHROCYTE [DISTWIDTH] IN BLOOD BY AUTOMATED COUNT: 17 % (ref 11.5–14.5)
GLUCOSE SERPL-MCNC: 102 MG/DL (ref 74–99)
HCT VFR BLD AUTO: 38.1 % (ref 36–46)
HGB BLD-MCNC: 12.2 G/DL (ref 12–16)
MAGNESIUM SERPL-MCNC: 2.49 MG/DL (ref 1.6–2.4)
MCH RBC QN AUTO: 27.8 PG (ref 26–34)
MCHC RBC AUTO-ENTMCNC: 32 G/DL (ref 32–36)
MCV RBC AUTO: 87 FL (ref 80–100)
NRBC BLD-RTO: 0 /100 WBCS (ref 0–0)
PHOSPHATE SERPL-MCNC: 1.7 MG/DL (ref 2.5–4.9)
PLATELET # BLD AUTO: 256 X10*3/UL (ref 150–450)
POTASSIUM SERPL-SCNC: 4.3 MMOL/L (ref 3.5–5.3)
PROT SERPL-MCNC: 7.4 G/DL (ref 6.4–8.2)
RBC # BLD AUTO: 4.39 X10*6/UL (ref 4–5.2)
SODIUM SERPL-SCNC: 132 MMOL/L (ref 136–145)
WBC # BLD AUTO: 4.6 X10*3/UL (ref 4.4–11.3)

## 2024-12-26 PROCEDURE — 83735 ASSAY OF MAGNESIUM: CPT

## 2024-12-26 PROCEDURE — 97530 THERAPEUTIC ACTIVITIES: CPT | Mod: GO

## 2024-12-26 PROCEDURE — 36415 COLL VENOUS BLD VENIPUNCTURE: CPT | Performed by: PHYSICIAN ASSISTANT

## 2024-12-26 PROCEDURE — 80053 COMPREHEN METABOLIC PANEL: CPT | Performed by: PHYSICIAN ASSISTANT

## 2024-12-26 PROCEDURE — 2500000002 HC RX 250 W HCPCS SELF ADMINISTERED DRUGS (ALT 637 FOR MEDICARE OP, ALT 636 FOR OP/ED)

## 2024-12-26 PROCEDURE — 99231 SBSQ HOSP IP/OBS SF/LOW 25: CPT | Performed by: INTERNAL MEDICINE

## 2024-12-26 PROCEDURE — 2500000001 HC RX 250 WO HCPCS SELF ADMINISTERED DRUGS (ALT 637 FOR MEDICARE OP)

## 2024-12-26 PROCEDURE — 2500000004 HC RX 250 GENERAL PHARMACY W/ HCPCS (ALT 636 FOR OP/ED)

## 2024-12-26 PROCEDURE — 84100 ASSAY OF PHOSPHORUS: CPT | Performed by: NURSE PRACTITIONER

## 2024-12-26 PROCEDURE — 85027 COMPLETE CBC AUTOMATED: CPT

## 2024-12-26 PROCEDURE — 1200000002 HC GENERAL ROOM WITH TELEMETRY DAILY

## 2024-12-26 PROCEDURE — 2500000001 HC RX 250 WO HCPCS SELF ADMINISTERED DRUGS (ALT 637 FOR MEDICARE OP): Performed by: PHYSICIAN ASSISTANT

## 2024-12-26 RX ORDER — SPIRONOLACTONE 25 MG/1
12.5 TABLET ORAL DAILY
Status: DISCONTINUED | OUTPATIENT
Start: 2024-12-26 | End: 2024-12-27 | Stop reason: HOSPADM

## 2024-12-26 RX ORDER — SPIRONOLACTONE 25 MG/1
12.5 TABLET ORAL DAILY
Status: DISCONTINUED | OUTPATIENT
Start: 2024-12-27 | End: 2024-12-26

## 2024-12-26 RX ADMIN — PANTOPRAZOLE SODIUM 40 MG: 40 TABLET, DELAYED RELEASE ORAL at 06:37

## 2024-12-26 RX ADMIN — SPIRONOLACTONE 12.5 MG: 25 TABLET, FILM COATED ORAL at 11:28

## 2024-12-26 RX ADMIN — HEPARIN SODIUM 5000 UNITS: 5000 INJECTION, SOLUTION INTRAVENOUS; SUBCUTANEOUS at 13:18

## 2024-12-26 RX ADMIN — ASPIRIN 81 MG: 81 TABLET, COATED ORAL at 08:23

## 2024-12-26 RX ADMIN — HEPARIN SODIUM 5000 UNITS: 5000 INJECTION, SOLUTION INTRAVENOUS; SUBCUTANEOUS at 20:44

## 2024-12-26 RX ADMIN — TORSEMIDE 20 MG: 20 TABLET ORAL at 08:24

## 2024-12-26 RX ADMIN — METOPROLOL SUCCINATE 12.5 MG: 25 TABLET, EXTENDED RELEASE ORAL at 08:23

## 2024-12-26 RX ADMIN — PREDNISONE 2.5 MG: 5 TABLET ORAL at 08:24

## 2024-12-26 RX ADMIN — HEPARIN SODIUM 5000 UNITS: 5000 INJECTION, SOLUTION INTRAVENOUS; SUBCUTANEOUS at 06:37

## 2024-12-26 ASSESSMENT — COGNITIVE AND FUNCTIONAL STATUS - GENERAL
WALKING IN HOSPITAL ROOM: A LITTLE
TOILETING: A LOT
DAILY ACTIVITIY SCORE: 16
CLIMB 3 TO 5 STEPS WITH RAILING: A LITTLE
TURNING FROM BACK TO SIDE WHILE IN FLAT BAD: A LITTLE
HELP NEEDED FOR BATHING: A LITTLE
PERSONAL GROOMING: A LITTLE
DRESSING REGULAR LOWER BODY CLOTHING: A LOT
MOVING TO AND FROM BED TO CHAIR: A LITTLE
DRESSING REGULAR UPPER BODY CLOTHING: A LITTLE
DAILY ACTIVITIY SCORE: 18
TOILETING: A LITTLE
DAILY ACTIVITIY SCORE: 18
CLIMB 3 TO 5 STEPS WITH RAILING: A LITTLE
PERSONAL GROOMING: A LITTLE
TOILETING: A LITTLE
WALKING IN HOSPITAL ROOM: A LITTLE
PERSONAL GROOMING: A LITTLE
EATING MEALS: A LITTLE
STANDING UP FROM CHAIR USING ARMS: A LITTLE
MOBILITY SCORE: 18
HELP NEEDED FOR BATHING: A LITTLE
MOVING FROM LYING ON BACK TO SITTING ON SIDE OF FLAT BED WITH BEDRAILS: A LITTLE
MOVING FROM LYING ON BACK TO SITTING ON SIDE OF FLAT BED WITH BEDRAILS: A LITTLE
TURNING FROM BACK TO SIDE WHILE IN FLAT BAD: A LITTLE
DRESSING REGULAR LOWER BODY CLOTHING: A LITTLE
DRESSING REGULAR LOWER BODY CLOTHING: A LITTLE
MOBILITY SCORE: 18
STANDING UP FROM CHAIR USING ARMS: A LITTLE
DRESSING REGULAR UPPER BODY CLOTHING: A LITTLE
EATING MEALS: A LITTLE
DRESSING REGULAR UPPER BODY CLOTHING: A LITTLE
MOVING TO AND FROM BED TO CHAIR: A LITTLE
HELP NEEDED FOR BATHING: A LOT

## 2024-12-26 ASSESSMENT — PAIN - FUNCTIONAL ASSESSMENT
PAIN_FUNCTIONAL_ASSESSMENT: 0-10

## 2024-12-26 ASSESSMENT — PAIN SCALES - GENERAL
PAINLEVEL_OUTOF10: 0 - NO PAIN

## 2024-12-26 ASSESSMENT — ACTIVITIES OF DAILY LIVING (ADL): HOME_MANAGEMENT_TIME_ENTRY: 5

## 2024-12-26 NOTE — CARE PLAN
Problem: Skin  Goal: Decreased wound size/increased tissue granulation at next dressing change  Outcome: Progressing  Flowsheets (Taken 12/26/2024 1725)  Decreased wound size/increased tissue granulation at next dressing change:   Protective dressings over bony prominences   Utilize specialty bed per algorithm  Goal: Participates in plan/prevention/treatment measures  Outcome: Progressing  Flowsheets (Taken 12/26/2024 1725)  Participates in plan/prevention/treatment measures:   Elevate heels   Increase activity/out of bed for meals  Goal: Prevent/manage excess moisture  Outcome: Progressing  Flowsheets (Taken 12/26/2024 1725)  Prevent/manage excess moisture:   Follow provider orders for dressing changes   Moisturize dry skin   Monitor for/manage infection if present  Goal: Prevent/minimize sheer/friction injuries  Outcome: Progressing  Flowsheets (Taken 12/26/2024 1725)  Prevent/minimize sheer/friction injuries:   Increase activity/out of bed for meals   Use pull sheet   HOB 30 degrees or less  Goal: Promote/optimize nutrition  Outcome: Progressing  Flowsheets (Taken 12/26/2024 1725)  Promote/optimize nutrition:   Assist with feeding   Consume > 50% meals/supplements  Goal: Promote skin healing  Outcome: Progressing  Flowsheets (Taken 12/26/2024 1725)  Promote skin healing:   Protective dressings over bony prominences   Turn/reposition every 2 hours/use positioning/transfer devices   Ensure correct size (line/device) and apply per  instructions

## 2024-12-26 NOTE — PROGRESS NOTES
Occupational Therapy    Occupational Therapy Treatment    Name: Erin Lopez  MRN: [id number]  : 1935  Date: 24  Room: [address] [place], [place] [id number]      Time Calculation  Start Time: 1023  Stop Time: 1055  Time Calculation (min): 32 min    Assessment:  OT Assessment: Pt made progress towards therapeutic goals this session. Pt demo'd good understanding of EC strategies. Pt demo'd good effort despite fatigue throughout session- required increased time to complete tasks and frequent rest breaks to manage fatigue. Pt continues to benefit from skilled OT at a MOD intensity.  Prognosis: Good  Barriers to Discharge Home: Physical needs  Physical Needs: Intermittent mobility assistance needed, Intermittent ADL assistance needed, High falls risk due to function or environment  Evaluation/Treatment Tolerance: Patient tolerated treatment well  Medical Staff Made Aware: Yes  End of Session Communication: Bedside nurse  End of Session Patient Position: Up in chair, Alarm off, not on at start of session  Plan:  Treatment Interventions: ADL retraining, Functional transfer training, Endurance training, Equipment evaluation/education, Compensatory technique education, UE strengthening/ROM  OT Frequency: 3 times per week  OT Discharge Recommendations: Moderate intensity level of continued care  Equipment Recommended upon Discharge:  (TBD)  OT Recommended Transfer Status: Assist of 1  OT - OK to Discharge: Yes    Subjective   General:  OT Last Visit  OT Received On: 24  Reason for Referral: Anasarca  Past Medical History Relevant to Rehab: NSTEMI (nonobstructive CAD ), TTR cardiac amyloidosis confirmed on [facility] PYP 2024, HFrEF (30-35% -), LBBB and SSS s/p PPM 2017 s/p device changeout 2022, HTN, CKD  Prior to Session Communication: Bedside nurse  Patient Position Received: Bed, 3 rail up, Alarm off, not on at start of session  Family/Caregiver Present: No  General Comment: Pt supine in bed upon arrival. Pleasant and  agreeable to OT tx.   Precautions:  Medical Precautions: Cardiac precautions, Fall precautions  Vitals:  Vital Signs (Past 2hrs)        Date/Time Vitals Session Patient Position Pulse Resp SpO2 BP MAP (mmHg)    12/26/24 1144 --  --  71  18  99 %  109/66  80                   Lines/Tubes/Drains:  External Urinary Catheter Female (Active)   Number of days: 8     Cognition:  Overall Cognitive Status: Within Functional Limits  Orientation Level: Oriented X4  Following Commands: Follows multistep commands without difficulty  Insight: Within function limits  Impulsive: Within functional limits    Pain Assessment:  Pain Assessment  Pain Assessment: 0-10  0-10 (Numeric) Pain Score: 0 - No pain     Objective   Activities of Daily Living:      Grooming  Grooming Comments: Pt completed face washing while standing at sink for ~3 minutes using LUE for task management and RUE supported on FWW all with CGA for safety    UE Dressing  UE Dressing Comments: Pt doffed and donned gown with [place] mainly to pull around shoulders and back while seated in chair    Functional Standing Tolerance:  Functional Mobility  Functional Mobility Performed: Yes  Functional Mobility 1  Comments 1: Pt ambulated MIN household distance using FWW and CGA from bedside to bathroom with seated rest break before completing face washing and returning back to chair (Increased time to complete)  Bed Mobility/Transfers:   Bed Mobility  Bed Mobility: Yes  Bed Mobility 1  Bed Mobility 1: Supine to sitting  Level of Assistance 1: Close supervision, Minimal verbal cues, Minimal tactile cues  Bed Mobility Comments 1: Cues for improved strategy, increased time to complete with rest PRN  Bed Mobility 2  Bed Mobility  2: Scooting (to EOB)  Level of Assistance 2: Close supervision, Minimal tactile cues, Minimal verbal cues  Bed Mobility Comments 2: Cues for improved strategy, increased time to complete, rest PRN  Transfers  Transfer: Yes  Transfer 1  Transfer From 1: Bed  to  Transfer to 1: Stand  Technique 1: Sit to stand  Transfer Device 1: Walker  Transfer Level of Assistance 1: Minimum assistance  Trials/Comments 1: VCs for FWW safety and hand placement  Transfers 2  Transfer From 2: Stand to, Chair without arms to (shower bench)  Transfer to 2: Chair without arms, Stand (shower bench)  Technique 2: Stand to sit, Sit to stand  Transfer Device 2: Walker  Transfer Level of Assistance 2: Moderate assistance  Trials/Comments 2: VCs for hand placment and strategy, ModA to slow sitting descent and ModA for sit to stand  Transfers 3  Transfer From 3: Stand to  Transfer to 3: Chair with arms  Technique 3: Stand to sit  Transfer Device 3: Walker  Transfer Level of Assistance 3: Minimum assistance, Minimal verbal cues  Trials/Comments 3: VCs for positioning and hand placement, [place]ago to slow sitting descent    Balance:  Dynamic Sitting Balance  Dynamic Sitting-Balance Support: Feet supported  Dynamic Sitting-Level of Assistance: Close supervision  Dynamic Sitting-Balance: Forward lean, Reaching for objects  Dynamic Standing Balance  Dynamic Standing-Balance Support: Bilateral upper extremity supported  Dynamic Standing-Level of Assistance: Contact guard  Dynamic Standing-Balance: Turning, Forward lean  Dynamic Standing-Comments: Fair-Good standing balance while standing at sink and turning  Static Sitting Balance  Static Sitting-Balance Support: Feet supported  Static Sitting-Level of Assistance: Close supervision  Static Standing Balance  Static Standing-Balance Support: Bilateral upper extremity supported  Static Standing-Level of Assistance: Contact guard  Static Standing-Comment/Number of Minutes: FWW    Therapy/Activity:      Therapeutic Activity  Therapeutic Activity Performed: Yes  Therapeutic Activity 1: Functional mobility and transfer training as noted requiring skilled assistance and cueing for safety and training  Therapeutic Activity 2: Standing balance training at sink and  during ambulation for total of ~8 minutes (at least 3 minutes standing at sink) requiring CGA and VCs for safety  Therapeutic Activity 3: Educated on energy conservation strategies and benefits of OT and mobility  Therapeutic Activity 4: Increased time for rest and fatigue management       Outcome Measures:  [facility] Daily Activity  Putting on and taking off regular lower body clothing: A lot  Bathing (including washing, rinsing, drying): A lot  Putting on and taking off regular upper body clothing: A little  Toileting, which includes using toilet, bedpan or urinal: A lot  Taking care of personal grooming such as brushing teeth: A little  Eating Meals: None  Daily Activity - Total Score: 16     Education Documentation  Body Mechanics, taught by Austyn Castro OT at 12/26/2024 12:20 PM.  Learner: Patient  Readiness: Acceptance  Method: Explanation  Response: Verbalizes Understanding    Precautions, taught by Austyn Castro OT at 12/26/2024 12:20 PM.  Learner: Patient  Readiness: Acceptance  Method: Explanation  Response: Verbalizes Understanding    ADL Training, taught by Austyn Castro OT at 12/26/2024 12:20 PM.  Learner: Patient  Readiness: Acceptance  Method: Explanation  Response: Verbalizes Understanding    Education Comments  No comments found.        Goals:  Encounter Problems       Encounter Problems (Active)       ADLs       Patient with complete upper body dressing with contact guard assist level of assistance donning and doffing all UE clothes with no adaptive equipment while edge of bed  (Progressing)       Start:  12/16/24    Expected End:  12/30/24            Patient with complete lower body dressing with contact guard assist level of assistance donning and doffing all LE clothes  with PRN adaptive equipment while edge of bed  (Progressing)       Start:  12/16/24    Expected End:  12/30/24            Patient will complete daily grooming tasks brushing teeth and washing face/hair with  independent level of assistance and PRN adaptive equipment while edge of bed . (Progressing)       Start:  12/16/24    Expected End:  12/30/24            Patient will complete toileting including hygiene clothing management/hygiene with contact guard assist level of assistance and bedside commode. (Progressing)       Start:  12/16/24    Expected End:  12/30/24               MOBILITY       Patient will perform Functional mobility min Household distances/Community Distances with contact guard assist level of assistance and front wheeled walker in order to improve safety and functional mobility. (Progressing)       Start:  12/16/24    Expected End:  12/30/24               TRANSFERS       Patient will perform bed mobility contact guard assist level of assistance and bed rails in order to improve safety and independence with mobility (Progressing)       Start:  12/16/24    Expected End:  12/30/24            Patient will complete sit to stand transfer with contact guard assist level of assistance and front wheeled walker in order to improve safety and prepare for out of bed mobility. (Progressing)       Start:  12/16/24    Expected End:  12/30/24 12/26/24 at 12:22 PM   Austyn Castro, OT   [phone]

## 2024-12-26 NOTE — PROGRESS NOTES
"Subjective:  Patient reports mild dyspnea this morning, states her edema is \"all gone.\" Remains on room air, encouraged to work to PT today, patient agreeable.     Today in brief:  - pending updated PT recs to send to SNF  - resumed spironolactone 12.5 mg daily    Objective:  Vitals:    12/26/24 0754   BP: 101/58   Pulse: 66   Resp: 18   Temp: 36.4 °C (97.5 °F)   SpO2: 100%     Weight         12/22/2024  0543 12/23/2024  0419 12/24/2024  0547 12/25/2024  0456 12/26/2024  0442    Weight: 50.2 kg (110 lb 10.7 oz) 49.7 kg (109 lb 9.1 oz) 50.1 kg (110 lb 7.2 oz) 50 kg (110 lb 3.7 oz) 50.7 kg (111 lb 12.4 oz)            Intake/Output Summary (Last 24 hours) at 12/26/2024 1104  Last data filed at 12/26/2024 0900  Gross per 24 hour   Intake 1200 ml   Output 1650 ml   Net -450 ml     Recent Results (from the past 24 hours)   CBC    Collection Time: 12/25/24  6:29 PM   Result Value Ref Range    WBC 4.2 (L) 4.4 - 11.3 x10*3/uL    nRBC 0.0 0.0 - 0.0 /100 WBCs    RBC 4.56 4.00 - 5.20 x10*6/uL    Hemoglobin 12.7 12.0 - 16.0 g/dL    Hematocrit 39.6 36.0 - 46.0 %    MCV 87 80 - 100 fL    MCH 27.9 26.0 - 34.0 pg    MCHC 32.1 32.0 - 36.0 g/dL    RDW 17.2 (H) 11.5 - 14.5 %    Platelets 232 150 - 450 x10*3/uL   Magnesium    Collection Time: 12/25/24  6:29 PM   Result Value Ref Range    Magnesium 2.48 (H) 1.60 - 2.40 mg/dL   Phosphorus    Collection Time: 12/25/24  6:29 PM   Result Value Ref Range    Phosphorus 1.5 (L) 2.5 - 4.9 mg/dL   Comprehensive metabolic panel    Collection Time: 12/25/24  6:29 PM   Result Value Ref Range    Glucose 87 74 - 99 mg/dL    Sodium 132 (L) 136 - 145 mmol/L    Potassium 4.5 3.5 - 5.3 mmol/L    Chloride 94 (L) 98 - 107 mmol/L    Bicarbonate 31 21 - 32 mmol/L    Anion Gap 12 10 - 20 mmol/L    Urea Nitrogen 48 (H) 6 - 23 mg/dL    Creatinine 1.46 (H) 0.50 - 1.05 mg/dL    eGFR 34 (L) >60 mL/min/1.73m*2    Calcium 9.5 8.6 - 10.6 mg/dL    Albumin 3.5 3.4 - 5.0 g/dL    Alkaline Phosphatase 170 (H) 33 - 136 U/L "    Total Protein 7.2 6.4 - 8.2 g/dL    AST 46 (H) 9 - 39 U/L    Bilirubin, Total 0.9 0.0 - 1.2 mg/dL    ALT 49 (H) 7 - 45 U/L     Inpatient Medications:  Scheduled medications   Medication Dose Route Frequency    aspirin  81 mg oral Daily    ergocalciferol  1,250 mcg oral Every Sunday    heparin (porcine)  5,000 Units subcutaneous q8h    metoprolol succinate XL  12.5 mg oral Daily    pantoprazole  40 mg oral Daily before breakfast    polyethylene glycol  17 g oral Daily    predniSONE  2.5 mg oral Daily    rosuvastatin  10 mg oral Nightly    [Held by provider] sacubitriL-valsartan  0.5 tablet oral BID    sennosides-docusate sodium  1 tablet oral Nightly    [Held by provider] spironolactone  25 mg oral Daily    torsemide  20 mg oral Daily     PRN medications   Medication    acetaminophen    ipratropium-albuteroL    white petrolatum     Continuous Medications   Medication Dose Last Rate       Telemetry 12/26/2024 (personally reviewed): AsVp 60s    Physical exam:  General: NAD, thin female  Head/ neck: +TR pulsation at R neck  Cardiac: RRR, regular S1 S2 , no murmur, no rub, no gallop  Pulm: CTA bilaterally, no wheezes, rales or rhonchi.    Vascular: Radial 2+ bilaterally  GI: Non distended  Extremities: no LE edema   Neuro: no focal neuro deficits   Psych: appropriate mood and behavior   Skin: warm and dry     Assessment/Plan   Erin Lopez is a 89 y.o. female presenting with PMH of NSTEMI (nonobstructive CAD 2017), TTR cardiac amyloidosis, HFrEF (30-35% 8/2-24), LBBB and SSS s/p PPM 2017 s/p device changeout 11/2022, HTN, CKD presented to the ED with severe anasarca and acute decompensated heart failure. Admitted to [facility] for further management.      Acute on chronic systolic and diastolic heart failure (HFrEF 30-35%)  TTR Amyloidosis  Acute respiratory failure, improved   - TTE 10/2023: EF 50-55%, mod increased LV septal and posterior wall thickness, Moderately enlarged V, mild to mod AR and MR, mod TR, mod LAE,  Compared to 2017 TTE there is possible underlying infiltrative disease  - TTE 8/24/24: EF 30-35%, gHK of LV,  elevated LVEDP, abnormal septal motion c/w V pacemaker, moderately increased LV septal thickness. LV posterior wall mod thickened. Severe cLVH. Moderately enlarged RV, mild LAE, RA severely dilated, mild to mod MR, severe TR, Slightly elevated RVSP. Mild to mod AR.  - New TTR amyloidosis seen on 8/25 NM PYP scan-- K/L ration WNL, SPEP, UPEP negative  - patient admitted with severe anasarca up to breasts/upper arms  - admit CXR: Severe cardiomegaly with increased mod-to-large bilateral pleural effusions and associated atelectasis and/or consolidation. Additional interstitial and patchy opacities with basilar predilection suggestive of interstitial and alveolar pulmonary edema  - Admit BNP >5000 (previously 2,626 8/2024). Repeat 12/19 1,184.   - discharge wt 9/4/2024 51.5 kg  - admit wt: 70.5 kg (recorded in ED)  - daily wt: 50.7 kg  - s/p lasix gtt at 20 mg/hour  - Transitioned from lasix infusion to torsemide 40mg BID on 12/19 then held on 12/20.   - 12/23 torsemide 40mg daily decreased to 20 mg daily   - Daily standing weights, strict I&O's, 2g sodium diet, 2L fluid restriction.   - Overall volume improved  - c/w torsemide 20mg PO daily   - remains euvolemic on exam with stable Cr and stable on room air  - prior home diuretic: Lasix 20mg daily  - Advanced HF team consulted during last admission:   -- Recommended initiating Tafamidis (amyloid coordinator informed at the time)- patient no showed for Dr. Goss appt 9/17/24 (transport issues). Consider starting outpatient.   - GDMT  - Toprol 12.5mg daily  -  Holding home dapagliflozin iso intermittent low blood glucose readings.   -  Entresto 12/13 BID held iso aggressive diuresis and low-normal blood pressures.   - resumed spironolactone 12.5 mg today  - home GDMT: Entresto 12/13 BID, metop succ 12.5 mg daily, dapagliflozin 10 mg daily,  and spironolactone  12.5 mg daily  - Palliative care:   -- DNR/DNI. Outpt follow up with palliative care (consult to be placed when closer to discharge). Ongoing pt and family support and care navigation. Assistance with home resources to support pt's goal. SW to assist with possible transportation support to ensure pt can make it to follow up appointments.   --  family meeting with palliative medicine> patient and family agreeable for patient to pursue SNF with palliative to follow at facility  -- nasreen Ramirez provided TCC with 3 SNF choices       Troponinemia  - HS trop: mildly elevated yet flat 418 -> 330. Suspected 2/2 demand from CHF  - CTA coronary : minor CAD, prox LAD has focal calcified plaque with minimal stenosis (<25%), LM, LCx and RCA have no significant atherosclerotic change or stenotic disease. Calcium score 17  - Cont rosuvastatin 20 mg nightly   - Daily ASA held due to worsening thrombocytopenia ~. Platelets since normalized. Resume low dose and continue to monitor CBC. If recurrent downtrend, then hold.     Hx of SSS s/p PPM , s/p device changeout 2022  - EC% AV pacing  - EP consulted during last admission due to concern for possible pacemaker mediated cardiomyopathy given high pacing burden on device interrogation: AV delayed extended but no intrinsic conduction noted. Recommended optimizing GDMT prior to considering upgrade to CRT since she would be high risk.   -EP recommends possible device upgrade:   39% RA and 97% RV paced. Appears to be dyssynchrony on echo. Can be pursued on an outpatient EP basis.      HTN  - Admit /76  - last 24 hour SBP range: 101- 114  - Entresto held as above   - Cont metoprolol succinate as above.      TERI on CKD IIIb, stable  - Cr baseline 1.4-1.6  - Admit Cr 2.12  - Daily Cr 1.46  - initial elevation was likely cardiorenal iso severe anasarca   - Diuresis as above  - avoid nephrotoxic agents     Hypovolemic Hyponatremia, Asymptomatic  - Sodium  trend stable:  132 (128, 132, 131, 129)  - Patient neurologically intact.  - 12/19 Urine/serum osmolality and urine sodium sent suggestive of hypovolemic hyponatremia 2/2 aggressive diuresis.   - Low dose PO diuretic as above.     Thrombocytopenia, resolved  - Baseline 190-220s  - Admit Plt: 101  - Daily Plt 232  - On admit: INR 1.3, elevated alk phos 163  - Cont to trend CBC  - Daily ASA held iso worsening thrombocytopenia; Plt already low on admission  - aspirin resumed as above, continue to trend     Concern for congestive hepatopathy   Elevated liver enzymes  - Alk phos 163, ALT/AST normal on admit  - daily alk phos: 170  - daily ALT/AST: 49, 46  - possibly 2/2 fluid overload but levels worsened with diuresis  - statin held iso elevated LFTs, can resume as they normalize   - nonconcerning exam, no RUQ tenderness at this time  - if remain elevated, can consider RUQ US    Arthritis  - Pt previously getting steroid injections q3 month then put on systemic steroids  - Cont prednisone 2.5 daily  (long term use dating back to 2023 for arthritis per patient)   - cont home PPI while on steroids    DVT ppx: subcutaneous heparin    DISPO:   - Medically ready for discharge.   - SNF planning per TCC underway, pending PT re- eval today     Code status: DNR and No Intubation    Patient seen and discussed with Dr. Mehrdad Yee.  DAMION Gonzalez-CNP

## 2024-12-26 NOTE — PROGRESS NOTES
TCC sent updated PT notes to [facility]. Precert initiated- ref number [id number]. TCC will continue to follow for discharge planning.       YURIDIA Samuels, RN  [facility], [facility]&9  Transitional Care Coordinator, Mon-Fri  Cell: [phone], Office: [phone]  Email: [email or web address]

## 2024-12-27 VITALS
SYSTOLIC BLOOD PRESSURE: 104 MMHG | HEART RATE: 75 BPM | WEIGHT: 111.33 LBS | HEIGHT: 63 IN | TEMPERATURE: 99 F | OXYGEN SATURATION: 100 % | RESPIRATION RATE: 18 BRPM | BODY MASS INDEX: 19.73 KG/M2 | DIASTOLIC BLOOD PRESSURE: 61 MMHG

## 2024-12-27 PROBLEM — I50.23 ACUTE ON CHRONIC SYSTOLIC HEART FAILURE: Status: RESOLVED | Noted: 2024-08-24 | Resolved: 2024-12-27

## 2024-12-27 PROBLEM — R60.1 ANASARCA: Status: RESOLVED | Noted: 2024-12-13 | Resolved: 2024-12-27

## 2024-12-27 PROCEDURE — 2500000001 HC RX 250 WO HCPCS SELF ADMINISTERED DRUGS (ALT 637 FOR MEDICARE OP): Performed by: PHYSICIAN ASSISTANT

## 2024-12-27 PROCEDURE — 2500000001 HC RX 250 WO HCPCS SELF ADMINISTERED DRUGS (ALT 637 FOR MEDICARE OP)

## 2024-12-27 PROCEDURE — 97110 THERAPEUTIC EXERCISES: CPT | Mod: GP

## 2024-12-27 PROCEDURE — 2500000004 HC RX 250 GENERAL PHARMACY W/ HCPCS (ALT 636 FOR OP/ED)

## 2024-12-27 PROCEDURE — 2500000002 HC RX 250 W HCPCS SELF ADMINISTERED DRUGS (ALT 637 FOR MEDICARE OP, ALT 636 FOR OP/ED)

## 2024-12-27 PROCEDURE — 2500000004 HC RX 250 GENERAL PHARMACY W/ HCPCS (ALT 636 FOR OP/ED): Performed by: NURSE PRACTITIONER

## 2024-12-27 PROCEDURE — G0008 ADMIN INFLUENZA VIRUS VAC: HCPCS | Performed by: NURSE PRACTITIONER

## 2024-12-27 PROCEDURE — 90662 IIV NO PRSV INCREASED AG IM: CPT | Performed by: NURSE PRACTITIONER

## 2024-12-27 PROCEDURE — 97116 GAIT TRAINING THERAPY: CPT | Mod: GP

## 2024-12-27 PROCEDURE — 99239 HOSP IP/OBS DSCHRG MGMT >30: CPT | Performed by: INTERNAL MEDICINE

## 2024-12-27 PROCEDURE — 97530 THERAPEUTIC ACTIVITIES: CPT | Mod: GP

## 2024-12-27 RX ORDER — POLYETHYLENE GLYCOL 3350 17 G/17G
17 POWDER, FOR SOLUTION ORAL DAILY
Start: 2024-12-28

## 2024-12-27 RX ORDER — TORSEMIDE 20 MG/1
20 TABLET ORAL DAILY
Start: 2024-12-28

## 2024-12-27 RX ORDER — ERGOCALCIFEROL 1.25 MG/1
1250 CAPSULE ORAL
Start: 2024-12-29

## 2024-12-27 RX ORDER — PREDNISONE 2.5 MG/1
2.5 TABLET ORAL DAILY
Start: 2024-12-27

## 2024-12-27 RX ADMIN — HEPARIN SODIUM 5000 UNITS: 5000 INJECTION, SOLUTION INTRAVENOUS; SUBCUTANEOUS at 05:56

## 2024-12-27 RX ADMIN — METOPROLOL SUCCINATE 12.5 MG: 25 TABLET, EXTENDED RELEASE ORAL at 08:32

## 2024-12-27 RX ADMIN — PANTOPRAZOLE SODIUM 40 MG: 40 TABLET, DELAYED RELEASE ORAL at 06:00

## 2024-12-27 RX ADMIN — PREDNISONE 2.5 MG: 5 TABLET ORAL at 08:32

## 2024-12-27 RX ADMIN — SPIRONOLACTONE 12.5 MG: 25 TABLET, FILM COATED ORAL at 08:32

## 2024-12-27 RX ADMIN — ASPIRIN 81 MG: 81 TABLET, COATED ORAL at 08:32

## 2024-12-27 RX ADMIN — TORSEMIDE 20 MG: 20 TABLET ORAL at 08:32

## 2024-12-27 RX ADMIN — INFLUENZA A VIRUS A/VICTORIA/4897/2022 IVR-238 (H1N1) ANTIGEN (FORMALDEHYDE INACTIVATED), INFLUENZA A VIRUS A/CALIFORNIA/122/2022 SAN-022 (H3N2) ANTIGEN (FORMALDEHYDE INACTIVATED), AND INFLUENZA B VIRUS B/MICHIGAN/01/2021 ANTIGEN (FORMALDEHYDE INACTIVATED) 0.5 ML: 60; 60; 60 INJECTION, SUSPENSION INTRAMUSCULAR at 15:45

## 2024-12-27 ASSESSMENT — COGNITIVE AND FUNCTIONAL STATUS - GENERAL
STANDING UP FROM CHAIR USING ARMS: A LITTLE
HELP NEEDED FOR BATHING: A LITTLE
TURNING FROM BACK TO SIDE WHILE IN FLAT BAD: A LITTLE
CLIMB 3 TO 5 STEPS WITH RAILING: A LITTLE
DAILY ACTIVITIY SCORE: 18
TOILETING: A LITTLE
CLIMB 3 TO 5 STEPS WITH RAILING: TOTAL
WALKING IN HOSPITAL ROOM: A LITTLE
EATING MEALS: A LITTLE
MOBILITY SCORE: 16
WALKING IN HOSPITAL ROOM: A LITTLE
DRESSING REGULAR UPPER BODY CLOTHING: A LITTLE
DRESSING REGULAR LOWER BODY CLOTHING: A LITTLE
MOVING FROM LYING ON BACK TO SITTING ON SIDE OF FLAT BED WITH BEDRAILS: A LITTLE
PERSONAL GROOMING: A LITTLE
TURNING FROM BACK TO SIDE WHILE IN FLAT BAD: A LITTLE
MOBILITY SCORE: 18
MOVING TO AND FROM BED TO CHAIR: A LITTLE
MOVING FROM LYING ON BACK TO SITTING ON SIDE OF FLAT BED WITH BEDRAILS: A LITTLE
MOVING TO AND FROM BED TO CHAIR: A LITTLE
STANDING UP FROM CHAIR USING ARMS: A LITTLE

## 2024-12-27 ASSESSMENT — PAIN - FUNCTIONAL ASSESSMENT
PAIN_FUNCTIONAL_ASSESSMENT: 0-10
PAIN_FUNCTIONAL_ASSESSMENT: 0-10

## 2024-12-27 ASSESSMENT — PAIN SCALES - GENERAL
PAINLEVEL_OUTOF10: 0 - NO PAIN
PAINLEVEL_OUTOF10: 0 - NO PAIN

## 2024-12-27 NOTE — SIGNIFICANT EVENT
Pt is doing well and waiting for discharge. Palliative will sign off today. Please reconsult us if needed.  - Precious Mcmahon CNP.

## 2024-12-27 NOTE — NURSING NOTE
Pt discharged to SNF.  Report called to nursing staff at facility.  Telemetry discontinued and heplock out.  Pt's family at bedside.  Pt transported to SNF by [facility] via stretcher.

## 2024-12-27 NOTE — PROGRESS NOTES
Physical Therapy    Physical Therapy Treatment    Patient Name: Erin Lopez  MRN: [id number]  Department: [place] [address]  Room: [address] [place] [address]  Today's Date: 12/27/2024  Time Calculation  Start Time: 0839  Stop Time: 0924  Time Calculation (min): 45 min    Assessment/Plan   PT Assessment  PT Assessment Results: Decreased strength, Decreased endurance, Impaired balance, Decreased range of motion, Decreased mobility  Rehab Prognosis: Good  Barriers to Discharge Home: Physical needs  Physical Needs: Ambulating household distances limited by function/safety, High falls risk due to function or environment, Intermittent ADL assistance needed, 24hr mobility assistance needed  Evaluation/Treatment Tolerance: Patient limited by fatigue  Medical Staff Made Aware: Yes  Strengths: Attitude of self, Coping skills  Barriers to Participation: Comorbidities  End of Session Communication: Bedside nurse  Assessment Comment: The pt demonstrated gradual functional progression with decreased assistance with bed mobility and increased amb distance using a wheeled walker.  End of Session Patient Position: Up in chair, Alarm off, not on at start of session  PT Plan  Inpatient/Swing Bed or Outpatient: Inpatient  PT Plan  Treatment/Interventions: Bed mobility, Transfer training, Gait training, Balance training, Strengthening, Endurance training, Therapeutic exercise, Therapeutic activity, Home exercise program  PT Plan: Ongoing PT  PT Frequency: 3 times per week  PT Discharge Recommendations: Moderate intensity level of continued care  Equipment Recommended upon Discharge: Wheeled walker  PT Recommended Transfer Status: Assist x1  PT - OK to Discharge: Yes      General Visit Information:   PT  Visit  PT Received On: 12/27/24  Response to Previous Treatment: Patient reporting fatigue but able to participate.  General  Reason for Referral: Anasarca  Past Medical History Relevant to Rehab: NSTEMI (nonobstructive CAD 2017), TTR cardiac amyloidosis  confirmed on NM PYP 8/2024, HFrEF (30-35% 8/2-24), LBBB and SSS s/p PPM 2017 s/p device changeout 11/2022, HTN, CKD  Prior to Session Communication: Bedside nurse  Patient Position Received: Bed, 3 rail up, Alarm off, not on at start of session  Preferred Learning Style: verbal, visual, written  General Comment: The pt was pleasant, cooperative and willing to participate in therapy.    Subjective   Precautions:  Precautions  Hearing/Visual Limitations: Hearing and vision WFL with reading glasses  Medical Precautions: Cardiac precautions, Fall precautions  Precautions Comment: The pt in compliance with precautions throughout therapy session.    Vital Signs (Past 2hrs)        Date/Time Vitals Session Patient Position Pulse Resp SpO2 BP MAP (mmHg)    12/27/24 0839 Pre PT  Lying  75  --  98 %  --  --                   Vital Signs Comment: vitals stable     Objective   Pain:  Pain Assessment  Pain Assessment: 0-10  0-10 (Numeric) Pain Score: 0 - No pain  Cognition:  Cognition  Overall Cognitive Status: Within Functional Limits  Orientation Level: Oriented X4  Following Commands: Follows one step commands without difficulty  Coordination:     Postural Control:  Postural Control  Postural Control: Impaired  Posture Comment: The pt presented with good sitting posture and impaired standing posture using a wheeled walker.  Static Sitting Balance  Static Sitting-Balance Support: Bilateral upper extremity supported, Feet supported  Static Sitting-Level of Assistance: Distant supervision  Static Sitting-Comment/Number of Minutes: Sitting EOB  Dynamic Sitting Balance  Dynamic Sitting-Balance Support: Bilateral upper extremity supported, Feet supported  Dynamic Sitting-Level of Assistance: Close supervision  Dynamic Sitting-Comments: Sitting EOB  Static Standing Balance  Static Standing-Balance Support: Bilateral upper extremity supported  Static Standing-Level of Assistance: Contact guard  Static Standing-Comment/Number of  Minutes: using a wheeled walker  Dynamic Standing Balance  Dynamic Standing-Balance Support: Bilateral upper extremity supported  Dynamic Standing-Level of Assistance: Contact guard  Dynamic Standing-Comments: using a wheeled walker  Extremity/Trunk Assessments:  Cervical Spine   Cervical Spine: Within Functional Limits  Lumbar Spine   Lumbar Spine : Within Functional Limits    RUE   RUE : Within Functional Limits  LUE   LUE: Within Functional Limits  RLE   RLE : Exceptions to WFL  AROM RLE (degrees)  RLE AROM Comment: WFL  Strength RLE  R Hip Flexion: 4-/5  R Knee Flexion: 4/5  R Knee Extension: 4/5  R Ankle Dorsiflexion: 4+/5  R Ankle Plantar Flexion: 4+/5  LLE   LLE : Exceptions to WFL  AROM LLE (degrees)  LLE AROM Comment: WFL  Strength LLE  L Hip Flexion: 4-/5  L Knee Flexion: 4/5  L Knee Extension: 4/5  L Ankle Dorsiflexion: 4+/5  L Ankle Plantar Flexion: 4+/5  Activity Tolerance:  Activity Tolerance  Endurance:  (Pt tolerated 10-20+min exercises with minimal difficulty.)  Treatments:  Therapeutic Exercise  Therapeutic Exercise Performed: Yes  Therapeutic Exercise Activity 1: ankle pumps x 15  Therapeutic Exercise Activity 2: heel slides x 15  Therapeutic Exercise Activity 3: SAQ x 15  Therapeutic Exercise Activity 4: SLR x 15  Therapeutic Exercise Activity 5: Hip ABD x 15    Therapeutic Activity  Therapeutic Activity Performed: Yes  Therapeutic Activity 1: The pt performed gait training using a wheeled walker.    Balance/Neuromuscular Re-Education  Balance/Neuromuscular Re-Education Activity Performed: Yes  Balance/Neuromuscular Re-Education Activity 1: Supervision assistance static sitting balance at EOB.  Balance/Neuromuscular Re-Education Activity 2: SBA dynamic sitting balance at EOB.  Balance/Neuromuscular Re-Education Activity 3: CGA static standing balance using a wheeled walker.  Balance/Neuromuscular Re-Education Activity 4: CGA dynamic standing lillie    Bed Mobility  Bed Mobility: Yes  Bed Mobility  1  Bed Mobility 1: Supine to sitting  Level of Assistance 1: Close supervision  Bed Mobility Comments 1: lateral roll technique    Ambulation/Gait Training  Ambulation/Gait Training Performed: Yes  Ambulation/Gait Training 1  Surface 1: Level tile  Device 1: Rolling walker  Assistance 1: Contact guard, Minimum assistance  Quality of Gait 1: Decreased step length, Forward flexed posture, Soft knee(s) (unsteady, decreased felix, decreased endurance, increased SOB)  Comments/Distance (ft) 1: 20ft  Transfers  Transfer: Yes  Transfer 1  Transfer From 1: Sit to  Transfer to 1: Stand  Transfer Device 1: Walker  Transfer Level of Assistance 1: Minimum assistance  Trials/Comments 1: Bed elevated  Transfers 2  Transfer From 2: Stand to  Transfer to 2: Sit  Transfer Device 2: Walker  Transfer Level of Assistance 2: Close supervision  Transfers 3  Transfer From 3: Bed to  Transfer to 3: Chair with arms  Transfer Device 3: Walker  Transfer Level of Assistance 3: Contact guard, Minimum assistance    Stairs  Stairs: No    Outcome Measures:  [facility] Basic Mobility  Turning from your back to your side while in a flat bed without using bedrails: A little  Moving from lying on your back to sitting on the side of a flat bed without using bedrails: A little  Moving to and from bed to chair (including a wheelchair): A little  Standing up from a chair using your arms (e.g. wheelchair or bedside chair): A little  To walk in hospital room: A little  Climbing 3-5 steps with railing: Total  Basic Mobility - Total Score: 16    OP EDUCATION:  Outpatient Education  Individual(s) Educated: Patient  Education Provided: Body Mechanics, Fall Risk, Home Exercise Program, Home Safety, POC, Posture  Patient Response to Education: Patient/Caregiver Verbalized Understanding of Information, Patient/Caregiver Performed Return Demonstration of Exercises/Activities  Education Comment: Pt received education on safe mobility using a wheeled  walker.    Encounter Problems       Encounter Problems (Active)       Balance       STG - Maintains supervision assistance dynamic standing balance with upper extremity support using a wheeled walker. (Progressing)       Start:  12/16/24    Expected End:  12/30/24            STG - Maintains supervision assistance static standing balance with upper extremity support using a wheeled walker. (Progressing)       Start:  12/16/24    Expected End:  12/30/24               Mobility       STG - Patient will ambulate 75ft with supervision assistance using a wheeled walker. (Progressing)       Start:  12/16/24    Expected End:  12/30/24               PT Transfers       STG - Transfer from bed to chair with supervision assistance using a wheeled walker. (Progressing)       Start:  12/16/24    Expected End:  12/30/24            STG - Patient to transfer to and from sit to supine with supervision assistance. (Progressing)       Start:  12/16/24    Expected End:  12/30/24            STG - Patient will transfer sit to and from stand with supervision assistance using a wheeled walker. (Progressing)       Start:  12/16/24    Expected End:  12/30/24

## 2024-12-27 NOTE — PROGRESS NOTES
Pt has been cleared for transfer to SNF on this date: 12/27/24, Friday.  Pt will be transferred to [facility] via [facility] at 330pm.   Nursing report may be called to [phone].    Support person notified: James Lopez at [phone].  Patient/Family, team aware of above and agreeable.    YURIDIA Samuels, RN  [facility], [facility] 5&9  Transitional Care Coordinator, Mon-Fri  Cell: [phone], Office: [phone]  Email: [email or web address]

## 2024-12-30 ENCOUNTER — PATIENT OUTREACH (OUTPATIENT)
Dept: CARE COORDINATION | Age: 89
End: 2024-12-30
Payer: COMMERCIAL

## 2025-01-07 ENCOUNTER — APPOINTMENT (OUTPATIENT)
Dept: CARDIOLOGY | Facility: HOSPITAL | Age: OVER 89
End: 2025-01-07
Payer: COMMERCIAL

## 2025-01-22 PROBLEM — I99.8 TRANSIENT ISCHEMIA: Status: ACTIVE | Noted: 2025-01-22

## 2025-01-22 PROBLEM — R60.0 PERIPHERAL EDEMA: Status: ACTIVE | Noted: 2025-01-22

## 2025-01-22 PROBLEM — M32.9 SYSTEMIC LUPUS ERYTHEMATOSUS (MULTI): Status: ACTIVE | Noted: 2025-01-22

## 2025-01-22 PROBLEM — I87.2 PERIPHERAL VENOUS INSUFFICIENCY: Status: ACTIVE | Noted: 2025-01-22

## 2025-02-04 ENCOUNTER — APPOINTMENT (OUTPATIENT)
Dept: CARDIOLOGY | Facility: HOSPITAL | Age: OVER 89
End: 2025-02-04
Payer: COMMERCIAL

## 2025-03-07 ENCOUNTER — SPECIALTY PHARMACY (OUTPATIENT)
Dept: PHARMACY | Facility: CLINIC | Age: OVER 89
End: 2025-03-07

## 2025-03-07 ENCOUNTER — OFFICE VISIT (OUTPATIENT)
Dept: CARDIOLOGY | Facility: HOSPITAL | Age: OVER 89
End: 2025-03-07
Payer: COMMERCIAL

## 2025-03-07 VITALS
BODY MASS INDEX: 18.54 KG/M2 | WEIGHT: 104.6 LBS | HEART RATE: 60 BPM | SYSTOLIC BLOOD PRESSURE: 110 MMHG | OXYGEN SATURATION: 100 % | DIASTOLIC BLOOD PRESSURE: 66 MMHG | HEIGHT: 63 IN

## 2025-03-07 DIAGNOSIS — I50.23 ACUTE ON CHRONIC SYSTOLIC HEART FAILURE: ICD-10-CM

## 2025-03-07 DIAGNOSIS — I50.20 SYSTOLIC CONGESTIVE HEART FAILURE, UNSPECIFIED HF CHRONICITY: Primary | ICD-10-CM

## 2025-03-07 DIAGNOSIS — E85.4 CARDIAC AMYLOIDOSIS: Primary | ICD-10-CM

## 2025-03-07 DIAGNOSIS — I43 CARDIAC AMYLOIDOSIS: Primary | ICD-10-CM

## 2025-03-07 PROCEDURE — 3074F SYST BP LT 130 MM HG: CPT | Performed by: STUDENT IN AN ORGANIZED HEALTH CARE EDUCATION/TRAINING PROGRAM

## 2025-03-07 PROCEDURE — 99215 OFFICE O/P EST HI 40 MIN: CPT | Performed by: STUDENT IN AN ORGANIZED HEALTH CARE EDUCATION/TRAINING PROGRAM

## 2025-03-07 PROCEDURE — 1036F TOBACCO NON-USER: CPT | Performed by: STUDENT IN AN ORGANIZED HEALTH CARE EDUCATION/TRAINING PROGRAM

## 2025-03-07 PROCEDURE — 1123F ACP DISCUSS/DSCN MKR DOCD: CPT | Performed by: STUDENT IN AN ORGANIZED HEALTH CARE EDUCATION/TRAINING PROGRAM

## 2025-03-07 PROCEDURE — 1126F AMNT PAIN NOTED NONE PRSNT: CPT | Performed by: STUDENT IN AN ORGANIZED HEALTH CARE EDUCATION/TRAINING PROGRAM

## 2025-03-07 PROCEDURE — 3078F DIAST BP <80 MM HG: CPT | Performed by: STUDENT IN AN ORGANIZED HEALTH CARE EDUCATION/TRAINING PROGRAM

## 2025-03-07 PROCEDURE — 1157F ADVNC CARE PLAN IN RCRD: CPT | Performed by: STUDENT IN AN ORGANIZED HEALTH CARE EDUCATION/TRAINING PROGRAM

## 2025-03-07 PROCEDURE — 1159F MED LIST DOCD IN RCRD: CPT | Performed by: STUDENT IN AN ORGANIZED HEALTH CARE EDUCATION/TRAINING PROGRAM

## 2025-03-07 RX ORDER — ROSUVASTATIN CALCIUM 20 MG/1
20 TABLET, COATED ORAL DAILY
COMMUNITY

## 2025-03-07 RX ORDER — METOPROLOL TARTRATE 25 MG/1
12.5 TABLET, FILM COATED ORAL 2 TIMES DAILY
COMMUNITY

## 2025-03-07 RX ORDER — LISINOPRIL 2.5 MG/1
2.5 TABLET ORAL DAILY
COMMUNITY

## 2025-03-07 RX ORDER — TORSEMIDE 20 MG/1
30 TABLET ORAL DAILY
Qty: 135 TABLET | Refills: 3 | Status: SHIPPED | OUTPATIENT
Start: 2025-03-07 | End: 2025-03-07

## 2025-03-07 RX ORDER — MIRTAZAPINE 7.5 MG/1
7.5 TABLET, FILM COATED ORAL NIGHTLY
COMMUNITY

## 2025-03-07 RX ORDER — TORSEMIDE 20 MG/1
30 TABLET ORAL DAILY
Qty: 135 TABLET | Refills: 3 | Status: SHIPPED | OUTPATIENT
Start: 2025-03-07 | End: 2026-03-07

## 2025-03-07 ASSESSMENT — PATIENT HEALTH QUESTIONNAIRE - PHQ9
SUM OF ALL RESPONSES TO PHQ9 QUESTIONS 1 AND 2: 0
2. FEELING DOWN, DEPRESSED OR HOPELESS: NOT AT ALL
1. LITTLE INTEREST OR PLEASURE IN DOING THINGS: NOT AT ALL

## 2025-03-07 ASSESSMENT — COLUMBIA-SUICIDE SEVERITY RATING SCALE - C-SSRS
6. HAVE YOU EVER DONE ANYTHING, STARTED TO DO ANYTHING, OR PREPARED TO DO ANYTHING TO END YOUR LIFE?: NO
2. HAVE YOU ACTUALLY HAD ANY THOUGHTS OF KILLING YOURSELF?: NO
1. IN THE PAST MONTH, HAVE YOU WISHED YOU WERE DEAD OR WISHED YOU COULD GO TO SLEEP AND NOT WAKE UP?: NO

## 2025-03-07 ASSESSMENT — ENCOUNTER SYMPTOMS: DEPRESSION: 0

## 2025-03-07 ASSESSMENT — PAIN SCALES - GENERAL: PAINLEVEL_OUTOF10: 0-NO PAIN

## 2025-03-07 NOTE — PATIENT INSTRUCTIONS
Thank you for coming in today. If you have any questions or concerns, you may call the Heart Failure Office at [phone] option 6, or [phone].  You may also contact our heart failure nursing team via email on [email or web address].    For quicker results set-up your  [employer] account to receive results and other correspondence directly to your phone.    Please bring all your pills/medications to your Cardiology appointments.    **  -Our amyloidosis nurse, Sana, will reach out to you to help you get started on the new medication Vyndamax, and to arrange for genetic testing    - Please make the following medication changes:  1.  START Vyndamax 61 mg once daily    2. INCREASE Torsemide  30 mg once daily    - Please have the following tests done:  1.Blood tests just before your next visit (RFP, BNP, CBC)    - Please make an appointment to be seen in 3-4 months

## 2025-03-07 NOTE — PROGRESS NOTES
"Referring Clinician:   Accompanied by: alone  Resident at [facility]    HPI:     89 y.o. retired  who presents for advanced heart failure/cardiac amyloidosis care.  My final recommendations will be communicated back to the requesting clinician  by way of shared Medical record.   Review of the electronic medical record, including Care Everywhere, and patient discussion shows a past medical history significant for cardiac amyloidosis, HFrEF, SSS status post PPM 2017, hypertension, CKD.    Ms. Lopez was admitted 12/2024 and per her DC Summary, \"..89 y.o. 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 Patient reports progressive leg swelling and SOB with exertion over the last 2 weeks. She has remained compliant on all her medications, including lasix 20mg daily, with help of her daughter at home. Daughter at the bedside reports that the patient has been slowly decompensating since completing rehab via [facility] approximately 6 weeks after discharge 9/4/2024. Patient was scheduled to see Dr. Goss 9/17/2024 but did not show up. When asked why, there was not a clear response from patient or daughter.Patient states that she stays in bed a majority of the day, but does use her walker to get to the bathroom when needed.    Patient's most recent admission to [facility] 8/24-9/4 for anasarca. Was found to have newly reduced HFrEF. With previously noted concern for amyloidosis on TTE 10/2023 and significantly thickened LV posterior wall on TTE 8/24, serum free light chains, SPEP and UPEP negative. NM PYP cardiac amyloidosis with SPECT CT suggestive of TTR. CTA coronary revealed minor CAD, calcium score 17. Advanced heart failure recommended GDMT initiation inpatient as well as tafamadis as outpatient.  GDMT was limited due to " "hypotension. GDMT at discharge: Entresto 12/13 BID, metop succ 12.5 mg daily, dapagliflozin 10 mg daily,  and spironolactone 12.5 mg daily + Lasix 20mg daily for maintenance diuretic. PT had originally recommended SNF, however patient declined SNF and was discharged home with [facility].  In the ED, /76 HR 70 SpO2 100% on RA. BNP >5K, HS trop 418 -> 330 attributing to demand ischemic similar to previous presentation. K 3.1 with Cr of 2.12. CXR revealing moderate-to-large bilateral pleural effusions. Given IV lasix 40mg x1. Admitted to [facility] for further management.   Floor course:  Diuresed successfully with IV lasix gtt and converted to PO torsemide 20mg daily.  GDMT optimized as able, limited by hypotension and CKD.   Hypovolemic hyponatremia noted with lowest Na of 127 in the setting of aggressive diurese. Na improved to 132 on maintenance diuresis.    EP consulted for consideration of device upgrade, plan for outpatient follow up to determine benefit of CRT-D.   Thrombocytopenia with unknown etiology, dony 83. Aspirin initially held but resumed with plan to monitor platelets, 256 on day of discharge.   PT/OT recommended moderate intensity. Patient and family agreeable for SNF for rehab. Plan for palliative care to follow patient at facility.    Discharge wt 50.5kg\"    Since she was discharged to the nursing facility she notes that she is doing much better.  It looks as if she was treated for influenza in February 2025 and notes that she has been on supplemental oxygen since then, for the past 2 weeks.  She is now able to eat independently and is working on boosting her appetite.  She is receiving physical rehabilitation daily and is now able to walk \"very much\" better than she did before.  She is able to walk a \"long walkway\" twice without exertional symptoms.    She denies chest pain, or dyspnea at rest.  She also volunteered that the symptoms were present before her last hospitalization.  She further denies PND " "although she continues to have an unchanged two-pillow orthopnea.  She is very pleased that she no longer has \"body swelling\" or swelling in her legs.  She has not had syncope, or presyncope.    She is fully adherent with all prescribed medications.    Her last HF hospitalisation was 12/2024    Surgical Hx:  - pattial hysterectomy    Past Obstetric Hx:  - P 5  - No cardiac complications of pregnancy    Social Hx:  - Smoking- never   - ETOH- never   - Illicit drugs - never   - Lives with daughter krista, currently in rehab at [facility] Hx:  Specifically, there is no known family history of  CAD, heart failure, ICD, PPM, LVAD, OHT, arrhythmias, CVA, or sudden cardiac death.    Heart disease \" on both sides\"- limited details     Medication reconciliation completed, see below.     Medication Documentation Review Audit       Reviewed by Pia Grayson RN (Registered Nurse) on 03/07/25 at 1103      Medication Order Taking? Sig Documenting Provider Last Dose Status   aspirin 81 mg EC tablet [id number]  Take 1 tablet (81 mg) by mouth once daily.   Patient not taking: Reported on 3/7/2025    Historical Provider, MD  Active    Discontinued 03/07/25 1019   lisinopril 2.5 mg tablet [id number] Yes Take 1 tablet (2.5 mg) by mouth once daily. Historical Provider, MD  Active    Discontinued 03/07/25 1017   metoprolol tartrate (Lopressor) 25 mg tablet [id number] Yes Take 0.5 tablets (12.5 mg) by mouth 2 times a day. Historical Provider, MD  Active   mirtazapine (Remeron) 7.5 mg tablet [id number] Yes Take 1 tablet (7.5 mg) by mouth once daily at bedtime. Historical Provider, MD  Active   pantoprazole (ProtoNix) 40 mg EC tablet [id number] Yes Take 1 tablet (40 mg) by mouth once daily in the morning. Take before meals. Do not crush, chew, or split. MARTINA Gonzalez  Active   polyethylene glycol (Glycolax, Miralax) 17 gram packet [id number] Yes Take 17 g by mouth once daily. MARTINA Ponce  Active   predniSONE " "(Deltasone) 2.5 mg tablet [id number] Yes Take 1 tablet (2.5 mg) by mouth once daily. DAMION Ponce-CNP  Active   rosuvastatin (Crestor) 20 mg tablet [id number] Yes Take 1 tablet (20 mg) by mouth once daily. Historical Provider, MD  Active   sennosides-docusate sodium (Nelda-Colace) 8.6-50 mg tablet [id number] Yes Take 1 tablet by mouth once daily at bedtime. DAMION Gonzalez-CNP  Active   sodium zirconium cyclosilicate (Lokelma) 10 gram packet [id number] Yes Take 10 g by mouth once daily. Historical Provider, MD  Active     Discontinued 03/07/25 1019    Discontinued 03/07/25 1059     Discontinued 03/07/25 1101   torsemide (Demadex) 20 mg tablet [id number]  Take 1.5 tablets (30 mg) by mouth once daily. Lisa Vega MD PhD  Active                       No Known Allergies    Review of Systems   Constitutional: Positive for weight loss. Negative for chills and fever.   Cardiovascular:  Positive for dyspnea on exertion and palpitations. Negative for chest pain and leg swelling.   Respiratory:  Negative for cough and shortness of breath.    Musculoskeletal:  Positive for arthritis and muscle weakness.   Gastrointestinal:  Positive for constipation and diarrhea. Negative for nausea and vomiting.        With new medications   Genitourinary:  Negative for frequency, hematuria and hesitancy.   Neurological:  Positive for numbness and weakness. Negative for dizziness, headaches and light-headedness.        Numbness/tingling in legs     Investigations:    The electronic medical record has been reviewed by me for salient history. All cardiovascular imaging and testing available in the electronic medical record, and [employer] has been reviewed.    Visit Vitals  /66 (BP Location: Left arm, Patient Position: Sitting)   Pulse 60   Ht 1.6 m (5' 3\")   Wt 47.4 kg (104 lb 9.6 oz)   SpO2 100% Comment: 2L   BMI 18.53 kg/m²   Smoking Status Never   BSA 1.45 m²      On examination:    Very pleasant petite elderly -American " woman in no apparent CP or painful distress.  She was wearing supplemental oxygen, appeared very comfortable from a respiratory perspective  Well  groomed   Neck: No JVD , + HJR  Chest wall: Unremarkable device contour  CVS: HS 1,2.   No added sounds  Resp: CTA bilaterally. Percussion note resonant  Abdomen: Flat SNT, BS wnl  Extremities: 1+ pedal oedema to the mid shins bilaterally  Skin: warm and dry  CNS: AO x 4, no gross deficits    Lab Results   Component Value Date    WBC 4.6 12/26/2024    HGB 12.2 12/26/2024    HCT 38.1 12/26/2024     12/26/2024    ALT 62 (H) 12/26/2024    AST 63 (H) 12/26/2024     (L) 12/26/2024    K 4.3 12/26/2024    CL 94 (L) 12/26/2024    CREATININE 1.48 (H) 12/26/2024    BUN 56 (H) 12/26/2024    CO2 31 12/26/2024    INR 1.3 (H) 12/13/2024     Transthoracic Echo (TTE) Complete    Result Date: 8/24/2024   [facility], [address] [place] [address]                Tel [phone] and Fax [phone] TRANSTHORACIC ECHOCARDIOGRAM REPORT  Patient Name:      MICHELE JACOB          Pierre Physician:    [id number] Mehrdad Yee MD Study Date:        8/24/2024            Ordering Provider:    [id number] ALEN SIMON MRN/PID:           [id number]             Fellow: Accession#:        [id number]         Nurse:                Abigail Moore RN Date of Birth/Age: 1935 / 88 years Sonographer:          Concha Pena FAB Gender:            F                    Additional Staff: Height:            160.02 cm            Admit Date: Weight:            73.03 kg             Admission Status:     Inpatient -                                                               Routine BSA / BMI:         1.76 m2 / 28.52      Encounter#:           [id number]                    kg/m2 Blood Pressure:    128/70 mmHg          Department Location:  [facility]                                                                Invasive Study Type:    TRANSTHORACIC ECHO (TTE) COMPLETE Diagnosis/ICD: Chronic systolic (congestive) heart failure (CHF)-I50.22 Indication:    new HFrEF with decompensation CPT Code:      Echo Complete w Full Doppler-[id number] Patient History: Pertinent History: NSTEMI, HFpEF, SSS s/p PPM 2017, HTN, CKD, CAD. Study Detail: The following Echo studies were performed: 2D, M-Mode, Doppler and               color flow. Agitated saline used as a contrast agent for               intraseptal flow evaluation.  Critical Event Critical Event: Test was completed as per department protocol. Critical Finding: Severe TR. Time Test was Completed: 11:15:39 AM Notified: Dr. Yee. Attending notification time: 11:25:49 AM  PHYSICIAN INTERPRETATION: Left Ventricle: Left ventricular ejection fraction is moderately decreased, by visual estimate at 30-35%. There is global hypokinesis of the left ventricle with minor regional variations. The left ventricular cavity size is decreased. The left ventricular septal wall thickness is moderately increased. There is moderately increased left ventricular posterior wall thickness. There is severe concentric left ventricular hypertrophy. Abnormal (paradoxical) septal motion, consistent with RV pacemaker. Spectral Doppler shows an impaired relaxation pattern of left ventricular diastolic filling. There is an elevated left ventricular end diastolic pressure. Left Atrium: The left atrium is mildly dilated. A bubble study using agitated saline was performed. A small PFO (= 10 bubbles) was demonstrated. Right Ventricle: The right ventricle is moderately enlarged. There is normal right ventricular global systolic function. A device is visualized in the right ventricle. Right Atrium: The right atrium is severely dilated. Aortic Valve: The aortic valve is trileaflet. There is minimal aortic valve cusp calcification. The aortic valve dimensionless index  is 0.70. There is mild to moderate aortic valve regurgitation. The peak instantaneous gradient of the aortic valve is 3.0 mmHg. The mean gradient of the aortic valve is 1.8 mmHg. Mitral Valve: The mitral valve is mildly thickened. There is mild to moderate mitral valve regurgitation. Tricuspid Valve: The tricuspid valve is structurally normal. There is severe tricuspid regurgitation. The Doppler estimated RVSP is slightly elevated at 31.2 mmHg. The RV systolic pressure is likely to be underestimated. Pulmonic Valve: The pulmonic valve is structurally normal. There is physiologic pulmonic valve regurgitation. Pericardium: There is a trivial pericardial effusion. Pleural: There is bilateral pleural effusion. Aorta: The aortic root is normal. Systemic Veins: The inferior vena cava appears dilated. There is IVC inspiratory collapse greater than 50%. The hepatic vein shows a pattern of systolic flow reversal, suggestive of severe tricuspid regurgitation. In comparison to the previous echocardiogram(s): Compared with study dated 10/3/2023, the LV EF is decreaaed (was 50-55%) and the TR is wide-open (was moderate).  CONCLUSIONS:  1. Left ventricular ejection fraction is moderately decreased, by visual estimate at 30-35%.  2. There is global hypokinesis of the left ventricle with minor regional variations.  3. Spectral Doppler shows an impaired relaxation pattern of left ventricular diastolic filling.  4. There is an elevated left ventricular end diastolic pressure.  5. Left ventricular cavity size is decreased.  6. Abnormal septal motion consistent with RV pacemaker.  7. Moderately increased left ventricular septal thickness.  8. The left ventricular posterior wall thickness is moderately increased.  9. There is severe concentric left ventricular hypertrophy. 10. There is normal right ventricular global systolic function. 11. Moderately enlarged right ventricle. 12. The left atrium is mildly dilated. 13. The right atrium  is severely dilated. 14. Mild to moderate mitral valve regurgitation. 15. Severe tricuspid regurgitation visualized. 16. Slightly elevated RVSP. 17. The RV systolic pressure is likely to be underestimated. 18. Mild to moderate aortic valve regurgitation. 19. Sent a Haiku to the NP service. QUANTITATIVE DATA SUMMARY: 2D MEASUREMENTS:                         Normal Ranges: Ao Root d:     3.20 cm  (2.0-3.7cm) LAs:           4.25 cm  (2.7-4.0cm) IVSd:          1.78 cm  (0.6-1.1cm) LVPWd:         1.76 cm  (0.6-1.1cm) LVIDd:         3.56 cm  (3.9-5.9cm) LVIDs:         3.26 cm LV Mass Index: 148 g/m2 LVEDV Index:   56 ml/m2 LV % FS        8.3 % LA VOLUME:                               Normal Ranges: LA Vol A4C:        65.4 ml    (22+/-6mL/m2) LA Vol A2C:        62.6 ml LA Vol BP:         67.2 ml LA Vol Index A4C:  37.1ml/m2 LA Vol Index A2C:  35.5 ml/m2 LA Vol Index BP:   38.1 ml/m2 LA Area A4C:       20.2 cm2 LA Area A2C:       18.8 cm2 LA Major Axis A4C: 5.3 cm LA Major Axis A2C: 4.8 cm RA VOLUME BY A/L METHOD:                       Normal Ranges: RA Area A4C: 31.0 cm2 AORTA MEASUREMENTS:                    Normal Ranges: Asc Ao, d: 3.30 cm (2.1-3.4cm) LV SYSTOLIC FUNCTION BY 2D PLANIMETRY (MOD):                      Normal Ranges: EF-A4C View:    32 % (>=55%) EF-A2C View:    39 % EF-Biplane:     35 % EF-Visual:      33 % LV EF Reported: 33 % LV DIASTOLIC FUNCTION:                        Normal Ranges: MV Peak E: 0.44 m/s    (0.7-1.2 m/s) MV Peak A: 0.64 m/s    (0.42-0.7 m/s) E/A Ratio: 0.68        (1.0-2.2) MV A Dur:  124.57 msec MV DT:     126 msec    (150-240 msec) MITRAL VALVE:                 Normal Ranges: MV DT: 126 msec (150-240msec) MITRAL INSUFFICIENCY:                      Normal Ranges: MR VTI:  176.42 cm MR Vmax: 459.02 cm/s AORTIC VALVE:                                   Normal Ranges: AoV Vmax:                0.87 m/s (<=1.7m/s) AoV Peak PG:             3.0 mmHg (<20mmHg) AoV Mean P.8 mmHg  (1.7-11.5mmHg) LVOT Max Alfonso:            0.65 m/s (<=1.1m/s) AoV VTI:                 16.35 cm (18-25cm) LVOT VTI:                11.51 cm LVOT Diameter:           1.98 cm  (1.8-2.4cm) AoV Area, VTI:           2.17 cm2 (2.5-5.5cm2) AoV Area,Vmax:           2.30 cm2 (2.5-4.5cm2) AoV Dimensionless Index: 0.70 AORTIC INSUFFICIENCY: AI Vmax:       3.51 m/s AI Half-time:  433 msec AI Decel Time: 1492 msec AI Decel Rate: 235.88 cm/s2  RIGHT VENTRICLE: RV Basal 4.80 cm RV Mid   3.60 cm RV Major 8.2 cm TAPSE:   20.0 mm RV s'    0.12 m/s TRICUSPID VALVE/RVSP:                             Normal Ranges: Peak TR Velocity: 2.41 m/s RV Syst Pressure: 31.2 mmHg (< 30mmHg) IVC Diam:         2.89 cm PULMONIC VALVE:                         Normal Ranges: PV Accel Time: 69 msec  (>120ms) PV Max Alfonso:    0.7 m/s  (0.6-0.9m/s) PV Max P.0 mmHg AORTA: Asc Ao Diam 3.26 cm  [id number] Mehrdad Yee MD Electronically signed on 2024 at 12:07:44 PM  ** Final **        IMPRESSION:  89 y.o. retired  who presents for advanced heart failure/cardiac amyloidosis care.  She has a past medical history significant for cardiac amyloidosis, HFrEF, SSS status post PPM 2017, hypertension, CKD.    NYHA Functional Class: 2-3 (improving)  ACC/AHA Stage C heart failure  Volume status: Hypervolemic (but improved based on past descriptions, patient report)  Perfusion status: Warm to touch  Aetiology: Cardiac amyloidosis    PLAN:    We will start tafamidis 61 mg once daily today.  We will also arrange for genetic testing for her, we discussed the rationale for this and she is amenable.    She is mildly volume overloaded today, and furosemide dose will be increased from 20 mg once daily to 30 mg once daily.    Plan for surveillance visit in 3 to 4 months    This note was transcribed using the Dragon Dictation system. There may be grammatical, punctuation, or verbiage errors that can occur with voice recognition programs.    Lisa Vega MD PhD

## 2025-03-10 ENCOUNTER — TELEPHONE (OUTPATIENT)
Dept: CARDIOLOGY | Facility: HOSPITAL | Age: OVER 89
End: 2025-03-10
Payer: COMMERCIAL

## 2025-03-10 NOTE — TELEPHONE ENCOUNTER
Attempted to call patient's daughter to discuss process for ordering tafamadis and genetic testing.  NA, will try later.

## 2025-03-19 ENCOUNTER — SPECIALTY PHARMACY (OUTPATIENT)
Dept: PHARMACY | Facility: CLINIC | Age: OVER 89
End: 2025-03-19

## 2025-03-19 PROCEDURE — RXMED WILLOW AMBULATORY MEDICATION CHARGE

## 2025-03-21 ENCOUNTER — PHARMACY VISIT (OUTPATIENT)
Dept: PHARMACY | Facility: CLINIC | Age: OVER 89
End: 2025-03-21
Payer: COMMERCIAL

## 2025-03-25 ENCOUNTER — SPECIALTY PHARMACY (OUTPATIENT)
Dept: PHARMACY | Facility: CLINIC | Age: OVER 89
End: 2025-03-25

## 2025-03-25 DIAGNOSIS — I25.10 CORONARY ARTERY DISEASE INVOLVING NATIVE HEART WITHOUT ANGINA PECTORIS, UNSPECIFIED VESSEL OR LESION TYPE: ICD-10-CM

## 2025-03-25 DIAGNOSIS — E78.2 MIXED HYPERLIPIDEMIA: Primary | ICD-10-CM

## 2025-03-25 RX ORDER — ROSUVASTATIN CALCIUM 10 MG/1
10 TABLET, COATED ORAL DAILY
Qty: 30 TABLET | Refills: 11 | Status: SHIPPED | OUTPATIENT
Start: 2025-03-25 | End: 2026-03-25

## 2025-03-25 NOTE — PROGRESS NOTES
[facility] Clinical Note  Initial Patient Education     Introduction  Eirn Lopez is a 89 y.o. female who is on the specialty pharmacy service for management of: Cardiology Core.    Erin Lopez is initiating the following therapy: Vyndaqel 80mg once daily     Medication receipt date: 3/24/25  Duration of therapy: Maintenance    The most recent encounter visit with the referring prescriber Lisa Vega MD PhD on 3/7/25 was reviewed.  Pharmacy will continue to collaborate in the care of this patient with the referring prescriber.    Clinical Background  An initial assessment was conducted prior to first fill of the medication to determine the appropriateness of therapy given the patient's diagnosis, medication list, comorbidities, allergies, medical history, patient's ability to self administer medication, and therapeutic goals based on possible outcomes of therapy. Refer to initial assessment task completed on 3/14/25.    Labs/Procedures for clinical appropriateness that were reviewed include:   Cardiology - No Labs Needed: There are no routine laboratory monitoring parameters for this medication    Education/Discussion  Erin was contacted on 3/25/2025 at 12:57 PM for a pharmacy visit with encounter number [id number] from:   [facility]  [address] [place] [id number]  Dept: [phone]  Dept Fax: [phone]  Caregiver consented to a/an Telephone visit, which was performed.    Medication Start Date (planned or actual): TBD  Education was conducted prior to start of therapy? Yes    Education discussed includes the following:  Patient Education  Counseled the Patient on the Following : Theraputic rationale and expected outcomes, Expected duration of therapy, Doses and administration, Adherence and missed doses, Possible side effects and management, Possible drug interactions, Lab monitoring and follow-up, Safe handling, storage, and  disposal, Pharmacy contact information  Learner: Family  Education Method: Explanation  Education Response: Verbalizes understanding  Additional details of the medication specific counseling are found within the linked patient education flowsheet.     The follow up timeline was discussed. Every person responds to and reacts to therapy differently. Patient should be assessed for efficacy and tolerability in approximately: 6 months    Provided education on goals and possible outcomes of therapy:  Adherence with therapy  Timely completion of appropriate labs  Timely and appropriate follow up with provider  Identify and address medication interactions with presciption medications, OTC medications and supplements  Optimize or maintain quality of life  Cardiology: Slow progression of disease  Prolong life    The importance of adherence was discussed and they were advised to take the medication as prescribed by their provider.     Impression/Plan  Review and Assessment   Reviewed During This Encounter: Allergies, Medications, Problem list  Medications Assessed for Appropriate Use, Dose, Route, Frequency, and Duration: Yes  Medication Reconciliation Completed: No (Comment) (chart reviewed; reminded of Crestor dose reduction upon start of Vyndaqel)  Drug Interactions Evaluated: Yes  Clinically Relevant Drug Interactions Identified: Yes  List Interactions and Management Plan: Crestor dose reduced from 20mg to 10mg upon start of Vyndaqel    This patient has been identified as high risk due to Geriatric (over 65 years of age).  The following action was taken: Patient/caregiver encouraged to participate in patient management program.    QOL/Patient Satisfaction  Rate your quality of life on scale of 1-10:  (unable to assess)  Rate your satisfaction with  Specialty Pharmacy on scale of 1-10:  (unable to assess)    The  Specialty Pharmacy Welcome packet may be viewed here:   Specialty Pharmacy Welcome Packet     Or by  scanning QR code:      Provided contact information ([phone]) for [facility] and reviewed dispensing process, refill timeline and patient management follow up. Advised to contact the pharmacy if there are any adverse effects and/or changes to medication list, including prescriptions, OTC medications, herbal products, or supplements. Confirmed understanding of education conducted during assessment. All questions and concerns were addressed and patient was encouraged to reach out for additional questions or concerns.      Eden Leung, TonyaD

## 2025-03-28 ENCOUNTER — TELEPHONE (OUTPATIENT)
Dept: CARDIOLOGY | Facility: HOSPITAL | Age: OVER 89
End: 2025-03-28
Payer: COMMERCIAL

## 2025-03-28 NOTE — TELEPHONE ENCOUNTER
Spoke with patient's daughter on 3/25. Vyndamax denied by insurance. Vyndaqel approved and received. Due to potential interaction with rosuvastatin, dose decreased from 20 mg to 10 mg while on Vyndaqel. Dr. Vega's last office visit note faxed to patient's primary care MD Dr. Joaquin Clark.

## 2025-03-31 ENCOUNTER — SPECIALTY PHARMACY (OUTPATIENT)
Dept: PHARMACY | Facility: CLINIC | Age: OVER 89
End: 2025-03-31

## 2025-04-01 ENCOUNTER — SPECIALTY PHARMACY (OUTPATIENT)
Dept: PHARMACY | Facility: CLINIC | Age: OVER 89
End: 2025-04-01

## 2025-04-01 ENCOUNTER — TELEPHONE (OUTPATIENT)
Dept: CARDIOLOGY | Facility: HOSPITAL | Age: OVER 89
End: 2025-04-01
Payer: COMMERCIAL

## 2025-04-01 PROCEDURE — RXMED WILLOW AMBULATORY MEDICATION CHARGE

## 2025-04-01 NOTE — TELEPHONE ENCOUNTER
----- Message from Matt Orlando sent at 3/31/2025  2:26 PM EDT -----  Regarding: RE: Patient cannot swallow medication  Thank you!  ----- Message -----  From: Sana Storey RN  Sent: 3/31/2025   2:11 PM EDT  To: Matt Orlando [facility]  Subject: RE: Patient cannot swallow medication            Okay, thank you.  I will call them and discuss with Dr. Vega.  ----- Message -----  From: Matt Orlando [facility]  Sent: 3/31/2025   1:45 PM EDT  To: Sana Storey RN  Subject: Patient cannot swallow medication                Good afternoon Sana,    We received a call from Malvin, a pharmacist from [facility] and advised he spoke with pt and she told him that she is not able to swallow the Vyndaqel 20 mg capsules.    Thank you    -Matt Orlando [facility]

## 2025-04-01 NOTE — TELEPHONE ENCOUNTER
Spoke with patient's daughter.  She is not able to swallow recently prescribed tafamadis capsules.  Daughter states it is the size of the capsule that is limiting.  She would not even be able to tolerate one capsule per day.  Will forward to Dr. Vega to review and advise.

## 2025-04-02 ENCOUNTER — PHARMACY VISIT (OUTPATIENT)
Dept: PHARMACY | Facility: CLINIC | Age: OVER 89
End: 2025-04-02
Payer: COMMERCIAL

## 2025-04-07 NOTE — TELEPHONE ENCOUNTER
Will stop kristy for now and discuss amvuttra at follow up visit with Dr. Vega in June.  Left message for patient's daughter Cassidy to call.

## 2025-04-23 ENCOUNTER — SPECIALTY PHARMACY (OUTPATIENT)
Dept: PHARMACY | Facility: CLINIC | Age: OVER 89
End: 2025-04-23

## 2025-04-23 PROCEDURE — RXMED WILLOW AMBULATORY MEDICATION CHARGE

## 2025-04-25 ENCOUNTER — TELEPHONE (OUTPATIENT)
Dept: CARDIOLOGY | Facility: HOSPITAL | Age: OVER 89
End: 2025-04-25
Payer: COMMERCIAL

## 2025-04-25 DIAGNOSIS — E78.2 MIXED HYPERLIPIDEMIA: ICD-10-CM

## 2025-04-25 DIAGNOSIS — I25.10 CORONARY ARTERY DISEASE INVOLVING NATIVE HEART WITHOUT ANGINA PECTORIS, UNSPECIFIED VESSEL OR LESION TYPE: ICD-10-CM

## 2025-04-25 RX ORDER — ROSUVASTATIN CALCIUM 20 MG/1
20 TABLET, COATED ORAL DAILY
Qty: 30 TABLET | Refills: 11 | Status: SHIPPED | OUTPATIENT
Start: 2025-04-25 | End: 2026-04-25

## 2025-04-25 NOTE — TELEPHONE ENCOUNTER
Spoke with patient's daughter.  Patient was previously on Rosuvastatin 20 mg daily prescribed by primary MD. It was decreased to 10 mg daily when started taking Vyndaqel due to potential interaction.  Patient no longer on Vyndaqel, unable to swallow the capsule.  She is questioning if patient should go back to Rosuvastatin 20 mg.  Primary MD requested she check with Dr. Vega.

## 2025-04-25 NOTE — TELEPHONE ENCOUNTER
Per Dr. Vega, increase rosuvastatin back to 20 mg daily.  Notified patient's daughter, she verbalized understanding.

## 2025-05-01 ENCOUNTER — PHARMACY VISIT (OUTPATIENT)
Dept: PHARMACY | Facility: CLINIC | Age: OVER 89
End: 2025-05-01
Payer: COMMERCIAL

## 2025-05-02 ENCOUNTER — TELEPHONE (OUTPATIENT)
Dept: CARDIOLOGY | Facility: HOSPITAL | Age: OVER 89
End: 2025-05-02
Payer: COMMERCIAL

## 2025-05-02 DIAGNOSIS — N18.9 CHRONIC KIDNEY DISEASE, UNSPECIFIED CKD STAGE: ICD-10-CM

## 2025-05-02 DIAGNOSIS — I50.23 ACUTE ON CHRONIC SYSTOLIC HEART FAILURE: Primary | ICD-10-CM

## 2025-05-02 DIAGNOSIS — I50.20 SYSTOLIC CONGESTIVE HEART FAILURE, UNSPECIFIED HF CHRONICITY: Primary | ICD-10-CM

## 2025-05-07 LAB
ALBUMIN SERPL-MCNC: 3.6 G/DL (ref 3.6–5.1)
BUN SERPL-MCNC: 26 MG/DL (ref 7–25)
BUN/CREAT SERPL: 16 (CALC) (ref 6–22)
CALCIUM SERPL-MCNC: 9.1 MG/DL (ref 8.6–10.4)
CHLORIDE SERPL-SCNC: 90 MMOL/L (ref 98–110)
CO2 SERPL-SCNC: 43 MMOL/L (ref 20–32)
CREAT SERPL-MCNC: 1.59 MG/DL (ref 0.6–0.95)
EGFRCR SERPLBLD CKD-EPI 2021: 31 ML/MIN/1.73M2
GLUCOSE SERPL-MCNC: 83 MG/DL (ref 65–99)
PHOSPHATE SERPL-MCNC: 3.5 MG/DL (ref 2.1–4.3)
POTASSIUM SERPL-SCNC: 2.4 MMOL/L (ref 3.5–5.3)
SODIUM SERPL-SCNC: 144 MMOL/L (ref 135–146)

## 2025-05-07 RX ORDER — POTASSIUM CHLORIDE 20 MEQ/1
40 TABLET, EXTENDED RELEASE ORAL DAILY
Qty: 6 TABLET | Refills: 0 | Status: SHIPPED | OUTPATIENT
Start: 2025-05-07 | End: 2025-05-10

## 2025-06-19 ENCOUNTER — OFFICE VISIT (OUTPATIENT)
Dept: CARDIOLOGY | Facility: HOSPITAL | Age: OVER 89
End: 2025-06-19
Payer: COMMERCIAL

## 2025-06-19 VITALS
BODY MASS INDEX: 21.33 KG/M2 | DIASTOLIC BLOOD PRESSURE: 74 MMHG | WEIGHT: 120.4 LBS | HEIGHT: 63 IN | SYSTOLIC BLOOD PRESSURE: 118 MMHG | OXYGEN SATURATION: 100 % | HEART RATE: 69 BPM

## 2025-06-19 DIAGNOSIS — I50.23 ACUTE ON CHRONIC SYSTOLIC HEART FAILURE: ICD-10-CM

## 2025-06-19 DIAGNOSIS — I50.20 SYSTOLIC CONGESTIVE HEART FAILURE, UNSPECIFIED HF CHRONICITY: ICD-10-CM

## 2025-06-19 PROCEDURE — 1159F MED LIST DOCD IN RCRD: CPT | Performed by: STUDENT IN AN ORGANIZED HEALTH CARE EDUCATION/TRAINING PROGRAM

## 2025-06-19 PROCEDURE — 99215 OFFICE O/P EST HI 40 MIN: CPT | Performed by: STUDENT IN AN ORGANIZED HEALTH CARE EDUCATION/TRAINING PROGRAM

## 2025-06-19 PROCEDURE — 3074F SYST BP LT 130 MM HG: CPT | Performed by: STUDENT IN AN ORGANIZED HEALTH CARE EDUCATION/TRAINING PROGRAM

## 2025-06-19 PROCEDURE — 1036F TOBACCO NON-USER: CPT | Performed by: STUDENT IN AN ORGANIZED HEALTH CARE EDUCATION/TRAINING PROGRAM

## 2025-06-19 PROCEDURE — 3078F DIAST BP <80 MM HG: CPT | Performed by: STUDENT IN AN ORGANIZED HEALTH CARE EDUCATION/TRAINING PROGRAM

## 2025-06-19 PROCEDURE — 1126F AMNT PAIN NOTED NONE PRSNT: CPT | Performed by: STUDENT IN AN ORGANIZED HEALTH CARE EDUCATION/TRAINING PROGRAM

## 2025-06-19 PROCEDURE — 99212 OFFICE O/P EST SF 10 MIN: CPT

## 2025-06-19 RX ORDER — TORSEMIDE 20 MG/1
40 TABLET ORAL DAILY
Qty: 180 TABLET | Refills: 3 | Status: SHIPPED | OUTPATIENT
Start: 2025-06-19 | End: 2026-06-19

## 2025-06-19 ASSESSMENT — ENCOUNTER SYMPTOMS
DIZZINESS: 0
LIGHT-HEADEDNESS: 0
SHORTNESS OF BREATH: 1
LOSS OF BALANCE: 0
DYSPNEA ON EXERTION: 1
HEADACHES: 0
GASTROINTESTINAL NEGATIVE: 1
CONSTITUTIONAL NEGATIVE: 1
PALPITATIONS: 0

## 2025-06-19 ASSESSMENT — PAIN SCALES - GENERAL: PAINLEVEL_OUTOF10: 0-NO PAIN

## 2025-06-19 NOTE — PROGRESS NOTES
"Referring Clinician:   Accompanied by: Sister, granddaughter  Resident at [facility]    HPI:     89 y.o. retired  who presents for advanced heart failure/cardiac amyloidosis care.  She has a past medical history significant for cardiac amyloidosis, HFrEF, SSS status post PPM 2017, hypertension, CKD.    She was treated for influenza in February 2025 and notes that she has been on supplemental oxygen since then.    At her last visit she was started on tafamidis but has been unable to swallow either Vyndamax or Vyndaquel.  She also had a brief period of hypokalemia and needed supplemental potassium.    She reports that she continues to have leg swelling although much improved, and exertional dyspnea with ambulation pain her home.  No other cardiovascular symptoms.    She is fully adherent with prescribed medications      Her last HF hospitalisation was 12/2024      Review of Systems   Constitutional: Negative.   Cardiovascular:  Positive for dyspnea on exertion. Negative for chest pain, leg swelling and palpitations.   Respiratory:  Positive for shortness of breath.    Gastrointestinal: Negative.    Genitourinary: Negative.    Neurological:  Negative for dizziness, headaches, light-headedness and loss of balance.     Surgical Hx:  - pattial hysterectomy    Past Obstetric Hx:  - P 5  - No cardiac complications of pregnancy    Social Hx:  - Smoking- never   - ETOH- never   - Illicit drugs - never       Family Hx:  Specifically, there is no known family history of  CAD, heart failure, ICD, PPM, LVAD, OHT, arrhythmias, CVA, or sudden cardiac death.    Heart disease \" on both sides\"- limited details     Medication reconciliation completed, see below.     Medication Documentation Review Audit       Reviewed by Karen Kirkpatrick RN (Registered Nurse) on 06/19/25 at 1044      Medication Order Taking? Sig Documenting Provider Last Dose Status   aspirin 81 mg EC tablet [id number]  Take 1 tablet (81 " "mg) by mouth once daily.   Patient not taking: Reported on 3/7/2025    Historical Provider, MD  Active   lisinopril 2.5 mg tablet [id number] Yes Take 1 tablet (2.5 mg) by mouth once daily. Historical Provider, MD  Active   metoprolol tartrate (Lopressor) 25 mg tablet [id number] Yes Take 0.5 tablets (12.5 mg) by mouth 2 times a day. Historical Provider, MD  Active   mirtazapine (Remeron) 7.5 mg tablet [id number] Yes Take 1 tablet (7.5 mg) by mouth once daily at bedtime. Historical Provider, MD  Active   pantoprazole (ProtoNix) 40 mg EC tablet [id number] Yes Take 1 tablet (40 mg) by mouth once daily in the morning. Take before meals. Do not crush, chew, or split. MARTINA Gonzalez  Active   polyethylene glycol (Glycolax, Miralax) 17 gram packet [id number]  Take 17 g by mouth once daily.   Patient not taking: Reported on 6/19/2025    MARTINA Ponce  Active   predniSONE (Deltasone) 2.5 mg tablet [id number] Yes Take 1 tablet (2.5 mg) by mouth once daily. MARTINA Ponce  Active   rosuvastatin (Crestor) 20 mg tablet [id number] Yes Take 1 tablet (20 mg) by mouth once daily. Lisa Vega MD PhD  Active   sennosides-docusate sodium (Nelda-Colace) 8.6-50 mg tablet [id number]  Take 1 tablet by mouth once daily at bedtime.   Patient not taking: Reported on 6/19/2025    MARTINA Gonzalez  Active   torsemide (Demadex) 20 mg tablet [id number] Yes Take 1.5 tablets (30 mg) by mouth once daily. Lisa Vega MD PhD  Active                      No Known Allergies  Investigations:    The electronic medical record has been reviewed by me for salient history. All cardiovascular imaging and testing available in the electronic medical record, and [employer] has been reviewed.    Visit Vitals  /74 (BP Location: Left arm, Patient Position: Sitting, BP Cuff Size: Small adult)   Pulse 69   Ht 1.6 m (5' 3\")   Wt 54.6 kg (120 lb 6.4 oz)   SpO2 100%   BMI 21.33 kg/m²   Smoking Status Never   BSA 1.56 m²         On " examination:    Very pleasant petite elderly -American woman in no apparent CP or painful distress.  In wheelchair.  She was wearing supplemental oxygen, appeared very comfortable from a respiratory perspective  Well  groomed   Neck: No JVD , + HJR  Chest wall: Unremarkable device contour  CVS: HS 1,2.   No added sounds  Resp: CTA bilaterally. Percussion note resonant  Abdomen: Flat SNT, BS wnl  Extremities: 1-2+ pedal oedema to the mid shins bilaterally  Skin: warm and dry  CNS: AO x 4, no gross deficits    Lab Results   Component Value Date    WBC 4.6 12/26/2024    HGB 12.2 12/26/2024    HCT 38.1 12/26/2024     12/26/2024    ALT 62 (H) 12/26/2024    AST 63 (H) 12/26/2024     05/06/2025    K 2.4 (LL) 05/06/2025    CL 90 (L) 05/06/2025    CREATININE 1.59 (H) 05/06/2025    BUN 26 (H) 05/06/2025    CO2 43 (H) 05/06/2025    INR 1.3 (H) 12/13/2024     Transthoracic Echo (TTE) Complete  Result Date: 8/24/2024   [facility], [address] [place] [address]                Tel [phone] and Fax [phone] TRANSTHORACIC ECHOCARDIOGRAM REPORT  Patient Name:      MICHELE JACOB          Pierre Physician:    [id number] Mehrdad Yee MD Study Date:        8/24/2024            Ordering Provider:    [id number] ALEN SIMON MRN/PID:           [id number]             Fellow: Accession#:        [id number]         Nurse:                Abigail Moore RN Date of Birth/Age: 1935 / 88 years Sonographer:          Concha Pena RDCS Gender:            F                    Additional Staff: Height:            160.02 cm            Admit Date: Weight:            73.03 kg             Admission Status:     Inpatient -                                                               Routine BSA / BMI:         1.76 m2 / 28.52      Encounter#:           [id number]                    kg/m2  Blood Pressure:    128/70 mmHg          Department Location:  [facility]                                                               Invasive Study Type:    TRANSTHORACIC ECHO (TTE) COMPLETE Diagnosis/ICD: Chronic systolic (congestive) heart failure (CHF)-I50.22 Indication:    new HFrEF with decompensation CPT Code:      Echo Complete w Full Doppler-[id number] Patient History: Pertinent History: NSTEMI, HFpEF, SSS s/p PPM 2017, HTN, CKD, CAD. Study Detail: The following Echo studies were performed: 2D, M-Mode, Doppler and               color flow. Agitated saline used as a contrast agent for               intraseptal flow evaluation.  Critical Event Critical Event: Test was completed as per department protocol. Critical Finding: Severe TR. Time Test was Completed: 11:15:39 AM Notified: Dr. Yee. Attending notification time: 11:25:49 AM  PHYSICIAN INTERPRETATION: Left Ventricle: Left ventricular ejection fraction is moderately decreased, by visual estimate at 30-35%. There is global hypokinesis of the left ventricle with minor regional variations. The left ventricular cavity size is decreased. The left ventricular septal wall thickness is moderately increased. There is moderately increased left ventricular posterior wall thickness. There is severe concentric left ventricular hypertrophy. Abnormal (paradoxical) septal motion, consistent with RV pacemaker. Spectral Doppler shows an impaired relaxation pattern of left ventricular diastolic filling. There is an elevated left ventricular end diastolic pressure. Left Atrium: The left atrium is mildly dilated. A bubble study using agitated saline was performed. A small PFO (= 10 bubbles) was demonstrated. Right Ventricle: The right ventricle is moderately enlarged. There is normal right ventricular global systolic function. A device is visualized in the right ventricle. Right Atrium: The right atrium is severely dilated. Aortic Valve: The aortic valve is trileaflet.  There is minimal aortic valve cusp calcification. The aortic valve dimensionless index is 0.70. There is mild to moderate aortic valve regurgitation. The peak instantaneous gradient of the aortic valve is 3.0 mmHg. The mean gradient of the aortic valve is 1.8 mmHg. Mitral Valve: The mitral valve is mildly thickened. There is mild to moderate mitral valve regurgitation. Tricuspid Valve: The tricuspid valve is structurally normal. There is severe tricuspid regurgitation. The Doppler estimated RVSP is slightly elevated at 31.2 mmHg. The RV systolic pressure is likely to be underestimated. Pulmonic Valve: The pulmonic valve is structurally normal. There is physiologic pulmonic valve regurgitation. Pericardium: There is a trivial pericardial effusion. Pleural: There is bilateral pleural effusion. Aorta: The aortic root is normal. Systemic Veins: The inferior vena cava appears dilated. There is IVC inspiratory collapse greater than 50%. The hepatic vein shows a pattern of systolic flow reversal, suggestive of severe tricuspid regurgitation. In comparison to the previous echocardiogram(s): Compared with study dated 10/3/2023, the LV EF is decreaaed (was 50-55%) and the TR is wide-open (was moderate).  CONCLUSIONS:  1. Left ventricular ejection fraction is moderately decreased, by visual estimate at 30-35%.  2. There is global hypokinesis of the left ventricle with minor regional variations.  3. Spectral Doppler shows an impaired relaxation pattern of left ventricular diastolic filling.  4. There is an elevated left ventricular end diastolic pressure.  5. Left ventricular cavity size is decreased.  6. Abnormal septal motion consistent with RV pacemaker.  7. Moderately increased left ventricular septal thickness.  8. The left ventricular posterior wall thickness is moderately increased.  9. There is severe concentric left ventricular hypertrophy. 10. There is normal right ventricular global systolic function. 11. Moderately  enlarged right ventricle. 12. The left atrium is mildly dilated. 13. The right atrium is severely dilated. 14. Mild to moderate mitral valve regurgitation. 15. Severe tricuspid regurgitation visualized. 16. Slightly elevated RVSP. 17. The RV systolic pressure is likely to be underestimated. 18. Mild to moderate aortic valve regurgitation. 19. Sent a Haiku to the NP service. QUANTITATIVE DATA SUMMARY: 2D MEASUREMENTS:                         Normal Ranges: Ao Root d:     3.20 cm  (2.0-3.7cm) LAs:           4.25 cm  (2.7-4.0cm) IVSd:          1.78 cm  (0.6-1.1cm) LVPWd:         1.76 cm  (0.6-1.1cm) LVIDd:         3.56 cm  (3.9-5.9cm) LVIDs:         3.26 cm LV Mass Index: 148 g/m2 LVEDV Index:   56 ml/m2 LV % FS        8.3 % LA VOLUME:                               Normal Ranges: LA Vol A4C:        65.4 ml    (22+/-6mL/m2) LA Vol A2C:        62.6 ml LA Vol BP:         67.2 ml LA Vol Index A4C:  37.1ml/m2 LA Vol Index A2C:  35.5 ml/m2 LA Vol Index BP:   38.1 ml/m2 LA Area A4C:       20.2 cm2 LA Area A2C:       18.8 cm2 LA Major Axis A4C: 5.3 cm LA Major Axis A2C: 4.8 cm RA VOLUME BY A/L METHOD:                       Normal Ranges: RA Area A4C: 31.0 cm2 AORTA MEASUREMENTS:                    Normal Ranges: Asc Ao, d: 3.30 cm (2.1-3.4cm) LV SYSTOLIC FUNCTION BY 2D PLANIMETRY (MOD):                      Normal Ranges: EF-A4C View:    32 % (>=55%) EF-A2C View:    39 % EF-Biplane:     35 % EF-Visual:      33 % LV EF Reported: 33 % LV DIASTOLIC FUNCTION:                        Normal Ranges: MV Peak E: 0.44 m/s    (0.7-1.2 m/s) MV Peak A: 0.64 m/s    (0.42-0.7 m/s) E/A Ratio: 0.68        (1.0-2.2) MV A Dur:  124.57 msec MV DT:     126 msec    (150-240 msec) MITRAL VALVE:                 Normal Ranges: MV DT: 126 msec (150-240msec) MITRAL INSUFFICIENCY:                      Normal Ranges: MR VTI:  176.42 cm MR Vmax: 459.02 cm/s AORTIC VALVE:                                   Normal Ranges: AoV Vmax:                0.87 m/s  (<=1.7m/s) AoV Peak PG:             3.0 mmHg (<20mmHg) AoV Mean P.8 mmHg (1.7-11.5mmHg) LVOT Max Alfonso:            0.65 m/s (<=1.1m/s) AoV VTI:                 16.35 cm (18-25cm) LVOT VTI:                11.51 cm LVOT Diameter:           1.98 cm  (1.8-2.4cm) AoV Area, VTI:           2.17 cm2 (2.5-5.5cm2) AoV Area,Vmax:           2.30 cm2 (2.5-4.5cm2) AoV Dimensionless Index: 0.70 AORTIC INSUFFICIENCY: AI Vmax:       3.51 m/s AI Half-time:  433 msec AI Decel Time: 1492 msec AI Decel Rate: 235.88 cm/s2  RIGHT VENTRICLE: RV Basal 4.80 cm RV Mid   3.60 cm RV Major 8.2 cm TAPSE:   20.0 mm RV s'    0.12 m/s TRICUSPID VALVE/RVSP:                             Normal Ranges: Peak TR Velocity: 2.41 m/s RV Syst Pressure: 31.2 mmHg (< 30mmHg) IVC Diam:         2.89 cm PULMONIC VALVE:                         Normal Ranges: PV Accel Time: 69 msec  (>120ms) PV Max Alfonso:    0.7 m/s  (0.6-0.9m/s) PV Max P.0 mmHg AORTA: Asc Ao Diam 3.26 cm  [id number] Mehrdad Yee MD Electronically signed on 2024 at 12:07:44 PM  ** Final **     IMPRESSION:    89 y.o. retired  who presents for advanced heart failure/cardiac amyloidosis care.  She has a past medical history significant for cardiac amyloidosis, HFrEF, SSS status post PPM , hypertension, CKD.  She was treated for influenza in 2025 and notes that she has been on supplemental oxygen since then.  At her last visit she was started on tafamidis but has been unable to swallow either Vyndamax or Vyndaquel.      NYHA Functional Class: 2-3 (improving)  ACC/AHA Stage C heart failure  Volume status: Hypervolemic (but improved)  Perfusion status: Warm to touch  Aetiology: Cardiac amyloidosis    PLAN:    She has not been able to tolerate tafamidis.  We discussed the possibility of starting vutrisiran, and discussed the apparent decreased utility in older patient populations.  She wishes to discuss this with her family and will let the heart failure team know  whether she wishes to start this medication or not    She is mildly volume overloaded today, and furosemide dose will be increased from 30 mg once daily to 14 mg once daily.  ~3 to 4 months    This note was transcribed using the Dragon Dictation system. There may be grammatical, punctuation, or verbiage errors that can occur with voice recognition programs.    Lisa Vega MD PhD

## 2025-06-19 NOTE — PATIENT INSTRUCTIONS
Thank you for coming in today. If you have any questions or concerns, you may call the Heart Failure Office at [phone] option 6, or [phone].  You may also contact our heart failure nursing team via email on [email or web address].    For quicker results set-up your  [employer] account to receive results and other correspondence directly to your phone.    Please bring all your pills/medications to your Cardiology appointments.    **  - Please let me know if you want to try using Amvuttra for your cardiac amyloidosis ( Tel [phone]) or communicate via My Chart    - Please make the following medication changes:  1. INCREASE Torsemide to 40 mg once a day    - Please have the following tests done:  1.Blood tests today (RFP, BNP, CBC, iron, TIBC, ferritin)    2. Echocardiogram, CALL  [phone]   OR    [phone] to schedule      3. Blood tests in 4 weeks ( RFP, BNP)    4. Blood tests just before your next visit ( RFP, BNP)    - Please make an appointment to be seen in 4 months

## 2025-06-20 DIAGNOSIS — I50.20 SYSTOLIC CONGESTIVE HEART FAILURE, UNSPECIFIED HF CHRONICITY: ICD-10-CM

## 2025-06-20 DIAGNOSIS — D50.9 IRON DEFICIENCY ANEMIA, UNSPECIFIED IRON DEFICIENCY ANEMIA TYPE: Primary | ICD-10-CM

## 2025-06-20 LAB
ALBUMIN SERPL-MCNC: 3.9 G/DL (ref 3.6–5.1)
BNP SERPL-MCNC: 2363 PG/ML
BUN SERPL-MCNC: 37 MG/DL (ref 7–25)
BUN/CREAT SERPL: 19 (CALC) (ref 6–22)
CALCIUM SERPL-MCNC: 9.8 MG/DL (ref 8.6–10.4)
CHLORIDE SERPL-SCNC: 101 MMOL/L (ref 98–110)
CO2 SERPL-SCNC: 29 MMOL/L (ref 20–32)
CREAT SERPL-MCNC: 1.95 MG/DL (ref 0.6–0.95)
EGFRCR SERPLBLD CKD-EPI 2021: 24 ML/MIN/1.73M2
ERYTHROCYTE [DISTWIDTH] IN BLOOD BY AUTOMATED COUNT: 13 % (ref 11–15)
FERRITIN SERPL-MCNC: 91 NG/ML (ref 16–288)
GLUCOSE SERPL-MCNC: 85 MG/DL (ref 65–99)
HCT VFR BLD AUTO: 39 % (ref 35–45)
HGB BLD-MCNC: 11.6 G/DL (ref 11.7–15.5)
IRON SATN MFR SERPL: 10 % (CALC) (ref 16–45)
IRON SERPL-MCNC: 36 MCG/DL (ref 45–160)
MCH RBC QN AUTO: 28.6 PG (ref 27–33)
MCHC RBC AUTO-ENTMCNC: 29.7 G/DL (ref 32–36)
MCV RBC AUTO: 96.1 FL (ref 80–100)
PHOSPHATE SERPL-MCNC: 3.9 MG/DL (ref 2.1–4.3)
PLATELET # BLD AUTO: 186 THOUSAND/UL (ref 140–400)
PMV BLD REES-ECKER: 12.2 FL (ref 7.5–12.5)
POTASSIUM SERPL-SCNC: 4.6 MMOL/L (ref 3.5–5.3)
RBC # BLD AUTO: 4.06 MILLION/UL (ref 3.8–5.1)
SODIUM SERPL-SCNC: 143 MMOL/L (ref 135–146)
TIBC SERPL-MCNC: 378 MCG/DL (CALC) (ref 250–450)
WBC # BLD AUTO: 4.2 THOUSAND/UL (ref 3.8–10.8)

## 2025-06-20 RX ORDER — EPINEPHRINE 0.3 MG/.3ML
0.3 INJECTION SUBCUTANEOUS EVERY 5 MIN PRN
OUTPATIENT
Start: 2025-06-21

## 2025-06-20 RX ORDER — ALBUTEROL SULFATE 0.83 MG/ML
3 SOLUTION RESPIRATORY (INHALATION) AS NEEDED
OUTPATIENT
Start: 2025-06-21

## 2025-06-20 RX ORDER — DIPHENHYDRAMINE HYDROCHLORIDE 50 MG/ML
50 INJECTION, SOLUTION INTRAMUSCULAR; INTRAVENOUS AS NEEDED
OUTPATIENT
Start: 2025-06-21

## 2025-06-20 RX ORDER — FAMOTIDINE 10 MG/ML
20 INJECTION, SOLUTION INTRAVENOUS ONCE AS NEEDED
OUTPATIENT
Start: 2025-06-21

## 2025-07-18 DIAGNOSIS — D50.9 IRON DEFICIENCY ANEMIA, UNSPECIFIED IRON DEFICIENCY ANEMIA TYPE: ICD-10-CM

## 2025-07-18 DIAGNOSIS — I50.20 SYSTOLIC CONGESTIVE HEART FAILURE, UNSPECIFIED HF CHRONICITY: Primary | ICD-10-CM

## 2025-07-18 RX ORDER — FAMOTIDINE 10 MG/ML
20 INJECTION, SOLUTION INTRAVENOUS ONCE AS NEEDED
OUTPATIENT
Start: 2025-07-21

## 2025-07-18 RX ORDER — EPINEPHRINE 0.3 MG/.3ML
0.3 INJECTION SUBCUTANEOUS EVERY 5 MIN PRN
OUTPATIENT
Start: 2025-07-21

## 2025-07-18 RX ORDER — DIPHENHYDRAMINE HYDROCHLORIDE 50 MG/ML
50 INJECTION, SOLUTION INTRAMUSCULAR; INTRAVENOUS AS NEEDED
OUTPATIENT
Start: 2025-07-21

## 2025-07-18 RX ORDER — ALBUTEROL SULFATE 0.83 MG/ML
3 SOLUTION RESPIRATORY (INHALATION) AS NEEDED
OUTPATIENT
Start: 2025-07-21

## 2025-07-19 DIAGNOSIS — I50.23 ACUTE ON CHRONIC SYSTOLIC HEART FAILURE: ICD-10-CM

## 2025-07-19 DIAGNOSIS — I50.20 SYSTOLIC CONGESTIVE HEART FAILURE, UNSPECIFIED HF CHRONICITY: ICD-10-CM

## 2025-07-21 ENCOUNTER — DOCUMENTATION (OUTPATIENT)
Dept: INFUSION THERAPY | Facility: CLINIC | Age: OVER 89
End: 2025-07-21
Payer: COMMERCIAL

## 2025-07-21 NOTE — PROGRESS NOTES
Patient to be scheduled for venofer infusions.     For Diagnosis: Iron Deficiency Anemia. Systolic CHF    POCT Urine pregnancy test ordered prior to first infusion?  Not applicable     Review of Labs:  Lab Results   Component Value Date    HGB 11.6 (L) 06/19/2025    FERRITIN 91 06/19/2025    IRON 36 (L) 06/19/2025    TIBC 378 06/19/2025    MCHC 29.7 (L) 06/19/2025    MCV 96.1 06/19/2025    MCH 28.6 06/19/2025      Lab Results   Component Value Date    IRONSAT 10 (L) 06/19/2025        Do labs confirm diagnosis of iron deficiency? Yes      chris Deficiency in patients with Heart Failure is defined as a serum ferritin level < 100mcg/L OR a serum ferritin level of 100 to 300 mcg/L WITH a transferrin saturation <20% with OR WITHOUT anemia. Hgb <15       Okay to schedule for treatment as ordered by prescribing provider.      DAMION Ruiz-CNP    [facility] ([facility])  [address] [place] [address] A, [place] [address]  Phone: [phone]